# Patient Record
Sex: MALE | Race: WHITE | NOT HISPANIC OR LATINO | Employment: OTHER | URBAN - NONMETROPOLITAN AREA
[De-identification: names, ages, dates, MRNs, and addresses within clinical notes are randomized per-mention and may not be internally consistent; named-entity substitution may affect disease eponyms.]

---

## 2021-07-18 ENCOUNTER — HOSPITAL ENCOUNTER (INPATIENT)
Facility: HOSPITAL | Age: 76
LOS: 16 days | Discharge: NURSING, REHAB OR LONG-TERM CARE | DRG: 291 | End: 2021-08-03
Attending: EMERGENCY MEDICINE | Admitting: FAMILY MEDICINE
Payer: COMMERCIAL

## 2021-07-18 ENCOUNTER — APPOINTMENT (EMERGENCY)
Dept: RADIOLOGY | Facility: HOSPITAL | Age: 76
DRG: 291 | End: 2021-07-18
Payer: COMMERCIAL

## 2021-07-18 DIAGNOSIS — E11.22 TYPE 2 DIABETES MELLITUS WITH STAGE 3B CHRONIC KIDNEY DISEASE, WITHOUT LONG-TERM CURRENT USE OF INSULIN (HCC): ICD-10-CM

## 2021-07-18 DIAGNOSIS — K92.2 GI BLEED: ICD-10-CM
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
## 2021-07-18 DIAGNOSIS — N17.9 AKI (ACUTE KIDNEY INJURY) (HCC): ICD-10-CM

## 2021-07-18 DIAGNOSIS — I50.33 ACUTE ON CHRONIC DIASTOLIC CHF (CONGESTIVE HEART FAILURE) (HCC): ICD-10-CM

## 2021-07-18 DIAGNOSIS — D64.9 ANEMIA: ICD-10-CM

## 2021-07-18 DIAGNOSIS — R60.1 ANASARCA: ICD-10-CM

## 2021-07-18 DIAGNOSIS — F03.90 DEMENTIA WITHOUT BEHAVIORAL DISTURBANCE (HCC): ICD-10-CM

## 2021-07-18 DIAGNOSIS — G93.89 ENCEPHALOMALACIA: ICD-10-CM

## 2021-07-18 DIAGNOSIS — N18.32 TYPE 2 DIABETES MELLITUS WITH STAGE 3B CHRONIC KIDNEY DISEASE, WITHOUT LONG-TERM CURRENT USE OF INSULIN (HCC): ICD-10-CM

## 2021-07-18 DIAGNOSIS — I35.0 MODERATE AORTIC STENOSIS BY PRIOR ECHOCARDIOGRAM: ICD-10-CM

## 2021-07-18 DIAGNOSIS — N18.9 ACUTE KIDNEY INJURY SUPERIMPOSED ON CHRONIC KIDNEY DISEASE (HCC): ICD-10-CM

## 2021-07-18 DIAGNOSIS — K92.2 GASTROINTESTINAL HEMORRHAGE, UNSPECIFIED GASTROINTESTINAL HEMORRHAGE TYPE: ICD-10-CM

## 2021-07-18 DIAGNOSIS — E87.6 HYPOKALEMIA: ICD-10-CM

## 2021-07-18 DIAGNOSIS — E87.79 OTHER HYPERVOLEMIA: ICD-10-CM

## 2021-07-18 DIAGNOSIS — R06.89 HYPERCAPNIA: ICD-10-CM

## 2021-07-18 DIAGNOSIS — E03.9 HYPOTHYROIDISM: ICD-10-CM

## 2021-07-18 DIAGNOSIS — N17.9 ACUTE KIDNEY INJURY SUPERIMPOSED ON CHRONIC KIDNEY DISEASE (HCC): ICD-10-CM

## 2021-07-18 DIAGNOSIS — R60.9 PERIPHERAL EDEMA: Primary | ICD-10-CM

## 2021-07-18 DIAGNOSIS — G93.40 ACUTE ENCEPHALOPATHY: ICD-10-CM

## 2021-07-18 PROBLEM — Z86.73 HISTORY OF TIA (TRANSIENT ISCHEMIC ATTACK): Status: ACTIVE | Noted: 2021-07-18

## 2021-07-18 PROBLEM — R29.6 FREQUENT FALLS: Status: ACTIVE | Noted: 2021-07-18

## 2021-07-18 PROBLEM — I48.0 PAF (PAROXYSMAL ATRIAL FIBRILLATION) (HCC): Status: ACTIVE | Noted: 2021-07-18

## 2021-07-18 PROBLEM — E87.70 VOLUME OVERLOAD: Status: ACTIVE | Noted: 2021-07-18

## 2021-07-18 PROBLEM — G30.9 ALZHEIMER'S DEMENTIA (HCC): Status: ACTIVE | Noted: 2021-07-18

## 2021-07-18 PROBLEM — F02.80 ALZHEIMER'S DEMENTIA (HCC): Status: ACTIVE | Noted: 2021-07-18

## 2021-07-18 PROBLEM — R26.2 AMBULATORY DYSFUNCTION: Status: ACTIVE | Noted: 2021-07-18

## 2021-07-18 PROBLEM — I10 ESSENTIAL HYPERTENSION: Status: ACTIVE | Noted: 2021-07-18

## 2021-07-18 PROBLEM — E11.29 TYPE 2 DIABETES MELLITUS WITH KIDNEY COMPLICATION, WITHOUT LONG-TERM CURRENT USE OF INSULIN (HCC): Status: ACTIVE | Noted: 2021-07-18

## 2021-07-18 PROBLEM — Z91.89 AT RISK FOR FLUID VOLUME OVERLOAD: Status: ACTIVE | Noted: 2021-07-18

## 2021-07-18 LAB
ALBUMIN SERPL BCP-MCNC: 3 G/DL (ref 3.5–5)
ALP SERPL-CCNC: 120 U/L (ref 46–116)
ALT SERPL W P-5'-P-CCNC: 25 U/L (ref 12–78)
ANION GAP SERPL CALCULATED.3IONS-SCNC: 10 MMOL/L (ref 4–13)
APTT PPP: 35 SECONDS (ref 23–37)
AST SERPL W P-5'-P-CCNC: 27 U/L (ref 5–45)
BASOPHILS # BLD AUTO: 0.06 THOUSANDS/ΜL (ref 0–0.1)
BASOPHILS NFR BLD AUTO: 1 % (ref 0–1)
BILIRUB SERPL-MCNC: 0.58 MG/DL (ref 0.2–1)
BUN SERPL-MCNC: 25 MG/DL (ref 5–25)
CALCIUM ALBUM COR SERPL-MCNC: 9.2 MG/DL (ref 8.3–10.1)
CALCIUM SERPL-MCNC: 8.4 MG/DL (ref 8.3–10.1)
CHLORIDE SERPL-SCNC: 105 MMOL/L (ref 100–108)
CK MB SERPL-MCNC: 1.4 NG/ML (ref 0–5)
CK MB SERPL-MCNC: <1 % (ref 0–2.5)
CK SERPL-CCNC: 286 U/L (ref 39–308)
CO2 SERPL-SCNC: 30 MMOL/L (ref 21–32)
CREAT SERPL-MCNC: 2.21 MG/DL (ref 0.6–1.3)
EOSINOPHIL # BLD AUTO: 0.16 THOUSAND/ΜL (ref 0–0.61)
EOSINOPHIL NFR BLD AUTO: 3 % (ref 0–6)
ERYTHROCYTE [DISTWIDTH] IN BLOOD BY AUTOMATED COUNT: 15.9 % (ref 11.6–15.1)
GFR SERPL CREATININE-BSD FRML MDRD: 28 ML/MIN/1.73SQ M
GLUCOSE SERPL-MCNC: 133 MG/DL (ref 65–140)
HCT VFR BLD AUTO: 29.2 % (ref 36.5–49.3)
HGB BLD-MCNC: 8.5 G/DL (ref 12–17)
IMM GRANULOCYTES # BLD AUTO: 0.02 THOUSAND/UL (ref 0–0.2)
IMM GRANULOCYTES NFR BLD AUTO: 0 % (ref 0–2)
INR PPP: 1.22 (ref 0.84–1.19)
LACTATE SERPL-SCNC: 1.1 MMOL/L (ref 0.5–2)
LYMPHOCYTES # BLD AUTO: 0.65 THOUSANDS/ΜL (ref 0.6–4.47)
LYMPHOCYTES NFR BLD AUTO: 11 % (ref 14–44)
MAGNESIUM SERPL-MCNC: 2.3 MG/DL (ref 1.6–2.6)
MCH RBC QN AUTO: 26.6 PG (ref 26.8–34.3)
MCHC RBC AUTO-ENTMCNC: 29.1 G/DL (ref 31.4–37.4)
MCV RBC AUTO: 91 FL (ref 82–98)
MONOCYTES # BLD AUTO: 0.78 THOUSAND/ΜL (ref 0.17–1.22)
MONOCYTES NFR BLD AUTO: 14 % (ref 4–12)
NEUTROPHILS # BLD AUTO: 4.05 THOUSANDS/ΜL (ref 1.85–7.62)
NEUTS SEG NFR BLD AUTO: 71 % (ref 43–75)
NRBC BLD AUTO-RTO: 0 /100 WBCS
NT-PROBNP SERPL-MCNC: 4495 PG/ML
PLATELET # BLD AUTO: 357 THOUSANDS/UL (ref 149–390)
PMV BLD AUTO: 9.8 FL (ref 8.9–12.7)
POTASSIUM SERPL-SCNC: 2.9 MMOL/L (ref 3.5–5.3)
PROT SERPL-MCNC: 7.5 G/DL (ref 6.4–8.2)
PROTHROMBIN TIME: 15.2 SECONDS (ref 11.6–14.5)
RBC # BLD AUTO: 3.2 MILLION/UL (ref 3.88–5.62)
SODIUM SERPL-SCNC: 145 MMOL/L (ref 136–145)
TROPONIN I SERPL-MCNC: 0.04 NG/ML
WBC # BLD AUTO: 5.72 THOUSAND/UL (ref 4.31–10.16)

## 2021-07-18 PROCEDURE — 71045 X-RAY EXAM CHEST 1 VIEW: CPT

## 2021-07-18 PROCEDURE — 85610 PROTHROMBIN TIME: CPT | Performed by: EMERGENCY MEDICINE

## 2021-07-18 PROCEDURE — 36415 COLL VENOUS BLD VENIPUNCTURE: CPT | Performed by: EMERGENCY MEDICINE

## 2021-07-18 PROCEDURE — 93005 ELECTROCARDIOGRAM TRACING: CPT

## 2021-07-18 PROCEDURE — 99285 EMERGENCY DEPT VISIT HI MDM: CPT | Performed by: EMERGENCY MEDICINE

## 2021-07-18 PROCEDURE — 85730 THROMBOPLASTIN TIME PARTIAL: CPT | Performed by: EMERGENCY MEDICINE

## 2021-07-18 PROCEDURE — 96374 THER/PROPH/DIAG INJ IV PUSH: CPT

## 2021-07-18 PROCEDURE — 80053 COMPREHEN METABOLIC PANEL: CPT | Performed by: EMERGENCY MEDICINE

## 2021-07-18 PROCEDURE — 82550 ASSAY OF CK (CPK): CPT | Performed by: EMERGENCY MEDICINE

## 2021-07-18 PROCEDURE — 84484 ASSAY OF TROPONIN QUANT: CPT | Performed by: EMERGENCY MEDICINE

## 2021-07-18 PROCEDURE — 99285 EMERGENCY DEPT VISIT HI MDM: CPT

## 2021-07-18 PROCEDURE — 82553 CREATINE MB FRACTION: CPT | Performed by: EMERGENCY MEDICINE

## 2021-07-18 PROCEDURE — 85025 COMPLETE CBC W/AUTO DIFF WBC: CPT | Performed by: EMERGENCY MEDICINE

## 2021-07-18 PROCEDURE — 83880 ASSAY OF NATRIURETIC PEPTIDE: CPT | Performed by: EMERGENCY MEDICINE

## 2021-07-18 PROCEDURE — 83735 ASSAY OF MAGNESIUM: CPT | Performed by: EMERGENCY MEDICINE

## 2021-07-18 PROCEDURE — 99223 1ST HOSP IP/OBS HIGH 75: CPT | Performed by: NURSE PRACTITIONER

## 2021-07-18 PROCEDURE — 83605 ASSAY OF LACTIC ACID: CPT | Performed by: EMERGENCY MEDICINE

## 2021-07-18 RX ORDER — GLIMEPIRIDE 4 MG/1
4 TABLET ORAL 2 TIMES DAILY
COMMUNITY
Start: 2021-03-23 | End: 2021-08-03 | Stop reason: HOSPADM

## 2021-07-18 RX ORDER — DONEPEZIL HYDROCHLORIDE 5 MG/1
5 TABLET, FILM COATED ORAL
Status: DISCONTINUED | OUTPATIENT
Start: 2021-07-18 | End: 2021-08-03 | Stop reason: HOSPADM

## 2021-07-18 RX ORDER — POTASSIUM CHLORIDE 29.8 MG/ML
40 INJECTION INTRAVENOUS ONCE
Status: DISCONTINUED | OUTPATIENT
Start: 2021-07-18 | End: 2021-07-18 | Stop reason: RX

## 2021-07-18 RX ORDER — DONEPEZIL HYDROCHLORIDE 5 MG/1
5 TABLET, FILM COATED ORAL
COMMUNITY
Start: 2021-03-23 | End: 2022-03-23

## 2021-07-18 RX ORDER — AMLODIPINE BESYLATE 2.5 MG/1
2.5 TABLET ORAL DAILY
Status: DISCONTINUED | OUTPATIENT
Start: 2021-07-19 | End: 2021-07-19

## 2021-07-18 RX ORDER — DOXYCYCLINE HYCLATE 50 MG/1
324 CAPSULE, GELATIN COATED ORAL DAILY
Status: DISCONTINUED | OUTPATIENT
Start: 2021-07-19 | End: 2021-07-23

## 2021-07-18 RX ORDER — PANTOPRAZOLE SODIUM 40 MG/1
40 TABLET, DELAYED RELEASE ORAL
Status: DISCONTINUED | OUTPATIENT
Start: 2021-07-19 | End: 2021-08-03 | Stop reason: HOSPADM

## 2021-07-18 RX ORDER — FUROSEMIDE 40 MG/1
40 TABLET ORAL 2 TIMES DAILY
Status: DISCONTINUED | OUTPATIENT
Start: 2021-07-18 | End: 2021-07-18

## 2021-07-18 RX ORDER — LEVOTHYROXINE SODIUM 0.1 MG/1
100 TABLET ORAL
COMMUNITY
Start: 2021-03-23 | End: 2021-08-03 | Stop reason: HOSPADM

## 2021-07-18 RX ORDER — POTASSIUM CHLORIDE 20 MEQ/1
40 TABLET, EXTENDED RELEASE ORAL ONCE
Status: COMPLETED | OUTPATIENT
Start: 2021-07-18 | End: 2021-07-18

## 2021-07-18 RX ORDER — PANTOPRAZOLE SODIUM 40 MG/1
40 TABLET, DELAYED RELEASE ORAL DAILY
COMMUNITY
Start: 2021-03-23

## 2021-07-18 RX ORDER — FUROSEMIDE 10 MG/ML
40 INJECTION INTRAMUSCULAR; INTRAVENOUS
Status: DISCONTINUED | OUTPATIENT
Start: 2021-07-18 | End: 2021-07-19

## 2021-07-18 RX ORDER — FUROSEMIDE 40 MG/1
40 TABLET ORAL DAILY
COMMUNITY
Start: 2021-03-23 | End: 2021-08-03 | Stop reason: HOSPADM

## 2021-07-18 RX ORDER — ACETAMINOPHEN 325 MG/1
650 TABLET ORAL EVERY 4 HOURS PRN
Status: DISCONTINUED | OUTPATIENT
Start: 2021-07-18 | End: 2021-08-03 | Stop reason: HOSPADM

## 2021-07-18 RX ORDER — LOSARTAN POTASSIUM 100 MG/1
100 TABLET ORAL DAILY
COMMUNITY
Start: 2021-04-05 | End: 2021-08-03 | Stop reason: HOSPADM

## 2021-07-18 RX ORDER — METOPROLOL SUCCINATE 50 MG/1
50 TABLET, EXTENDED RELEASE ORAL DAILY
COMMUNITY
Start: 2021-06-15 | End: 2022-06-15

## 2021-07-18 RX ORDER — POTASSIUM CHLORIDE 14.9 MG/ML
20 INJECTION INTRAVENOUS
Status: COMPLETED | OUTPATIENT
Start: 2021-07-18 | End: 2021-07-19

## 2021-07-18 RX ORDER — METOPROLOL SUCCINATE 50 MG/1
50 TABLET, EXTENDED RELEASE ORAL DAILY
Status: DISCONTINUED | OUTPATIENT
Start: 2021-07-19 | End: 2021-08-03 | Stop reason: HOSPADM

## 2021-07-18 RX ORDER — LEVOTHYROXINE SODIUM 0.1 MG/1
100 TABLET ORAL
Status: DISCONTINUED | OUTPATIENT
Start: 2021-07-19 | End: 2021-07-21

## 2021-07-18 RX ORDER — PIOGLITAZONEHYDROCHLORIDE 30 MG/1
TABLET ORAL
COMMUNITY
Start: 2021-03-23 | End: 2021-08-03 | Stop reason: HOSPADM

## 2021-07-18 RX ORDER — ATORVASTATIN CALCIUM 40 MG/1
80 TABLET, FILM COATED ORAL
Status: DISCONTINUED | OUTPATIENT
Start: 2021-07-19 | End: 2021-08-03 | Stop reason: HOSPADM

## 2021-07-18 RX ORDER — ATORVASTATIN CALCIUM 80 MG/1
80 TABLET, FILM COATED ORAL
COMMUNITY
Start: 2021-03-23

## 2021-07-18 RX ORDER — AMLODIPINE BESYLATE 5 MG/1
5 TABLET ORAL DAILY
COMMUNITY
Start: 2021-03-23

## 2021-07-18 RX ORDER — CLOPIDOGREL BISULFATE 75 MG/1
75 TABLET ORAL DAILY
COMMUNITY
Start: 2021-03-23

## 2021-07-18 RX ADMIN — POTASSIUM CHLORIDE 20 MEQ: 14.9 INJECTION, SOLUTION INTRAVENOUS at 22:02

## 2021-07-18 RX ADMIN — POTASSIUM CHLORIDE 20 MEQ: 14.9 INJECTION, SOLUTION INTRAVENOUS at 23:58

## 2021-07-18 RX ADMIN — POTASSIUM CHLORIDE 40 MEQ: 1500 TABLET, EXTENDED RELEASE ORAL at 21:49

## 2021-07-18 RX ADMIN — DONEPEZIL HYDROCHLORIDE 5 MG: 5 TABLET ORAL at 23:59

## 2021-07-19 ENCOUNTER — ANESTHESIA EVENT (INPATIENT)
Dept: ANESTHESIOLOGY | Facility: HOSPITAL | Age: 76
DRG: 291 | End: 2021-07-19
Payer: COMMERCIAL

## 2021-07-19 ENCOUNTER — ANESTHESIA (INPATIENT)
Dept: ANESTHESIOLOGY | Facility: HOSPITAL | Age: 76
DRG: 291 | End: 2021-07-19
Payer: COMMERCIAL

## 2021-07-19 ENCOUNTER — APPOINTMENT (INPATIENT)
Dept: NON INVASIVE DIAGNOSTICS | Facility: HOSPITAL | Age: 76
DRG: 291 | End: 2021-07-19
Payer: COMMERCIAL

## 2021-07-19 PROBLEM — I50.33 ACUTE ON CHRONIC DIASTOLIC CHF (CONGESTIVE HEART FAILURE) (HCC): Status: ACTIVE | Noted: 2021-07-18

## 2021-07-19 PROBLEM — E66.01 MORBID OBESITY WITH BMI OF 40.0-44.9, ADULT (HCC): Status: ACTIVE | Noted: 2021-07-19

## 2021-07-19 LAB
ANION GAP SERPL CALCULATED.3IONS-SCNC: 9 MMOL/L (ref 4–13)
BILIRUB UR QL STRIP: NEGATIVE
BUN SERPL-MCNC: 23 MG/DL (ref 5–25)
CALCIUM SERPL-MCNC: 8.4 MG/DL (ref 8.3–10.1)
CHLORIDE SERPL-SCNC: 107 MMOL/L (ref 100–108)
CHOLEST SERPL-MCNC: 86 MG/DL (ref 50–200)
CLARITY UR: CLEAR
CO2 SERPL-SCNC: 28 MMOL/L (ref 21–32)
COLOR UR: YELLOW
CREAT SERPL-MCNC: 2.06 MG/DL (ref 0.6–1.3)
ERYTHROCYTE [DISTWIDTH] IN BLOOD BY AUTOMATED COUNT: 15.9 % (ref 11.6–15.1)
EST. AVERAGE GLUCOSE BLD GHB EST-MCNC: 143 MG/DL
GFR SERPL CREATININE-BSD FRML MDRD: 30 ML/MIN/1.73SQ M
GLUCOSE SERPL-MCNC: 102 MG/DL (ref 65–140)
GLUCOSE SERPL-MCNC: 121 MG/DL (ref 65–140)
GLUCOSE SERPL-MCNC: 133 MG/DL (ref 65–140)
GLUCOSE SERPL-MCNC: 61 MG/DL (ref 65–140)
GLUCOSE SERPL-MCNC: 63 MG/DL (ref 65–140)
GLUCOSE SERPL-MCNC: 67 MG/DL (ref 65–140)
GLUCOSE SERPL-MCNC: 79 MG/DL (ref 65–140)
GLUCOSE SERPL-MCNC: 85 MG/DL (ref 65–140)
GLUCOSE SERPL-MCNC: 93 MG/DL (ref 65–140)
GLUCOSE UR STRIP-MCNC: NEGATIVE MG/DL
HBA1C MFR BLD: 6.6 %
HCT VFR BLD AUTO: 27.2 % (ref 36.5–49.3)
HDLC SERPL-MCNC: 41 MG/DL
HGB BLD-MCNC: 7.8 G/DL (ref 12–17)
HGB UR QL STRIP.AUTO: NEGATIVE
KETONES UR STRIP-MCNC: NEGATIVE MG/DL
LDLC SERPL CALC-MCNC: 33 MG/DL (ref 0–100)
LEUKOCYTE ESTERASE UR QL STRIP: NEGATIVE
MCH RBC QN AUTO: 26.4 PG (ref 26.8–34.3)
MCHC RBC AUTO-ENTMCNC: 28.7 G/DL (ref 31.4–37.4)
MCV RBC AUTO: 92 FL (ref 82–98)
NITRITE UR QL STRIP: NEGATIVE
NONHDLC SERPL-MCNC: 45 MG/DL
PH UR STRIP.AUTO: 5.5 [PH]
PLATELET # BLD AUTO: 328 THOUSANDS/UL (ref 149–390)
PMV BLD AUTO: 10.2 FL (ref 8.9–12.7)
POTASSIUM SERPL-SCNC: 3.2 MMOL/L (ref 3.5–5.3)
PROT UR STRIP-MCNC: NEGATIVE MG/DL
RBC # BLD AUTO: 2.95 MILLION/UL (ref 3.88–5.62)
SODIUM SERPL-SCNC: 144 MMOL/L (ref 136–145)
SP GR UR STRIP.AUTO: 1.01 (ref 1–1.03)
T4 FREE SERPL-MCNC: 1.2 NG/DL (ref 0.76–1.46)
TRIGL SERPL-MCNC: 58 MG/DL
TSH SERPL DL<=0.05 MIU/L-ACNC: 14.38 UIU/ML (ref 0.36–3.74)
UROBILINOGEN UR QL STRIP.AUTO: 0.2 E.U./DL
WBC # BLD AUTO: 4.62 THOUSAND/UL (ref 4.31–10.16)

## 2021-07-19 PROCEDURE — 97163 PT EVAL HIGH COMPLEX 45 MIN: CPT

## 2021-07-19 PROCEDURE — 80048 BASIC METABOLIC PNL TOTAL CA: CPT | Performed by: NURSE PRACTITIONER

## 2021-07-19 PROCEDURE — 93308 TTE F-UP OR LMTD: CPT

## 2021-07-19 PROCEDURE — 93325 DOPPLER ECHO COLOR FLOW MAPG: CPT | Performed by: INTERNAL MEDICINE

## 2021-07-19 PROCEDURE — 93321 DOPPLER ECHO F-UP/LMTD STD: CPT | Performed by: INTERNAL MEDICINE

## 2021-07-19 PROCEDURE — 84443 ASSAY THYROID STIM HORMONE: CPT | Performed by: NURSE PRACTITIONER

## 2021-07-19 PROCEDURE — 97167 OT EVAL HIGH COMPLEX 60 MIN: CPT

## 2021-07-19 PROCEDURE — 94760 N-INVAS EAR/PLS OXIMETRY 1: CPT

## 2021-07-19 PROCEDURE — 82948 REAGENT STRIP/BLOOD GLUCOSE: CPT

## 2021-07-19 PROCEDURE — 80061 LIPID PANEL: CPT | Performed by: NURSE PRACTITIONER

## 2021-07-19 PROCEDURE — 93308 TTE F-UP OR LMTD: CPT | Performed by: INTERNAL MEDICINE

## 2021-07-19 PROCEDURE — 85027 COMPLETE CBC AUTOMATED: CPT | Performed by: NURSE PRACTITIONER

## 2021-07-19 PROCEDURE — 83036 HEMOGLOBIN GLYCOSYLATED A1C: CPT | Performed by: NURSE PRACTITIONER

## 2021-07-19 PROCEDURE — 84439 ASSAY OF FREE THYROXINE: CPT | Performed by: NURSE PRACTITIONER

## 2021-07-19 PROCEDURE — 81003 URINALYSIS AUTO W/O SCOPE: CPT | Performed by: EMERGENCY MEDICINE

## 2021-07-19 PROCEDURE — 99233 SBSQ HOSP IP/OBS HIGH 50: CPT | Performed by: FAMILY MEDICINE

## 2021-07-19 RX ORDER — POTASSIUM CHLORIDE 20 MEQ/1
40 TABLET, EXTENDED RELEASE ORAL ONCE
Status: COMPLETED | OUTPATIENT
Start: 2021-07-19 | End: 2021-07-19

## 2021-07-19 RX ORDER — FUROSEMIDE 10 MG/ML
40 INJECTION INTRAMUSCULAR; INTRAVENOUS ONCE
Status: COMPLETED | OUTPATIENT
Start: 2021-07-19 | End: 2021-07-19

## 2021-07-19 RX ORDER — FUROSEMIDE 10 MG/ML
20 INJECTION INTRAMUSCULAR; INTRAVENOUS ONCE
Status: DISCONTINUED | OUTPATIENT
Start: 2021-07-19 | End: 2021-07-19

## 2021-07-19 RX ORDER — POLYETHYLENE GLYCOL 3350 17 G/17G
238 POWDER, FOR SOLUTION ORAL ONCE
Status: COMPLETED | OUTPATIENT
Start: 2021-07-19 | End: 2021-07-19

## 2021-07-19 RX ORDER — FUROSEMIDE 10 MG/ML
60 INJECTION INTRAMUSCULAR; INTRAVENOUS
Status: DISCONTINUED | OUTPATIENT
Start: 2021-07-19 | End: 2021-07-21

## 2021-07-19 RX ORDER — DEXTROSE MONOHYDRATE 50 MG/ML
50 INJECTION, SOLUTION INTRAVENOUS CONTINUOUS
Status: DISCONTINUED | OUTPATIENT
Start: 2021-07-19 | End: 2021-07-21

## 2021-07-19 RX ADMIN — POLYETHYLENE GLYCOL 3350 238 G: 17 POWDER, FOR SOLUTION ORAL at 12:24

## 2021-07-19 RX ADMIN — LEVOTHYROXINE SODIUM 100 MCG: 100 TABLET ORAL at 05:03

## 2021-07-19 RX ADMIN — PANTOPRAZOLE SODIUM 40 MG: 40 TABLET, DELAYED RELEASE ORAL at 05:03

## 2021-07-19 RX ADMIN — FUROSEMIDE 40 MG: 10 INJECTION, SOLUTION INTRAMUSCULAR; INTRAVENOUS at 12:24

## 2021-07-19 RX ADMIN — DEXTROSE 50 ML/HR: 5 SOLUTION INTRAVENOUS at 17:44

## 2021-07-19 RX ADMIN — ATORVASTATIN CALCIUM 80 MG: 40 TABLET, FILM COATED ORAL at 18:37

## 2021-07-19 RX ADMIN — BISACODYL 20 MG: 5 TABLET, COATED ORAL at 15:00

## 2021-07-19 RX ADMIN — FUROSEMIDE 40 MG: 10 INJECTION, SOLUTION INTRAMUSCULAR; INTRAVENOUS at 08:51

## 2021-07-19 RX ADMIN — DONEPEZIL HYDROCHLORIDE 5 MG: 5 TABLET ORAL at 21:33

## 2021-07-19 RX ADMIN — METOPROLOL SUCCINATE 50 MG: 50 TABLET, EXTENDED RELEASE ORAL at 08:51

## 2021-07-19 RX ADMIN — AMLODIPINE BESYLATE 2.5 MG: 2.5 TABLET ORAL at 08:51

## 2021-07-19 RX ADMIN — FERROUS GLUCONATE 324 MG: 324 TABLET ORAL at 08:51

## 2021-07-19 RX ADMIN — FUROSEMIDE 60 MG: 10 INJECTION, SOLUTION INTRAMUSCULAR; INTRAVENOUS at 18:36

## 2021-07-19 RX ADMIN — POTASSIUM CHLORIDE 40 MEQ: 1500 TABLET, EXTENDED RELEASE ORAL at 08:57

## 2021-07-19 RX ADMIN — POTASSIUM CHLORIDE 40 MEQ: 1500 TABLET, EXTENDED RELEASE ORAL at 12:24

## 2021-07-19 RX ADMIN — PERFLUTREN 0.4 ML/MIN: 6.52 INJECTION, SUSPENSION INTRAVENOUS at 15:29

## 2021-07-19 NOTE — CONSULTS
Consult received for CHF ed  Pt with +4 BLE and nonpitting perineal edema per nursing flowsheets  Currently on CCD clear liquids diet and NPO after midnight for colonoscopy  Pt has decreased appetite for about a week, smaller portions at meals  Normally eats 2 meals per day though does not follow special diet at home  Wife reports pt uses some salt at table, does report consumption of high sodium foods like processed meats  Discussed high sodium foods and alternatives  Advised avoidance of salt shaker  CHF Nutrition Therapy handout provided with RD contact information  Advance diet as medically appropriate to CCD 3, cardiac diet  Fluid restriction per MD     Thank you for the consult, will follow up

## 2021-07-19 NOTE — PLAN OF CARE
Problem: Potential for Falls  Goal: Patient will remain free of falls  Description: INTERVENTIONS:  - Educate patient/family on patient safety including physical limitations  - Instruct patient to call for assistance with activity   - Consult OT/PT to assist with strengthening/mobility   - Keep Call bell within reach  - Keep bed low and locked with side rails adjusted as appropriate  - Keep care items and personal belongings within reach  - Initiate and maintain comfort rounds  - Make Fall Risk Sign visible to staff  -  - Apply yellow socks and bracelet for high fall risk patients  - Consider moving patient to room near nurses station  Outcome: Progressing     Problem: MOBILITY - ADULT  Goal: Maintain or return to baseline ADL function  Description: INTERVENTIONS:  -  Assess patient's ability to carry out ADLs; assess patient's baseline for ADL function and identify physical deficits which impact ability to perform ADLs (bathing, care of mouth/teeth, toileting, grooming, dressing, etc )  - Assess/evaluate cause of self-care deficits   - Assess range of motion  - Assess patient's mobility; develop plan if impaired  - Assess patient's need for assistive devices and provide as appropriate  - Encourage maximum independence but intervene and supervise when necessary  - Involve family in performance of ADLs  - Assess for home care needs following discharge   - Consider OT consult to assist with ADL evaluation and planning for discharge  - Provide patient education as appropriate  Outcome: Progressing  Goal: Maintains/Returns to pre admission functional level  Description: INTERVENTIONS:  - Perform BMAT or MOVE assessment daily    - Set and communicate daily mobility goal to care team and patient/family/caregiver     - Collaborate with rehabilitation services on mobility goals if consulted  -  - Out of bed for toileting  - Record patient progress and toleration of activity level   Outcome: Progressing     Problem: Prexisting or High Potential for Compromised Skin Integrity  Goal: Skin integrity is maintained or improved  Description: INTERVENTIONS:  - Identify patients at risk for skin breakdown  - Assess and monitor skin integrity  - Assess and monitor nutrition and hydration status  - Monitor labs   - Assess for incontinence   - Turn and reposition patient  - Assist with mobility/ambulation  - Relieve pressure over bony prominences  - Avoid friction and shearing  - Provide appropriate hygiene as needed including keeping skin clean and dry  - Evaluate need for skin moisturizer/barrier cream  - Collaborate with interdisciplinary team   - Patient/family teaching  - Consider wound care consult   Outcome: Progressing     Problem: NEUROSENSORY - ADULT  Goal: Achieves stable or improved neurological status  Description: INTERVENTIONS  - Monitor and report changes in neurological status  - Monitor vital signs such as temperature, blood pressure, glucose, and any other labs ordered   - Initiate measures to prevent increased intracranial pressure  - Monitor for seizure activity and implement precautions if appropriate      Outcome: Progressing  Goal: Remains free of injury related to seizures activity  Description: INTERVENTIONS  - Maintain airway, patient safety  and administer oxygen as ordered  - Monitor patient for seizure activity, document and report duration and description of seizure to physician/advanced practitioner  - If seizure occurs,  ensure patient safety during seizure  - Reorient patient post seizure  - Seizure pads on all 4 side rails  - Instruct patient/family to notify RN of any seizure activity including if an aura is experienced  - Instruct patient/family to call for assistance with activity based on nursing assessment  - Administer anti-seizure medications if ordered    Outcome: Progressing  Goal: Achieves maximal functionality and self care  Description: INTERVENTIONS  - Monitor swallowing and airway patency with patient fatigue and changes in neurological status  - Encourage and assist patient to increase activity and self care     - Encourage visually impaired, hearing impaired and aphasic patients to use assistive/communication devices  Outcome: Progressing     Problem: CARDIOVASCULAR - ADULT  Goal: Maintains optimal cardiac output and hemodynamic stability  Description: INTERVENTIONS:  - Monitor I/O, vital signs and rhythm  - Monitor for S/S and trends of decreased cardiac output  - Administer and titrate ordered vasoactive medications to optimize hemodynamic stability  - Assess quality of pulses, skin color and temperature  - Assess for signs of decreased coronary artery perfusion  - Instruct patient to report change in severity of symptoms  Outcome: Progressing  Goal: Absence of cardiac dysrhythmias or at baseline rhythm  Description: INTERVENTIONS:  - Continuous cardiac monitoring, vital signs, obtain 12 lead EKG if ordered  - Administer antiarrhythmic and heart rate control medications as ordered  - Monitor electrolytes and administer replacement therapy as ordered  Outcome: Progressing     Problem: RESPIRATORY - ADULT  Goal: Achieves optimal ventilation and oxygenation  Description: INTERVENTIONS:  - Assess for changes in respiratory status  - Assess for changes in mentation and behavior  - Position to facilitate oxygenation and minimize respiratory effort  - Oxygen administered by appropriate delivery if ordered  - Initiate smoking cessation education as indicated  - Encourage broncho-pulmonary hygiene including cough, deep breathe, Incentive Spirometry  - Assess the need for suctioning and aspirate as needed  - Assess and instruct to report SOB or any respiratory difficulty  - Respiratory Therapy support as indicated  Outcome: Progressing     Problem: GASTROINTESTINAL - ADULT  Goal: Minimal or absence of nausea and/or vomiting  Description: INTERVENTIONS:  - Administer IV fluids if ordered to ensure adequate hydration  - Maintain NPO status until nausea and vomiting are resolved  - Nasogastric tube if ordered  - Administer ordered antiemetic medications as needed  - Provide nonpharmacologic comfort measures as appropriate  - Advance diet as tolerated, if ordered  - Consider nutrition services referral to assist patient with adequate nutrition and appropriate food choices  Outcome: Progressing  Goal: Maintains or returns to baseline bowel function  Description: INTERVENTIONS:  - Assess bowel function  - Encourage oral fluids to ensure adequate hydration  - Administer IV fluids if ordered to ensure adequate hydration  - Administer ordered medications as needed  - Encourage mobilization and activity  - Consider nutritional services referral to assist patient with adequate nutrition and appropriate food choices  Outcome: Progressing  Goal: Maintains adequate nutritional intake  Description: INTERVENTIONS:  - Monitor percentage of each meal consumed  - Identify factors contributing to decreased intake, treat as appropriate  - Assist with meals as needed  - Monitor I&O, weight, and lab values if indicated  - Obtain nutrition services referral as needed  Outcome: Progressing  Goal: Establish and maintain optimal ostomy function  Description: INTERVENTIONS:  - Assess bowel function  - Encourage oral fluids to ensure adequate hydration  - Administer IV fluids if ordered to ensure adequate hydration   - Administer ordered medications as needed  - Encourage mobilization and activity  - Nutrition services referral to assist patient with appropriate food choices  - Assess stoma site  - Consider wound care consult   Outcome: Progressing  Goal: Oral mucous membranes remain intact  Description: INTERVENTIONS  - Assess oral mucosa and hygiene practices  - Implement preventative oral hygiene regimen  - Implement oral medicated treatments as ordered  - Initiate Nutrition services referral as needed  Outcome: Progressing     Problem: GENITOURINARY - ADULT  Goal: Maintains or returns to baseline urinary function  Description: INTERVENTIONS:  - Assess urinary function  - Encourage oral fluids to ensure adequate hydration if ordered  - Administer IV fluids as ordered to ensure adequate hydration  - Administer ordered medications as needed  - Offer frequent toileting  - Follow urinary retention protocol if ordered  Outcome: Progressing  Goal: Absence of urinary retention  Description: INTERVENTIONS:  - Assess patients ability to void and empty bladder  - Monitor I/O  - Bladder scan as needed  - Discuss with physician/AP medications to alleviate retention as needed  - Discuss catheterization for long term situations as appropriate  Outcome: Progressing  Goal: Urinary catheter remains patent  Description: INTERVENTIONS:  - Assess patency of urinary catheter  - If patient has a chronic quintero, consider changing catheter if non-functioning  - Follow guidelines for intermittent irrigation of non-functioning urinary catheter  Outcome: Progressing     Problem: METABOLIC, FLUID AND ELECTROLYTES - ADULT  Goal: Electrolytes maintained within normal limits  Description: INTERVENTIONS:  - Monitor labs and assess patient for signs and symptoms of electrolyte imbalances  - Administer electrolyte replacement as ordered  - Monitor response to electrolyte replacements, including repeat lab results as appropriate  - Instruct patient on fluid and nutrition as appropriate  Outcome: Progressing  Goal: Fluid balance maintained  Description: INTERVENTIONS:  - Monitor labs   - Monitor I/O and WT  - Instruct patient on fluid and nutrition as appropriate  - Assess for signs & symptoms of volume excess or deficit  Outcome: Progressing  Goal: Glucose maintained within target range  Description: INTERVENTIONS:  - Monitor Blood Glucose as ordered  - Assess for signs and symptoms of hyperglycemia and hypoglycemia  - Administer ordered medications to maintain glucose within target range  - Assess nutritional intake and initiate nutrition service referral as needed  Outcome: Progressing     Problem: SKIN/TISSUE INTEGRITY - ADULT  Goal: Skin Integrity remains intact(Skin Breakdown Prevention)  Description: Assess:    -Assess extremities for adequate circulation and sensation     Bed Management:  -Have minimal linens on bed & keep smooth, unwrinkled  -Change linens as needed when moist or perspiring  -      Skin Care:  -Avoid use of baby powder, tape, friction and shearing, hot water or constrictive clothing    Outcome: Progressing  Goal: Incision(s), wounds(s) or drain site(s) healing without S/S of infection  Description: INTERVENTIONS  - Assess and document dressing, incision, wound bed, drain sites and surrounding tissue    Outcome: Progressing  Goal: Pressure injury heals and does not worsen  Description: Interventions:  - Implement low air loss mattress or specialty surface (Criteria met)  - Apply silicone foam dressing   hours   - Utilize friction reducing device or surface for transfers     - Consider nutrition services referral as needed  Outcome: Progressing     Problem: HEMATOLOGIC - ADULT  Goal: Maintains hematologic stability  Description: INTERVENTIONS  - Assess for signs and symptoms of bleeding or hemorrhage  - Monitor labs  - Administer supportive blood products/factors as ordered and appropriate  Outcome: Progressing     Problem: MUSCULOSKELETAL - ADULT  Goal: Maintain or return mobility to safest level of function  Description: INTERVENTIONS:  - Assess patient's ability to carry out ADLs; assess patient's baseline for ADL function and identify physical deficits which impact ability to perform ADLs (bathing, care of mouth/teeth, toileting, grooming, dressing, etc )  - Assess/evaluate cause of self-care deficits   - Assess range of motion  - Assess patient's mobility  - Assess patient's need for assistive devices and provide as appropriate  - Encourage maximum independence but intervene and supervise when necessary  - Involve family in performance of ADLs  - Assess for home care needs following discharge   - Consider OT consult to assist with ADL evaluation and planning for discharge  - Provide patient education as appropriate  Outcome: Progressing  Goal: Maintain proper alignment of affected body part  Description: INTERVENTIONS:  - Support, maintain and protect limb and body alignment  - Provide patient/ family with appropriate education  Outcome: Progressing

## 2021-07-19 NOTE — ASSESSMENT & PLAN NOTE
· Lower extremity 3+ pitting edema to bilateral lower extremities extending up to scrotum, bibasilar rales  · Chest x-ray:  Cardiomegaly  Suspect pulmonary vasculature congestion    Radiology read is pending  · BNP:  4500  · Daily weights  · I&Os  · Lasix IV twice daily  · Cardiology consult  · Recheck echocardiogram  · Low-salt diet, fluid restriction

## 2021-07-19 NOTE — H&P
[address] 1945, 64 y o  male MRN: [id number]  Unit/Bed#: -01 Encounter: [id number]  Primary Care Provider: No primary care provider on file  Date and time admitted to hospital: 7/18/2021  9:06 PM    Ambulatory dysfunction  Assessment & Plan  · Patient has been having frequent falls, and today he could not get up from the floor  EMS was concerned that patient may be in an unsafe situation as patient's wife is having difficulty caring for him  · Fall precautions  · Case management consult  · PT OT consult    Volume overload  Assessment & Plan  · Lower extremity 3+ pitting edema to bilateral lower extremities extending up to scrotum, bibasilar rales  · Chest x-ray:  Cardiomegaly  Suspect pulmonary vasculature congestion  Radiology read is pending  · BNP:  4500  · Daily weights  · I&Os  · Lasix IV twice daily  · Cardiology consult  · Recheck echocardiogram  · Low-salt diet, fluid restriction    Acute kidney injury superimposed on chronic kidney disease [facility])  Assessment & Plan  Lab Results   Component Value Date    EGFR 28 07/18/2021    CREATININE 2 21 (H) 07/18/2021     · Baseline creatinine around 1 4  Suspect partially due to losartan-will hold  · Hold off on fluids due to anasarca  · Daily metabolic panel and trend creatinine during diuresis  · Caution with nephrotoxins and avoid hypotension    Moderate aortic stenosis by prior echocardiogram  Assessment & Plan  · Echocardiogram May 2021 at [facility]:  EF 55-60%  Normal diastolic function  Consistent with moderate AS, moderate tricuspid regurgitation  Moderately elevated estimated PA systolic pressure    · He takes Lasix 40 mg orally daily-will change to Lasix 40 mg IV per volume overload plan  · Cardiology consult    Type 2 diabetes mellitus with kidney complication, without long-term current use of insulin ([facility])  Assessment & Plan  Lab Results   Component Value Date    HGBA1C 7 2 (H) 04/20/2018       No results for input(s): POCGLU in the last 72 hours  Blood Sugar Average: Last 72 hrs:     · Diabetic diet  · Fingerstick blood sugar sliding scale coverage  · Hold home oral agents  · Check hemoglobin A1c    History of TIA (transient ischemic attack)  Assessment & Plan  · Plavix on hold due to GI bleeding  · Continue statin    GI bleeding  Assessment & Plan  · Stool Hemoccult positive  · Hemoglobin 8 5  · He has a history of GI bleeding  · GI consult  · Hold Plavix    Essential hypertension  Assessment & Plan  · BP reviewed and acceptable  · Losartan on hold due to NORAH  · Continue metoprolol  · Monitor blood pressure    PAF (paroxysmal atrial fibrillation) ([facility])  Assessment & Plan  · EKG: normal sinus rhythm  · He is not on anticoagulation  · Continue metoprolol    Anemia  Assessment & Plan  · Hemoglobin is 8 5 with baseline around 13  · Stool Hemoccult is positive  · Daily CBC and trend hemoglobin  · Monitor for bleeding  · Continue iron    Alzheimer's dementia ([facility] [address] )  Assessment & Plan  · At baseline  · Continue Aricept  · Supportive care    Hypokalemia  Assessment & Plan  · Potassium 2 9  · Repleted with 40 mEq orally and 20 mEq IV in the ER  · Recheck metabolic panel and trend potassium    VTE Prophylaxis: Pharmacologic VTE Prophylaxis contraindicated due to Hemoccult-positive stool  / sequential compression device   Code Status:  Full  POLST: POLST form is not discussed and not completed at this time  Discussion with family:  Patient    Anticipated Length of Stay:  Patient will be admitted on an Inpatient basis with an anticipated length of stay of  greater than 2 midnights  Justification for Hospital Stay:  Per plan above    Total Time for Visit, including Counseling / Coordination of Care: 45 minutes  Greater than 50% of this total time spent on direct patient counseling and coordination of care      Chief Complaint:   Ambulatory dysfunction, edema    History of Present Illness:    Arash Roach is a 64 y o  male with history of TIA, non-insulin-dependent type 2 diabetes, aortic stenosis, GI bleed, Alzheimer's dementia, essential hypertension, PAF, anemia, and CKD stage IIIb, who presents with ambulatory dysfunction and edema  The patient had slipped off the couch, and EMS was called for lift assistance  EMS noted significant lower extremity edema and were also concerned that patient's wife is having difficulty taking care of him  The patient is alert, but not oriented to time or situation  He offers no complaints, is joking around with staff  When asked questions, he is sometimes vague to cover deficits  He denies fever, congestion, visual changes, shortness of breath, chest pain, nausea, abdominal pain, dysuria, and dizziness, or suicidal ideation  Review of Systems:    Review of Systems   Constitutional: Negative for chills and fever  HENT: Negative for congestion  Eyes: Negative for visual disturbance  Respiratory: Negative for cough and shortness of breath  Cardiovascular: Positive for leg swelling  Negative for chest pain and palpitations  Gastrointestinal: Negative for abdominal pain, constipation, diarrhea, nausea and vomiting  Genitourinary: Negative for dysuria and flank pain  Musculoskeletal: Positive for gait problem  Negative for arthralgias and myalgias  Skin: Negative for pallor and rash  Neurological: Positive for weakness  Negative for dizziness, syncope, numbness and headaches  Psychiatric/Behavioral: Negative for suicidal ideas  All other systems reviewed and are negative        Past Medical and Surgical History:     Past Medical History:   Diagnosis Date    [facility]     Diabetes mellitus ([facility] )     Hypertension        Past Surgical History:   Procedure Laterality Date    APPENDECTOMY      KNEE ARTHROPLASTY Bilateral        Meds/Allergies:    Prior to Admission medications    Medication Sig Start Date End Date Taking? Authorizing Provider   amLODIPine (NORVASC) 2 5 mg tablet Take 2 5 mg by mouth 3/23/21  Yes Historical Provider, MD   atorvastatin (LIPITOR) 80 mg tablet Take 80 mg by mouth 3/23/21  Yes Historical Provider, MD   clopidogrel (PLAVIX) 75 mg tablet Take 75 mg by mouth 3/23/21  Yes Historical Provider, MD   donepezil (ARICEPT) 5 mg tablet Take 5 mg by mouth 3/23/21 3/23/22 Yes Historical Provider, MD   furosemide (LASIX) 40 mg tablet Take 40 mg by mouth daily 3/23/21  Yes Historical Provider, MD   glimepiride (AMARYL) 4 mg tablet Take 4 mg by mouth 3/23/21  Yes Historical Provider, MD   levothyroxine 100 mcg tablet Take 100 mcg by mouth 3/23/21 3/23/22 Yes Historical Provider, MD   losartan (COZAAR) 100 MG tablet Take 100 mg by mouth daily 4/5/21 4/5/22 Yes Historical Provider, MD   metoprolol succinate (TOPROL-XL) 50 mg 24 hr tablet Take 50 mg by mouth daily 6/15/21 6/15/22 Yes Historical Provider, MD   pantoprazole (PROTONIX) 40 mg tablet Take 40 mg by mouth 3/23/21  Yes Historical Provider, MD   pioglitazone (ACTOS) 30 mg tablet Take one tablet by mouth daily 3/23/21  Yes Historical Provider, MD   Ferrous Gluconate 256 (28 Fe) MG TABS Take 246 mg by mouth    Historical Provider, MD     I have reviewed home medications using allscripts  Allergies: No Known Allergies    Social History:     Marital Status:     Occupation:  Retired  Patient Pre-hospital Living Situation:  Home  Patient Pre-hospital Level of Mobility:  Requires assist  Patient Pre-hospital Diet Restrictions:  Cardiac diabetic  Substance Use History:   Social History     Substance and Sexual Activity   Alcohol Use Never     Social History     Tobacco Use   Smoking Status Never Smoker   Smokeless Tobacco Never Used     Social History     Substance and Sexual Activity   Drug Use Never       Family History:    Family History   Problem Relation Age of Onset    Heart disease Mother     Stroke Mother        Physical Exam:     Vitals:   Blood Pressure: 126/54 (07/18/21 2248)  Pulse: 72 (07/18/21 2248)  Temperature: 98 6 °F (37 °C) (07/18/21 2248)  Temp Source: Temporal (07/18/21 2109)  Respirations: 18 (07/18/21 2248)  Height: 5' 7" (170 2 cm) (07/18/21 2109)  Weight - Scale: 126 kg (276 lb 10 8 oz) (07/18/21 2109)  SpO2: 94 % (07/18/21 2248)    Physical Exam  Vitals and nursing note reviewed  HENT:      Head: Normocephalic and atraumatic  Mouth/Throat:      Mouth: Mucous membranes are moist       Pharynx: Oropharynx is clear  Eyes:      Extraocular Movements: Extraocular movements intact  Pupils: Pupils are equal, round, and reactive to light  Cardiovascular:      Rate and Rhythm: Normal rate and regular rhythm  Pulses:           Dorsalis pedis pulses are 1+ on the right side and 1+ on the left side  Heart sounds: Murmur heard  Pulmonary:      Effort: Pulmonary effort is normal       Breath sounds: Rales present  Abdominal:      General: Bowel sounds are normal       Palpations: Abdomen is soft  Tenderness: There is no abdominal tenderness  Genitourinary:     Comments: Scrotal edema  Musculoskeletal:         General: Swelling and tenderness present  Normal range of motion  Cervical back: Normal range of motion and neck supple  Skin:     General: Skin is warm and dry  Capillary Refill: Capillary refill takes less than 2 seconds  Neurological:      General: No focal deficit present  Mental Status: He is alert  He is disoriented  Comments: Confabulates to cover deficits           Additional Data:     Lab Results: I have personally reviewed pertinent reports        Results from last 7 days   Lab Units 07/18/21 2114   WBC Thousand/uL 5 72   HEMOGLOBIN g/dL 8 5*   HEMATOCRIT % 29 2*   PLATELETS Thousands/uL 357   NEUTROS PCT % 71   LYMPHS PCT % 11*   MONOS PCT % 14*   EOS PCT % 3     Results from last 7 days   Lab Units 07/18/21 2114   SODIUM mmol/L 145   POTASSIUM mmol/L 2 9*   CHLORIDE mmol/L 105   CO2 mmol/L 30   BUN mg/dL 25   CREATININE mg/dL 2 21*   ANION GAP mmol/L 10   CALCIUM mg/dL 8 4   ALBUMIN g/dL 3 0*   TOTAL BILIRUBIN mg/dL 0 58   ALK PHOS U/L 120*   ALT U/L 25   AST U/L 27   GLUCOSE RANDOM mg/dL 133     Results from last 7 days   Lab Units 07/18/21  2114   INR  1 22*             Results from last 7 days   Lab Units 07/18/21  2114   LACTIC ACID mmol/L 1 1       Imaging: I have personally reviewed pertinent reports  and I have personally reviewed pertinent films in PACS    XR chest 1 view portable   ED Interpretation by Luzmaria Centeno DO (07/18 2142)   Cardiomegaly, no acute findings            Allscripts / Epic Records Reviewed: Yes     ** Please Note: This note has been constructed using a voice recognition system   **

## 2021-07-19 NOTE — ASSESSMENT & PLAN NOTE
· BP reviewed and acceptable  · Losartan on hold due to NORAH  · Continue metoprolol  · Monitor blood pressure

## 2021-07-19 NOTE — PROGRESS NOTES
[address]yllis Harley  Progress Note Alonso Smith 1945, 64 y o  male MRN: [id number]  Unit/Bed#: -01 Encounter: [id number]  Primary Care Provider: No primary care provider on file  Date and time admitted to hospital: 7/18/2021  9:06 PM    * Acute on chronic diastolic CHF (congestive heart failure) ([facility])  Assessment & Plan  · Lower extremity 3+ pitting edema to bilateral lower extremities extending up to scrotum, bibasilar rales  · Chest x-ray:  Atelectasis  · BNP:  4500  · Daily weights  · I&Os  · Lasix IV twice daily for acute diuresis for now  Not much urine output with 40 IV b i d  Of Lasix  Will increase to 60 mg IV b i d  Closely monitor urine output  Hold Norvasc  · check echocardiogram to evaluate LV function  · Low-salt diet, fluid restriction    Acute on chronic anemia  Assessment & Plan  · Hemoglobin is 8 5 with baseline around 13 admission now trended down to 7 8 this morning  · Stool Hemoccult is positive  · No evidence of gross bleeding noted  Keep NPO for now to be seen by GI for possible EGD today  · Continue iron    Acute kidney injury superimposed on chronic kidney disease [facility])  Assessment & Plan  Lab Results   Component Value Date    EGFR 30 07/19/2021    EGFR 28 07/18/2021    CREATININE 2 06 (H) 07/19/2021    CREATININE 2 21 (H) 07/18/2021     · Baseline creatinine around 1 4  Suspect partially due to losartan-will hold  · Hold off on fluids due to anasarca  · Daily metabolic panel and trend creatinine during diuresis  · Caution with nephrotoxins and avoid hypotension    Hypokalemia  Assessment & Plan  · Potassium 2 9  · Repleted with 40 mEq orally and 20 mEq IV in the ER  · Recheck potassium today is 3 2    Will repeat 40meq oral potassium and recheck BMP again tomorrow    Morbid obesity with BMI of 40 0-44 9, adult [facility])  Assessment & Plan  Consult placed to dietitian    History of TIA (transient ischemic attack)  Assessment & Plan  · Plavix on hold due to GI bleeding  · Continue statin    Essential hypertension  Assessment & Plan  · BP reviewed and acceptable  · Losartan on hold due to NORAH  · Continue metoprolol  · Monitor blood pressure    Ambulatory dysfunction  Assessment & Plan  · Patient has been having frequent falls, and today he could not get up from the floor  EMS was concerned that patient may be in an unsafe situation as patient's wife is having difficulty caring for him  · Fall precautions  · Case management consult  · PT OT consult    Moderate aortic stenosis by prior echocardiogram  Assessment & Plan  · Echocardiogram May 2021 at [facility]:  EF 55-60%  Normal diastolic function  Consistent with moderate AS, moderate tricuspid regurgitation  Moderately elevated estimated PA systolic pressure  · He takes Lasix 40 mg orally daily at home    Type 2 diabetes mellitus with kidney complication, without long-term current use of insulin [facility])  Assessment & Plan  Lab Results   Component Value Date    HGBA1C 6 6 (H) 07/19/2021       Recent Labs     07/19/21  0000 07/19/21  0750   POCGLU 121 85       Blood Sugar Average: Last 72 hrs:  (P) 103   · Diabetic diet  · Fingerstick blood sugar sliding scale coverage  · Hold home oral agents  ·  hemoglobin A1c 6 6  Blood sugars are currently well controlled due to being NPO    PAF (paroxysmal atrial fibrillation) ([facility])  Assessment & Plan  · EKG: normal sinus rhythm  · He is not on anticoagulation  · Continue metoprolol    Alzheimer's dementia ([facility] [address] )  Assessment & Plan  · At baseline  · Continue Aricept  · Supportive care      VTE Pharmacologic Prophylaxis:   Pharmacologic: Pharmacologic VTE Prophylaxis contraindicated due to Acute on chronic anemia  Mechanical VTE Prophylaxis in Place: Yes    Patient Centered Rounds: I have performed bedside rounds with nursing staff today      Discussions with Specialists or Other Care Team Provider:  Discussed with GI    Education and Discussions with Family / Patient:  Discussed with patient at bedside will update family    Time Spent for Care: 45 minutes  More than 50% of total time spent on counseling and coordination of care as described above  Current Length of Stay: 1 day(s)    Current Patient Status: Inpatient   Certification Statement: The patient will continue to require additional inpatient hospital stay due to Acute on chronic diastolic CHF exacerbation    Discharge Plan:  Pending progress    Code Status: Level 1 - Full Code      Subjective:   Patient denies any chest pain or shortness of breath or abdominal pain today  He is a poor historian  Denies any active bleeding  States he has been falling at home and his ex-wife is having a hard time taking care of him    Objective:     Vitals:   Temp (24hrs), Av 7 °F (36 5 °C), Min:96 3 °F (35 7 °C), Max:98 6 °F (37 °C)    Temp:  [96 3 °F (35 7 °C)-98 6 °F (37 °C)] 98 2 °F (36 8 °C)  HR:  [67-73] 68  Resp:  [16-23] 18  BP: (121-150)/(54-72) 121/62  SpO2:  [90 %-95 %] 93 %  Body mass index is 43 3 kg/m²  Input and Output Summary (last 24 hours): Intake/Output Summary (Last 24 hours) at 2021 1101  Last data filed at 2021 0859  Gross per 24 hour   Intake --   Output 200 ml   Net -200 ml       Physical Exam:     Physical Exam  Vitals and nursing note reviewed  Constitutional:       Appearance: He is ill-appearing  Comments: Morbidly obese   HENT:      Head: Normocephalic and atraumatic  Right Ear: External ear normal       Left Ear: External ear normal       Nose: Nose normal       Mouth/Throat:      Pharynx: Oropharynx is clear  Eyes:      Pupils: Pupils are equal, round, and reactive to light  Cardiovascular:      Rate and Rhythm: Normal rate and regular rhythm  Heart sounds: Normal heart sounds     Pulmonary:      Effort: Pulmonary effort is normal       Comments: Moderate air entry bilaterally diminished breath sounds bilateral bases  Abdominal:      General: Bowel sounds are normal  Palpations: Abdomen is soft  Tenderness: There is no abdominal tenderness  Musculoskeletal:         General: Normal range of motion  Cervical back: Normal range of motion and neck supple  Right lower leg: Edema present  Left lower leg: Edema present  Skin:     General: Skin is warm and dry  Capillary Refill: Capillary refill takes less than 2 seconds  Neurological:      General: No focal deficit present  Mental Status: He is alert  Comments: Oriented to person and place   Psychiatric:         Mood and Affect: Mood normal            Additional Data:     Labs:    Results from last 7 days   Lab Units 07/19/21  0449 07/18/21 2114   WBC Thousand/uL 4 62 5 72   HEMOGLOBIN g/dL 7 8* 8 5*   HEMATOCRIT % 27 2* 29 2*   PLATELETS Thousands/uL 328 357   NEUTROS PCT %  --  71   LYMPHS PCT %  --  11*   MONOS PCT %  --  14*   EOS PCT %  --  3     Results from last 7 days   Lab Units 07/19/21  0449 07/18/21  2114   SODIUM mmol/L 144 145   POTASSIUM mmol/L 3 2* 2 9*   CHLORIDE mmol/L 107 105   CO2 mmol/L 28 30   BUN mg/dL 23 25   CREATININE mg/dL 2 06* 2 21*   ANION GAP mmol/L 9 10   CALCIUM mg/dL 8 4 8 4   ALBUMIN g/dL  --  3 0*   TOTAL BILIRUBIN mg/dL  --  0 58   ALK PHOS U/L  --  120*   ALT U/L  --  25   AST U/L  --  27   GLUCOSE RANDOM mg/dL 93 133     Results from last 7 days   Lab Units 07/18/21  2114   INR  1 22*     Results from last 7 days   Lab Units 07/19/21  0750 07/19/21  0000   POC GLUCOSE mg/dl 85 121     Results from last 7 days   Lab Units 07/19/21  0449   HEMOGLOBIN A1C % 6 6*     Results from last 7 days   Lab Units 07/18/21  2114   LACTIC ACID mmol/L 1 1           * I Have Reviewed All Lab Data Listed Above  * Additional Pertinent Lab Tests Reviewed:  Elsie 64 Admission Reviewed    Imaging:    Imaging Reports Reviewed Today Include:  Chest x-ray  Imaging Personally Reviewed by Myself Includes:  Chest x-ray    Recent Cultures (last 7 days): Last 24 Hours Medication List:   Current Facility-Administered Medications   Medication Dose Route Frequency Provider Last Rate    acetaminophen  650 mg Oral Q4H PRN SEYMOUR Rothman      atorvastatin  80 mg Oral Daily With [address] [address] SEYMOUR Miller      donepezil  5 mg Oral HS SEYMOUR Rothman      ferrous gluconate  324 mg Oral Daily SEYMOUR Rothman      furosemide  40 mg Intravenous Once Xenia Chin MD      furosemide  60 mg Intravenous BID (diuretic) eXnia Chin MD      insulin lispro  2-12 Units Subcutaneous TID AC SEYMOUR Rothman      insulin lispro  2-12 Units Subcutaneous HS SEYMOUR Rothman      levothyroxine  100 mcg Oral Early Morning SEYMOUR Rothman      metoprolol succinate  50 mg Oral Daily SEYMOUR Rothman      pantoprazole  40 mg Oral Early Morning SEYMOUR Rothman      potassium chloride  40 mEq Oral Once Xenia Chin MD          Today, Patient Was Seen By: Xenia Chin MD    ** Please Note: Dictation voice to text software may have been used in the creation of this document   **

## 2021-07-19 NOTE — ASSESSMENT & PLAN NOTE
· Echocardiogram May 2021 at [facility]:  EF 55-60%  Normal diastolic function  Consistent with moderate AS, moderate tricuspid regurgitation  Moderately elevated estimated PA systolic pressure    · He takes Lasix 40 mg orally daily at home

## 2021-07-19 NOTE — ASSESSMENT & PLAN NOTE
· Potassium 2 9  · Repleted with 40 mEq orally and 20 mEq IV in the ER  · Recheck potassium today is 3 2    Will repeat 40meq oral potassium and recheck BMP again tomorrow

## 2021-07-19 NOTE — ASSESSMENT & PLAN NOTE
Lab Results   Component Value Date    HGBA1C 6 6 (H) 07/19/2021       Recent Labs     07/19/21  0000 07/19/21  0750   POCGLU 121 85       Blood Sugar Average: Last 72 hrs:  (P) 103   · Diabetic diet  · Fingerstick blood sugar sliding scale coverage  · Hold home oral agents  ·  hemoglobin A1c 6 6    Blood sugars are currently well controlled due to being NPO

## 2021-07-19 NOTE — ASSESSMENT & PLAN NOTE
Lab Results   Component Value Date    EGFR 30 07/19/2021    EGFR 28 07/18/2021    CREATININE 2 06 (H) 07/19/2021    CREATININE 2 21 (H) 07/18/2021     · Baseline creatinine around 1 4    Suspect partially due to losartan-will hold  · Hold off on fluids due to anasarca  · Daily metabolic panel and trend creatinine during diuresis  · Caution with nephrotoxins and avoid hypotension

## 2021-07-19 NOTE — UTILIZATION REVIEW
consult  Daily electrolyte & creatinine monitoring on Diuresis  Consult GI  Hld Plavix  Date: 7/19  Day 2:   PT EVAL  Post acute rehabilitation services  7/19 Attending  Increase IV Lasix 60 mg BID- not much urine output  Obtain ECHO to assess LV function; NPO poss EGD today; hold Losartan due to NORAH, hold fluids due to anasarca  Avoid hypotension & nephrotoxins  Hypokalemia on admit: 40 mEq orally and 20 mEq IV in the ER & repeat oral replacement & repeat BMP in am  Cont statin, monitor BP  GI  On dual antiplt therapy, aortic stenosis & CKD places him at risk for bleeding w AVMs; proceed w EGD / colonoscopy tomorrow; clear liq & NPO after MN  Prep ordered & transfuse for HGB< 7-8  Optimize volume for procedure tomorrow     ED Triage Vitals [07/18/21 2109]   Temperature Pulse Respirations Blood Pressure SpO2   (!) 96 3 °F (35 7 °C) 73 16 145/64 95 %      Temp Source Heart Rate Source Patient Position - Orthostatic VS BP Location FiO2 (%)   Temporal Monitor Lying Right arm --      Pain Score       No Pain          Wt Readings from Last 1 Encounters:   07/19/21 125 kg (276 lb 7 3 oz)     Additional Vital Signs:   Date/Time  Temp  Pulse  Resp  BP  MAP (mmHg)  SpO2  O2 Device  Patient Position - Orthostatic VS   07/19/21 15:14:22  98 6 °F (37 °C)  --  18  139/63  88  --  --  --   07/19/21 1145  --  --  18  --  --  93 %  None (Room air)  --   07/19/21 09:49:15  --  68  --  121/62   82  93 %  None (Room air)  --   BP: after ambulation, c/o lightheadedness, resolved w/in 2' at 07/19/21 0949   07/19/21 07:47:02  98 2 °F (36 8 °C)  67  --  150/72  98  91 %  --  --   07/19/21 0036  --  70  18  --  --  90 %  --  --   07/18/21 2250  --  --  --  --  --  --  None (Room air)  --   07/18/21 22:48:29  98 6 °F (37 °C)  72  18  126/54  78  94 %  --  --   07/18/21 2200  --  69  23Abnormal   137/62  89  95 %  --  --   07/18/21 2145  --  68  22  137/63  91  92 %  --  --   07/18/21 2130  --  69  23Abnormal   145/60  87  95 %  --  -- 07/18/21 2109  96 3 °F (35 7 °C)Abnormal   73  16  145/64  --  95 %  None (Room air)  Lying      Weights (last 14 days)    Date/Time  Weight  Weight Method  Height   07/19/21 1256  --  --  5' 7" (1 702 m)   07/19/21 0454  125 kg (276 lb 7 3 oz)  Bed scale  --   07/18/21 2109  126 kg (276 lb 10 8 oz)  Bed scale  5' 7" (1 702 m)       Pertinent Labs/Diagnostic Test Results:       Results from last 7 days   Lab Units 07/19/21 0449 07/18/21 2114   WBC Thousand/uL 4 62 5 72   HEMOGLOBIN g/dL 7 8* 8 5*   HEMATOCRIT % 27 2* 29 2*   PLATELETS Thousands/uL 328 357   NEUTROS ABS Thousands/µL  --  4 05         Results from last 7 days   Lab Units 07/19/21 0449 07/18/21 2114   SODIUM mmol/L 144 145   POTASSIUM mmol/L 3 2* 2 9*   CHLORIDE mmol/L 107 105   CO2 mmol/L 28 30   ANION GAP mmol/L 9 10   BUN mg/dL 23 25   CREATININE mg/dL 2 06* 2 21*   EGFR ml/min/1 73sq m 30 28   CALCIUM mg/dL 8 4 8 4   MAGNESIUM mg/dL  --  2 3     Results from last 7 days   Lab Units 07/18/21 2114   AST U/L 27   ALT U/L 25   ALK PHOS U/L 120*   TOTAL PROTEIN g/dL 7 5   ALBUMIN g/dL 3 0*   TOTAL BILIRUBIN mg/dL 0 58     Results from last 7 days   Lab Units 07/19/21  1201 07/19/21  1141 07/19/21  0750 07/19/21  0000   POC GLUCOSE mg/dl 79 63* 85 121     Results from last 7 days   Lab Units 07/19/21  0449 07/18/21  2114   GLUCOSE RANDOM mg/dL 93 133         Results from last 7 days   Lab Units 07/19/21 0449   HEMOGLOBIN A1C % 6 6*   EAG mg/dl 143     No results found for: BETA-HYDROXYBUTYRATE               Results from last 7 days   Lab Units 07/18/21 2114   CK TOTAL U/L 286   CK MB INDEX % <1 0   CK MB ng/mL 1 4     Results from last 7 days   Lab Units 07/18/21  2114   TROPONIN I ng/mL 0 04         Results from last 7 days   Lab Units 07/18/21  2114   PROTIME seconds 15 2*   INR  1 22*   PTT seconds 35     Results from last 7 days   Lab Units 07/19/21  0449   TSH 3RD GENERATON uIU/mL 14 382*         Results from last 7 days   Lab Units 07/18/21 2114   LACTIC ACID mmol/L 1 1             Results from last 7 days   Lab Units 07/18/21 2114   NT-PRO BNP pg/mL 4,495*     7/19 ECHO=PENDING    7/18    XR chest 1 view portable   Final Result by Franca Khoury MD (07/19 1010)      Right basilar subsegmental atelectasis  7/18 ekg nsr Ectopy: PVCs       PVCs:  Infrequent   QRS:     QRS intervals:  Wide   Conduction:     Conduction: abnormal       Abnormal conduction: complete RBBB      ED Treatment:   Medication Administration from 07/18/2021 2105 to 07/18/2021 2236       Date/Time Order Dose Route Action     07/18/2021 2149 potassium chloride (K-DUR,KLOR-CON) CR tablet 40 mEq 40 mEq Oral Given     07/18/2021 2202 potassium chloride 20 mEq IVPB (premix) 20 mEq Intravenous New Bag        Past Medical History:   Diagnosis Date    Alzheimers disease ([facility] )     Diabetes mellitus ([facility] )     Hypertension      Admitting Diagnosis: Edema [R60 9]  Hypokalemia [E87 6]  Anasarca [R60 1]  Peripheral edema [R60 9]  Anemia [D64 9]  GI bleed [K92 2]  NORAH (acute kidney injury) ([facility] [N17 9]  Fall, initial encounter [W19  XXXA]  Age/Sex: 64 y o  male  Admission Orders:  Npo  egd  Colonoscopy  Up w assist  scd  Scheduled Medications:  atorvastatin, 80 mg, Oral, Daily With Dinner  donepezil, 5 mg, Oral, HS  ferrous gluconate, 324 mg, Oral, Daily  furosemide, 60 mg, Intravenous, BID (diuretic)  insulin lispro, 1-6 Units, Subcutaneous, TID AC  levothyroxine, 100 mcg, Oral, Early Morning  metoprolol succinate, 50 mg, Oral, Daily  pantoprazole, 40 mg, Oral, Early Morning  Continuous IV Infusions:     PRN Meds:  acetaminophen, 650 mg, Oral, Q4H PRN  IP CONSULT TO GASTROENTEROLOGY  IP CONSULT TO NUTRITION SERVICES  IP CONSULT TO CASE MANAGEMENT    Network Utilization Review Department  ATTENTION: Please call with any questions or concerns to [phone] and carefully listen to the prompts so that you are directed to the right person   All voicemails are confidential   Jayme Garza all requests for admission clinical reviews, approved or denied determinations and any other requests to dedicated fax number below belonging to the campus where the patient is receiving treatment   List of dedicated fax numbers for the Facilities:  [address] DENIALS (Administrative/Medical Necessity) [phone]   [address] (Maternity/NICU/Pediatrics) [phone]   [address] Cecille Burrellel Claudia [phone] [address] [facility] [address] [facility] [facility] [phone]

## 2021-07-19 NOTE — PLAN OF CARE
Problem: OCCUPATIONAL THERAPY ADULT  Goal: Performs self-care activities at highest level of function for planned discharge setting  See evaluation for individualized goals  Description: Treatment Interventions: ADL retraining, Functional transfer training, UE strengthening/ROM, Endurance training, Cognitive reorientation, Patient/family training, Equipment evaluation/education, Neuromuscular reeducation, Compensatory technique education, Fine motor coordination activities, Continued evaluation, Energy conservation, Activityengagement          See flowsheet documentation for full assessment, interventions and recommendations  Note: Limitation: Decreased ADL status, Decreased UE strength, Decreased Safe judgement during ADL, Decreased cognition, Decreased endurance, Decreased fine motor control, Decreased high-level ADLs, Decreased self-care trans  Prognosis: Good  Assessment: Pt is a 63 y/o F admitted to [address] [place] [address] 7/18/2021 d/t experiencing a fall at home and inability to get up  Dx: ambulatory dysfunction  Pt with PMHx impacting performance during functional tasks including: alzheimers disease, DM, HTN, hx of TIA, NORAH  Pt with no acute fracture or injury noted at this time from fall  Pt unable to report PLOF or home set-up at this time  Information obtained from EMS report, stating that Pt lives at home with his wife who is his primary caretaker  Pt appears to be needing more assistance at home than what the wife can provide  Will consult with CM as able to obtain information regarding Pt's PLOF and home set-up  On evaluation, Pt A&Ox1  Pt completing supine>sit @ Mod A  UB Dressing @ Min A  LB Dressing @ Max A  Pt completing STS and SPT @ Mod A with use of RW for UB support  Pt's BUE ROM and MS WFL   Pt's barriers to d/c include: decrease activity tolerance, decrease standing balance, decrease sitting balance, decrease performance during ADL tasks, decrease cognition, decrease safety awareness , decrease UB MS, decrease generalized strength, decrease activity engagement, decrease performance during functional transfers and frequent falls  Pt would benefit from continued acute OT services to address deficits as well as post acute rehab upon d/c from [facility]       OT Discharge Recommendation: Post acute rehabilitation services

## 2021-07-19 NOTE — OCCUPATIONAL THERAPY NOTE
Occupational Therapy Evaluation     Patient Name: Dany Ballesteros  EHFRQ'Z Date: 7/19/2021  Problem List  Principal Problem:    Ambulatory dysfunction  Active Problems:    Hypokalemia    Alzheimer's dementia ([place] [address] )    Anemia    Acute kidney injury superimposed on chronic kidney disease (HCC)    PAF (paroxysmal atrial fibrillation) ([place] [address] )    Type 2 diabetes mellitus with kidney complication, without long-term current use of insulin ([facility])    Moderate aortic stenosis by prior echocardiogram    Essential hypertension    Volume overload    GI bleeding    History of TIA (transient ischemic attack)    Past Medical History  Past Medical History:   Diagnosis Date    Alzheimers disease ([place] [address] )     Diabetes mellitus ([place] [address] )     Hypertension      Past Surgical History  Past Surgical History:   Procedure Laterality Date    APPENDECTOMY      KNEE ARTHROPLASTY Bilateral            07/19/21 0939   Note Type   Note type Evaluation   Restrictions/Precautions   Other Precautions Cognitive; Chair Alarm; Bed Alarm; Fall Risk   Pain Assessment   Pain Assessment Tool 0-10   Pain Score No Pain   Home Living   Type of Home House  (0 LIGIA)   Home Layout One level;Performs ADLs on one level; Able to live on main level with bedroom/bathroom   Additional Comments Pt poor historian, uncertain of accuracy of information  Will consult with CM as as able   Prior Function   Level of [place] Independent with ADLs and functional mobility   Lives With Spouse  ("My fiance")   ADL Assistance Needs assistance   IADLs Needs assistance   Falls in the last 6 months 1 to 4  (3 falls)   Comments Pt poor historian  reprots being independent with ADLs, IADLs, adn functional ambulation + driving although per EMS report, Pt has assistance for all functional tasks from his wife     ADL   Where Assessed Edge of bed   Grooming Assistance 5  Supervision/Setup   Grooming Deficit Setup   UB Dressing Assistance 4  Minimal Assistance   UB Dressing Deficit Verbal cueing;Supervision/safety; Increased time to complete; Thread RUE; Thread LUE;Pull over head;Pull around back  (vc'ing for sequencing through task)   LB Dressing Assistance 2  Maximal Assistance   LB Dressing Deficit Steadying;Verbal cueing; Requires assistive device for steadying;Supervision/safety; Increased time to complete; Don/doff R sock; Don/doff L sock; Thread RLE into pants; Thread LLE into pants;Pull up over hips   Additional Comments Pt completing ADL tasks while seated at EOB  UB Dressing @ Min A for vc'ing for sequencing and threading BUE and pulling over head  Pt requiring S for grooming tasks with increased time and vc'ing for initiation  Pt requirign Max A for LB Dressing with minimal attempts to complete independently from Pt  Max A for donning socks and pants around feet  Mod A for CM around waist while standing at RW  Bed Mobility   Supine to Sit 3  Moderate assistance   Additional items Assist x 1; Increased time required;Verbal cues;LE management  (trunk management)   Additional Comments HOB flat, no bedrails   Transfers   Sit to Stand 3  Moderate assistance   Additional items Assist x 1; Increased time required;Verbal cues   Stand to Sit 3  Moderate assistance   Additional items Assist x 1; Increased time required;Verbal cues   Stand pivot 3  Moderate assistance   Additional items Assist x 1; Increased time required;Verbal cues   Additional Comments Pt completing functional transfers with use of RW for UB support  Mod A for STS and SPT with increased time, RW management, weight shifting, and sequencing through task      Balance   Static Sitting Fair +   Dynamic Sitting Fair   Static Standing Fair -   Dynamic Standing Poor +   Activity Tolerance   Activity Tolerance Patient limited by fatigue   Medical Staff Made Aware Spoke with PT, Luiza Paris   Nurse Made Aware Spoke with RN, Segundo Billy Assessment   RUE Assessment WFL   LUE Assessment   LUE Assessment WFL   Hand Function   Gross Motor Coordination Functional   Fine Motor Coordination Functional   Sensation   Light Touch No apparent deficits   Additional Comments Pt's BUE ROM and MS as well as FM control, appear WFL although Pt with increased difficulty following commands to fully assess MS adn FM coordination  Cognition   Overall Cognitive Status Impaired   Arousal/Participation Alert; Responsive; Cooperative   Attention Difficulty attending to directions   Orientation Level Oriented to person;Disoriented to place; Disoriented to time;Disoriented to situation   Memory Decreased long term memory   Following Commands Follows one step commands inconsistently   Comments Pt inconsistantly following commands, frequent HOHA required to sequencing through or initiate task  Assessment   Limitation Decreased ADL status; Decreased UE strength;Decreased Safe judgement during ADL;Decreased cognition;Decreased endurance;Decreased fine motor control;Decreased high-level ADLs; Decreased self-care trans   Prognosis Good   Assessment Pt is a 63 y/o F admitted to [address] [place] [address] 7/18/2021 d/t experiencing a fall at home and inability to get up  Dx: ambulatory dysfunction  Pt with PMHx impacting performance during functional tasks including: alzheimers disease, DM, HTN, hx of TIA, NORAH  Pt with no acute fracture or injury noted at this time from fall  Pt unable to report PLOF or home set-up at this time  Information obtained from EMS report, stating that Pt lives at home with his wife who is his primary caretaker  Pt appears to be needing more assistance at home than what the wife can provide  Will consult with CM as able to obtain information regarding Pt's PLOF and home set-up  On evaluation, Pt A&Ox1  Pt completing supine>sit @ Mod A  UB Dressing @ Min A  LB Dressing @ Max A  Pt completing STS and SPT @ Mod A with use of RW for UB support  Pt's BUE ROM and MS WFL   Pt's barriers to d/c include: decrease activity tolerance, decrease standing balance, decrease sitting balance, decrease performance during ADL tasks, decrease cognition, decrease safety awareness , decrease UB MS, decrease generalized strength, decrease activity engagement, decrease performance during functional transfers and frequent falls  Pt would benefit from continued acute OT services to address deficits as well as post acute rehab upon d/c from [facility]  Plan   Treatment Interventions ADL retraining;Functional transfer training;UE strengthening/ROM; Endurance training;Cognitive reorientation;Patient/family training;Equipment evaluation/education; Neuromuscular reeducation; Compensatory technique education; Fine motor coordination activities;Continued evaluation; Energy conservation; Activityengagement   Goal Expiration Date 07/29/21   OT Frequency 3-5x/wk   Recommendation   OT Discharge Recommendation Post acute rehabilitation services   AM-[facility] Daily Activity Inpatient   Lower Body Dressing 2   Bathing 2   Toileting 2   Upper Body Dressing 3   Grooming 3   Eating 3   Daily Activity Raw Score 15   Daily Activity Standardized Score (Calc for Raw Score >=11) 34 69   [facility] Applied Cognition Inpatient   Following a Speech/Presentation 3   Understanding Ordinary Conversation 3   Taking Medications 1   Remembering Where Things Are Placed or Put Away 2   Remembering List of 4-5 Errands 1   Taking Care of Complicated Tasks 1   Applied Cognition Raw Score 11   Applied Cognition Standardized Score 27 03     The patient's raw score on the -PAC Daily Activity inpatient short form is 15, standardized score is 34 69, less than 39 4  Patients at this level are likely to benefit from DC to post-acute rehabilitation services  Please refer to the recommendation of the Occupational Therapist for safe DC planning  Pt goals to be met by 7/29/2021    1  Pt will demonstrate ability to complete grooming/hygiene tasks @ Mod I after set-up    2  Pt will demonstrate ability to complete supine<>sit @ Mod I in order to increase safety and independence during ADL tasks  3  Pt will demonstrate ability to complete UB ADLs including washing/dressing @ Mod I in order to increase performance and participation during meaningful tasks  4  Pt will demonstrate ability to complete LB dressing @ Min A in order to increase safety and independence during meaningful tasks  5  Pt will demonstrate ability to complete toileting tasks including CM and pericare @ Min A in order to increase safety and independence during meaningful tasks  6  Pt will demonstrate ability to complete EOB, chair, toilet/commode transfers @ S in order to increase performance and participation during functional tasks  7  Pt will demonstrate ability to stand for 2-3 minutes while maintaining F+ balance with use of RW for UB support PRN  8  Pt will demonstrate ability to tolerate 30-35 minute OT session with no vc'ing for deep breathing or use of energy conservation techniques in order to increase activity tolerance during functional tasks  9  Pt will demonstrate Good carryover of use of energy conservation/compensatory strategies during ADLs and functional tasks in order to increase safety and reduce risk for falls  10  Pt will demonstrate Good attention and participation in continued evaluation of functional ambulation house hold distances in order to assist with safe d/c planning  11  Pt will attend to continued cognitive assessments 100% of the time in order to provide most appropriate d/c recommendations  12  Pt will follow 100% simple 1-step commands and be A&O x2 consistently with environmental cues to increase participation in functional activities  13  Pt will identify 3 areas of interest/hobbies and 1 intervention on how to incorporate into daily life in order to increase interaction with environment and peers as well as increase participation in meaningful tasks     14  Pt will demonstrate 100% carryover of BUE HEP in order to increase BUE MS and increase performance during functional tasks upon d/c home      Randy BOWENS/GEETHA

## 2021-07-19 NOTE — ED PROVIDER NOTES
History  Chief Complaint   Patient presents with   Dorcus Erwin     pt fell off of couch today denies injury    Edema     pt has anasarca     Patient is a 64-year-old male brought to the emergency department by EMS secondary to slip off couch and diffuse edema, initial EMS call was for assistance to get off floor as patient had slid off the couch onto his, however EMS was concerned patient required evaluation in the emergency department secondary to bilateral lower extremity edema, patient's wife currently cares for him at and EMS was also concerned that patient unlikely in unsafe situation as wife has difficult time caring for him and patient was too weak to get off the floor by himself after slipping, patient is pleasantly confused but denies any complaints          Prior to Admission Medications   Prescriptions Last Dose Informant Patient Reported? Taking?    Ferrous Gluconate 256 (28 Fe) MG TABS   Yes No   Sig: Take 246 mg by mouth   amLODIPine (NORVASC) 2 5 mg tablet   Yes Yes   Sig: Take 2 5 mg by mouth   atorvastatin (LIPITOR) 80 mg tablet   Yes Yes   Sig: Take 80 mg by mouth   clopidogrel (PLAVIX) 75 mg tablet   Yes Yes   Sig: Take 75 mg by mouth   donepezil (ARICEPT) 5 mg tablet   Yes Yes   Sig: Take 5 mg by mouth   furosemide (LASIX) 40 mg tablet   Yes Yes   Sig: Take 40 mg by mouth daily   glimepiride (AMARYL) 4 mg tablet   Yes Yes   Sig: Take 4 mg by mouth   levothyroxine 100 mcg tablet   Yes Yes   Sig: Take 100 mcg by mouth   losartan (COZAAR) 100 MG tablet   Yes Yes   Sig: Take 100 mg by mouth daily   metoprolol succinate (TOPROL-XL) 50 mg 24 hr tablet   Yes Yes   Sig: Take 50 mg by mouth daily   pantoprazole (PROTONIX) 40 mg tablet   Yes Yes   Sig: Take 40 mg by mouth   pioglitazone (ACTOS) 30 mg tablet   Yes Yes   Sig: Take one tablet by mouth daily      Facility-Administered Medications: None       Past Medical History:   Diagnosis Date    [facility] )     Diabetes mellitus ([facility] )     Hypertension        Past Surgical History:   Procedure Laterality Date    APPENDECTOMY      KNEE ARTHROPLASTY Bilateral        Family History   Problem Relation Age of Onset    Heart disease Mother     Stroke Mother      I have reviewed and agree with the history as documented  E-Cigarette/Vaping    E-Cigarette Use Never User      E-Cigarette/Vaping Substances     Social History     Tobacco Use    Smoking status: Never Smoker    Smokeless tobacco: Never Used   Vaping Use    Vaping Use: Never used   Substance Use Topics    Alcohol use: Never    Drug use: Never       Review of Systems   Constitutional: Negative  HENT: Negative  Eyes: Negative  Respiratory: Negative  Cardiovascular: Positive for leg swelling  Gastrointestinal: Negative  Endocrine: Negative  Genitourinary: Negative  Musculoskeletal: Negative  Skin: Negative  Allergic/Immunologic: Negative  Neurological: Positive for weakness  Hematological: Negative  Psychiatric/Behavioral: Positive for confusion  Physical Exam  Physical Exam  Constitutional:       Appearance: He is obese  HENT:      Head: Normocephalic  Nose: Nose normal       Mouth/Throat:      Mouth: Mucous membranes are moist    Eyes:      Extraocular Movements: Extraocular movements intact  Pupils: Pupils are equal, round, and reactive to light  Cardiovascular:      Rate and Rhythm: Normal rate  Pulses: Normal pulses  Heart sounds: Normal heart sounds  Pulmonary:      Effort: Pulmonary effort is normal    Abdominal:      Palpations: Abdomen is soft  Musculoskeletal:         General: Normal range of motion  Cervical back: Normal range of motion  Right lower leg: Edema present  Left lower leg: Edema present  Skin:     General: Skin is warm  Coloration: Skin is pale  Neurological:      Mental Status: He is alert  Mental status is at baseline  He is disoriented     Psychiatric:         Mood and Affect: Mood normal          Vital Signs  ED Triage Vitals [07/18/21 2109]   Temperature Pulse Respirations Blood Pressure SpO2   (!) 96 3 °F (35 7 °C) 73 16 145/64 95 %      Temp Source Heart Rate Source Patient Position - Orthostatic VS BP Location FiO2 (%)   Temporal Monitor Lying Right arm --      Pain Score       No Pain           Vitals:    07/18/21 2130 07/18/21 2145 07/18/21 2200 07/18/21 2248   BP: 145/60 137/63 137/62 126/54   Pulse: 69 68 69 72   Patient Position - Orthostatic VS:               Visual Acuity      Most Recent Value   L Pupil Size (mm)  3   R Pupil Size (mm)  3          ED Medications  Medications   potassium chloride 20 mEq IVPB (premix) (20 mEq Intravenous New Bag 7/18/21 2202)   amLODIPine (NORVASC) tablet 2 5 mg (has no administration in time range)   atorvastatin (LIPITOR) tablet 80 mg (has no administration in time range)   donepezil (ARICEPT) tablet 5 mg (has no administration in time range)   Ferrous Gluconate TABS 246 mg (has no administration in time range)   levothyroxine tablet 100 mcg (has no administration in time range)   metoprolol succinate (TOPROL-XL) 24 hr tablet 50 mg (has no administration in time range)   pantoprazole (PROTONIX) EC tablet 40 mg (has no administration in time range)   furosemide (LASIX) tablet 40 mg (has no administration in time range)   insulin lispro (HumaLOG) 100 units/mL subcutaneous injection 2-12 Units (has no administration in time range)   insulin lispro (HumaLOG) 100 units/mL subcutaneous injection 2-12 Units (has no administration in time range)   acetaminophen (TYLENOL) tablet 650 mg (has no administration in time range)   potassium chloride (K-DUR,KLOR-CON) CR tablet 40 mEq (40 mEq Oral Given 7/18/21 2149)       Diagnostic Studies  Results Reviewed     Procedure Component Value Units Date/Time    CKMB [[id number]]  (Normal) Collected: 07/18/21 2114    Lab Status: Final result Specimen: Blood from Arm, Left Updated: 07/18/21 2206     CK-MB Index <1 0 %      CK-MB 1 4 ng/mL     Magnesium [[id number]]  (Normal) Collected: 07/18/21 2114    Lab Status: Final result Specimen: Blood from Arm, Left Updated: 07/18/21 2145     Magnesium 2 3 mg/dL     NT-BNP PRO [[id number]]  (Abnormal) Collected: 07/18/21 2114    Lab Status: Final result Specimen: Blood from Arm, Left Updated: 07/18/21 2145     NT-proBNP 4,495 pg/mL     Troponin I [[id number]]  (Normal) Collected: 07/18/21 2114    Lab Status: Final result Specimen: Blood from Arm, Left Updated: 07/18/21 2145     Troponin I 0 04 ng/mL     Lactic acid [[id number]]  (Normal) Collected: 07/18/21 2114    Lab Status: Final result Specimen: Blood from Arm, Left Updated: 07/18/21 2144     LACTIC ACID 1 1 mmol/L     Narrative:      Result may be elevated if tourniquet was used during collection      CK Total with Reflex CKMB [[id number]]  (Normal) Collected: 07/18/21 2114    Lab Status: Final result Specimen: Blood from Arm, Left Updated: 07/18/21 2144     Total  U/L     Comprehensive metabolic panel [[id number]]  (Abnormal) Collected: 07/18/21 2114    Lab Status: Final result Specimen: Blood from Arm, Left Updated: 07/18/21 2139     Sodium 145 mmol/L      Potassium 2 9 mmol/L      Chloride 105 mmol/L      CO2 30 mmol/L      ANION GAP 10 mmol/L      BUN 25 mg/dL      Creatinine 2 21 mg/dL      Glucose 133 mg/dL      Calcium 8 4 mg/dL      Corrected Calcium 9 2 mg/dL      AST 27 U/L      ALT 25 U/L      Alkaline Phosphatase 120 U/L      Total Protein 7 5 g/dL      Albumin 3 0 g/dL      Total Bilirubin 0 58 mg/dL      eGFR 28 ml/min/1 73sq m     Narrative:      Laura guidelines for Chronic Kidney Disease (CKD):     Stage 1 with normal or high GFR (GFR > 90 mL/min/1 73 square meters)    Stage 2 Mild CKD (GFR = 60-89 mL/min/1 73 square meters)    Stage 3A Moderate CKD (GFR = 45-59 mL/min/1 73 square meters)    Stage 3B Moderate CKD (GFR = 30-44 mL/min/1 73 square meters)    Stage 4 Severe CKD (GFR = 15-29 mL/min/1 73 square meters)    Stage 5 End Stage CKD (GFR <15 mL/min/1 73 square meters)  Note: GFR calculation is accurate only with a steady state creatinine    Protime-INR [[id number]]  (Abnormal) Collected: 07/18/21 2114    Lab Status: Final result Specimen: Blood from Arm, Left Updated: 07/18/21 2136     Protime 15 2 seconds      INR 1 22    APTT [[id number]]  (Normal) Collected: 07/18/21 2114    Lab Status: Final result Specimen: Blood from Arm, Left Updated: 07/18/21 2136     PTT 35 seconds     CBC and differential [[id number]]  (Abnormal) Collected: 07/18/21 2114    Lab Status: Final result Specimen: Blood from Arm, Left Updated: 07/18/21 2120     WBC 5 72 Thousand/uL      RBC 3 20 Million/uL      Hemoglobin 8 5 g/dL      Hematocrit 29 2 %      MCV 91 fL      MCH 26 6 pg      MCHC 29 1 g/dL      RDW 15 9 %      MPV 9 8 fL      Platelets 085 Thousands/uL      nRBC 0 /100 WBCs      Neutrophils Relative 71 %      Immat GRANS % 0 %      Lymphocytes Relative 11 %      Monocytes Relative 14 %      Eosinophils Relative 3 %      Basophils Relative 1 %      Neutrophils Absolute 4 05 Thousands/µL      Immature Grans Absolute 0 02 Thousand/uL      Lymphocytes Absolute 0 65 Thousands/µL      Monocytes Absolute 0 78 Thousand/µL      Eosinophils Absolute 0 16 Thousand/µL      Basophils Absolute 0 06 Thousands/µL     UA w Reflex to Microscopic w Reflex to Culture [[id number]]     Lab Status: No result Specimen: Urine                  XR chest 1 view portable   ED Interpretation by Calin Leyva DO (07/18 2142)   Cardiomegaly, no acute findings                 Procedures  ECG 12 Lead Documentation Only    Date/Time: 7/18/2021 9:13 PM  Performed by: Calin Leyva DO  Authorized by: Calin Leyva DO     Indications / Diagnosis:  Edema  ECG reviewed by me, the ED Provider: yes    Previous ECG:     Previous ECG:  Unavailable    Comparison to cardiac monitor: Yes    Interpretation:     Interpretation: normal Rate:     ECG rate:  74    ECG rate assessment: normal    Rhythm:     Rhythm: sinus rhythm    Ectopy:     Ectopy: PVCs      PVCs:  Infrequent  QRS:     QRS intervals: Wide  Conduction:     Conduction: abnormal      Abnormal conduction: complete RBBB    ST segments:     ST segments:  Normal  T waves:     T waves: normal               ED Course  ED Course as of Jul 18 2338   Sun Jul 18, 2021   2210 Rectal exam with soft dark brown stool, Hemoccult positive      2337 Patient with multiple lab abnormalities including anemia, Hemoccult-positive stools, but NORAH with creatinine at 2 2 and potassium of 2 9, diffuse anasarca, significant other reports she is having increasingly difficult time caring for patient at home, patient likely will require PT OT evaluation, possible placement in short-term rehab versus skilled nursing facility or home health services depending on needs evaluation, discussed with hospitalist service who will admit for further evaluation and treatment                                                Disposition  Final diagnoses:   Peripheral edema   Fall, initial encounter   Anasarca   NORAH (acute kidney injury) ([facility] )   Anemia   GI bleed   Hypokalemia   Other hypervolemia     Time reflects when diagnosis was documented in both MDM as applicable and the Disposition within this note     Time User Action Codes Description Comment    7/18/2021 10:09 PM Leverette Holstein Add [R60 9] Peripheral edema     7/18/2021 10:09 PM Leverette Holstein Add Kristel Slovak  ZQDO] Fall, initial encounter     7/18/2021 10:09 PM Carlin Gloria Add [R60 1] Anasarca     7/18/2021 10:09 PM Carlin Gloria Add [N17 9] NORAH (acute kidney injury) ([facility] )     7/18/2021 10:09 PM Carlin Gloria Add [D64 9] Anemia     7/18/2021 10:09 PM Carlin Gloria Add [K92 2] GI bleed     7/18/2021 10:10 PM Carlin Gloria [E87 6] Hypokalemia     7/18/2021 11:33 PM Jocelyn Riddle Add [E87 79] Other hypervolemia       ED Disposition     ED Disposition Condition Date/Time Comment    Admit Stable Sun Jul 18, 2021 10:09 PM Case was discussed with KATIA Lezama and the patient's admission status was agreed to be Admission Status: inpatient status to the service of Dr Awa Faulkner   Follow-up Information    None         Current Discharge Medication List      CONTINUE these medications which have NOT CHANGED    Details   amLODIPine (NORVASC) 2 5 mg tablet Take 2 5 mg by mouth      atorvastatin (LIPITOR) 80 mg tablet Take 80 mg by mouth      clopidogrel (PLAVIX) 75 mg tablet Take 75 mg by mouth      donepezil (ARICEPT) 5 mg tablet Take 5 mg by mouth      furosemide (LASIX) 40 mg tablet Take 40 mg by mouth daily      glimepiride (AMARYL) 4 mg tablet Take 4 mg by mouth      levothyroxine 100 mcg tablet Take 100 mcg by mouth      losartan (COZAAR) 100 MG tablet Take 100 mg by mouth daily      metoprolol succinate (TOPROL-XL) 50 mg 24 hr tablet Take 50 mg by mouth daily      pantoprazole (PROTONIX) 40 mg tablet Take 40 mg by mouth      pioglitazone (ACTOS) 30 mg tablet Take one tablet by mouth daily      Ferrous Gluconate 256 (28 Fe) MG TABS Take 246 mg by mouth           No discharge procedures on file      PDMP Review     None          ED Provider  Electronically Signed by           Rhoda Bustamante DO  07/18/21 3086

## 2021-07-19 NOTE — PLAN OF CARE
Problem: PHYSICAL THERAPY ADULT  Goal: Performs mobility at highest level of function for planned discharge setting  See evaluation for individualized goals  Description: Treatment/Interventions: Functional transfer training, LE strengthening/ROM, Elevations, Therapeutic exercise, Endurance training, Cognitive reorientation, Patient/family training, Equipment eval/education, Bed mobility, Gait training, Compensatory technique education, Spoke to nursing, Spoke to case management, OT  Equipment Recommended:  (TBD by rehab)       See flowsheet documentation for full assessment, interventions and recommendations  Note: Prognosis: Good  Problem List: Decreased strength, Decreased endurance, Impaired balance, Decreased mobility, Decreased cognition, Impaired judgement, Decreased safety awareness, Impaired hearing, Obesity, Decreased skin integrity  Assessment:  Pt is a 64 y o  male seen for PT evaluation s/p admission to [address] [place] [address] on 7/18/2021 with Ambulatory dysfunction, GI bleeding  Order placed for PT services    Upon evaluation: Pt is presenting with impaired functional mobility due to decreased strength, decreased endurance, impaired balance, gait deviations, impaired cognition, decreased safety awareness, impaired judgment, impaired hearing, fall risk, LE edema and impaired skin integrity requiring moderate assistance for bed mobility, moderate assistance for transfers and minimal assistance for ambulation with RW  Pt's clinical presentation is currently unpredictable given the functional mobility deficits above, especially weakness, edema of extremities, decreased skin integrity, decreased endurance, gait deviations, decreased activity tolerance, decreased functional mobility tolerance, decreased safety awareness, impaired judgement, decreased cognition and SOB upon exertion, coupled with fall risks as indicated by AM-PAC 6-Clicks: 44/81 as well as hx of falls, impaired balance, polypharmacy, impaired judgement, decreased safety awareness and decreased cognition and combined with medical complications of abnormal renal lab values, abnormal H&H, abnormal potassium values, need for input for mobility technique/safety and abnormal BNP, HgbA1c, NORAH  Pt's PMHx and comorbidities that may affect physical performance and progress include: CKD, A fib, DM, HTN, Dementia and h/o TIA, moderate aortic stenosis  Personal factors affecting pt at time of IE include: inaccessible home environment, limited home support, inability to perform IADLs, inability to perform ADLs, inability to navigate level surfaces without external assistance, inability to ambulate household distances, limited insight into impairments and recent fall(s)/fall history  Pt will benefit from continued skilled PT services to address deficits as defined above and to maximize level of functional mobility to facilitate return toward PLOF and improved QOL  From PT/mobility standpoint, recommendation at time of d/c would be Short term rehab pending progress in order to reduce fall risk and maximize pt's functional independence and consistency with mobility in order to facilitate return to PLOF  Recommend ther ex next 1-2 sessions  Barriers to Discharge: Decreased caregiver support, Inaccessible home environment  Barriers to Discharge Comments: requires increased assistance to complete all mobility     PT Discharge Recommendation: Post acute rehabilitation services     PT - OK to Discharge:  (when medically cleared to rehab)    See flowsheet documentation for full assessment

## 2021-07-19 NOTE — CASE MANAGEMENT
Case Management Progress Note    Patient name Oren Frausto  Location [address] [phone] MRN [id number]  : 1945 Date 2021       LOS (days): 1  Geometric Mean LOS (GMLOS) (days):   Days to GMLOS:        PROGRESS NOTE:      STR has been recommended by PT/OT  Cm discussing options with pt and SO, who is at bedside  As per request, the following referrals have been placed in [facility]:  [id number] of note, pts SO sts he had his first Covid vaccine "three weeks ago" and was due for second shot on 21, pt fell ill and did not receive second dose

## 2021-07-19 NOTE — ANESTHESIA PREPROCEDURE EVALUATION
Procedure:  PRE-OP ONLY  Takes metoprolol  TTE: Pending  Hbg 7 8 baseline 13  Acute on chronic CHF  Hx mod AS  Relevant Problems   CARDIO   (+) Essential hypertension   (+) Moderate aortic stenosis by prior echocardiogram   (+) PAF (paroxysmal atrial fibrillation) (HCC)      ENDO   (+) Type 2 diabetes mellitus with kidney complication, without long-term current use of insulin (HCC)      GI/HEPATIC   (+) GI bleeding      /RENAL   (+) Acute kidney injury superimposed on chronic kidney disease (HCC)      HEMATOLOGY   (+) Acute on chronic anemia      NEURO/PSYCH   (+) Alzheimer's dementia (HCC)   (+) History of TIA (transient ischemic attack)      Other   (+) Morbid obesity with BMI of 40 0-44 9, adult ([facility] [address] )             Anesthesia Plan  ASA Score- 4     Anesthesia Type- IV sedation with anesthesia with ASA Monitors  Additional Monitors:   Airway Plan:           Plan Factors-    Chart reviewed  EKG reviewed  Existing labs reviewed  Patient summary reviewed              Induction-     Postoperative Plan-     Informed Consent-

## 2021-07-19 NOTE — ASSESSMENT & PLAN NOTE
· Potassium 2 9  · Repleted with 40 mEq orally and 20 mEq IV in the ER  · Recheck metabolic panel and trend potassium

## 2021-07-19 NOTE — PLAN OF CARE
Problem: Potential for Falls  Goal: Patient will remain free of falls  Description: INTERVENTIONS:  - Educate patient/family on patient safety including physical limitations  - Instruct patient to call for assistance with activity   - Consult OT/PT to assist with strengthening/mobility   - Keep Call bell within reach  - Keep bed low and locked with side rails adjusted as appropriate  - Keep care items and personal belongings within reach  - Initiate and maintain comfort rounds  - Make Fall Risk Sign visible to staff  - Offer Toileting every 2 Hours, in advance of need  - Initiate/Maintain bed alarm  - Obtain necessary fall risk management equipment:   - Apply yellow socks and bracelet for high fall risk patients  - Consider moving patient to room near nurses station  Outcome: Progressing     Problem: MOBILITY - ADULT  Goal: Maintain or return to baseline ADL function  Description: INTERVENTIONS:  -  Assess patient's ability to carry out ADLs; assess patient's baseline for ADL function and identify physical deficits which impact ability to perform ADLs (bathing, care of mouth/teeth, toileting, grooming, dressing, etc )  - Assess/evaluate cause of self-care deficits   - Assess range of motion  - Assess patient's mobility; develop plan if impaired  - Assess patient's need for assistive devices and provide as appropriate  - Encourage maximum independence but intervene and supervise when necessary  - Involve family in performance of ADLs  - Assess for home care needs following discharge   - Consider OT consult to assist with ADL evaluation and planning for discharge  - Provide patient education as appropriate  Outcome: Progressing  Goal: Maintains/Returns to pre admission functional level  Description: INTERVENTIONS:  - Perform BMAT or MOVE assessment daily    - Set and communicate daily mobility goal to care team and patient/family/caregiver     - Collaborate with rehabilitation services on mobility goals if consulted  - Perform Range of Motion 3 times a day  - Reposition patient every 2 hours    - Dangle patient 3 times a day  - Stand patient 3 times a day  - Ambulate patient 3 times a day  - Out of bed to chair 3 times a day   - Out of bed for meals 3 times a day  - Out of bed for toileting  - Record patient progress and toleration of activity level   Outcome: Progressing     Problem: Prexisting or High Potential for Compromised Skin Integrity  Goal: Skin integrity is maintained or improved  Description: INTERVENTIONS:  - Identify patients at risk for skin breakdown  - Assess and monitor skin integrity  - Assess and monitor nutrition and hydration status  - Monitor labs   - Assess for incontinence   - Turn and reposition patient  - Assist with mobility/ambulation  - Relieve pressure over bony prominences  - Avoid friction and shearing  - Provide appropriate hygiene as needed including keeping skin clean and dry  - Evaluate need for skin moisturizer/barrier cream  - Collaborate with interdisciplinary team   - Patient/family teaching  - Consider wound care consult   Outcome: Progressing     Problem: NEUROSENSORY - ADULT  Goal: Achieves stable or improved neurological status  Description: INTERVENTIONS  - Monitor and report changes in neurological status  - Monitor vital signs such as temperature, blood pressure, glucose, and any other labs ordered   - Initiate measures to prevent increased intracranial pressure  - Monitor for seizure activity and implement precautions if appropriate      Outcome: Progressing  Goal: Remains free of injury related to seizures activity  Description: INTERVENTIONS  - Maintain airway, patient safety  and administer oxygen as ordered  - Monitor patient for seizure activity, document and report duration and description of seizure to physician/advanced practitioner  - If seizure occurs,  ensure patient safety during seizure  - Reorient patient post seizure  - Seizure pads on all 4 side rails  - Instruct patient/family to notify RN of any seizure activity including if an aura is experienced  - Instruct patient/family to call for assistance with activity based on nursing assessment  - Administer anti-seizure medications if ordered    Outcome: Progressing  Goal: Achieves maximal functionality and self care  Description: INTERVENTIONS  - Monitor swallowing and airway patency with patient fatigue and changes in neurological status  - Encourage and assist patient to increase activity and self care     - Encourage visually impaired, hearing impaired and aphasic patients to use assistive/communication devices  Outcome: Progressing     Problem: CARDIOVASCULAR - ADULT  Goal: Maintains optimal cardiac output and hemodynamic stability  Description: INTERVENTIONS:  - Monitor I/O, vital signs and rhythm  - Monitor for S/S and trends of decreased cardiac output  - Administer and titrate ordered vasoactive medications to optimize hemodynamic stability  - Assess quality of pulses, skin color and temperature  - Assess for signs of decreased coronary artery perfusion  - Instruct patient to report change in severity of symptoms  Outcome: Progressing  Goal: Absence of cardiac dysrhythmias or at baseline rhythm  Description: INTERVENTIONS:  - Continuous cardiac monitoring, vital signs, obtain 12 lead EKG if ordered  - Administer antiarrhythmic and heart rate control medications as ordered  - Monitor electrolytes and administer replacement therapy as ordered  Outcome: Progressing     Problem: RESPIRATORY - ADULT  Goal: Achieves optimal ventilation and oxygenation  Description: INTERVENTIONS:  - Assess for changes in respiratory status  - Assess for changes in mentation and behavior  - Position to facilitate oxygenation and minimize respiratory effort  - Oxygen administered by appropriate delivery if ordered  - Initiate smoking cessation education as indicated  - Encourage broncho-pulmonary hygiene including cough, deep breathe, Incentive Spirometry  - Assess the need for suctioning and aspirate as needed  - Assess and instruct to report SOB or any respiratory difficulty  - Respiratory Therapy support as indicated  Outcome: Progressing     Problem: GASTROINTESTINAL - ADULT  Goal: Minimal or absence of nausea and/or vomiting  Description: INTERVENTIONS:  - Administer IV fluids if ordered to ensure adequate hydration  - Maintain NPO status until nausea and vomiting are resolved  - Nasogastric tube if ordered  - Administer ordered antiemetic medications as needed  - Provide nonpharmacologic comfort measures as appropriate  - Advance diet as tolerated, if ordered  - Consider nutrition services referral to assist patient with adequate nutrition and appropriate food choices  Outcome: Progressing  Goal: Maintains or returns to baseline bowel function  Description: INTERVENTIONS:  - Assess bowel function  - Encourage oral fluids to ensure adequate hydration  - Administer IV fluids if ordered to ensure adequate hydration  - Administer ordered medications as needed  - Encourage mobilization and activity  - Consider nutritional services referral to assist patient with adequate nutrition and appropriate food choices  Outcome: Progressing  Goal: Maintains adequate nutritional intake  Description: INTERVENTIONS:  - Monitor percentage of each meal consumed  - Identify factors contributing to decreased intake, treat as appropriate  - Assist with meals as needed  - Monitor I&O, weight, and lab values if indicated  - Obtain nutrition services referral as needed  Outcome: Progressing  Goal: Establish and maintain optimal ostomy function  Description: INTERVENTIONS:  - Assess bowel function  - Encourage oral fluids to ensure adequate hydration  - Administer IV fluids if ordered to ensure adequate hydration   - Administer ordered medications as needed  - Encourage mobilization and activity  - Nutrition services referral to assist patient with appropriate food choices  - Assess stoma site  - Consider wound care consult   Outcome: Progressing  Goal: Oral mucous membranes remain intact  Description: INTERVENTIONS  - Assess oral mucosa and hygiene practices  - Implement preventative oral hygiene regimen  - Implement oral medicated treatments as ordered  - Initiate Nutrition services referral as needed  Outcome: Progressing     Problem: GENITOURINARY - ADULT  Goal: Maintains or returns to baseline urinary function  Description: INTERVENTIONS:  - Assess urinary function  - Encourage oral fluids to ensure adequate hydration if ordered  - Administer IV fluids as ordered to ensure adequate hydration  - Administer ordered medications as needed  - Offer frequent toileting  - Follow urinary retention protocol if ordered  Outcome: Progressing  Goal: Absence of urinary retention  Description: INTERVENTIONS:  - Assess patients ability to void and empty bladder  - Monitor I/O  - Bladder scan as needed  - Discuss with physician/AP medications to alleviate retention as needed  - Discuss catheterization for long term situations as appropriate  Outcome: Progressing  Goal: Urinary catheter remains patent  Description: INTERVENTIONS:  - Assess patency of urinary catheter  - If patient has a chronic quintero, consider changing catheter if non-functioning  - Follow guidelines for intermittent irrigation of non-functioning urinary catheter  Outcome: Progressing     Problem: METABOLIC, FLUID AND ELECTROLYTES - ADULT  Goal: Electrolytes maintained within normal limits  Description: INTERVENTIONS:  - Monitor labs and assess patient for signs and symptoms of electrolyte imbalances  - Administer electrolyte replacement as ordered  - Monitor response to electrolyte replacements, including repeat lab results as appropriate  - Instruct patient on fluid and nutrition as appropriate  Outcome: Progressing  Goal: Fluid balance maintained  Description: INTERVENTIONS:  - Monitor labs   - Monitor I/O and WT  - Instruct patient on fluid and nutrition as appropriate  - Assess for signs & symptoms of volume excess or deficit  Outcome: Progressing  Goal: Glucose maintained within target range  Description: INTERVENTIONS:  - Monitor Blood Glucose as ordered  - Assess for signs and symptoms of hyperglycemia and hypoglycemia  - Administer ordered medications to maintain glucose within target range  - Assess nutritional intake and initiate nutrition service referral as needed  Outcome: Progressing     Problem: SKIN/TISSUE INTEGRITY - ADULT  Goal: Skin Integrity remains intact(Skin Breakdown Prevention)  Description: Assess:  -Perform Wale assessment every shift  -Clean and moisturize skin every time when soiled  -Inspect skin when repositioning, toileting, and assisting with ADLS  -Assess under medical devices such as oxygen tubing every 2 hours   -Assess extremities for adequate circulation and sensation     Bed Management:  -Have minimal linens on bed & keep smooth, unwrinkled  -Change linens as needed when moist or perspiring  -Avoid sitting or lying in one position for more than 2 hours while in bed  -Keep HOB at 30-45 degrees     Toileting:  -Offer bedside commode  -Assess for incontinence every two hours   -Use incontinent care products after each incontinent episode such as     Activity:  -Mobilize patient 3 times a day  -Encourage activity and walks on unit  -Encourage or provide ROM exercises   -Turn and reposition patient every 2 Hours  -Use appropriate equipment to lift or move patient in bed  -Instruct/ Assist with weight shifting every hour when out of bed in chair  -Consider limitation of chair time 2 hour intervals    Skin Care:  -Avoid use of baby powder, tape, friction and shearing, hot water or constrictive clothing  -Relieve pressure over bony prominences using allevyns  -Do not massage red bony areas    Next Steps:  -Teach patient strategies to minimize risks such as    -Consider consults to interdisciplinary teams such as   Outcome: Progressing  Goal: Incision(s), wounds(s) or drain site(s) healing without S/S of infection  Description: INTERVENTIONS  - Assess and document dressing, incision, wound bed, drain sites and surrounding tissue  - Provide patient and family education  - Perform skin care/dressing changes every day  Outcome: Progressing  Goal: Pressure injury heals and does not worsen  Description: Interventions:  - Implement low air loss mattress or specialty surface (Criteria met)  - Apply silicone foam dressing  - Instruct/assist with weight shifting every 30 minutes when in chair   - Limit chair time to 2 hour intervals  - Use special pressure reducing interventions such as ehob cushion when in chair   - Apply fecal or urinary incontinence containment device   - Perform passive or active ROM every   - Turn and reposition patient & offload bony prominences every  hours   - Utilize friction reducing device or surface for transfers   - Consider consults to  interdisciplinary teams such as   - Use incontinent care products after each incontinent episode such as   - Consider nutrition services referral as needed  Outcome: Progressing     Problem: HEMATOLOGIC - ADULT  Goal: Maintains hematologic stability  Description: INTERVENTIONS  - Assess for signs and symptoms of bleeding or hemorrhage  - Monitor labs  - Administer supportive blood products/factors as ordered and appropriate  Outcome: Progressing     Problem: MUSCULOSKELETAL - ADULT  Goal: Maintain or return mobility to safest level of function  Description: INTERVENTIONS:  - Assess patient's ability to carry out ADLs; assess patient's baseline for ADL function and identify physical deficits which impact ability to perform ADLs (bathing, care of mouth/teeth, toileting, grooming, dressing, etc )  - Assess/evaluate cause of self-care deficits   - Assess range of motion  - Assess patient's mobility  - Assess patient's need for assistive devices and provide as appropriate  - Encourage maximum independence but intervene and supervise when necessary  - Involve family in performance of ADLs  - Assess for home care needs following discharge   - Consider OT consult to assist with ADL evaluation and planning for discharge  - Provide patient education as appropriate  Outcome: Progressing  Goal: Maintain proper alignment of affected body part  Description: INTERVENTIONS:  - Support, maintain and protect limb and body alignment  - Provide patient/ family with appropriate education  Outcome: Progressing

## 2021-07-19 NOTE — ASSESSMENT & PLAN NOTE
Lab Results   Component Value Date    EGFR 28 07/18/2021    CREATININE 2 21 (H) 07/18/2021     · Baseline creatinine around 1 4    Suspect partially due to losartan-will hold  · Hold off on fluids due to anasarca  · Daily metabolic panel and trend creatinine during diuresis  · Caution with nephrotoxins and avoid hypotension

## 2021-07-19 NOTE — CASE MANAGEMENT
Case Management Assessment & Discharge Planning Note    Patient name Sharlee Krabbe  Location /-68 MRN [id number]  : 1945 Date 2021       Current Admission Date: 2021  Current Admission Diagnosis:  Acute on chronic diastolic CHF (congestive heart failure) ([place] [address] )  Patient Active Problem List   Diagnosis    Hypokalemia    Alzheimer's dementia ([place] [address] )    Acute on chronic anemia    Acute kidney injury superimposed on chronic kidney disease ([place] [address] )    Frequent falls    PAF (paroxysmal atrial fibrillation) ([facility])    Type 2 diabetes mellitus with kidney complication, without long-term current use of insulin ([facility])    Moderate aortic stenosis by prior echocardiogram    Ambulatory dysfunction    Essential hypertension    Acute on chronic diastolic CHF (congestive heart failure) ([facility])    GI bleeding    History of TIA (transient ischemic attack)    Morbid obesity with BMI of 40 0-44 9, adult ([place] [address] )    Previous Admission - Discharge Date:    LOS (days): 1  Geometric Mean LOS (GMLOS) (days):   Days to GMLOS: Previous Discharge Diagnosis:  No discharge information exists for this patient       Risk of Unplanned Readmission Score  Predictive Model Details          16 (Low)  Factor Value    Calculated 2021 12:06 20% Number of active Rx orders 25    Risk of Unplanned Readmission Model 11% ECG/EKG order present in last 6 months     9% Diagnosis of electrolyte disorder present     8% Latest INR high (1 22)     8% Imaging order present in last 6 months     7% Age 64     7% Latest hemoglobin low (7 8 g/dL)     7% Charlson Comorbidity Index 5     6% Number of ED visits in last six months 1     5% Diagnosis of deficiency anemia present     5% Latest creatinine high (2 06 mg/dL)     4% Diagnosis of renal failure present     2% Future appointment scheduled     1% Active ulcer medication Rx order present     1% Current length of stay 0 581 days         BUNDLE:      OBJECTIVE:  Pt is a 64 y o  year old , white or  [1], male with Nondenominational preference of None admitted on 7/18/2021  9:06 PM  Pt is admitted to [facility] [address] at [address] with complaints of Acute on chronic diastolic CHF (congestive heart failure) ([facility] [address] )  Current admission status: Inpatient  Preferred Pharmacy:   [facility], [place] [address], [address]  [address] [place] [address]  Phone: [phone] Fax: [phone]    Primary Care Provider: Dr De Choi:    Healthcare Proxy  Health Care Agent:   Name: Hollie Garner  Relationship: Significant Other [13]  Home Phone: [phone]  Mobile Phone:   If more than one Healthcare Proxy exists, details will need to be added manually in this note  Advance Directives  Does patient have a [address]?: Yes (Zion Morley is pts POA)  Does patient have Advance Directives?: Yes  Advance Directives: Power of  for health care, Power of  for finance                   Patient Information  Mental Status: Confused  During Assessment patient was accompanied by: Spouse  Assessment information provided by[de-identified] Spouse  Primary Caregiver: Spouse  Caregiver's Name[de-identified] Donn Bender Relationship to Patient[de-identified] Significant Other  Support Systems: Spouse/significant other  Type of Current Residence: [facility] entry access options   Select all that apply : Stairs  Number of steps to enter home : 1  Living Arrangements: Lives w/ Spouse/significant other    Activities of Daily Living Prior to Admission  Functional Status: Assistance  Completes ADLs independently?: No  Level of ADL dependence: Assistance  Ambulates independently?: No  Level of ambulatory dependence: Assistance  Does patient use assisted devices?: Yes  Assisted Devices (DME) used: Nicol Yancy  Does patient currently own DME?: Yes  What DME does the patient currently own?: Nicol Yancy  Does patient have a history of Outpatient Therapy (PT/OT)?: No  Does the patient have a history of Short-Term Rehab?: No  Does patient currently have [address]?: No         Patient Information Continued  Income Source: Pension/group home  Does patient receive dialysis treatments?: No  Does patient have a history of substance abuse?: No  Does patient have a history of Mental Health Diagnosis?: No         Means of Transportation  Means of Transport to Appts[de-identified] Family transport    DISCHARGE DETAILS:    Discharge planning discussed with[de-identified] pt and pts SO  Freedom of Choice: Yes    Contacts  Patient Contacts: Caroline Rasheed to Patient[de-identified] Friend  Contact Method: In Person  Reason/Outcome: Discharge [address]         Is the patient interested in [address] at discharge?: No    DME Referral Provided  Referral made for DME?: No         We would like to be able to fill any required prescriptions on discharge at our [facility] and have them delivered to you at discharge in your room    Would you like to participate in this program? : No - Declined    Discharge Destination Plan[de-identified] SNF

## 2021-07-19 NOTE — PHYSICAL THERAPY NOTE
PHYSICAL THERAPY EVALUATION  NAME:  Fadumo Rhodes  DATE: 07/19/21    AGE:   64 y o  Mrn:   [id number]  ADMIT DX:  Edema [R60 9]  Hypokalemia [E87 6]  Anasarca [R60 1]  Peripheral edema [R60 9]  Anemia [D64 9]  GI bleed [K92 2]  NORAH (acute kidney injury) ([facility] [address] ) [N17 9]  Fall, initial encounter [W19  XXXA]    Past Medical History:   Diagnosis Date    Alzheimers disease ([facility] )     Diabetes mellitus ([facility] )     Hypertension      Length Of Stay: 1  Performed at least 2 patient identifiers during session: Name and Birthday  PHYSICAL THERAPY EVALUATION :      07/19/21 0938   PT Last Visit   PT Visit Date 07/19/21   Note Type   Note type Evaluation   Pain Assessment   Pain Assessment Tool 0-10   Pain Score No Pain   Home Living   Type of Home House  (no LIGIA)   Home Layout One level;Performs ADLs on one level; Able to live on main level with bedroom/bathroom   [address] [facility]   Additional Comments Poot historian  Will need to clarify PLOF and home set up  Prior Function   Level of [place] Independent with ADLs and functional mobility   Lives With Spouse  ("My fiance")   ADL Assistance Needs assistance   IADLs Needs assistance   Falls in the last 6 months 1 to 4  (3 falls)   Comments pt poor historian  Reports being indepednent PTA and + driving, but per chart review, pt has assistance with ADLs and IADLs  Pt reports using RW PTA  Restrictions/Precautions   Other Precautions Cognitive; Chair Alarm; Bed Alarm; Fall Risk;Hard of hearing   Cognition   Orientation Level Oriented to person;Disoriented to place; Disoriented to time;Disoriented to situation   Following Commands Follows one step commands inconsistently   Comments requires repeated cues and cues for inc attentiont to task   RLE Assessment   RLE Assessment WFL  (3+/5, ankle DF to neutral; inc LE edema)   LLE Assessment   LLE Assessment WFL  (3+/5, ankle DF to neutral  inc LE edema)   Coordination   Movements are Fluid and Coordinated 1   Light Touch   RLE Light Touch Grossly intact   LLE Light Touch Grossly intact   Bed Mobility   Supine to Sit 3  Moderate assistance   Additional items Assist x 1; Increased time required;Verbal cues  (trunk management)   Additional Comments HOB flat without bedrail  required increased verbal cues and manual assistance at modAx1 to complete bed mobility  Transfers   Sit to Stand 3  Moderate assistance   Additional items Assist x 1; Increased time required;Verbal cues   Stand to Sit 3  Moderate assistance   Additional items Assist x 1; Increased time required;Verbal cues   Stand pivot 3  Moderate assistance   Additional items Assist x 1; Increased time required;Verbal cues   Additional Comments use of RW  min verbal cues for hand placement for safety with use of RW  required modAx1 to achieve standing and modAx1 to initiate sitting  spt with RW with modAx1 with manual cues for wt shifting and management of RW as well as cues for turning completely prior to sitting  Ambulation/Elevation   Gait pattern Wide MIRLANDE; Excessively slow; Short stride;Decreased foot clearance   Gait Assistance 4  Minimal assist   Additional items Assist x 1;Verbal cues   Assistive Device Rolling walker   Distance 16'x1 with RW and minAx1 with manual cues for wt shifting, verbal cues for increased step length and foot clearance  chair follow  at end of ambulation pt with decreasing verbalizations with increased manaul cues to initiate sitting  pt then with c/o feeling lightheaded  Balance   Static Sitting Fair +   Dynamic Sitting Fair   Static Standing Fair -   Dynamic Standing Poor +   Ambulatory Poor +   Endurance Deficit   Endurance Deficit Yes   Endurance Deficit Description min MARTE  SpO2 stable  fatigue and c/o lightheadedness   Activity Tolerance   Activity Tolerance Patient limited by fatigue   Medical Staff Made Aware Erin CALLE   Nurse Made Aware Anastasia SAINZ   Assessment   Prognosis Good   Problem List Decreased strength;Decreased endurance; Impaired balance;Decreased mobility; Decreased cognition; Impaired judgement;Decreased safety awareness; Impaired hearing;Obesity; Decreased skin integrity   Barriers to Discharge Decreased caregiver support; Inaccessible home environment   Barriers to Discharge Comments requires increased assistance to complete all mobility   Goals   Patient Goals "Get rehab at that new place "   [facility] Expiration Date 07/29/21   PT Treatment Day 0   Plan   Treatment/Interventions Functional transfer training;LE strengthening/ROM; Elevations; Therapeutic exercise; Endurance training;Cognitive reorientation;Patient/family training;Equipment eval/education; Bed mobility;Gait training; Compensatory technique education;Spoke to nursing;Spoke to case management;OT   PT Frequency Other (Comment)  (3-5x/week)   Recommendation   PT Discharge Recommendation Post acute rehabilitation services   Equipment Recommended   (TBD by rehab)   PT - OK to Discharge   (when medically cleared to rehab)   Additional Comments pt sitting in recliner chair at end of session with all needs wihtin reach and posey alarm on   AM-PAC Basic Mobility Inpatient   Turning in Bed Without Bedrails 2   Lying on Back to Sitting on Edge of Flat Bed 2   Moving Bed to Chair 2   Standing Up From Chair 2   Walk in Room 3   Climb 3-5 Stairs 2   Basic Mobility Inpatient Raw Score 13   Basic Mobility Standardized Score 33 99        07/19/21 0949   Vitals   Pulse 68   Blood Pressure 121/62  (after ambulation, c/o lightheadedness, resolved w/in 2')   MAP (mmHg) 82   Oxygen Therapy   SpO2 93 %   SpO2 Activity At Rest   O2 Device None (Room air)   Pain Assessment   Pain Assessment Tool Pain Assessment not indicated - pt denies pain   Pain Score No Pain       (Please find full objective findings from PT assessment regarding body systems outlined above)       Assessment: Pt is a 64 y o  male seen for PT evaluation s/p admission to [address] [place] [address] on 7/18/2021 with Ambulatory dysfunction, GI bleeding  Order placed for PT services  Upon evaluation: Pt is presenting with impaired functional mobility due to decreased strength, decreased endurance, impaired balance, gait deviations, impaired cognition, decreased safety awareness, impaired judgment, impaired hearing, fall risk, LE edema and impaired skin integrity requiring moderate assistance for bed mobility, moderate assistance for transfers and minimal assistance for ambulation with RW  Pt's clinical presentation is currently unpredictable given the functional mobility deficits above, especially weakness, edema of extremities, decreased skin integrity, decreased endurance, gait deviations, decreased activity tolerance, decreased functional mobility tolerance, decreased safety awareness, impaired judgement, decreased cognition and SOB upon exertion, coupled with fall risks as indicated by AM-PAC 6-Clicks: 55/05 as well as hx of falls, impaired balance, polypharmacy, impaired judgement, decreased safety awareness and decreased cognition and combined with medical complications of abnormal renal lab values, abnormal H&H, abnormal potassium values, need for input for mobility technique/safety and abnormal BNP, HgbA1c, NORAH  Pt's PMHx and comorbidities that may affect physical performance and progress include: CKD, A fib, DM, HTN, Dementia and h/o TIA, moderate aortic stenosis  Personal factors affecting pt at time of IE include: inaccessible home environment, limited home support, inability to perform IADLs, inability to perform ADLs, inability to navigate level surfaces without external assistance, inability to ambulate household distances, limited insight into impairments and recent fall(s)/fall history  Pt will benefit from continued skilled PT services to address deficits as defined above and to maximize level of functional mobility to facilitate return toward PLOF and improved QOL   From PT/mobility standpoint, recommendation at time of d/c would be Short term rehab pending progress in order to reduce fall risk and maximize pt's functional independence and consistency with mobility in order to facilitate return to PLOF  Recommend ther ex next 1-2 sessions  The patient's AM-PAC Basic Mobility Inpatient Short Form Raw Score is 13, Standardized Score is 33 99  A standardized score less than 42 9 suggests the patient may benefit from discharge to post-acute rehabilitation services  Please also refer to the recommendation of the Physical Therapist for safe discharge planning  Goals: Pt will: Perform bed mobility tasks with Supervision to reposition in bed and prepare for transfers  Pt will perform transfers with Supervision to decrease burden of care, decrease risk for falls and improve activity tolerance and prepare for ambulation  Pt will ambulate with RW for >/= 76' with  Supervision  to decrease burden of care, decrease risk for falls, improve activity tolerance and improve gait quality and to access home environment  Pt will complete >/= 4 steps with with bilateral handrails with steadying assistance to decrease burden of care, decrease risk for falls and improve activity tolerance  Pt will participate in objective balance assessment to determine baseline fall risk  Pt will increase B LE strength >/= 1/2 MMT grade to facilitate functional mobility        Alpesh Louei, PT,DPT

## 2021-07-19 NOTE — ASSESSMENT & PLAN NOTE
· Echocardiogram May 2021 at [facility]:  EF 55-60%  Normal diastolic function  Consistent with moderate AS, moderate tricuspid regurgitation  Moderately elevated estimated PA systolic pressure    · He takes Lasix 40 mg orally daily-will change to Lasix 40 mg IV per volume overload plan  · Cardiology consult

## 2021-07-19 NOTE — NURSING NOTE
FSBS was 63  Patient was given 15 grams of carbohydrate juice  Repeat FSBS was 79  Dr Nilesh Barnes made aware

## 2021-07-19 NOTE — ASSESSMENT & PLAN NOTE
Lab Results   Component Value Date    HGBA1C 7 2 (H) 04/20/2018       No results for input(s): POCGLU in the last 72 hours      Blood Sugar Average: Last 72 hrs:     · Diabetic diet  · Fingerstick blood sugar sliding scale coverage  · Hold home oral agents  · Check hemoglobin A1c

## 2021-07-19 NOTE — CONSULTS
[facility] - Outpatient Consultation  Tangela Paris 64 y o  male MRN: [id number]  Encounter: [id number]          ASSESSMENT AND PLAN:      1  Acute on chronic anemia  2  Hemoccult-positive stool  3  History of iron deficiency anemia  -patient fortunately is hemodynamically stable without signs of overt GI bleeding  -certainly has risk factors for GI bleeding given his dual antiplatelet therapy as well as his aortic stenosis and CKD which puts him at risk for bleeding particularly with AVMs, other differential diagnosis includes PUD, diverticular disease, hemorrhoidal bleeding  -given his likely need for continued dual antiplatelet therapy will proceed with EGD/colonoscopy tomorrow  -continue to hold Plavix  -clear liquid diet today, NPO after midnight  -MiraLax/Dulcolax split prep dose ordered  -transfuse for hemoglobin less than 7-8  -optimize volume status for procedure tomorrow     ______________________________________________________________________    HPI:  57-year-old male seen in consultation for acute on chronic anemia, Hemoccult-positive stool with history of GI bleeding on dual antiplatelet agents and history of iron deficiency anemia  His past medical history significant for diabetes, Alzheimer's, hypertension, history of TIA, moderate aortic stenosis, paroxysmal atrial fibrillation, CKD 3 who presented with ambulatory dysfunction edema with subsequent fall therefore EMS was called  He presents with acute on chronic kidney injury and acute on chronic anemia with Hemoccult-positive blood  Baseline hemoglobin 13-14, presenting hemoglobin 8 5 and trended down further to 7 8 this morning  As per nursing staff no overt signs of GI bleeding  Patient is a poor historian and is only oriented to himself however he denies chest pain, shortness of breath, palpitations, abdominal pain, nausea, vomiting        REVIEW OF SYSTEMS:  Limited due to patient's mental status    CONSTITUTIONAL: Denies any fever, chills, rigors, and weight loss  HEENT: No earache or tinnitus  Denies hearing loss or visual disturbances  CARDIOVASCULAR: No chest pain or palpitations  RESPIRATORY: Denies any cough, hemoptysis, shortness of breath or dyspnea on exertion  GASTROINTESTINAL: As noted in the History of Present Illness  GENITOURINARY: No problems with urination  Denies any hematuria or dysuria  NEUROLOGIC: No dizziness or vertigo, denies headaches  MUSCULOSKELETAL: Denies any muscle or joint pain  SKIN: Denies skin rashes or itching  ENDOCRINE: Denies excessive thirst  Denies intolerance to heat or cold  PSYCHOSOCIAL: Denies depression or anxiety  Denies any recent memory loss         Historical Information   Past Medical History:   Diagnosis Date    [facility]     Diabetes mellitus ([facility] )     Hypertension      Past Surgical History:   Procedure Laterality Date    APPENDECTOMY      KNEE ARTHROPLASTY Bilateral      Social History   Social History     Substance and Sexual Activity   Alcohol Use Never     Social History     Substance and Sexual Activity   Drug Use Never     Social History     Tobacco Use   Smoking Status Never Smoker   Smokeless Tobacco Never Used     Family History   Problem Relation Age of Onset    Heart disease Mother     Stroke Mother        Meds/Allergies       Current Facility-Administered Medications:     acetaminophen (TYLENOL) tablet 650 mg, 650 mg, Oral, Q4H PRN    atorvastatin (LIPITOR) tablet 80 mg, 80 mg, Oral, Daily With Dinner    bisacodyl (DULCOLAX) EC tablet 20 mg, 20 mg, Oral, Once    donepezil (ARICEPT) tablet 5 mg, 5 mg, Oral, HS, 5 mg at 07/18/21 2889    ferrous gluconate (FERGON) tablet 324 mg, 324 mg, Oral, Daily, 324 mg at 07/19/21 0851    furosemide (LASIX) injection 40 mg, 40 mg, Intravenous, Once    furosemide (LASIX) injection 60 mg, 60 mg, Intravenous, BID (diuretic)    insulin lispro (HumaLOG) 100 units/mL subcutaneous injection 2-12 Units, 2-12 Units, Subcutaneous, TID AC **AND** Fingerstick Glucose (POCT), , , TID AC    insulin lispro (HumaLOG) 100 units/mL subcutaneous injection 2-12 Units, 2-12 Units, Subcutaneous, HS    levothyroxine tablet 100 mcg, 100 mcg, Oral, Early Morning, 100 mcg at 07/19/21 0503    metoprolol succinate (TOPROL-XL) 24 hr tablet 50 mg, 50 mg, Oral, Daily, 50 mg at 07/19/21 0851    pantoprazole (PROTONIX) EC tablet 40 mg, 40 mg, Oral, Early Morning, 40 mg at 07/19/21 0503    polyethylene glycol (GLYCOLAX) bowel prep 238 g, 238 g, Oral, Once    potassium chloride (K-DUR,KLOR-CON) CR tablet 40 mEq, 40 mEq, Oral, Once    No Known Allergies        Objective     Blood pressure 121/62, pulse 68, temperature 98 2 °F (36 8 °C), resp  rate 18, height 5' 7" (1 702 m), weight 125 kg (276 lb 7 3 oz), SpO2 93 %  Body mass index is 43 3 kg/m²  PHYSICAL EXAM:      General Appearance:   Alert, cooperative, no distress, AAOX1   HEENT:   Normocephalic, atraumatic, anicteric, +conjunctival pallor      Neck:  Supple, symmetrical, trachea midline   Lungs:   Clear to auscultation bilaterally; no rales, rhonchi or wheezing; respirations unlabored    Heart[de-identified]   Regular rate and rhythm; no murmur, rub, or gallop     Abdomen:   Soft, non-tender, non-distended; normal bowel sounds; no masses, no organomegaly    Genitalia:   Deferred    Rectal:   External hemorrhoids, no melena or blood appreciated   Extremities:  + edema bilaterally        Skin:   RLE erythema     Lymph nodes:  No palpable cervical lymphadenopathy        Lab Results:   Admission on 07/18/2021   Component Date Value    WBC 07/18/2021 5 72     RBC 07/18/2021 3 20*    Hemoglobin 07/18/2021 8 5*    Hematocrit 07/18/2021 29 2*    MCV 07/18/2021 91     MCH 07/18/2021 26 6*    MCHC 07/18/2021 29 1*    RDW 07/18/2021 15 9*    MPV 07/18/2021 9 8     Platelets 26/33/6170 357     nRBC 07/18/2021 0     Neutrophils Relative 07/18/2021 71  Immat GRANS % 07/18/2021 0     Lymphocytes Relative 07/18/2021 11*    Monocytes Relative 07/18/2021 14*    Eosinophils Relative 07/18/2021 3     Basophils Relative 07/18/2021 1     Neutrophils Absolute 07/18/2021 4 05     Immature Grans Absolute 07/18/2021 0 02     Lymphocytes Absolute 07/18/2021 0 65     Monocytes Absolute 07/18/2021 0 78     Eosinophils Absolute 07/18/2021 0 16     Basophils Absolute 07/18/2021 0 06     Protime 07/18/2021 15 2*    INR 07/18/2021 1 22*    PTT 07/18/2021 35     Sodium 07/18/2021 145     Potassium 07/18/2021 2 9*    Chloride 07/18/2021 105     CO2 07/18/2021 30     ANION GAP 07/18/2021 10     BUN 07/18/2021 25     Creatinine 07/18/2021 2 21*    Glucose 07/18/2021 133     Calcium 07/18/2021 8 4     Corrected Calcium 07/18/2021 9 2     AST 07/18/2021 27     ALT 07/18/2021 25     Alkaline Phosphatase 07/18/2021 120*    Total Protein 07/18/2021 7 5     Albumin 07/18/2021 3 0*    Total Bilirubin 07/18/2021 0 58     eGFR 07/18/2021 28     Magnesium 07/18/2021 2 3     LACTIC ACID 07/18/2021 1 1     Total CK 07/18/2021 286     Troponin I 07/18/2021 0 04     NT-proBNP 07/18/2021 4,495*    CK-MB Index 07/18/2021 <1 0     CK-MB 07/18/2021 1 4     POC Glucose 07/19/2021 121     TSH 3RD GENERATON 07/19/2021 14 382*    Hemoglobin A1C 07/19/2021 6 6*    EAG 07/19/2021 143     Sodium 07/19/2021 144     Potassium 07/19/2021 3 2*    Chloride 07/19/2021 107     CO2 07/19/2021 28     ANION GAP 07/19/2021 9     BUN 07/19/2021 23     Creatinine 07/19/2021 2 06*    Glucose 07/19/2021 93     Calcium 07/19/2021 8 4     eGFR 07/19/2021 30     WBC 07/19/2021 4 62     RBC 07/19/2021 2 95*    Hemoglobin 07/19/2021 7 8*    Hematocrit 07/19/2021 27 2*    MCV 07/19/2021 92     MCH 07/19/2021 26 4*    MCHC 07/19/2021 28 7*    RDW 07/19/2021 15 9*    Platelets 07/03/1965 328     MPV 07/19/2021 10 2     Cholesterol 07/19/2021 86     Triglycerides 07/19/2021 58     HDL, Direct 07/19/2021 41     LDL Calculated 07/19/2021 33     Non-HDL-Chol (CHOL-HDL) 07/19/2021 45     Free T4 07/19/2021 1 20     POC Glucose 07/19/2021 85          Radiology Results:   XR chest 1 view portable    Result Date: 7/19/2021  Narrative: CHEST INDICATION:   edema  COMPARISON:  None EXAM PERFORMED/VIEWS:  XR CHEST PORTABLE FINDINGS: Heart top normal in size  Pulmonary vessels unremarkable  Suboptimal depth of inspiration  Elevation of the right hemidiaphragm  Mild subsegmental atelectasis in the mid right lung  Lungs otherwise clear  No evidence of pleural effusion or pneumothorax  Osseous structures appear within normal limits for patient age  Impression: Right basilar subsegmental atelectasis   Workstation performed: [id number]

## 2021-07-19 NOTE — ASSESSMENT & PLAN NOTE
· Patient has been having frequent falls, and today he could not get up from the floor    EMS was concerned that patient may be in an unsafe situation as patient's wife is having difficulty caring for him  · Fall precautions  · Case management consult  · PT OT consult

## 2021-07-19 NOTE — ASSESSMENT & PLAN NOTE
· Hemoglobin is 8 5 with baseline around 13  · Stool Hemoccult is positive  · Daily CBC and trend hemoglobin  · Monitor for bleeding  · Continue iron

## 2021-07-19 NOTE — ASSESSMENT & PLAN NOTE
· Stool Hemoccult positive  · Hemoglobin 8 5  · He has a history of GI bleeding  · GI consult  · Hold Plavix

## 2021-07-19 NOTE — ASSESSMENT & PLAN NOTE
· Hemoglobin is 8 5 with baseline around 13 admission now trended down to 7 8 this morning  · Stool Hemoccult is positive  · No evidence of gross bleeding noted    Keep NPO for now to be seen by GI for possible EGD today  · Continue iron

## 2021-07-19 NOTE — ASSESSMENT & PLAN NOTE
· Lower extremity 3+ pitting edema to bilateral lower extremities extending up to scrotum, bibasilar rales  · Chest x-ray:  Atelectasis  · BNP:  4500  · Daily weights  · I&Os  · Lasix IV twice daily for acute diuresis for now  Not much urine output with 40 IV b i d  Of Lasix  Will increase to 60 mg IV b i d  Closely monitor urine output  Hold Norvasc    · check echocardiogram to evaluate LV function  · Low-salt diet, fluid restriction

## 2021-07-19 NOTE — RESPIRATORY THERAPY NOTE
RT Protocol Note  Claudette Siu 64 y o  male MRN: [id number]  Unit/Bed#: -01 Encounter: [id number]    Assessment    Principal Problem:    Acute on chronic diastolic CHF (congestive heart failure) ([facility])  Active Problems:    Hypokalemia    Alzheimer's dementia ([facility] )    Acute on chronic anemia    Acute kidney injury superimposed on chronic kidney disease (HCC)    PAF (paroxysmal atrial fibrillation) ([facility])    Type 2 diabetes mellitus with kidney complication, without long-term current use of insulin ([facility])    Moderate aortic stenosis by prior echocardiogram    Ambulatory dysfunction    Essential hypertension    GI bleeding    History of TIA (transient ischemic attack)    Morbid obesity with BMI of 40 0-44 9, adult [facility])      Home Pulmonary Medications:         Past Medical History:   Diagnosis Date    Alzheimers disease ([place] [address] )     Diabetes mellitus ([place] [address] )     Hypertension      Social History     Socioeconomic History    Marital status:      Spouse name: None    Number of children: None    Years of education: None    Highest education level: None   Occupational History    None   Tobacco Use    Smoking status: Never Smoker    Smokeless tobacco: Never Used   Vaping Use    Vaping Use: Never used   Substance and Sexual Activity    Alcohol use: Never    Drug use: Never    Sexual activity: None   Other Topics Concern    None   Social History Narrative    None     Social Determinants of Health     Financial Resource Strain:     Difficulty of Paying Living Expenses:    Food Insecurity:     Worried About Running Out of Food in the Last Year:     Ran Out of Food in the Last Year:    Transportation Needs:     Lack of Transportation (Medical):      Lack of Transportation (Non-Medical):    Physical Activity:     Days of Exercise per Week:     Minutes of Exercise per Session:    Stress:     Feeling of Stress :    Social Connections:     Frequency of Communication with Friends and Family:     Frequency of Social Gatherings with Friends and Family:     Attends Rastafari Services:     Active Member of Clubs or Organizations:     Attends Club or Organization Meetings:     Marital Status:    Intimate Partner Violence:     Fear of Current or Ex-Partner:     Emotionally Abused:     Physically Abused:     Sexually Abused:        Subjective         Objective    Physical Exam:   Assessment Type: Assess only  General Appearance: Alert, Awake  Respiratory Pattern: Normal  Chest Assessment: Chest expansion symmetrical  Bilateral Breath Sounds: Diminished, Clear  Cough: None  O2 Device: RA    Vitals:  Blood pressure 121/62, pulse 68, temperature 98 2 °F (36 8 °C), resp  rate 18, height 5' 7" (1 702 m), weight 125 kg (276 lb 7 3 oz), SpO2 93 %  Imaging and other studies: I have personally reviewed pertinent reports  O2 Device: RA     Plan    Respiratory Plan: No distress/Pulmonary history        Resp Comments: Pt is a former smoker, wife states quit a long time ago  Pt does not have pulmonary hx or home respiratory medications or devices  Pt is not currently wheezing or in respiratory distress  Pt here for CHF  Respiratory protocol  D/C

## 2021-07-20 ENCOUNTER — APPOINTMENT (OUTPATIENT)
Dept: GASTROENTEROLOGY | Facility: HOSPITAL | Age: 76
DRG: 291 | End: 2021-07-20
Attending: INTERNAL MEDICINE
Payer: COMMERCIAL

## 2021-07-20 ENCOUNTER — TELEPHONE (OUTPATIENT)
Dept: SURGERY | Facility: HOSPITAL | Age: 76
End: 2021-07-20

## 2021-07-20 ENCOUNTER — ANESTHESIA EVENT (INPATIENT)
Dept: GASTROENTEROLOGY | Facility: HOSPITAL | Age: 76
DRG: 291 | End: 2021-07-20
Payer: COMMERCIAL

## 2021-07-20 ENCOUNTER — ANESTHESIA (INPATIENT)
Dept: GASTROENTEROLOGY | Facility: HOSPITAL | Age: 76
DRG: 291 | End: 2021-07-20
Payer: COMMERCIAL

## 2021-07-20 LAB
ANION GAP SERPL CALCULATED.3IONS-SCNC: 6 MMOL/L (ref 4–13)
BUN SERPL-MCNC: 19 MG/DL (ref 5–25)
CALCIUM SERPL-MCNC: 8.1 MG/DL (ref 8.3–10.1)
CHLORIDE SERPL-SCNC: 107 MMOL/L (ref 100–108)
CO2 SERPL-SCNC: 30 MMOL/L (ref 21–32)
CREAT SERPL-MCNC: 1.91 MG/DL (ref 0.6–1.3)
ERYTHROCYTE [DISTWIDTH] IN BLOOD BY AUTOMATED COUNT: 15.9 % (ref 11.6–15.1)
ERYTHROCYTE [DISTWIDTH] IN BLOOD BY AUTOMATED COUNT: 16.1 % (ref 11.6–15.1)
GFR SERPL CREATININE-BSD FRML MDRD: 33 ML/MIN/1.73SQ M
GLUCOSE SERPL-MCNC: 123 MG/DL (ref 65–140)
GLUCOSE SERPL-MCNC: 179 MG/DL (ref 65–140)
GLUCOSE SERPL-MCNC: 64 MG/DL (ref 65–140)
GLUCOSE SERPL-MCNC: 72 MG/DL (ref 65–140)
GLUCOSE SERPL-MCNC: 76 MG/DL (ref 65–140)
GLUCOSE SERPL-MCNC: 85 MG/DL (ref 65–140)
HCT VFR BLD AUTO: 26.9 % (ref 36.5–49.3)
HCT VFR BLD AUTO: 27.8 % (ref 36.5–49.3)
HGB BLD-MCNC: 7.9 G/DL (ref 12–17)
HGB BLD-MCNC: 8.1 G/DL (ref 12–17)
MCH RBC QN AUTO: 26.6 PG (ref 26.8–34.3)
MCH RBC QN AUTO: 26.7 PG (ref 26.8–34.3)
MCHC RBC AUTO-ENTMCNC: 29.1 G/DL (ref 31.4–37.4)
MCHC RBC AUTO-ENTMCNC: 29.4 G/DL (ref 31.4–37.4)
MCV RBC AUTO: 91 FL (ref 82–98)
MCV RBC AUTO: 91 FL (ref 82–98)
PLATELET # BLD AUTO: 315 THOUSANDS/UL (ref 149–390)
PLATELET # BLD AUTO: 338 THOUSANDS/UL (ref 149–390)
PMV BLD AUTO: 10 FL (ref 8.9–12.7)
PMV BLD AUTO: 9.7 FL (ref 8.9–12.7)
POTASSIUM SERPL-SCNC: 3.4 MMOL/L (ref 3.5–5.3)
RBC # BLD AUTO: 2.96 MILLION/UL (ref 3.88–5.62)
RBC # BLD AUTO: 3.05 MILLION/UL (ref 3.88–5.62)
SODIUM SERPL-SCNC: 143 MMOL/L (ref 136–145)
T3 SERPL-MCNC: 0.8 NG/ML (ref 0.6–1.8)
WBC # BLD AUTO: 4.61 THOUSAND/UL (ref 4.31–10.16)
WBC # BLD AUTO: 5.99 THOUSAND/UL (ref 4.31–10.16)

## 2021-07-20 PROCEDURE — 85027 COMPLETE CBC AUTOMATED: CPT | Performed by: FAMILY MEDICINE

## 2021-07-20 PROCEDURE — 99232 SBSQ HOSP IP/OBS MODERATE 35: CPT | Performed by: STUDENT IN AN ORGANIZED HEALTH CARE EDUCATION/TRAINING PROGRAM

## 2021-07-20 PROCEDURE — 88305 TISSUE EXAM BY PATHOLOGIST: CPT | Performed by: PATHOLOGY

## 2021-07-20 PROCEDURE — 82948 REAGENT STRIP/BLOOD GLUCOSE: CPT

## 2021-07-20 PROCEDURE — 0DB58ZX EXCISION OF ESOPHAGUS, VIA NATURAL OR ARTIFICIAL OPENING ENDOSCOPIC, DIAGNOSTIC: ICD-10-PCS | Performed by: INTERNAL MEDICINE

## 2021-07-20 PROCEDURE — 80048 BASIC METABOLIC PNL TOTAL CA: CPT | Performed by: FAMILY MEDICINE

## 2021-07-20 PROCEDURE — 84480 ASSAY TRIIODOTHYRONINE (T3): CPT | Performed by: NURSE PRACTITIONER

## 2021-07-20 PROCEDURE — 0DJD8ZZ INSPECTION OF LOWER INTESTINAL TRACT, VIA NATURAL OR ARTIFICIAL OPENING ENDOSCOPIC: ICD-10-PCS | Performed by: INTERNAL MEDICINE

## 2021-07-20 PROCEDURE — 85027 COMPLETE CBC AUTOMATED: CPT | Performed by: STUDENT IN AN ORGANIZED HEALTH CARE EDUCATION/TRAINING PROGRAM

## 2021-07-20 PROCEDURE — 0DB98ZX EXCISION OF DUODENUM, VIA NATURAL OR ARTIFICIAL OPENING ENDOSCOPIC, DIAGNOSTIC: ICD-10-PCS | Performed by: INTERNAL MEDICINE

## 2021-07-20 PROCEDURE — 0DB68ZX EXCISION OF STOMACH, VIA NATURAL OR ARTIFICIAL OPENING ENDOSCOPIC, DIAGNOSTIC: ICD-10-PCS | Performed by: INTERNAL MEDICINE

## 2021-07-20 RX ORDER — SODIUM CHLORIDE, SODIUM LACTATE, POTASSIUM CHLORIDE, CALCIUM CHLORIDE 600; 310; 30; 20 MG/100ML; MG/100ML; MG/100ML; MG/100ML
INJECTION, SOLUTION INTRAVENOUS CONTINUOUS PRN
Status: DISCONTINUED | OUTPATIENT
Start: 2021-07-20 | End: 2021-07-20

## 2021-07-20 RX ORDER — ONDANSETRON 2 MG/ML
4 INJECTION INTRAMUSCULAR; INTRAVENOUS ONCE AS NEEDED
Status: DISCONTINUED | OUTPATIENT
Start: 2021-07-20 | End: 2021-07-20 | Stop reason: HOSPADM

## 2021-07-20 RX ORDER — DEXTROSE MONOHYDRATE 25 G/50ML
25 INJECTION, SOLUTION INTRAVENOUS ONCE
Status: COMPLETED | OUTPATIENT
Start: 2021-07-20 | End: 2021-07-20

## 2021-07-20 RX ORDER — DEXTROSE MONOHYDRATE 25 G/50ML
10 INJECTION, SOLUTION INTRAVENOUS ONCE
Status: COMPLETED | OUTPATIENT
Start: 2021-07-20 | End: 2021-07-20

## 2021-07-20 RX ORDER — LIDOCAINE HYDROCHLORIDE 10 MG/ML
INJECTION, SOLUTION EPIDURAL; INFILTRATION; INTRACAUDAL; PERINEURAL AS NEEDED
Status: DISCONTINUED | OUTPATIENT
Start: 2021-07-20 | End: 2021-07-20

## 2021-07-20 RX ORDER — CLOPIDOGREL BISULFATE 75 MG/1
75 TABLET ORAL DAILY
Status: DISCONTINUED | OUTPATIENT
Start: 2021-07-21 | End: 2021-08-03 | Stop reason: HOSPADM

## 2021-07-20 RX ORDER — FENTANYL CITRATE/PF 50 MCG/ML
12.5 SYRINGE (ML) INJECTION
Status: DISCONTINUED | OUTPATIENT
Start: 2021-07-20 | End: 2021-07-20 | Stop reason: HOSPADM

## 2021-07-20 RX ORDER — PROPOFOL 10 MG/ML
INJECTION, EMULSION INTRAVENOUS AS NEEDED
Status: DISCONTINUED | OUTPATIENT
Start: 2021-07-20 | End: 2021-07-20

## 2021-07-20 RX ORDER — POTASSIUM CHLORIDE 20MEQ/15ML
40 LIQUID (ML) ORAL ONCE
Status: COMPLETED | OUTPATIENT
Start: 2021-07-20 | End: 2021-07-20

## 2021-07-20 RX ADMIN — Medication 40 MG: at 14:00

## 2021-07-20 RX ADMIN — PROPOFOL 10 MG: 10 INJECTION, EMULSION INTRAVENOUS at 13:49

## 2021-07-20 RX ADMIN — PROPOFOL 50 MG: 10 INJECTION, EMULSION INTRAVENOUS at 13:33

## 2021-07-20 RX ADMIN — ATORVASTATIN CALCIUM 80 MG: 40 TABLET, FILM COATED ORAL at 17:44

## 2021-07-20 RX ADMIN — FUROSEMIDE 60 MG: 10 INJECTION, SOLUTION INTRAMUSCULAR; INTRAVENOUS at 17:46

## 2021-07-20 RX ADMIN — PROPOFOL 20 MG: 10 INJECTION, EMULSION INTRAVENOUS at 13:37

## 2021-07-20 RX ADMIN — PROPOFOL 10 MG: 10 INJECTION, EMULSION INTRAVENOUS at 13:42

## 2021-07-20 RX ADMIN — FERROUS GLUCONATE 324 MG: 324 TABLET ORAL at 08:27

## 2021-07-20 RX ADMIN — DEXTROSE 50 ML/HR: 5 SOLUTION INTRAVENOUS at 14:53

## 2021-07-20 RX ADMIN — PROPOFOL 50 MG: 10 INJECTION, EMULSION INTRAVENOUS at 13:35

## 2021-07-20 RX ADMIN — POTASSIUM CHLORIDE 40 MEQ: 20 SOLUTION ORAL at 18:30

## 2021-07-20 RX ADMIN — DEXTROSE MONOHYDRATE 10 ML: 500 INJECTION PARENTERAL at 13:01

## 2021-07-20 RX ADMIN — LIDOCAINE HYDROCHLORIDE 50 MG: 10 INJECTION, SOLUTION EPIDURAL; INFILTRATION; INTRACAUDAL; PERINEURAL at 13:33

## 2021-07-20 RX ADMIN — DONEPEZIL HYDROCHLORIDE 5 MG: 5 TABLET ORAL at 21:22

## 2021-07-20 RX ADMIN — BISACODYL 20 MG: 5 TABLET, COATED ORAL at 09:22

## 2021-07-20 RX ADMIN — PROPOFOL 20 MG: 10 INJECTION, EMULSION INTRAVENOUS at 14:12

## 2021-07-20 RX ADMIN — PROPOFOL 10 MG: 10 INJECTION, EMULSION INTRAVENOUS at 14:02

## 2021-07-20 RX ADMIN — PROPOFOL 20 MG: 10 INJECTION, EMULSION INTRAVENOUS at 14:04

## 2021-07-20 RX ADMIN — PROPOFOL 10 MG: 10 INJECTION, EMULSION INTRAVENOUS at 13:45

## 2021-07-20 RX ADMIN — PROPOFOL 10 MG: 10 INJECTION, EMULSION INTRAVENOUS at 13:55

## 2021-07-20 RX ADMIN — LEVOTHYROXINE SODIUM 100 MCG: 100 TABLET ORAL at 05:43

## 2021-07-20 RX ADMIN — FUROSEMIDE 60 MG: 10 INJECTION, SOLUTION INTRAMUSCULAR; INTRAVENOUS at 08:27

## 2021-07-20 RX ADMIN — DEXTROSE MONOHYDRATE 25 ML: 500 INJECTION PARENTERAL at 13:34

## 2021-07-20 RX ADMIN — PROPOFOL 10 MG: 10 INJECTION, EMULSION INTRAVENOUS at 13:54

## 2021-07-20 RX ADMIN — METOPROLOL SUCCINATE 50 MG: 50 TABLET, EXTENDED RELEASE ORAL at 08:27

## 2021-07-20 RX ADMIN — PROPOFOL 10 MG: 10 INJECTION, EMULSION INTRAVENOUS at 13:57

## 2021-07-20 RX ADMIN — PROPOFOL 10 MG: 10 INJECTION, EMULSION INTRAVENOUS at 13:40

## 2021-07-20 RX ADMIN — SODIUM CHLORIDE, SODIUM LACTATE, POTASSIUM CHLORIDE, AND CALCIUM CHLORIDE: .6; .31; .03; .02 INJECTION, SOLUTION INTRAVENOUS at 13:29

## 2021-07-20 RX ADMIN — PANTOPRAZOLE SODIUM 40 MG: 40 TABLET, DELAYED RELEASE ORAL at 05:43

## 2021-07-20 RX ADMIN — PROPOFOL 20 MG: 10 INJECTION, EMULSION INTRAVENOUS at 14:08

## 2021-07-20 NOTE — ASSESSMENT & PLAN NOTE
· Echocardiogram May 2021 at [facility]:  EF 55-60%  Normal diastolic function  Consistent with moderate AS, moderate tricuspid regurgitation  Moderately elevated estimated PA systolic pressure    · He takes Lasix 40 mg orally daily at home

## 2021-07-20 NOTE — PLAN OF CARE
Problem: Prexisting or High Potential for Compromised Skin Integrity  Goal: Skin integrity is maintained or improved  Description: INTERVENTIONS:  - Identify patients at risk for skin breakdown  - Assess and monitor skin integrity  - Assess and monitor nutrition and hydration status  - Monitor labs   - Assess for incontinence   - Turn and reposition patient  - Assist with mobility/ambulation  - Relieve pressure over bony prominences  - Avoid friction and shearing  - Provide appropriate hygiene as needed including keeping skin clean and dry  - Evaluate need for skin moisturizer/barrier cream  - Collaborate with interdisciplinary team   - Patient/family teaching  - Consider wound care consult   Outcome: Progressing     Problem: NEUROSENSORY - ADULT  Goal: Achieves stable or improved neurological status  Description: INTERVENTIONS  - Monitor and report changes in neurological status  - Monitor vital signs such as temperature, blood pressure, glucose, and any other labs ordered   - Initiate measures to prevent increased intracranial pressure  - Monitor for seizure activity and implement precautions if appropriate      Outcome: Progressing     Problem: NEUROSENSORY - ADULT  Goal: Remains free of injury related to seizures activity  Description: INTERVENTIONS  - Maintain airway, patient safety  and administer oxygen as ordered  - Monitor patient for seizure activity, document and report duration and description of seizure to physician/advanced practitioner  - If seizure occurs,  ensure patient safety during seizure  - Reorient patient post seizure  - Seizure pads on all 4 side rails  - Instruct patient/family to notify RN of any seizure activity including if an aura is experienced  - Instruct patient/family to call for assistance with activity based on nursing assessment  - Administer anti-seizure medications if ordered    Outcome: Progressing     Problem: NEUROSENSORY - ADULT  Goal: Achieves maximal functionality and self care  Description: INTERVENTIONS  - Monitor swallowing and airway patency with patient fatigue and changes in neurological status  - Encourage and assist patient to increase activity and self care     - Encourage visually impaired, hearing impaired and aphasic patients to use assistive/communication devices  Outcome: Progressing     Problem: CARDIOVASCULAR - ADULT  Goal: Maintains optimal cardiac output and hemodynamic stability  Description: INTERVENTIONS:  - Monitor I/O, vital signs and rhythm  - Monitor for S/S and trends of decreased cardiac output  - Administer and titrate ordered vasoactive medications to optimize hemodynamic stability  - Assess quality of pulses, skin color and temperature  - Assess for signs of decreased coronary artery perfusion  - Instruct patient to report change in severity of symptoms  Outcome: Progressing     Problem: CARDIOVASCULAR - ADULT  Goal: Absence of cardiac dysrhythmias or at baseline rhythm  Description: INTERVENTIONS:  - Continuous cardiac monitoring, vital signs, obtain 12 lead EKG if ordered  - Administer antiarrhythmic and heart rate control medications as ordered  - Monitor electrolytes and administer replacement therapy as ordered  Outcome: Progressing     Problem: RESPIRATORY - ADULT  Goal: Achieves optimal ventilation and oxygenation  Description: INTERVENTIONS:  - Assess for changes in respiratory status  - Assess for changes in mentation and behavior  - Position to facilitate oxygenation and minimize respiratory effort  - Oxygen administered by appropriate delivery if ordered  - Initiate smoking cessation education as indicated  - Encourage broncho-pulmonary hygiene including cough, deep breathe, Incentive Spirometry  - Assess the need for suctioning and aspirate as needed  - Assess and instruct to report SOB or any respiratory difficulty  - Respiratory Therapy support as indicated  Outcome: Progressing     Problem: GASTROINTESTINAL - ADULT  Goal: Minimal or absence of nausea and/or vomiting  Description: INTERVENTIONS:  - Administer IV fluids if ordered to ensure adequate hydration  - Maintain NPO status until nausea and vomiting are resolved  - Nasogastric tube if ordered  - Administer ordered antiemetic medications as needed  - Provide nonpharmacologic comfort measures as appropriate  - Advance diet as tolerated, if ordered  - Consider nutrition services referral to assist patient with adequate nutrition and appropriate food choices  Outcome: Progressing     Problem: GASTROINTESTINAL - ADULT  Goal: Maintains or returns to baseline bowel function  Description: INTERVENTIONS:  - Assess bowel function  - Encourage oral fluids to ensure adequate hydration  - Administer IV fluids if ordered to ensure adequate hydration  - Administer ordered medications as needed  - Encourage mobilization and activity  - Consider nutritional services referral to assist patient with adequate nutrition and appropriate food choices  Outcome: Progressing     Problem: GASTROINTESTINAL - ADULT  Goal: Maintains adequate nutritional intake  Description: INTERVENTIONS:  - Monitor percentage of each meal consumed  - Identify factors contributing to decreased intake, treat as appropriate  - Assist with meals as needed  - Monitor I&O, weight, and lab values if indicated  - Obtain nutrition services referral as needed  Outcome: Progressing     Problem: GASTROINTESTINAL - ADULT  Goal: Oral mucous membranes remain intact  Description: INTERVENTIONS  - Assess oral mucosa and hygiene practices  - Implement preventative oral hygiene regimen  - Implement oral medicated treatments as ordered  - Initiate Nutrition services referral as needed  Outcome: Progressing     Problem: GENITOURINARY - ADULT  Goal: Maintains or returns to baseline urinary function  Description: INTERVENTIONS:  - Assess urinary function  - Encourage oral fluids to ensure adequate hydration if ordered  - Administer IV fluids as ordered to ensure adequate hydration  - Administer ordered medications as needed  - Offer frequent toileting  - Follow urinary retention protocol if ordered  Outcome: Progressing     Problem: GENITOURINARY - ADULT  Goal: Absence of urinary retention  Description: INTERVENTIONS:  - Assess patients ability to void and empty bladder  - Monitor I/O  - Bladder scan as needed  - Discuss with physician/AP medications to alleviate retention as needed  - Discuss catheterization for long term situations as appropriate  Outcome: Progressing     Problem: METABOLIC, FLUID AND ELECTROLYTES - ADULT  Goal: Electrolytes maintained within normal limits  Description: INTERVENTIONS:  - Monitor labs and assess patient for signs and symptoms of electrolyte imbalances  - Administer electrolyte replacement as ordered  - Monitor response to electrolyte replacements, including repeat lab results as appropriate  - Instruct patient on fluid and nutrition as appropriate  Outcome: Progressing     Problem: METABOLIC, FLUID AND ELECTROLYTES - ADULT  Goal: Fluid balance maintained  Description: INTERVENTIONS:  - Monitor labs   - Monitor I/O and WT  - Instruct patient on fluid and nutrition as appropriate  - Assess for signs & symptoms of volume excess or deficit  Outcome: Progressing     Problem: METABOLIC, FLUID AND ELECTROLYTES - ADULT  Goal: Glucose maintained within target range  Description: INTERVENTIONS:  - Monitor Blood Glucose as ordered  - Assess for signs and symptoms of hyperglycemia and hypoglycemia  - Administer ordered medications to maintain glucose within target range  - Assess nutritional intake and initiate nutrition service referral as needed  Outcome: Progressing     Problem: HEMATOLOGIC - ADULT  Goal: Maintains hematologic stability  Description: INTERVENTIONS  - Assess for signs and symptoms of bleeding or hemorrhage  - Monitor labs  - Administer supportive blood products/factors as ordered and appropriate  Outcome: Progressing Problem: MUSCULOSKELETAL - ADULT  Goal: Maintain or return mobility to safest level of function  Description: INTERVENTIONS:  - Assess patient's ability to carry out ADLs; assess patient's baseline for ADL function and identify physical deficits which impact ability to perform ADLs (bathing, care of mouth/teeth, toileting, grooming, dressing, etc )  - Assess/evaluate cause of self-care deficits   - Assess range of motion  - Assess patient's mobility  - Assess patient's need for assistive devices and provide as appropriate  - Encourage maximum independence but intervene and supervise when necessary  - Involve family in performance of ADLs  - Assess for home care needs following discharge   - Consider OT consult to assist with ADL evaluation and planning for discharge  - Provide patient education as appropriate  Outcome: Progressing     Problem: MUSCULOSKELETAL - ADULT  Goal: Maintain proper alignment of affected body part  Description: INTERVENTIONS:  - Support, maintain and protect limb and body alignment  - Provide patient/ family with appropriate education  Outcome: Progressing     Problem: Nutrition/Hydration-ADULT  Goal: Nutrient/Hydration intake appropriate for improving, restoring or maintaining nutritional needs  Description: Monitor and assess patient's nutrition/hydration status for malnutrition  Collaborate with interdisciplinary team and initiate plan and interventions as ordered  Monitor patient's weight and dietary intake as ordered or per policy  Utilize nutrition screening tool and intervene as necessary  Determine patient's food preferences and provide high-protein, high-caloric foods as appropriate       INTERVENTIONS:  - Monitor oral intake, urinary output, labs, and treatment plans  - Assess nutrition and hydration status and recommend course of action  - Evaluate amount of meals eaten  - Assist patient with eating if necessary   - Allow adequate time for meals  - Recommend/ encourage appropriate diets, oral nutritional supplements, and vitamin/mineral supplements  - Order, calculate, and assess calorie counts as needed  - Recommend, monitor, and adjust tube feedings and TPN/PPN based on assessed needs  - Assess need for intravenous fluids  - Provide specific nutrition/hydration education as appropriate  - Include patient/family/caregiver in decisions related to nutrition  Outcome: Progressing

## 2021-07-20 NOTE — ASSESSMENT & PLAN NOTE
· Potassium 2 9  · Repleted with 40 mEq orally and 20 mEq IV in the ER  · Monitor and replete accordingly

## 2021-07-20 NOTE — ASSESSMENT & PLAN NOTE
Lab Results   Component Value Date    HGBA1C 6 6 (H) 07/19/2021       Recent Labs     07/20/21  1247 07/20/21  1321 07/20/21  1427 07/20/21  1621   POCGLU 64* 76 85 76       Blood Sugar Average: Last 72 hrs:  (P) 86 [id number]   · Diabetic diet  · Fingerstick blood sugar sliding scale coverage  · Hold home oral agents  ·  hemoglobin A1c 6 6    Blood sugars are currently well controlled

## 2021-07-20 NOTE — ASSESSMENT & PLAN NOTE
Lab Results   Component Value Date    EGFR 33 07/20/2021    EGFR 30 07/19/2021    EGFR 28 07/18/2021    CREATININE 1 91 (H) 07/20/2021    CREATININE 2 06 (H) 07/19/2021    CREATININE 2 21 (H) 07/18/2021     · Baseline creatinine around 1 4    Suspect partially due to losartan-will hold  · Hold off on fluids due to anasarca  · Daily metabolic panel and trend creatinine during diuresis  · Caution with nephrotoxins and avoid hypotension  · Improving, continue to monitor

## 2021-07-20 NOTE — ASSESSMENT & PLAN NOTE
· Hemoglobin is 8 5 with baseline around 13 admission now trended down to 7 8 this morning  · Stool Hemoccult is positive  · No evidence of gross bleeding noted     · Continue iron  · S/p EGD and colonoscopy on 7/20  "C1 M3 Og's esophagus-biopsies taken to rule out dysplasia  Small hiatal hernia otherwise normal stomach on direct and retroflexed views  Random gastric biopsies taken to rule out H pylori  Normal visualized portion of duodenum from duodenal bulb to D3  Random biopsies taken to rule out celiac disease "    "No evidence of active bleeding  Terminal ileum normal  Scattered diverticula throughout the colon left greater than right without evidence of bleeding  Area of erythema/ulceration/hemorrhage likely from trauma from enema versus old diverticular bleed  Large internal/external hemorrhoids"    Okay to resume plavix from GI standpoint  Patient okay to eat from GI standpoint

## 2021-07-20 NOTE — ASSESSMENT & PLAN NOTE
· Lower extremity 3+ pitting edema to bilateral lower extremities extending up to scrotum, bibasilar rales  · Chest x-ray:  Atelectasis  · BNP:  4500  · Daily weights  · I&Os  · Lasix IV twice daily for acute diuresis for now  Not much urine output with 40 IV b i d  Of Lasix  Will increase to 60 mg IV b i d  Closely monitor urine output  Hold Norvasc    · TTE: LV function showing 55% EF  · Low-salt diet, fluid restriction

## 2021-07-20 NOTE — ASSESSMENT & PLAN NOTE
· Stool Hemoccult positive  · Hemoglobin 8 5  · EGD and Colonoscopy results noted  · Okay to start Plavix from GI standpoint  · Okay to resume diet

## 2021-07-20 NOTE — PROGRESS NOTES
[address]  Progress Note Miquel Summers 1945, 64 y o  male MRN: [id number]  Unit/Bed#: -01 Encounter: [id number]  Primary Care Provider: Verna Quinn MD   Date and time admitted to hospital: 7/18/2021  9:06 PM    Morbid obesity with BMI of 40 0-44 9, adult [facility])  Assessment & Plan  Consult placed to dietitian    History of TIA (transient ischemic attack)  Assessment & Plan  · Resumed plavix, no active bleeding on endoscopy  · Continue statin    GI bleeding  Assessment & Plan  · Stool Hemoccult positive  · Hemoglobin 8 5  · EGD and Colonoscopy results noted  · Okay to start Plavix from GI standpoint  · Okay to resume diet    Essential hypertension  Assessment & Plan  · BP reviewed and acceptable  · Losartan on hold due to NORAH  · Continue metoprolol  · Monitor blood pressure    Ambulatory dysfunction  Assessment & Plan  · Patient has been having frequent falls, and today he could not get up from the floor  EMS was concerned that patient may be in an unsafe situation as patient's wife is having difficulty caring for him  · Fall precautions  · Case management consult  · PT OT consult    Moderate aortic stenosis by prior echocardiogram  Assessment & Plan  · Echocardiogram May 2021 at [facility]:  EF 55-60%  Normal diastolic function  Consistent with moderate AS, moderate tricuspid regurgitation  Moderately elevated estimated PA systolic pressure  · He takes Lasix 40 mg orally daily at home    Type 2 diabetes mellitus with kidney complication, without long-term current use of insulin [facility])  Assessment & Plan  Lab Results   Component Value Date    HGBA1C 6 6 (H) 07/19/2021       Recent Labs     07/20/21  1247 07/20/21  1321 07/20/21  1427 07/20/21  1621   POCGLU 64* 76 85 76       Blood Sugar Average: Last 72 hrs:  (P) 86 [id number]   · Diabetic diet  · Fingerstick blood sugar sliding scale coverage  · Hold home oral agents  ·  hemoglobin A1c 6 6    Blood sugars are currently well controlled    PAF (paroxysmal atrial fibrillation) ([facility])  Assessment & Plan  · EKG: normal sinus rhythm  · He is not on anticoagulation  · Continue metoprolol    Acute kidney injury superimposed on chronic kidney disease [facility])  Assessment & Plan  Lab Results   Component Value Date    EGFR 33 07/20/2021    EGFR 30 07/19/2021    EGFR 28 07/18/2021    CREATININE 1 91 (H) 07/20/2021    CREATININE 2 06 (H) 07/19/2021    CREATININE 2 21 (H) 07/18/2021     · Baseline creatinine around 1 4  Suspect partially due to losartan-will hold  · Hold off on fluids due to anasarca  · Daily metabolic panel and trend creatinine during diuresis  · Caution with nephrotoxins and avoid hypotension  · Improving, continue to monitor    Acute on chronic anemia  Assessment & Plan  · Hemoglobin is 8 5 with baseline around 13 admission now trended down to 7 8 this morning  · Stool Hemoccult is positive  · No evidence of gross bleeding noted     · Continue iron  · S/p EGD and colonoscopy on 7/20  "C1 M3 Og's esophagus-biopsies taken to rule out dysplasia  Small hiatal hernia otherwise normal stomach on direct and retroflexed views  Random gastric biopsies taken to rule out H pylori  Normal visualized portion of duodenum from duodenal bulb to D3  Random biopsies taken to rule out celiac disease "    "No evidence of active bleeding  Terminal ileum normal  Scattered diverticula throughout the colon left greater than right without evidence of bleeding  Area of erythema/ulceration/hemorrhage likely from trauma from enema versus old diverticular bleed  Large internal/external hemorrhoids"    Okay to resume plavix from GI standpoint  Patient okay to eat from GI standpoint        Alzheimer's dementia ([facility] [address] )  Assessment & Plan  · At baseline  · Continue Aricept  · Supportive care    Hypokalemia  Assessment & Plan  · Potassium 2 9  · Repleted with 40 mEq orally and 20 mEq IV in the ER  · Monitor and replete accordingly    * Acute on chronic diastolic CHF (congestive heart failure) ([facility])  Assessment & Plan  · Lower extremity 3+ pitting edema to bilateral lower extremities extending up to scrotum, bibasilar rales  · Chest x-ray:  Atelectasis  · BNP:  4500  · Daily weights  · I&Os  · Lasix IV twice daily for acute diuresis for now  Not much urine output with 40 IV b i d  Of Lasix  Will increase to 60 mg IV b i d  Closely monitor urine output  Hold Norvasc  · TTE: LV function showing 55% EF  · Low-salt diet, fluid restriction        VTE Pharmacologic Prophylaxis:   Pharmacologic: Pharmacologic VTE Prophylaxis contraindicated due to GI bleed  Mechanical VTE Prophylaxis in Place: Yes    Patient Centered Rounds: I have performed bedside rounds with nursing staff today  Discussions with Specialists or Other Care Team Provider: GI    Education and Discussions with Family / Patient: patient at bedside    Time Spent for Care: 30 minutes  More than 50% of total time spent on counseling and coordination of care as described above  Current Length of Stay: 2 day(s)    Current Patient Status: Inpatient   Certification Statement: The patient will continue to require additional inpatient hospital stay due to IV diuresis    Discharge Plan: Rehab    Code Status: Level 1 - Full Code      Subjective:   Patient seen examined at bedside  Endorses feeling better than when he 1st came in  Denies any complaints at this time  Endorses improvement in lower extremity edema  Review of Systems   Constitutional: Negative for chills and fever  HENT: Negative for ear pain and sore throat  Eyes: Negative for pain and visual disturbance  Respiratory: Negative for cough and shortness of breath  Cardiovascular: Negative for chest pain and palpitations  Gastrointestinal: Negative for abdominal pain and vomiting  Genitourinary: Negative for dysuria and hematuria  Musculoskeletal: Negative for arthralgias and back pain          Bilateral lower extremity edema   Skin: Negative for color change and rash  Neurological: Negative for seizures and syncope  All other systems reviewed and are negative  Objective:     Vitals:   Temp (24hrs), Av 2 °F (36 8 °C), Min:97 9 °F (36 6 °C), Max:98 5 °F (36 9 °C)    Temp:  [97 9 °F (36 6 °C)-98 5 °F (36 9 °C)] 98 5 °F (36 9 °C)  HR:  [61-69] 63  Resp:  [16-18] 18  BP: (113-165)/(55-75) 140/56  SpO2:  [90 %-100 %] 93 %  Body mass index is 41 63 kg/m²  Input and Output Summary (last 24 hours): Intake/Output Summary (Last 24 hours) at 2021 1750  Last data filed at 2021 1735  Gross per 24 hour   Intake 1500 83 ml   Output 1200 ml   Net 300 83 ml       Physical Exam:     Physical Exam  Vitals and nursing note reviewed  Constitutional:       Appearance: He is well-developed  HENT:      Head: Normocephalic and atraumatic  Eyes:      Conjunctiva/sclera: Conjunctivae normal    Cardiovascular:      Rate and Rhythm: Normal rate and regular rhythm  Heart sounds: No murmur heard  Comments: 3+ pitting edema bilateral lower extremities  Pulmonary:      Effort: Pulmonary effort is normal  No respiratory distress  Breath sounds: Normal breath sounds  Abdominal:      Palpations: Abdomen is soft  Tenderness: There is no abdominal tenderness  Musculoskeletal:      Cervical back: Neck supple  Skin:     General: Skin is warm and dry  Neurological:      Mental Status: He is alert             Additional Data:     Labs:    Results from last 7 days   Lab Units 21  1311 21  2114   WBC Thousand/uL 5 99 5 72   HEMOGLOBIN g/dL 8 1* 8 5*   HEMATOCRIT % 27 8* 29 2*   PLATELETS Thousands/uL 338 357   NEUTROS PCT %  --  71   LYMPHS PCT %  --  11*   MONOS PCT %  --  14*   EOS PCT %  --  3     Results from last 7 days   Lab Units 21  0444 21  2114   SODIUM mmol/L 143 145   POTASSIUM mmol/L 3 4* 2 9*   CHLORIDE mmol/L 107 105   CO2 mmol/L 30 30   BUN mg/dL 19 25   CREATININE mg/dL 1 91* 2 21*   ANION GAP mmol/L 6 10   CALCIUM mg/dL 8 1* 8 4   ALBUMIN g/dL  --  3 0*   TOTAL BILIRUBIN mg/dL  --  0 58   ALK PHOS U/L  --  120*   ALT U/L  --  25   AST U/L  --  27   GLUCOSE RANDOM mg/dL 76 133     Results from last 7 days   Lab Units 07/18/21  2114   INR  1 22*     Results from last 7 days   Lab Units 07/20/21  1621 07/20/21  1427 07/20/21  1321 07/20/21  1247 07/20/21  1102 07/20/21  0716 07/19/21  2132 07/19/21  1849 07/19/21  1714 07/19/21  1657 07/19/21  1201 07/19/21  1141   POC GLUCOSE mg/dl 76 85 76 64* 72 123 102 133 67 61* 79 63*     Results from last 7 days   Lab Units 07/19/21  0449   HEMOGLOBIN A1C % 6 6*     Results from last 7 days   Lab Units 07/18/21  2114   LACTIC ACID mmol/L 1 1           * I Have Reviewed All Lab Data Listed Above  * Additional Pertinent Lab Tests Reviewed:  Elsie 64 Admission Reviewed    Imaging:    Reviewed    Recent Cultures (last 7 days):           Last 24 Hours Medication List:   Current Facility-Administered Medications   Medication Dose Route Frequency Provider Last Rate    acetaminophen  650 mg Oral Q4H PRN SEYMOUR Thacker      atorvastatin  80 mg Oral Daily With Abbott LaboratoriesSEYMOUR      [START ON 7/21/2021] clopidogrel  75 mg Oral Daily Janice Baires MD      dextrose  50 mL/hr Intravenous Continuous Jannette Jha MD 50 mL/hr (07/20/21 1453)    donepezil  5 mg Oral HS SEYMOUR Thacker      ferrous gluconate  324 mg Oral Daily SEYMOUR Thacker      furosemide  60 mg Intravenous BID (diuretic) Jannette Jha MD      insulin lispro  1-6 Units Subcutaneous TID AC Jannette Jha MD      levothyroxine  100 mcg Oral Early Morning SEYMOUR Thacker      metoprolol succinate  50 mg Oral Daily SEYMOUR Thacker      pantoprazole  40 mg Oral Early Morning SEYMOUR Thacker      potassium chloride  40 mEq Oral Once Janice Garcia MD          Today, Patient Was Seen By: Cristóbal Montoya MD    ** Please Note: Dictation voice to text software may have been used in the creation of this document   **

## 2021-07-20 NOTE — H&P
History and Physical - [address] [facility]  Kaylah Powers 64 y o  male MRN: [id number]                  HPI: Kaylah Powers is a 64y o  year old male who presents for EGD/colonoscopy for evaluation of acute on chronic anemia with Hemoccult-positive stool in setting of iron deficiency anemia  Last EGD/colonoscopy performed 2018 which revealed probable Og's esophagus and erosive duodenitis and diminutive polyp, mild diverticulosis and small internal hemorrhoids on the colonoscopy  REVIEW OF SYSTEMS: Per the HPI, and otherwise unremarkable  Historical Information   Past Medical History:   Diagnosis Date    Alzheimers disease ([facility] )     Diabetes mellitus ([facility] )     Hypertension      Past Surgical History:   Procedure Laterality Date    APPENDECTOMY      KNEE ARTHROPLASTY Bilateral      Social History   Social History     Substance and Sexual Activity   Alcohol Use Never     Social History     Substance and Sexual Activity   Drug Use Never     Social History     Tobacco Use   Smoking Status Never Smoker   Smokeless Tobacco Never Used     Family History   Problem Relation Age of Onset    Heart disease Mother     Stroke Mother        Meds/Allergies     Medications Prior to Admission   Medication    amLODIPine (NORVASC) 2 5 mg tablet    atorvastatin (LIPITOR) 80 mg tablet    donepezil (ARICEPT) 5 mg tablet    furosemide (LASIX) 40 mg tablet    glimepiride (AMARYL) 4 mg tablet    levothyroxine 100 mcg tablet    losartan (COZAAR) 100 MG tablet    metoprolol succinate (TOPROL-XL) 50 mg 24 hr tablet    pantoprazole (PROTONIX) 40 mg tablet    pioglitazone (ACTOS) 30 mg tablet    clopidogrel (PLAVIX) 75 mg tablet    Ferrous Gluconate 256 (28 Fe) MG TABS       No Known Allergies    Objective     Blood pressure 165/75, pulse 61, temperature 97 9 °F (36 6 °C), resp  rate 18, height 5' 7" (1 702 m), weight 121 kg (265 lb 12 8 oz), SpO2 96 %        PHYSICAL EXAM    Gen: NAD  CV: RRR  CHEST: Clear  ABD: soft, NT/ND  EXT: no edema      ASSESSMENT/PLAN:  This is a 64y o  year old male here for EGD/colonoscopy for evaluation of acute on chronic anemia  Patient is stable and optimized for procedure      PLAN:   Procedure:  EGD/colonoscopy

## 2021-07-20 NOTE — ANESTHESIA PREPROCEDURE EVALUATION
Procedure:  COLONOSCOPY  EGD  Takes metoprolol  TTE: LVEF 55%, Mild AS mG 15  Hbg 7 9 baseline 13  Acute on chronic CHF  Hx mild AS  Denies SOB  Relevant Problems   CARDIO   (+) Essential hypertension   (+) Moderate aortic stenosis by prior echocardiogram   (+) PAF (paroxysmal atrial fibrillation) ([facility])      ENDO   (+) Type 2 diabetes mellitus with kidney complication, without long-term current use of insulin (HCC)      GI/HEPATIC   (+) GI bleeding      /RENAL   (+) Acute kidney injury superimposed on chronic kidney disease (HCC)      HEMATOLOGY   (+) Acute on chronic anemia      NEURO/PSYCH   (+) Alzheimer's dementia ([facility])   (+) History of TIA (transient ischemic attack)      Other   (+) Acute on chronic diastolic CHF (congestive heart failure) ([facility])   (+) Ambulatory dysfunction        Physical Exam    Airway    Mallampati score: II  TM Distance: >3 FB  Neck ROM: full     Dental   lower dentures and upper dentures,     Cardiovascular      Pulmonary      Other Findings        Anesthesia Plan  ASA Score- 3     Anesthesia Type- IV sedation with anesthesia with ASA Monitors  Additional Monitors:   Airway Plan:           Plan Factors-Exercise comment: Unable to assess  Chart reviewed  EKG reviewed  Existing labs reviewed  Patient summary reviewed  Patient is not a current smoker  Obstructive sleep apnea risk education given perioperatively  Induction-     Postoperative Plan-     Informed Consent- Anesthetic plan and risks discussed with patient  I personally reviewed this patient with the CRNA  Discussed and agreed on the Anesthesia Plan with the CRNA  Luz Delaney

## 2021-07-21 PROBLEM — E03.9 HYPOTHYROIDISM: Status: ACTIVE | Noted: 2021-07-21

## 2021-07-21 LAB
ANION GAP SERPL CALCULATED.3IONS-SCNC: 7 MMOL/L (ref 4–13)
BASOPHILS # BLD AUTO: 0.05 THOUSANDS/ΜL (ref 0–0.1)
BASOPHILS NFR BLD AUTO: 1 % (ref 0–1)
BUN SERPL-MCNC: 19 MG/DL (ref 5–25)
CALCIUM SERPL-MCNC: 7.9 MG/DL (ref 8.3–10.1)
CHLORIDE SERPL-SCNC: 103 MMOL/L (ref 100–108)
CO2 SERPL-SCNC: 29 MMOL/L (ref 21–32)
CREAT SERPL-MCNC: 1.88 MG/DL (ref 0.6–1.3)
EOSINOPHIL # BLD AUTO: 0.61 THOUSAND/ΜL (ref 0–0.61)
EOSINOPHIL NFR BLD AUTO: 10 % (ref 0–6)
ERYTHROCYTE [DISTWIDTH] IN BLOOD BY AUTOMATED COUNT: 16 % (ref 11.6–15.1)
GFR SERPL CREATININE-BSD FRML MDRD: 33 ML/MIN/1.73SQ M
GLUCOSE SERPL-MCNC: 109 MG/DL (ref 65–140)
GLUCOSE SERPL-MCNC: 115 MG/DL (ref 65–140)
GLUCOSE SERPL-MCNC: 123 MG/DL (ref 65–140)
GLUCOSE SERPL-MCNC: 125 MG/DL (ref 65–140)
GLUCOSE SERPL-MCNC: 216 MG/DL (ref 65–140)
HCT VFR BLD AUTO: 29.4 % (ref 36.5–49.3)
HGB BLD-MCNC: 8.5 G/DL (ref 12–17)
IMM GRANULOCYTES # BLD AUTO: 0.03 THOUSAND/UL (ref 0–0.2)
IMM GRANULOCYTES NFR BLD AUTO: 1 % (ref 0–2)
LYMPHOCYTES # BLD AUTO: 0.63 THOUSANDS/ΜL (ref 0.6–4.47)
LYMPHOCYTES NFR BLD AUTO: 10 % (ref 14–44)
MCH RBC QN AUTO: 26.6 PG (ref 26.8–34.3)
MCHC RBC AUTO-ENTMCNC: 28.9 G/DL (ref 31.4–37.4)
MCV RBC AUTO: 92 FL (ref 82–98)
MONOCYTES # BLD AUTO: 0.83 THOUSAND/ΜL (ref 0.17–1.22)
MONOCYTES NFR BLD AUTO: 13 % (ref 4–12)
NEUTROPHILS # BLD AUTO: 4.26 THOUSANDS/ΜL (ref 1.85–7.62)
NEUTS SEG NFR BLD AUTO: 65 % (ref 43–75)
NRBC BLD AUTO-RTO: 0 /100 WBCS
PLATELET # BLD AUTO: 313 THOUSANDS/UL (ref 149–390)
PMV BLD AUTO: 10.2 FL (ref 8.9–12.7)
POTASSIUM SERPL-SCNC: 3.8 MMOL/L (ref 3.5–5.3)
RBC # BLD AUTO: 3.2 MILLION/UL (ref 3.88–5.62)
SODIUM SERPL-SCNC: 139 MMOL/L (ref 136–145)
WBC # BLD AUTO: 6.41 THOUSAND/UL (ref 4.31–10.16)

## 2021-07-21 PROCEDURE — 80048 BASIC METABOLIC PNL TOTAL CA: CPT | Performed by: STUDENT IN AN ORGANIZED HEALTH CARE EDUCATION/TRAINING PROGRAM

## 2021-07-21 PROCEDURE — 99233 SBSQ HOSP IP/OBS HIGH 50: CPT | Performed by: STUDENT IN AN ORGANIZED HEALTH CARE EDUCATION/TRAINING PROGRAM

## 2021-07-21 PROCEDURE — 85025 COMPLETE CBC W/AUTO DIFF WBC: CPT | Performed by: STUDENT IN AN ORGANIZED HEALTH CARE EDUCATION/TRAINING PROGRAM

## 2021-07-21 PROCEDURE — 82948 REAGENT STRIP/BLOOD GLUCOSE: CPT

## 2021-07-21 RX ORDER — LEVOTHYROXINE SODIUM 112 UG/1
112 TABLET ORAL
Status: DISCONTINUED | OUTPATIENT
Start: 2021-07-22 | End: 2021-08-03 | Stop reason: HOSPADM

## 2021-07-21 RX ORDER — TORSEMIDE 20 MG/1
40 TABLET ORAL DAILY
Status: DISCONTINUED | OUTPATIENT
Start: 2021-07-22 | End: 2021-07-21

## 2021-07-21 RX ORDER — FUROSEMIDE 10 MG/ML
80 INJECTION INTRAMUSCULAR; INTRAVENOUS
Status: DISCONTINUED | OUTPATIENT
Start: 2021-07-22 | End: 2021-07-22

## 2021-07-21 RX ORDER — TORSEMIDE 20 MG/1
40 TABLET ORAL DAILY
Status: DISCONTINUED | OUTPATIENT
Start: 2021-07-21 | End: 2021-07-21

## 2021-07-21 RX ORDER — QUETIAPINE FUMARATE 25 MG/1
25 TABLET, FILM COATED ORAL
Status: DISCONTINUED | OUTPATIENT
Start: 2021-07-21 | End: 2021-08-03 | Stop reason: HOSPADM

## 2021-07-21 RX ADMIN — FUROSEMIDE 60 MG: 10 INJECTION, SOLUTION INTRAMUSCULAR; INTRAVENOUS at 16:39

## 2021-07-21 RX ADMIN — DONEPEZIL HYDROCHLORIDE 5 MG: 5 TABLET ORAL at 21:46

## 2021-07-21 RX ADMIN — FUROSEMIDE 60 MG: 10 INJECTION, SOLUTION INTRAMUSCULAR; INTRAVENOUS at 08:06

## 2021-07-21 RX ADMIN — LEVOTHYROXINE SODIUM 100 MCG: 100 TABLET ORAL at 05:19

## 2021-07-21 RX ADMIN — ATORVASTATIN CALCIUM 80 MG: 40 TABLET, FILM COATED ORAL at 16:38

## 2021-07-21 RX ADMIN — QUETIAPINE FUMARATE 25 MG: 25 TABLET ORAL at 22:55

## 2021-07-21 RX ADMIN — METOPROLOL SUCCINATE 50 MG: 50 TABLET, EXTENDED RELEASE ORAL at 08:07

## 2021-07-21 RX ADMIN — TORSEMIDE 40 MG: 20 TABLET ORAL at 18:18

## 2021-07-21 RX ADMIN — PANTOPRAZOLE SODIUM 40 MG: 40 TABLET, DELAYED RELEASE ORAL at 05:19

## 2021-07-21 RX ADMIN — FERROUS GLUCONATE 324 MG: 324 TABLET ORAL at 08:07

## 2021-07-21 RX ADMIN — CLOPIDOGREL BISULFATE 75 MG: 75 TABLET ORAL at 08:07

## 2021-07-21 RX ADMIN — INSULIN LISPRO 2 UNITS: 100 INJECTION, SOLUTION INTRAVENOUS; SUBCUTANEOUS at 08:07

## 2021-07-21 NOTE — PLAN OF CARE
Problem: Potential for Falls  Goal: Patient will remain free of falls  Description: INTERVENTIONS:  - Educate patient/family on patient safety including physical limitations  - Instruct patient to call for assistance with activity   - Consult OT/PT to assist with strengthening/mobility   - Keep Call bell within reach  - Keep bed low and locked with side rails adjusted as appropriate  - Keep care items and personal belongings within reach  - Initiate and maintain comfort rounds  - Make Fall Risk Sign visible to staff  - Offer Toileting every 2 Hours, in advance of need  - Initiate/Maintain bed alarm  - Obtain necessary fall risk management equipment: yellow socks and bracelet  - Apply yellow socks and bracelet for high fall risk patients  - Consider moving patient to room near nurses station  Outcome: Progressing     Problem: MOBILITY - ADULT  Goal: Maintain or return to baseline ADL function  Description: INTERVENTIONS:  -  Assess patient's ability to carry out ADLs; assess patient's baseline for ADL function and identify physical deficits which impact ability to perform ADLs (bathing, care of mouth/teeth, toileting, grooming, dressing, etc )  - Assess/evaluate cause of self-care deficits   - Assess range of motion  - Assess patient's mobility; develop plan if impaired  - Assess patient's need for assistive devices and provide as appropriate  - Encourage maximum independence but intervene and supervise when necessary  - Involve family in performance of ADLs  - Assess for home care needs following discharge   - Consider OT consult to assist with ADL evaluation and planning for discharge  - Provide patient education as appropriate  Outcome: Progressing  Goal: Maintains/Returns to pre admission functional level  Description: INTERVENTIONS:  - Perform BMAT or MOVE assessment daily    - Set and communicate daily mobility goal to care team and patient/family/caregiver     - Collaborate with rehabilitation services on mobility goals if consulted  - Perform Range of Motion 4 times a day  - Reposition patient every 2 hours  - Dangle patient 4 times a day  - Stand patient 4 times a day  - Ambulate patient 4 times a day  - Out of bed to chair 4 times a day   - Out of bed for meals 3 times a day  - Out of bed for toileting  - Record patient progress and toleration of activity level   Outcome: Progressing     Problem: Prexisting or High Potential for Compromised Skin Integrity  Goal: Skin integrity is maintained or improved  Description: INTERVENTIONS:  - Identify patients at risk for skin breakdown  - Assess and monitor skin integrity  - Assess and monitor nutrition and hydration status  - Monitor labs   - Assess for incontinence   - Turn and reposition patient  - Assist with mobility/ambulation  - Relieve pressure over bony prominences  - Avoid friction and shearing  - Provide appropriate hygiene as needed including keeping skin clean and dry  - Evaluate need for skin moisturizer/barrier cream  - Collaborate with interdisciplinary team   - Patient/family teaching  - Consider wound care consult   Outcome: Progressing     Problem: NEUROSENSORY - ADULT  Goal: Achieves stable or improved neurological status  Description: INTERVENTIONS  - Monitor and report changes in neurological status  - Monitor vital signs such as temperature, blood pressure, glucose, and any other labs ordered   - Initiate measures to prevent increased intracranial pressure  - Monitor for seizure activity and implement precautions if appropriate      Outcome: Progressing  Goal: Achieves maximal functionality and self care  Description: INTERVENTIONS  - Monitor swallowing and airway patency with patient fatigue and changes in neurological status  - Encourage and assist patient to increase activity and self care     - Encourage visually impaired, hearing impaired and aphasic patients to use assistive/communication devices  Outcome: Progressing     Problem: CARDIOVASCULAR - ADULT  Goal: Maintains optimal cardiac output and hemodynamic stability  Description: INTERVENTIONS:  - Monitor I/O, vital signs and rhythm  - Monitor for S/S and trends of decreased cardiac output  - Administer and titrate ordered vasoactive medications to optimize hemodynamic stability  - Assess quality of pulses, skin color and temperature  - Assess for signs of decreased coronary artery perfusion  - Instruct patient to report change in severity of symptoms  Outcome: Progressing  Goal: Absence of cardiac dysrhythmias or at baseline rhythm  Description: INTERVENTIONS:  - Continuous cardiac monitoring, vital signs, obtain 12 lead EKG if ordered  - Administer antiarrhythmic and heart rate control medications as ordered  - Monitor electrolytes and administer replacement therapy as ordered  Outcome: Progressing     Problem: RESPIRATORY - ADULT  Goal: Achieves optimal ventilation and oxygenation  Description: INTERVENTIONS:  - Assess for changes in respiratory status  - Assess for changes in mentation and behavior  - Position to facilitate oxygenation and minimize respiratory effort  - Oxygen administered by appropriate delivery if ordered  - Initiate smoking cessation education as indicated  - Encourage broncho-pulmonary hygiene including cough, deep breathe, Incentive Spirometry  - Assess the need for suctioning and aspirate as needed  - Assess and instruct to report SOB or any respiratory difficulty  - Respiratory Therapy support as indicated  Outcome: Progressing     Problem: GASTROINTESTINAL - ADULT  Goal: Minimal or absence of nausea and/or vomiting  Description: INTERVENTIONS:  - Administer IV fluids if ordered to ensure adequate hydration  - Maintain NPO status until nausea and vomiting are resolved  - Nasogastric tube if ordered  - Administer ordered antiemetic medications as needed  - Provide nonpharmacologic comfort measures as appropriate  - Advance diet as tolerated, if ordered  - Consider nutrition services referral to assist patient with adequate nutrition and appropriate food choices  Outcome: Progressing  Goal: Maintains or returns to baseline bowel function  Description: INTERVENTIONS:  - Assess bowel function  - Encourage oral fluids to ensure adequate hydration  - Administer IV fluids if ordered to ensure adequate hydration  - Administer ordered medications as needed  - Encourage mobilization and activity  - Consider nutritional services referral to assist patient with adequate nutrition and appropriate food choices  Outcome: Progressing  Goal: Maintains adequate nutritional intake  Description: INTERVENTIONS:  - Monitor percentage of each meal consumed  - Identify factors contributing to decreased intake, treat as appropriate  - Assist with meals as needed  - Monitor I&O, weight, and lab values if indicated  - Obtain nutrition services referral as needed  Outcome: Progressing  Goal: Oral mucous membranes remain intact  Description: INTERVENTIONS  - Assess oral mucosa and hygiene practices  - Implement preventative oral hygiene regimen  - Implement oral medicated treatments as ordered  - Initiate Nutrition services referral as needed  Outcome: Progressing     Problem: GENITOURINARY - ADULT  Goal: Maintains or returns to baseline urinary function  Description: INTERVENTIONS:  - Assess urinary function  - Encourage oral fluids to ensure adequate hydration if ordered  - Administer IV fluids as ordered to ensure adequate hydration  - Administer ordered medications as needed  - Offer frequent toileting  - Follow urinary retention protocol if ordered  Outcome: Progressing  Goal: Absence of urinary retention  Description: INTERVENTIONS:  - Assess patients ability to void and empty bladder  - Monitor I/O  - Bladder scan as needed  - Discuss with physician/AP medications to alleviate retention as needed  - Discuss catheterization for long term situations as appropriate  Outcome: Progressing Problem: METABOLIC, FLUID AND ELECTROLYTES - ADULT  Goal: Electrolytes maintained within normal limits  Description: INTERVENTIONS:  - Monitor labs and assess patient for signs and symptoms of electrolyte imbalances  - Administer electrolyte replacement as ordered  - Monitor response to electrolyte replacements, including repeat lab results as appropriate  - Instruct patient on fluid and nutrition as appropriate  Outcome: Progressing  Goal: Fluid balance maintained  Description: INTERVENTIONS:  - Monitor labs   - Monitor I/O and WT  - Instruct patient on fluid and nutrition as appropriate  - Assess for signs & symptoms of volume excess or deficit  Outcome: Progressing  Goal: Glucose maintained within target range  Description: INTERVENTIONS:  - Monitor Blood Glucose as ordered  - Assess for signs and symptoms of hyperglycemia and hypoglycemia  - Administer ordered medications to maintain glucose within target range  - Assess nutritional intake and initiate nutrition service referral as needed  Outcome: Progressing     Problem: SKIN/TISSUE INTEGRITY - ADULT  Goal: Skin Integrity remains intact(Skin Breakdown Prevention)  Description: Assess:  -Perform Wale assessment every 8  -Clean and moisturize skin every 8  -Inspect skin when repositioning, toileting, and assisting with ADLS  -Assess under medical devices such as masimo band every 2 hours  -Assess extremities for adequate circulation and sensation     Bed Management:  -Have minimal linens on bed & keep smooth, unwrinkled  -Change linens as needed when moist or perspiring  -Avoid sitting or lying in one position for more than 2 hours while in bed  -Keep HOB at 60 degrees     Toileting:  -Offer bedside commode  -Assess for incontinence every 2  -Use incontinent care products after each incontinent episode such as pads    Activity:  -Mobilize patient 4 times a day  -Encourage activity and walks on unit  -Encourage or provide ROM exercises   -Turn and reposition patient every 2 Hours  -Use appropriate equipment to lift or move patient in bed  -Instruct/ Assist with weight shifting every 2 when out of bed in chair  -Consider limitation of chair time 2 hour intervals    Skin Care:  -Avoid use of baby powder, tape, friction and shearing, hot water or constrictive clothing  -Relieve pressure over bony prominences using pillows  -Do not massage red bony areas    Next Steps:  -Teach patient strategies to minimize risks such as weight shifting  -Consider consults to  interdisciplinary teams such as PT  Outcome: Progressing  Goal: Incision(s), wounds(s) or drain site(s) healing without S/S of infection  Description: INTERVENTIONS  - Assess and document dressing, incision, wound bed, drain sites and surrounding tissue  - Provide patient and family education  - Perform skin care/dressing changes every 2  Outcome: Progressing  Goal: Pressure injury heals and does not worsen  Description: Interventions:  - Implement low air loss mattress or specialty surface (Criteria met)  - Apply silicone foam dressing  - Instruct/assist with weight shifting every 30 minutes when in chair   - Limit chair time to 2 hour intervals  - Use special pressure reducing interventions such as weight shifting when in chair   - Apply fecal or urinary incontinence containment device   - Perform passive or active ROM every 2  - Turn and reposition patient & offload bony prominences every 2 hours   - Utilize friction reducing device or surface for transfers   - Consider consults to  interdisciplinary teams such as PT  - Use incontinent care products after each incontinent episode such as pads  - Consider nutrition services referral as needed  Outcome: Progressing     Problem: HEMATOLOGIC - ADULT  Goal: Maintains hematologic stability  Description: INTERVENTIONS  - Assess for signs and symptoms of bleeding or hemorrhage  - Monitor labs  - Administer supportive blood products/factors as ordered and appropriate  Outcome: Progressing     Problem: MUSCULOSKELETAL - ADULT  Goal: Maintain or return mobility to safest level of function  Description: INTERVENTIONS:  - Assess patient's ability to carry out ADLs; assess patient's baseline for ADL function and identify physical deficits which impact ability to perform ADLs (bathing, care of mouth/teeth, toileting, grooming, dressing, etc )  - Assess/evaluate cause of self-care deficits   - Assess range of motion  - Assess patient's mobility  - Assess patient's need for assistive devices and provide as appropriate  - Encourage maximum independence but intervene and supervise when necessary  - Involve family in performance of ADLs  - Assess for home care needs following discharge   - Consider OT consult to assist with ADL evaluation and planning for discharge  - Provide patient education as appropriate  Outcome: Progressing  Goal: Maintain proper alignment of affected body part  Description: INTERVENTIONS:  - Support, maintain and protect limb and body alignment  - Provide patient/ family with appropriate education  Outcome: Progressing     Problem: Nutrition/Hydration-ADULT  Goal: Nutrient/Hydration intake appropriate for improving, restoring or maintaining nutritional needs  Description: Monitor and assess patient's nutrition/hydration status for malnutrition  Collaborate with interdisciplinary team and initiate plan and interventions as ordered  Monitor patient's weight and dietary intake as ordered or per policy  Utilize nutrition screening tool and intervene as necessary  Determine patient's food preferences and provide high-protein, high-caloric foods as appropriate       INTERVENTIONS:  - Monitor oral intake, urinary output, labs, and treatment plans  - Assess nutrition and hydration status and recommend course of action  - Evaluate amount of meals eaten  - Assist patient with eating if necessary   - Allow adequate time for meals  - Recommend/ encourage appropriate diets, oral nutritional supplements, and vitamin/mineral supplements  - Order, calculate, and assess calorie counts as needed  - Recommend, monitor, and adjust tube feedings and TPN/PPN based on assessed needs  - Assess need for intravenous fluids  - Provide specific nutrition/hydration education as appropriate  - Include patient/family/caregiver in decisions related to nutrition  Outcome: Progressing

## 2021-07-21 NOTE — PROGRESS NOTES
Progress Note - Sharlee Krabbe 64 y o  male MRN: [id number]    Unit/Bed#: -[facility] Encounter: [id number]      Assessment:  Acute on chronic anemia, JJ  Heme occult positive stool  Barretts esophagus  Hiatal hernia  Diverticulosis  Hemorrhoids  Erythematous rectosigmoid/rectal mucosa    Plan:  No signs of active unstable GI bleeding  F/u EGD and colon path results  Follow stool, vitals, cbc, lytes, coags  Diet as tolerated  Monitor carefully back on Plavix  PPI daily for Barretts  GI will follow as needed, please call with questions    Subjective:   Resting comfortably  Denies abd pain, no N/V  Hgb improved  S/p EGD/Colon 7/20/21 see below    Objective:     Vitals: Blood pressure 167/71, pulse 68, temperature 97 8 °F (36 6 °C), resp  rate 19, height 5' 7" (1 702 m), weight 121 kg (267 lb 3 2 oz), SpO2 96 %  ,Body mass index is 41 85 kg/m²  Intake/Output Summary (Last 24 hours) at 7/21/2021 1318  Last data filed at 7/21/2021 1016  Gross per 24 hour   Intake 1805 83 ml   Output 700 ml   Net 1105 83 ml       Physical Exam:   Gen:  NAD  CVS:  RRR  Lungs:  CTA b/l  Abd:  Soft, NT/ND, +BS    Invasive Devices     Peripheral Intravenous Line            Peripheral IV 07/21/21 Dorsal (posterior); Right Hand <1 day                Lab, Imaging and other studies: I have personally reviewed pertinent reports  Results for Jyoti Rahman (MRN [id number]) as of 7/21/2021 13:22   Ref   Range 7/21/2021 06:35   Sodium Latest Ref Range: 136 - 145 mmol/L 139   Potassium Latest Ref Range: 3 5 - 5 3 mmol/L 3 8   Chloride Latest Ref Range: 100 - 108 mmol/L 103   CO2 Latest Ref Range: 21 - 32 mmol/L 29   Anion Gap Latest Ref Range: 4 - 13 mmol/L 7   BUN Latest Ref Range: 5 - 25 mg/dL 19   Creatinine Latest Ref Range: 0 60 - 1 30 mg/dL 1 88 (H)   Glucose, Random Latest Ref Range: 65 - 140 mg/dL 115   Calcium Latest Ref Range: 8 3 - 10 1 mg/dL 7 9 (L)   eGFR Latest Units: ml/min/1 73sq m 33   WBC Latest Ref Range: 4 31 - 10 16 Thousand/uL 6 41   Red Blood Cell Count Latest Ref Range: 3 88 - 5 62 Million/uL 3 20 (L)   Hemoglobin Latest Ref Range: 12 0 - 17 0 g/dL 8 5 (L)   HCT Latest Ref Range: 36 5 - 49 3 % 29 4 (L)   MCV Latest Ref Range: 82 - 98 fL 92   MCH Latest Ref Range: 26 8 - 34 3 pg 26 6 (L)   MCHC Latest Ref Range: 31 4 - 37 4 g/dL 28 9 (L)   RDW Latest Ref Range: 11 6 - 15 1 % 16 0 (H)   Platelet Count Latest Ref Range: 149 - 390 Thousands/uL 313   MPV Latest Ref Range: 8 9 - 12 7 fL 10 2   nRBC Latest Units: /100 WBCs 0   Neutrophils % Latest Ref Range: 43 - 75 % 65   Immat GRANS % Latest Ref Range: 0 - 2 % 1   Lymphocytes Relative Latest Ref Range: 14 - 44 % 10 (L)   Monocytes Relative Latest Ref Range: 4 - 12 % 13 (H)   Eosinophils Latest Ref Range: 0 - 6 % 10 (H)   Basophils Relative Latest Ref Range: 0 - 1 % 1   Immature Grans Absolute Latest Ref Range: 0 00 - 0 20 Thousand/uL 0 03   Absolute Neutrophils Latest Ref Range: 1 85 - 7 62 Thousands/µL 4 26   Lymphocytes Absolute Latest Ref Range: 0 60 - 4 47 Thousands/µL 0 63   Absolute Monocytes Latest Ref Range: 0 17 - 1 22 Thousand/µL 0 83   Absolute Eosinophils Latest Ref Range: 0 00 - 0 61 Thousand/µL 0 61   Basophils Absolute Latest Ref Range: 0 00 - 0 10 Thousands/µL 0 05     EGD/Colon 7/20/21     FINDINGS:  · C1M3 Og's esophagus observed where the top of gastric folds are 38 cm from the incisors with no associated nodule; performed cold forceps biopsy  · Small sliding hiatal hernia (type I hiatal hernia) without Prachi Ravalli lesions present - GE junction 38 cm from the incisors, diaphragmatic impression 40 cm from the incisors   Otherwise normal stomach on direct and retroflexed views  · Performed biopsies to rule out celiac disease and H  pylori in the body of the stomach, incisura, antrum, duodenal bulb and 2nd part of the duodenum  · The duodenal bulb, 1st part of the duodenum, 2nd part of the duodenum and 3rd part of the duodenum appeared normal   FINDINGS:  · The terminal ileum appeared normal   · The ileocecal valve, cecum, ascending colon, hepatic flexure, transverse colon, splenic flexure, descending colon and sigmoid colon appeared normal   · Mild erythematous and hemorrhagic mucosa with erosion in the rectosigmoid and rectum   Areas of erosive hemorrhagic lesions that were not actively bleeding possibly secondary to trauma from enemas; no identified diverticula in the area however this could also be secondary to diverticula that may have bled  · Moderate scattered diverticula  · Protruding, external, internal hemorrhoids

## 2021-07-21 NOTE — CASE MANAGEMENT
Case Management Progress Note    Patient name Yazmin Coon  Location [address] 334/-30 MRN [id number]  : 1945 Date 2021       LOS (days): 3  Geometric Mean LOS (GMLOS) (days): 4 00  Days to GMLOS:1 5          PROGRESS NOTE:    [facility] will accept pt at time of discharge

## 2021-07-21 NOTE — PLAN OF CARE
Problem: Prexisting or High Potential for Compromised Skin Integrity  Goal: Skin integrity is maintained or improved  Description: INTERVENTIONS:  - Identify patients at risk for skin breakdown  - Assess and monitor skin integrity  - Assess and monitor nutrition and hydration status  - Monitor labs   - Assess for incontinence   - Turn and reposition patient  - Assist with mobility/ambulation  - Relieve pressure over bony prominences  - Avoid friction and shearing  - Provide appropriate hygiene as needed including keeping skin clean and dry  - Evaluate need for skin moisturizer/barrier cream  - Collaborate with interdisciplinary team   - Patient/family teaching  - Consider wound care consult   Outcome: Progressing     Problem: NEUROSENSORY - ADULT  Goal: Achieves stable or improved neurological status  Description: INTERVENTIONS  - Monitor and report changes in neurological status  - Monitor vital signs such as temperature, blood pressure, glucose, and any other labs ordered   - Initiate measures to prevent increased intracranial pressure  - Monitor for seizure activity and implement precautions if appropriate      Outcome: Progressing     Problem: NEUROSENSORY - ADULT  Goal: Achieves maximal functionality and self care  Description: INTERVENTIONS  - Monitor swallowing and airway patency with patient fatigue and changes in neurological status  - Encourage and assist patient to increase activity and self care     - Encourage visually impaired, hearing impaired and aphasic patients to use assistive/communication devices  Outcome: Progressing     Problem: CARDIOVASCULAR - ADULT  Goal: Maintains optimal cardiac output and hemodynamic stability  Description: INTERVENTIONS:  - Monitor I/O, vital signs and rhythm  - Monitor for S/S and trends of decreased cardiac output  - Administer and titrate ordered vasoactive medications to optimize hemodynamic stability  - Assess quality of pulses, skin color and temperature  - Assess for signs of decreased coronary artery perfusion  - Instruct patient to report change in severity of symptoms  Outcome: Progressing     Problem: CARDIOVASCULAR - ADULT  Goal: Absence of cardiac dysrhythmias or at baseline rhythm  Description: INTERVENTIONS:  - Continuous cardiac monitoring, vital signs, obtain 12 lead EKG if ordered  - Administer antiarrhythmic and heart rate control medications as ordered  - Monitor electrolytes and administer replacement therapy as ordered  Outcome: Progressing     Problem: RESPIRATORY - ADULT  Goal: Achieves optimal ventilation and oxygenation  Description: INTERVENTIONS:  - Assess for changes in respiratory status  - Assess for changes in mentation and behavior  - Position to facilitate oxygenation and minimize respiratory effort  - Oxygen administered by appropriate delivery if ordered  - Initiate smoking cessation education as indicated  - Encourage broncho-pulmonary hygiene including cough, deep breathe, Incentive Spirometry  - Assess the need for suctioning and aspirate as needed  - Assess and instruct to report SOB or any respiratory difficulty  - Respiratory Therapy support as indicated  Outcome: Progressing     Problem: GASTROINTESTINAL - ADULT  Goal: Minimal or absence of nausea and/or vomiting  Description: INTERVENTIONS:  - Administer IV fluids if ordered to ensure adequate hydration  - Maintain NPO status until nausea and vomiting are resolved  - Nasogastric tube if ordered  - Administer ordered antiemetic medications as needed  - Provide nonpharmacologic comfort measures as appropriate  - Advance diet as tolerated, if ordered  - Consider nutrition services referral to assist patient with adequate nutrition and appropriate food choices  Outcome: Progressing     Problem: GASTROINTESTINAL - ADULT  Goal: Maintains or returns to baseline bowel function  Description: INTERVENTIONS:  - Assess bowel function  - Encourage oral fluids to ensure adequate hydration  - Administer IV fluids if ordered to ensure adequate hydration  - Administer ordered medications as needed  - Encourage mobilization and activity  - Consider nutritional services referral to assist patient with adequate nutrition and appropriate food choices  Outcome: Progressing     Problem: GASTROINTESTINAL - ADULT  Goal: Maintains adequate nutritional intake  Description: INTERVENTIONS:  - Monitor percentage of each meal consumed  - Identify factors contributing to decreased intake, treat as appropriate  - Assist with meals as needed  - Monitor I&O, weight, and lab values if indicated  - Obtain nutrition services referral as needed  Outcome: Progressing     Problem: GASTROINTESTINAL - ADULT  Goal: Oral mucous membranes remain intact  Description: INTERVENTIONS  - Assess oral mucosa and hygiene practices  - Implement preventative oral hygiene regimen  - Implement oral medicated treatments as ordered  - Initiate Nutrition services referral as needed  Outcome: Progressing     Problem: GENITOURINARY - ADULT  Goal: Maintains or returns to baseline urinary function  Description: INTERVENTIONS:  - Assess urinary function  - Encourage oral fluids to ensure adequate hydration if ordered  - Administer IV fluids as ordered to ensure adequate hydration  - Administer ordered medications as needed  - Offer frequent toileting  - Follow urinary retention protocol if ordered  Outcome: Progressing     Problem: GENITOURINARY - ADULT  Goal: Absence of urinary retention  Description: INTERVENTIONS:  - Assess patients ability to void and empty bladder  - Monitor I/O  - Bladder scan as needed  - Discuss with physician/AP medications to alleviate retention as needed  - Discuss catheterization for long term situations as appropriate  Outcome: Progressing     Problem: METABOLIC, FLUID AND ELECTROLYTES - ADULT  Goal: Electrolytes maintained within normal limits  Description: INTERVENTIONS:  - Monitor labs and assess patient for signs and symptoms of electrolyte imbalances  - Administer electrolyte replacement as ordered  - Monitor response to electrolyte replacements, including repeat lab results as appropriate  - Instruct patient on fluid and nutrition as appropriate  Outcome: Progressing     Problem: METABOLIC, FLUID AND ELECTROLYTES - ADULT  Goal: Fluid balance maintained  Description: INTERVENTIONS:  - Monitor labs   - Monitor I/O and WT  - Instruct patient on fluid and nutrition as appropriate  - Assess for signs & symptoms of volume excess or deficit  Outcome: Progressing     Problem: METABOLIC, FLUID AND ELECTROLYTES - ADULT  Goal: Glucose maintained within target range  Description: INTERVENTIONS:  - Monitor Blood Glucose as ordered  - Assess for signs and symptoms of hyperglycemia and hypoglycemia  - Administer ordered medications to maintain glucose within target range  - Assess nutritional intake and initiate nutrition service referral as needed  Outcome: Progressing

## 2021-07-21 NOTE — CASE MANAGEMENT
Case Management Progress Note    Patient name Hiral Marin  Location [address] 334/-[id number] MRN [id number]  : 1945 Date 2021       LOS (days): 3  Geometric Mean LOS (GMLOS) (days): 4 00  Days to GMLOS:1 3        PROGRESS NOTE:    CM initiated prior auth for STR stay through [facility], [email or web address] ([phone])  Pending auth # [id number]  CM awaiting call from nurse at [facility] [email or web address] to receive fax number to send clinical information       Cyril 109 received call from [facility] from [facility] who requesting clinical information to be faxed to  phone number [phone] option#2, ext [phone]    As per request, CM faxed clinical

## 2021-07-21 NOTE — ANESTHESIA POSTPROCEDURE EVALUATION
Post-Op Assessment Note    CV Status:  Stable  Pain Score: 0    Pain management: adequate     Mental Status:  Alert and awake   Hydration Status:  Euvolemic   PONV Controlled:  Controlled   Airway Patency:  Patent      Post Op Vitals Reviewed:  Yes

## 2021-07-22 ENCOUNTER — APPOINTMENT (INPATIENT)
Dept: CT IMAGING | Facility: HOSPITAL | Age: 76
DRG: 291 | End: 2021-07-22
Payer: COMMERCIAL

## 2021-07-22 ENCOUNTER — APPOINTMENT (INPATIENT)
Dept: NON INVASIVE DIAGNOSTICS | Facility: HOSPITAL | Age: 76
DRG: 291 | End: 2021-07-22
Payer: COMMERCIAL

## 2021-07-22 ENCOUNTER — APPOINTMENT (INPATIENT)
Dept: MRI IMAGING | Facility: HOSPITAL | Age: 76
DRG: 291 | End: 2021-07-22
Payer: COMMERCIAL

## 2021-07-22 ENCOUNTER — APPOINTMENT (INPATIENT)
Dept: ULTRASOUND IMAGING | Facility: HOSPITAL | Age: 76
DRG: 291 | End: 2021-07-22
Payer: COMMERCIAL

## 2021-07-22 LAB
ALBUMIN SERPL BCP-MCNC: 2.7 G/DL (ref 3.5–5)
ALP SERPL-CCNC: 100 U/L (ref 46–116)
ALT SERPL W P-5'-P-CCNC: 18 U/L (ref 12–78)
ANION GAP SERPL CALCULATED.3IONS-SCNC: 8 MMOL/L (ref 4–13)
AST SERPL W P-5'-P-CCNC: 17 U/L (ref 5–45)
BACTERIA UR QL AUTO: ABNORMAL /HPF
BASOPHILS # BLD AUTO: 0.04 THOUSANDS/ΜL (ref 0–0.1)
BASOPHILS NFR BLD AUTO: 1 % (ref 0–1)
BILIRUB SERPL-MCNC: 0.67 MG/DL (ref 0.2–1)
BILIRUB UR QL STRIP: NEGATIVE
BUN SERPL-MCNC: 22 MG/DL (ref 5–25)
CALCIUM ALBUM COR SERPL-MCNC: 8.6 MG/DL (ref 8.3–10.1)
CALCIUM SERPL-MCNC: 7.6 MG/DL (ref 8.3–10.1)
CHLORIDE SERPL-SCNC: 104 MMOL/L (ref 100–108)
CLARITY UR: CLEAR
CO2 SERPL-SCNC: 28 MMOL/L (ref 21–32)
COLOR UR: YELLOW
CREAT SERPL-MCNC: 2.06 MG/DL (ref 0.6–1.3)
CREAT UR-MCNC: 46.4 MG/DL
EOSINOPHIL # BLD AUTO: 0.11 THOUSAND/ΜL (ref 0–0.61)
EOSINOPHIL NFR BLD AUTO: 2 % (ref 0–6)
ERYTHROCYTE [DISTWIDTH] IN BLOOD BY AUTOMATED COUNT: 16.2 % (ref 11.6–15.1)
GFR SERPL CREATININE-BSD FRML MDRD: 30 ML/MIN/1.73SQ M
GLUCOSE SERPL-MCNC: 100 MG/DL (ref 65–140)
GLUCOSE SERPL-MCNC: 112 MG/DL (ref 65–140)
GLUCOSE SERPL-MCNC: 124 MG/DL (ref 65–140)
GLUCOSE SERPL-MCNC: 150 MG/DL (ref 65–140)
GLUCOSE SERPL-MCNC: 90 MG/DL (ref 65–140)
GLUCOSE UR STRIP-MCNC: NEGATIVE MG/DL
HCT VFR BLD AUTO: 27.2 % (ref 36.5–49.3)
HGB BLD-MCNC: 8 G/DL (ref 12–17)
HGB UR QL STRIP.AUTO: NEGATIVE
HYALINE CASTS #/AREA URNS LPF: ABNORMAL /LPF
IMM GRANULOCYTES # BLD AUTO: 0.03 THOUSAND/UL (ref 0–0.2)
IMM GRANULOCYTES NFR BLD AUTO: 1 % (ref 0–2)
IRON SATN MFR SERPL: 7 %
IRON SERPL-MCNC: 26 UG/DL (ref 65–175)
KETONES UR STRIP-MCNC: NEGATIVE MG/DL
LEUKOCYTE ESTERASE UR QL STRIP: NEGATIVE
LYMPHOCYTES # BLD AUTO: 0.71 THOUSANDS/ΜL (ref 0.6–4.47)
LYMPHOCYTES NFR BLD AUTO: 13 % (ref 14–44)
MAGNESIUM SERPL-MCNC: 2.1 MG/DL (ref 1.6–2.6)
MCH RBC QN AUTO: 26.8 PG (ref 26.8–34.3)
MCHC RBC AUTO-ENTMCNC: 29.4 G/DL (ref 31.4–37.4)
MCV RBC AUTO: 91 FL (ref 82–98)
MICROALBUMIN UR-MCNC: 36.7 MG/L (ref 0–20)
MICROALBUMIN/CREAT 24H UR: 79 MG/G CREATININE (ref 0–30)
MONOCYTES # BLD AUTO: 0.82 THOUSAND/ΜL (ref 0.17–1.22)
MONOCYTES NFR BLD AUTO: 15 % (ref 4–12)
NEUTROPHILS # BLD AUTO: 3.95 THOUSANDS/ΜL (ref 1.85–7.62)
NEUTS SEG NFR BLD AUTO: 68 % (ref 43–75)
NITRITE UR QL STRIP: NEGATIVE
NON-SQ EPI CELLS URNS QL MICRO: ABNORMAL /HPF
NRBC BLD AUTO-RTO: 0 /100 WBCS
PH UR STRIP.AUTO: 5.5 [PH]
PHOSPHATE SERPL-MCNC: 3.6 MG/DL (ref 2.3–4.1)
PLATELET # BLD AUTO: 297 THOUSANDS/UL (ref 149–390)
PMV BLD AUTO: 10.6 FL (ref 8.9–12.7)
POTASSIUM SERPL-SCNC: 3.6 MMOL/L (ref 3.5–5.3)
PROT SERPL-MCNC: 6.9 G/DL (ref 6.4–8.2)
PROT UR STRIP-MCNC: NEGATIVE MG/DL
PROT UR-MCNC: 24 MG/DL
PROT/CREAT UR: 0.52 MG/G{CREAT} (ref 0–0.1)
RBC # BLD AUTO: 2.99 MILLION/UL (ref 3.88–5.62)
RBC #/AREA URNS AUTO: ABNORMAL /HPF
SODIUM 24H UR-SCNC: 85 MOL/L
SODIUM SERPL-SCNC: 140 MMOL/L (ref 136–145)
SP GR UR STRIP.AUTO: 1.01 (ref 1–1.03)
TIBC SERPL-MCNC: 352 UG/DL (ref 250–450)
UROBILINOGEN UR QL STRIP.AUTO: 0.2 E.U./DL
UUN 24H UR-MCNC: 230 MG/DL
WBC # BLD AUTO: 5.66 THOUSAND/UL (ref 4.31–10.16)
WBC #/AREA URNS AUTO: ABNORMAL /HPF

## 2021-07-22 PROCEDURE — 82043 UR ALBUMIN QUANTITATIVE: CPT | Performed by: INTERNAL MEDICINE

## 2021-07-22 PROCEDURE — 84100 ASSAY OF PHOSPHORUS: CPT | Performed by: STUDENT IN AN ORGANIZED HEALTH CARE EDUCATION/TRAINING PROGRAM

## 2021-07-22 PROCEDURE — 82570 ASSAY OF URINE CREATININE: CPT | Performed by: INTERNAL MEDICINE

## 2021-07-22 PROCEDURE — 82948 REAGENT STRIP/BLOOD GLUCOSE: CPT

## 2021-07-22 PROCEDURE — 76770 US EXAM ABDO BACK WALL COMP: CPT

## 2021-07-22 PROCEDURE — 83550 IRON BINDING TEST: CPT | Performed by: INTERNAL MEDICINE

## 2021-07-22 PROCEDURE — 84300 ASSAY OF URINE SODIUM: CPT | Performed by: INTERNAL MEDICINE

## 2021-07-22 PROCEDURE — 70450 CT HEAD/BRAIN W/O DYE: CPT

## 2021-07-22 PROCEDURE — G1004 CDSM NDSC: HCPCS

## 2021-07-22 PROCEDURE — 83540 ASSAY OF IRON: CPT | Performed by: INTERNAL MEDICINE

## 2021-07-22 PROCEDURE — 81001 URINALYSIS AUTO W/SCOPE: CPT | Performed by: INTERNAL MEDICINE

## 2021-07-22 PROCEDURE — 84540 ASSAY OF URINE/UREA-N: CPT | Performed by: INTERNAL MEDICINE

## 2021-07-22 PROCEDURE — 99233 SBSQ HOSP IP/OBS HIGH 50: CPT | Performed by: STUDENT IN AN ORGANIZED HEALTH CARE EDUCATION/TRAINING PROGRAM

## 2021-07-22 PROCEDURE — 80053 COMPREHEN METABOLIC PANEL: CPT | Performed by: STUDENT IN AN ORGANIZED HEALTH CARE EDUCATION/TRAINING PROGRAM

## 2021-07-22 PROCEDURE — 84156 ASSAY OF PROTEIN URINE: CPT | Performed by: INTERNAL MEDICINE

## 2021-07-22 PROCEDURE — 83735 ASSAY OF MAGNESIUM: CPT | Performed by: STUDENT IN AN ORGANIZED HEALTH CARE EDUCATION/TRAINING PROGRAM

## 2021-07-22 PROCEDURE — 99223 1ST HOSP IP/OBS HIGH 75: CPT | Performed by: INTERNAL MEDICINE

## 2021-07-22 PROCEDURE — 70551 MRI BRAIN STEM W/O DYE: CPT

## 2021-07-22 PROCEDURE — 85025 COMPLETE CBC W/AUTO DIFF WBC: CPT | Performed by: STUDENT IN AN ORGANIZED HEALTH CARE EDUCATION/TRAINING PROGRAM

## 2021-07-22 PROCEDURE — 99222 1ST HOSP IP/OBS MODERATE 55: CPT | Performed by: INTERNAL MEDICINE

## 2021-07-22 RX ORDER — ALBUMIN (HUMAN) 12.5 G/50ML
25 SOLUTION INTRAVENOUS 3 TIMES DAILY
Status: DISCONTINUED | OUTPATIENT
Start: 2021-07-22 | End: 2021-07-25

## 2021-07-22 RX ORDER — BUMETANIDE 0.25 MG/ML
1 INJECTION INTRAMUSCULAR; INTRAVENOUS CONTINUOUS
Status: DISCONTINUED | OUTPATIENT
Start: 2021-07-22 | End: 2021-07-25

## 2021-07-22 RX ADMIN — CLOPIDOGREL BISULFATE 75 MG: 75 TABLET ORAL at 08:48

## 2021-07-22 RX ADMIN — ALBUMIN (HUMAN) 25 G: 0.25 INJECTION, SOLUTION INTRAVENOUS at 21:50

## 2021-07-22 RX ADMIN — ATORVASTATIN CALCIUM 80 MG: 40 TABLET, FILM COATED ORAL at 16:01

## 2021-07-22 RX ADMIN — Medication 0.5 MG/HR: at 20:17

## 2021-07-22 RX ADMIN — Medication 0.5 MG/HR: at 13:51

## 2021-07-22 RX ADMIN — DONEPEZIL HYDROCHLORIDE 5 MG: 5 TABLET ORAL at 21:47

## 2021-07-22 RX ADMIN — INSULIN LISPRO 1 UNITS: 100 INJECTION, SOLUTION INTRAVENOUS; SUBCUTANEOUS at 11:53

## 2021-07-22 RX ADMIN — FUROSEMIDE 80 MG: 10 INJECTION, SOLUTION INTRAMUSCULAR; INTRAVENOUS at 08:48

## 2021-07-22 RX ADMIN — METOPROLOL SUCCINATE 50 MG: 50 TABLET, EXTENDED RELEASE ORAL at 08:48

## 2021-07-22 RX ADMIN — FERROUS GLUCONATE 324 MG: 324 TABLET ORAL at 08:48

## 2021-07-22 RX ADMIN — QUETIAPINE FUMARATE 25 MG: 25 TABLET ORAL at 21:47

## 2021-07-22 RX ADMIN — ALBUMIN (HUMAN) 25 G: 0.25 INJECTION, SOLUTION INTRAVENOUS at 16:09

## 2021-07-22 NOTE — ASSESSMENT & PLAN NOTE
· Lower extremity 3+ pitting edema to bilateral lower extremities extending up to scrotum, bibasilar rales  · Chest x-ray:  Atelectasis  · BNP:  4500  · Daily weights  · I&Os  · Lasix IV twice daily for acute diuresis for now  Not much urine output with 40 IV b i d  Of Lasix  Decreased lasix dose to 40 from 80 IV bid however patient seems to have decreased urinary output so increased lasix back to 80 BID  Patient also received 1 dose of torsemide this afternoon  Not having a lot of urinary output on Closely monitor urine output  Hold Norvasc    · TTE: LV function showing 55% EF  · Low-salt diet, fluid restriction    Will consult cardiology

## 2021-07-22 NOTE — PLAN OF CARE
Problem: Potential for Falls  Goal: Patient will remain free of falls  Description: INTERVENTIONS:  - Educate patient/family on patient safety including physical limitations  - Instruct patient to call for assistance with activity   - Consult OT/PT to assist with strengthening/mobility   - Keep Call bell within reach  - Keep bed low and locked with side rails adjusted as appropriate  - Keep care items and personal belongings within reach  - Initiate and maintain comfort rounds  - Make Fall Risk Sign visible to staff  - Offer Toileting every 2 Hours, in advance of need  - Initiate/Maintain bed alarm  - Obtain necessary fall risk management equipment: yellow socks and bracelet  - Apply yellow socks and bracelet for high fall risk patients  - Consider moving patient to room near nurses station  Outcome: Progressing     Problem: MOBILITY - ADULT  Goal: Maintain or return to baseline ADL function  Description: INTERVENTIONS:  -  Assess patient's ability to carry out ADLs; assess patient's baseline for ADL function and identify physical deficits which impact ability to perform ADLs (bathing, care of mouth/teeth, toileting, grooming, dressing, etc )  - Assess/evaluate cause of self-care deficits   - Assess range of motion  - Assess patient's mobility; develop plan if impaired  - Assess patient's need for assistive devices and provide as appropriate  - Encourage maximum independence but intervene and supervise when necessary  - Involve family in performance of ADLs  - Assess for home care needs following discharge   - Consider OT consult to assist with ADL evaluation and planning for discharge  - Provide patient education as appropriate  Outcome: Progressing  Goal: Maintains/Returns to pre admission functional level  Description: INTERVENTIONS:  - Perform BMAT or MOVE assessment daily    - Set and communicate daily mobility goal to care team and patient/family/caregiver     - Collaborate with rehabilitation services on mobility goals if consulted  - Perform Range of Motion 4 times a day  - Reposition patient every 2 hours  - Dangle patient 4 times a day  - Stand patient 4 times a day  - Ambulate patient 4 times a day  - Out of bed to chair 3 times a day   - Out of bed for meals 3 times a day  - Out of bed for toileting  - Record patient progress and toleration of activity level   Outcome: Progressing     Problem: Prexisting or High Potential for Compromised Skin Integrity  Goal: Skin integrity is maintained or improved  Description: INTERVENTIONS:  - Identify patients at risk for skin breakdown  - Assess and monitor skin integrity  - Assess and monitor nutrition and hydration status  - Monitor labs   - Assess for incontinence   - Turn and reposition patient  - Assist with mobility/ambulation  - Relieve pressure over bony prominences  - Avoid friction and shearing  - Provide appropriate hygiene as needed including keeping skin clean and dry  - Evaluate need for skin moisturizer/barrier cream  - Collaborate with interdisciplinary team   - Patient/family teaching  - Consider wound care consult   Outcome: Progressing     Problem: NEUROSENSORY - ADULT  Goal: Achieves stable or improved neurological status  Description: INTERVENTIONS  - Monitor and report changes in neurological status  - Monitor vital signs such as temperature, blood pressure, glucose, and any other labs ordered   - Initiate measures to prevent increased intracranial pressure  - Monitor for seizure activity and implement precautions if appropriate      Outcome: Progressing  Goal: Achieves maximal functionality and self care  Description: INTERVENTIONS  - Monitor swallowing and airway patency with patient fatigue and changes in neurological status  - Encourage and assist patient to increase activity and self care     - Encourage visually impaired, hearing impaired and aphasic patients to use assistive/communication devices  Outcome: Progressing     Problem: CARDIOVASCULAR - ADULT  Goal: Maintains optimal cardiac output and hemodynamic stability  Description: INTERVENTIONS:  - Monitor I/O, vital signs and rhythm  - Monitor for S/S and trends of decreased cardiac output  - Administer and titrate ordered vasoactive medications to optimize hemodynamic stability  - Assess quality of pulses, skin color and temperature  - Assess for signs of decreased coronary artery perfusion  - Instruct patient to report change in severity of symptoms  Outcome: Progressing  Goal: Absence of cardiac dysrhythmias or at baseline rhythm  Description: INTERVENTIONS:  - Continuous cardiac monitoring, vital signs, obtain 12 lead EKG if ordered  - Administer antiarrhythmic and heart rate control medications as ordered  - Monitor electrolytes and administer replacement therapy as ordered  Outcome: Progressing     Problem: RESPIRATORY - ADULT  Goal: Achieves optimal ventilation and oxygenation  Description: INTERVENTIONS:  - Assess for changes in respiratory status  - Assess for changes in mentation and behavior  - Position to facilitate oxygenation and minimize respiratory effort  - Oxygen administered by appropriate delivery if ordered  - Initiate smoking cessation education as indicated  - Encourage broncho-pulmonary hygiene including cough, deep breathe, Incentive Spirometry  - Assess the need for suctioning and aspirate as needed  - Assess and instruct to report SOB or any respiratory difficulty  - Respiratory Therapy support as indicated  Outcome: Progressing     Problem: GASTROINTESTINAL - ADULT  Goal: Minimal or absence of nausea and/or vomiting  Description: INTERVENTIONS:  - Administer IV fluids if ordered to ensure adequate hydration  - Maintain NPO status until nausea and vomiting are resolved  - Nasogastric tube if ordered  - Administer ordered antiemetic medications as needed  - Provide nonpharmacologic comfort measures as appropriate  - Advance diet as tolerated, if ordered  - Consider nutrition services referral to assist patient with adequate nutrition and appropriate food choices  Outcome: Progressing  Goal: Maintains or returns to baseline bowel function  Description: INTERVENTIONS:  - Assess bowel function  - Encourage oral fluids to ensure adequate hydration  - Administer IV fluids if ordered to ensure adequate hydration  - Administer ordered medications as needed  - Encourage mobilization and activity  - Consider nutritional services referral to assist patient with adequate nutrition and appropriate food choices  Outcome: Progressing  Goal: Maintains adequate nutritional intake  Description: INTERVENTIONS:  - Monitor percentage of each meal consumed  - Identify factors contributing to decreased intake, treat as appropriate  - Assist with meals as needed  - Monitor I&O, weight, and lab values if indicated  - Obtain nutrition services referral as needed  Outcome: Progressing  Goal: Oral mucous membranes remain intact  Description: INTERVENTIONS  - Assess oral mucosa and hygiene practices  - Implement preventative oral hygiene regimen  - Implement oral medicated treatments as ordered  - Initiate Nutrition services referral as needed  Outcome: Progressing     Problem: GENITOURINARY - ADULT  Goal: Maintains or returns to baseline urinary function  Description: INTERVENTIONS:  - Assess urinary function  - Encourage oral fluids to ensure adequate hydration if ordered  - Administer IV fluids as ordered to ensure adequate hydration  - Administer ordered medications as needed  - Offer frequent toileting  - Follow urinary retention protocol if ordered  Outcome: Progressing  Goal: Absence of urinary retention  Description: INTERVENTIONS:  - Assess patients ability to void and empty bladder  - Monitor I/O  - Bladder scan as needed  - Discuss with physician/AP medications to alleviate retention as needed  - Discuss catheterization for long term situations as appropriate  Outcome: Progressing Problem: METABOLIC, FLUID AND ELECTROLYTES - ADULT  Goal: Electrolytes maintained within normal limits  Description: INTERVENTIONS:  - Monitor labs and assess patient for signs and symptoms of electrolyte imbalances  - Administer electrolyte replacement as ordered  - Monitor response to electrolyte replacements, including repeat lab results as appropriate  - Instruct patient on fluid and nutrition as appropriate  Outcome: Progressing  Goal: Fluid balance maintained  Description: INTERVENTIONS:  - Monitor labs   - Monitor I/O and WT  - Instruct patient on fluid and nutrition as appropriate  - Assess for signs & symptoms of volume excess or deficit  Outcome: Progressing  Goal: Glucose maintained within target range  Description: INTERVENTIONS:  - Monitor Blood Glucose as ordered  - Assess for signs and symptoms of hyperglycemia and hypoglycemia  - Administer ordered medications to maintain glucose within target range  - Assess nutritional intake and initiate nutrition service referral as needed  Outcome: Progressing     Problem: SKIN/TISSUE INTEGRITY - ADULT  Goal: Skin Integrity remains intact(Skin Breakdown Prevention)  Description: Assess:  -Perform Wale assessment every 8  -Inspect skin when repositioning, toileting, and assisting with ADLS  -Assess under medical devices such as Masimo band   -Assess extremities for adequate circulation and sensation     Bed Management:  -Have minimal linens on bed & keep smooth, unwrinkled  -Change linens as needed when moist or perspiring  -Avoid sitting or lying in one position for more than 2 hours while in bed  -Keep HOB at 60 degrees     Toileting:  -Offer bedside commode  -Assess for incontinence every 2  -Use incontinent care products after each incontinent episode such as pads    Activity:  -Mobilize patient 4 times a day  -Encourage activity and walks on unit  -Encourage or provide ROM exercises   -Turn and reposition patient every 2 Hours  -Use appropriate equipment to lift or move patient in bed  -Instruct/ Assist with weight shifting every 30 min when out of bed in chair  -Consider limitation of chair time 2 hour intervals    Skin Care:  -Avoid use of baby powder, tape, friction and shearing, hot water or constrictive clothing  -Relieve pressure over bony prominences using pillows  -Do not massage red bony areas    Next Steps:  -Teach patient strategies to minimize risks such as weight shifting  -Consider consults to  interdisciplinary teams such as PT  Outcome: Progressing  Goal: Incision(s), wounds(s) or drain site(s) healing without S/S of infection  Description: INTERVENTIONS  - Assess and document dressing, incision, wound bed, drain sites and surrounding tissue  - Provide patient and family education  - Perform skin care/dressing changes every 8  Outcome: Progressing  Goal: Pressure injury heals and does not worsen  Description: Interventions:  - Implement low air loss mattress or specialty surface (Criteria met)  - Apply silicone foam dressing  - Instruct/assist with weight shifting every 30 minutes when in chair   - Limit chair time to 2 hour intervals  - Use special pressure reducing interventions such as pillows when in chair   - Apply fecal or urinary incontinence containment device   - Perform passive or active ROM every 2  - Turn and reposition patient & offload bony prominences every 2 hours   - Utilize friction reducing device or surface for transfers   - Consider consults to  interdisciplinary teams such as PT  - Use incontinent care products after each incontinent episode such as pads  - Consider nutrition services referral as needed  Outcome: Progressing     Problem: HEMATOLOGIC - ADULT  Goal: Maintains hematologic stability  Description: INTERVENTIONS  - Assess for signs and symptoms of bleeding or hemorrhage  - Monitor labs  - Administer supportive blood products/factors as ordered and appropriate  Outcome: Progressing     Problem: MUSCULOSKELETAL - ADULT  Goal: Maintain or return mobility to safest level of function  Description: INTERVENTIONS:  - Assess patient's ability to carry out ADLs; assess patient's baseline for ADL function and identify physical deficits which impact ability to perform ADLs (bathing, care of mouth/teeth, toileting, grooming, dressing, etc )  - Assess/evaluate cause of self-care deficits   - Assess range of motion  - Assess patient's mobility  - Assess patient's need for assistive devices and provide as appropriate  - Encourage maximum independence but intervene and supervise when necessary  - Involve family in performance of ADLs  - Assess for home care needs following discharge   - Consider OT consult to assist with ADL evaluation and planning for discharge  - Provide patient education as appropriate  Outcome: Progressing  Goal: Maintain proper alignment of affected body part  Description: INTERVENTIONS:  - Support, maintain and protect limb and body alignment  - Provide patient/ family with appropriate education  Outcome: Progressing     Problem: Nutrition/Hydration-ADULT  Goal: Nutrient/Hydration intake appropriate for improving, restoring or maintaining nutritional needs  Description: Monitor and assess patient's nutrition/hydration status for malnutrition  Collaborate with interdisciplinary team and initiate plan and interventions as ordered  Monitor patient's weight and dietary intake as ordered or per policy  Utilize nutrition screening tool and intervene as necessary  Determine patient's food preferences and provide high-protein, high-caloric foods as appropriate       INTERVENTIONS:  - Monitor oral intake, urinary output, labs, and treatment plans  - Assess nutrition and hydration status and recommend course of action  - Evaluate amount of meals eaten  - Assist patient with eating if necessary   - Allow adequate time for meals  - Recommend/ encourage appropriate diets, oral nutritional supplements, and vitamin/mineral supplements  - Order, calculate, and assess calorie counts as needed  - Recommend, monitor, and adjust tube feedings and TPN/PPN based on assessed needs  - Assess need for intravenous fluids  - Provide specific nutrition/hydration education as appropriate  - Include patient/family/caregiver in decisions related to nutrition  Outcome: Progressing

## 2021-07-22 NOTE — PROGRESS NOTES
[address]  Progress Note Gretchen Rangel 1945, 64 y o  male MRN: [id number]  Unit/Bed#: -01 Encounter: [id number]  Primary Care Provider: Pia Mckinley MD   Date and time admitted to hospital: 7/18/2021  9:06 PM    Hypothyroidism  Assessment & Plan  Patient with a history of hypothyroidism and on levothyroxine 100 mcg at home  TSH done on admission elevated to 14 with ft4 at 1 2  Will increase levothyroxine dose to 112 mcg   Patient will need to obtain repeat TSH levels in 4-6 weeks    Morbid obesity with BMI of 40 0-44 9, adult [facility])  Assessment & Plan  Consult placed to dietitian    History of TIA (transient ischemic attack)  Assessment & Plan  · Resumed plavix, no active bleeding on endoscopy  · Continue statin    GI bleeding  Assessment & Plan  · Stool Hemoccult positive  · Hemoglobin 8 5  · EGD and Colonoscopy results noted  · Okay to start Plavix from GI standpoint  · Okay to resume diet    Essential hypertension  Assessment & Plan  · BP reviewed and acceptable  · Losartan on hold due to NORAH  · Continue metoprolol  · Monitor blood pressure    Ambulatory dysfunction  Assessment & Plan  · Patient has been having frequent falls, and today he could not get up from the floor  EMS was concerned that patient may be in an unsafe situation as patient's wife is having difficulty caring for him  · Fall precautions  · Case management consult  · PT OT consult - discharge to rehab    Moderate aortic stenosis by prior echocardiogram  Assessment & Plan  · Echocardiogram May 2021 at [facility]:  EF 55-60%  Normal diastolic function  Consistent with moderate AS, moderate tricuspid regurgitation  Moderately elevated estimated PA systolic pressure    · He takes Lasix 40 mg orally daily at home however was started on IV diuresis during this admission given fluid overload    Type 2 diabetes mellitus with kidney complication, without long-term current use of insulin (HCC)  Assessment & Plan  Lab Results   Component Value Date    HGBA1C 6 6 (H) 07/19/2021       Recent Labs     07/21/21  1601 07/21/21  2103 07/22/21  0702 07/22/21  1104   POCGLU 123 109 100 150*       Blood Sugar Average: Last 72 hrs:  (P) 617 [id number]   · Diabetic diet  · Fingerstick blood sugar sliding scale coverage  · Hold home oral agents  ·  hemoglobin A1c 6 6  Blood sugars are currently well controlled    PAF (paroxysmal atrial fibrillation) ([facility])  Assessment & Plan  · EKG: normal sinus rhythm  · He is not on anticoagulation  · Continue metoprolol    Acute kidney injury superimposed on chronic kidney disease [facility])  Assessment & Plan  Lab Results   Component Value Date    EGFR 30 07/22/2021    EGFR 33 07/21/2021    EGFR 33 07/20/2021    CREATININE 2 06 (H) 07/22/2021    CREATININE 1 88 (H) 07/21/2021    CREATININE 1 91 (H) 07/20/2021     · Baseline creatinine around 1 4  Suspect partially due to losartan-will hold  · Hold off on fluids due to anasarca  · Daily metabolic panel and trend creatinine during diuresis  · Caution with nephrotoxins and avoid hypotension  · Improving, continue to monitor    Nephrology consulted and recommendations appreciated    Acute on chronic anemia  Assessment & Plan  · Hemoglobin is 8 5 with baseline around 13 admission now trended down to 7 8 this morning  · Stool Hemoccult is positive  · No evidence of gross bleeding noted     · Continue iron  · S/p EGD and colonoscopy on 7/20  "C1 M3 Og's esophagus-biopsies taken to rule out dysplasia  Small hiatal hernia otherwise normal stomach on direct and retroflexed views  Random gastric biopsies taken to rule out H pylori  Normal visualized portion of duodenum from duodenal bulb to D3  Random biopsies taken to rule out celiac disease "    "No evidence of active bleeding  Terminal ileum normal  Scattered diverticula throughout the colon left greater than right without evidence of bleeding  Area of erythema/ulceration/hemorrhage likely from trauma from enema versus old diverticular bleed  Large internal/external hemorrhoids"    Okay to resume plavix from GI standpoint  Patient okay to eat from GI standpoint        Alzheimer's dementia ([facility] [address] )  Assessment & Plan  · At baseline  · Continue Aricept  · Supportive care  · CT head showing encephalomalacia  · MRI showing the same    Hypokalemia  Assessment & Plan  · Potassium 2 9  · Repleted with 40 mEq orally and 20 mEq IV in the ER  · Monitor and replete accordingly    IMPROVED    * Acute on chronic diastolic CHF (congestive heart failure) ([facility])  Assessment & Plan  · Lower extremity 3+ pitting edema to bilateral lower extremities extending up to scrotum, bibasilar rales  · Chest x-ray:  Atelectasis  · BNP:  4500  · Daily weights  · I&Os  · Lasix IV twice daily for acute diuresis for now  Not much urine output with 40 IV b i d  Of Lasix  Decreased lasix dose to 40 from 80 IV bid however patient seems to have decreased urinary output so increased lasix back to 80 BID  Patient also received 1 dose of torsemide this afternoon  Not having a lot of urinary output on Closely monitor urine output  Hold Norvasc  · TTE: LV function showing 55% EF  · Low-salt diet, fluid restriction    Patient started on Bumex drip on 07/22 given poor response to Lasix IV as per cardiology recommendations  Will continue to monitor I&Os        VTE Pharmacologic Prophylaxis:   Pharmacologic: Pharmacologic VTE Prophylaxis contraindicated due to GI bleed  Mechanical VTE Prophylaxis in Place: Yes    Patient Centered Rounds: I have performed bedside rounds with nursing staff today  Discussions with Specialists or Other Care Team Provider:  Cardiology, Nephrology, GI    Education and Discussions with Family / Patient:  Patient and his wife    Time Spent for Care: 30 minutes  More than 50% of total time spent on counseling and coordination of care as described above      Current Length of Stay: 4 day(s)    Current Patient Status: Inpatient Certification Statement: The patient will continue to require additional inpatient hospital stay due to IV diuresis    Discharge Plan:  Rehab    Code Status: Level 1 - Full Code      Subjective:   Patient seen and examined at bedside today patient not really responding well to any questions  Spoke with patient's wife who had stated that this was patient's baseline for the past year  Noticed that the patient has good days and has bad days and sometimes answers appropriately however today was not  Review of Systems   Unable to perform ROS: Dementia          Objective:     Vitals:   Temp (24hrs), Av 8 °F (37 1 °C), Min:98 3 °F (36 8 °C), Max:99 5 °F (37 5 °C)    Temp:  [98 3 °F (36 8 °C)-99 5 °F (37 5 °C)] 98 3 °F (36 8 °C)  HR:  [60-67] 60  Resp:  [18-22] 22  BP: (136-155)/() 142/103  SpO2:  [91 %-100 %] 93 %  Body mass index is 41 57 kg/m²  Input and Output Summary (last 24 hours): Intake/Output Summary (Last 24 hours) at 2021  Last data filed at 2021 1703  Gross per 24 hour   Intake --   Output 1256 ml   Net -1256 ml       Physical Exam:     Physical Exam  Vitals and nursing note reviewed  Constitutional:       General: He is not in acute distress  Appearance: He is well-developed  He is ill-appearing  HENT:      Head: Normocephalic and atraumatic  Eyes:      Conjunctiva/sclera: Conjunctivae normal    Cardiovascular:      Rate and Rhythm: Normal rate  Rhythm irregular  Heart sounds: No murmur heard  Comments: 3+ pitting edema on dependent areas as well as scrotum and bilateral lower extremities  Pulmonary:      Effort: Pulmonary effort is normal  No respiratory distress  Breath sounds: Rales present  Comments: Bilateral of rales over all lung field  Abdominal:      Palpations: Abdomen is soft  Tenderness: There is no abdominal tenderness  Musculoskeletal:      Cervical back: Neck supple  Skin:     General: Skin is warm and dry  Comments: Appearing pale and ashen   Neurological:      Mental Status: He is alert  He is disoriented  Additional Data:     Labs:    Results from last 7 days   Lab Units 07/22/21  0623   WBC Thousand/uL 5 66   HEMOGLOBIN g/dL 8 0*   HEMATOCRIT % 27 2*   PLATELETS Thousands/uL 297   NEUTROS PCT % 68   LYMPHS PCT % 13*   MONOS PCT % 15*   EOS PCT % 2     Results from last 7 days   Lab Units 07/22/21  0623   SODIUM mmol/L 140   POTASSIUM mmol/L 3 6   CHLORIDE mmol/L 104   CO2 mmol/L 28   BUN mg/dL 22   CREATININE mg/dL 2 06*   ANION GAP mmol/L 8   CALCIUM mg/dL 7 6*   ALBUMIN g/dL 2 7*   TOTAL BILIRUBIN mg/dL 0 67   ALK PHOS U/L 100   ALT U/L 18   AST U/L 17   GLUCOSE RANDOM mg/dL 90     Results from last 7 days   Lab Units 07/18/21  2114   INR  1 22*     Results from last 7 days   Lab Units 07/22/21  1104 07/22/21  0702 07/21/21  2103 07/21/21  1601 07/21/21  1107 07/21/21  0735 07/20/21  2118 07/20/21  1621 07/20/21  1427 07/20/21  1321 07/20/21  1247 07/20/21  1102   POC GLUCOSE mg/dl 150* 100 109 123 125 216* 179* 76 85 76 64* 72     Results from last 7 days   Lab Units 07/19/21  0449   HEMOGLOBIN A1C % 6 6*     Results from last 7 days   Lab Units 07/18/21  2114   LACTIC ACID mmol/L 1 1           * I Have Reviewed All Lab Data Listed Above  * Additional Pertinent Lab Tests Reviewed:  [facility] Admission Reviewed    Imaging:  Reviewed    Recent Cultures (last 7 days):           Last 24 Hours Medication List:   Current Facility-Administered Medications   Medication Dose Route Frequency Provider Last Rate    acetaminophen  650 mg Oral Q4H PRN Arno Sprain, CRNP      albumin human  25 g Intravenous TID SEYMOUR Asencio      atorvastatin  80 mg Oral Daily With Dinner Arno Sprain, CRNP      bumetanide  0 5 mg/hr Intravenous Continuous SEYMOUR Asencio 0 5 mg/hr (07/22/21 1351)    clopidogrel  75 mg Oral Daily Wen Bennett MD      donepezil  5 mg Oral HS SEYMOUR Murray      ferrous gluconate  324 mg Oral Daily SEYMOUR Murray      insulin lispro  1-6 Units Subcutaneous TID AC Álvaro Carrillo MD      levothyroxine  112 mcg Oral Early Morning Italia Cope MD      metoprolol succinate  50 mg Oral Daily SEYMOUR Murray      pantoprazole  40 mg Oral Early Morning SEYMOUR Murray      QUEtiapine  25 mg Oral HS SEYMOUR Montez          Today, Patient Was Seen By: Gamal Goyal MD    ** Please Note: Dictation voice to text software may have been used in the creation of this document   **

## 2021-07-22 NOTE — CASE MANAGEMENT
Case Management Progress Note    Patient name Hiral Marin  Location /-79 MRN [id number]  : 1945 Date 2021       LOS (days): 4  Geometric Mean LOS (GMLOS) (days): 4 00  Days to GMLOS:0 3        BUNDLE:      OBJECTIVE:  Pt is a 64y o  year old , white or  [1], male with Sabianism preference of None admitted on 2021  9:06 PM  Pt is admitted to [facility] [id number] [phone] at [address] [place] [address] with complaints of Acute on chronic diastolic CHF (congestive heart failure) ([facility] [address] )  Current admission status: Inpatient  Preferred Pharmacy:   [facility], [address] [place] [address]  Phone: [phone] Fax: [phone]    Primary Care Provider: Verna Quinn MD    PROGRESS NOTE:  Pt discussed in MDT rounds, not stable for d/c  CM received auth from [facility] [employer] (#[id number]) w/ next review on  to Kay Cohen at [phone]; fax [phone])  CM will update Chelo Franklin [phone]) to update on dcp

## 2021-07-22 NOTE — ASSESSMENT & PLAN NOTE
· Echocardiogram May 2021 at [facility]:  EF 55-60%  Normal diastolic function  Consistent with moderate AS, moderate tricuspid regurgitation  Moderately elevated estimated PA systolic pressure    · He takes Lasix 40 mg orally daily at home however was started on IV diuresis during this admission given fluid overload

## 2021-07-22 NOTE — ASSESSMENT & PLAN NOTE
· Potassium 2 9  · Repleted with 40 mEq orally and 20 mEq IV in the ER  · Monitor and replete accordingly    IMPROVED

## 2021-07-22 NOTE — ASSESSMENT & PLAN NOTE
Lab Results   Component Value Date    EGFR 30 07/22/2021    EGFR 33 07/21/2021    EGFR 33 07/20/2021    CREATININE 2 06 (H) 07/22/2021    CREATININE 1 88 (H) 07/21/2021    CREATININE 1 91 (H) 07/20/2021     · Baseline creatinine around 1 4    Suspect partially due to losartan-will hold  · Hold off on fluids due to anasarca  · Daily metabolic panel and trend creatinine during diuresis  · Caution with nephrotoxins and avoid hypotension  · Improving, continue to monitor    Nephrology consulted and recommendations appreciated

## 2021-07-22 NOTE — QUICK NOTE
Final Diagnosis   A  Duodenum, biopsy:  -  Benign duodenal mucosa with partially eroded surface   -  No villous atrophy, intraepithelial lymphocytosis or crypt hyperplasia; negative for features of malabsorptive enteropathy   -  Negative for chronic or active duodenitis, dysplasia or malignancy  B  Stomach, biopsy:  -  Chronic inactive oxyntic gastritis  -  Negative for Helicobacter pylori, by H&E stain   -  Negative for atrophy, intestinal metaplasia, dysplasia or carcinoma  C  Esophagus, biopsy:  -  Gastroesophageal junction mucosa with intestinal metaplasia, compatible with Og's esophagus in the correct clinical setting   -  Negative for dysplasia or carcinoma  Pathology results from EGD on 7/20/21 as above  Negative for H pylori, 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

## 2021-07-22 NOTE — PHYSICAL THERAPY NOTE
PHYSICAL THERAPY NOTE          Patient Name: Isiah [id number] Date: 7/22/2021 07/22/21 1437   Note Type   Note Type Treatment   Cancel Reasons Patient off floor/test       Chart reviewed  Patient with increasing confusion  CT head showing: Encephalomalacia involving left gyrus rectus and inferior left frontal lobe as well as right frontal cortical/subcortical white matter  MRI of brain ordered  Pt off unit at this time, unavailable  Will continue to follow and see patient as medically appropriate at a later time      Kiana General, PT,DPT

## 2021-07-22 NOTE — ASSESSMENT & PLAN NOTE
· Patient has been having frequent falls, and today he could not get up from the floor    EMS was concerned that patient may be in an unsafe situation as patient's wife is having difficulty caring for him  · Fall precautions  · Case management consult  · PT OT consult - discharge to rehab

## 2021-07-22 NOTE — ASSESSMENT & PLAN NOTE
· Echocardiogram May 2021 at [facility]:  EF 55-60%  Normal diastolic function  Consistent with moderate AS, moderate tricuspid regurgitation  Moderately elevated estimated PA systolic pressure    · He takes Lasix 40 mg orally daily at home however was started on IV diuresis during this admission given fluid overload

## 2021-07-22 NOTE — ASSESSMENT & PLAN NOTE
Lab Results   Component Value Date    HGBA1C 6 6 (H) 07/19/2021       Recent Labs     07/21/21  1601 07/21/21  2103 07/22/21  0702 07/22/21  1104   POCGLU 123 109 100 150*       Blood Sugar Average: Last 72 hrs:  (P) 920 [id number]   · Diabetic diet  · Fingerstick blood sugar sliding scale coverage  · Hold home oral agents  ·  hemoglobin A1c 6 6    Blood sugars are currently well controlled

## 2021-07-22 NOTE — ASSESSMENT & PLAN NOTE
Patient with a history of hypothyroidism and on levothyroxine 100 mcg at home  TSH done on admission elevated to 14 with ft4 at 1 2  Will increase levothyroxine dose to 112 mcg   Patient will need to obtain repeat TSH levels in 4-6 weeks

## 2021-07-22 NOTE — ASSESSMENT & PLAN NOTE
Lab Results   Component Value Date    EGFR 33 07/21/2021    EGFR 33 07/20/2021    EGFR 30 07/19/2021    CREATININE 1 88 (H) 07/21/2021    CREATININE 1 91 (H) 07/20/2021    CREATININE 2 06 (H) 07/19/2021     · Baseline creatinine around 1 4  Suspect partially due to losartan-will hold  · Hold off on fluids due to anasarca  · Daily metabolic panel and trend creatinine during diuresis  · Caution with nephrotoxins and avoid hypotension  · Improving, continue to monitor    Will consult nephrology

## 2021-07-22 NOTE — PROGRESS NOTES
[address]  Progress Note Miquel Summers 1945, 64 y o  male MRN: [id number]  Unit/Bed#: -01 Encounter: [id number]  Primary Care Provider: Verna Quinn MD   Date and time admitted to hospital: 7/18/2021  9:06 PM    Hypothyroidism  Assessment & Plan  Patient with a history of hypothyroidism and on levothyroxine 100 mcg at home  TSH done on admission elevated to 14 with ft4 at 1 2  Will increase levothyroxine dose to 112 mcg   Patient will need to obtain repeat TSH levels in 4-6 weeks    Morbid obesity with BMI of 40 0-44 9, adult [facility])  Assessment & Plan  Consult placed to dietitian    History of TIA (transient ischemic attack)  Assessment & Plan  · Resumed plavix, no active bleeding on endoscopy  · Continue statin    GI bleeding  Assessment & Plan  · Stool Hemoccult positive  · Hemoglobin 8 5  · EGD and Colonoscopy results noted  · Okay to start Plavix from GI standpoint  · Okay to resume diet    Essential hypertension  Assessment & Plan  · BP reviewed and acceptable  · Losartan on hold due to NORAH  · Continue metoprolol  · Monitor blood pressure    Ambulatory dysfunction  Assessment & Plan  · Patient has been having frequent falls, and today he could not get up from the floor  EMS was concerned that patient may be in an unsafe situation as patient's wife is having difficulty caring for him  · Fall precautions  · Case management consult  · PT OT consult - discharge to rehab    Moderate aortic stenosis by prior echocardiogram  Assessment & Plan  · Echocardiogram May 2021 at [facility]:  EF 55-60%  Normal diastolic function  Consistent with moderate AS, moderate tricuspid regurgitation  Moderately elevated estimated PA systolic pressure    · He takes Lasix 40 mg orally daily at home however was started on IV diuresis during this admission given fluid overload    Type 2 diabetes mellitus with kidney complication, without long-term current use of insulin (HCC)  Assessment & Plan  Lab Results   Component Value Date    HGBA1C 6 6 (H) 07/19/2021       Recent Labs     07/20/21  2118 07/21/21  0735 07/21/21  1107 07/21/21  1601   POCGLU 179* 216* 125 123       Blood Sugar Average: Last 72 hrs:  (P) 102 [id number]   · Diabetic diet  · Fingerstick blood sugar sliding scale coverage  · Hold home oral agents  ·  hemoglobin A1c 6 6  Blood sugars are currently well controlled    PAF (paroxysmal atrial fibrillation) ([facility])  Assessment & Plan  · EKG: normal sinus rhythm  · He is not on anticoagulation  · Continue metoprolol    Acute kidney injury superimposed on chronic kidney disease [facility])  Assessment & Plan  Lab Results   Component Value Date    EGFR 33 07/21/2021    EGFR 33 07/20/2021    EGFR 30 07/19/2021    CREATININE 1 88 (H) 07/21/2021    CREATININE 1 91 (H) 07/20/2021    CREATININE 2 06 (H) 07/19/2021     · Baseline creatinine around 1 4  Suspect partially due to losartan-will hold  · Hold off on fluids due to anasarca  · Daily metabolic panel and trend creatinine during diuresis  · Caution with nephrotoxins and avoid hypotension  · Improving, continue to monitor    Will consult nephrology  Acute on chronic anemia  Assessment & Plan  · Hemoglobin is 8 5 with baseline around 13 admission now trended down to 7 8 this morning  · Stool Hemoccult is positive  · No evidence of gross bleeding noted     · Continue iron  · S/p EGD and colonoscopy on 7/20  "C1 M3 Og's esophagus-biopsies taken to rule out dysplasia  Small hiatal hernia otherwise normal stomach on direct and retroflexed views  Random gastric biopsies taken to rule out H pylori  Normal visualized portion of duodenum from duodenal bulb to D3  Random biopsies taken to rule out celiac disease "    "No evidence of active bleeding  Terminal ileum normal  Scattered diverticula throughout the colon left greater than right without evidence of bleeding  Area of erythema/ulceration/hemorrhage likely from trauma from enema versus old diverticular bleed  Large internal/external hemorrhoids"    Okay to resume plavix from GI standpoint  Patient okay to eat from GI standpoint        Alzheimer's dementia ([facility] [address] )  Assessment & Plan  · At baseline  · Continue Aricept  · Supportive care    Hypokalemia  Assessment & Plan  · Potassium 2 9  · Repleted with 40 mEq orally and 20 mEq IV in the ER  · Monitor and replete accordingly    IMPROVED    * Acute on chronic diastolic CHF (congestive heart failure) ([facility])  Assessment & Plan  · Lower extremity 3+ pitting edema to bilateral lower extremities extending up to scrotum, bibasilar rales  · Chest x-ray:  Atelectasis  · BNP:  4500  · Daily weights  · I&Os  · Lasix IV twice daily for acute diuresis for now  Not much urine output with 40 IV b i d  Of Lasix  Decreased lasix dose to 40 from 80 IV bid however patient seems to have decreased urinary output so increased lasix back to 80 BID  Patient also received 1 dose of torsemide this afternoon  Not having a lot of urinary output on Closely monitor urine output  Hold Norvasc  · TTE: LV function showing 55% EF  · Low-salt diet, fluid restriction    Will consult cardiology      VTE Pharmacologic Prophylaxis:   Pharmacologic: Pharmacologic VTE Prophylaxis contraindicated due to suspected GIB and acute on chronic anemia  Mechanical VTE Prophylaxis in Place: Yes    Patient Centered Rounds: I have performed bedside rounds with nursing staff today  Discussions with Specialists or Other Care Team Provider: GI  Consult placed to Cardiology and Nephrology    Education and Discussions with Family / Patient: patient and wife at bedside    Time Spent for Care: 30 minutes  More than 50% of total time spent on counseling and coordination of care as described above      Current Length of Stay: 3 day(s)    Current Patient Status: Inpatient   Certification Statement: The patient will continue to require additional inpatient hospital stay due to diuresis    Discharge Plan: rehab    Code Status: Level 1 - Full Code      Subjective:   Patient was seen and examined at bedside  He appeared very weak during the encounter however denied any complaints  Denied any shortness of breath, worsening edema however wife stated that his legs are always paining him and have been very heavy  Patient and wife endorsing poor urine output despite IV lasix  Appetite remains good  Denies any abdominal pain or bloody bowel movements  Review of Systems   Constitutional: Positive for fatigue  Negative for chills and fever  HENT: Negative for ear pain and sore throat  Eyes: Negative for pain and visual disturbance  Respiratory: Negative for cough and shortness of breath  Cardiovascular: Positive for leg swelling  Negative for chest pain and palpitations  Gastrointestinal: Negative for abdominal pain and vomiting  Genitourinary: Negative for dysuria and hematuria  Musculoskeletal: Negative for arthralgias and back pain  Skin: Negative for color change and rash  Neurological: Negative for seizures and syncope  All other systems reviewed and are negative  Objective:     Vitals:   Temp (24hrs), Av 7 °F (36 5 °C), Min:97 6 °F (36 4 °C), Max:97 8 °F (36 6 °C)    Temp:  [97 6 °F (36 4 °C)-97 8 °F (36 6 °C)] 97 6 °F (36 4 °C)  HR:  [63-68] 63  Resp:  [19-22] 22  BP: (133-167)/(59-71) 133/59  SpO2:  [92 %-98 %] 92 %  Body mass index is 41 85 kg/m²  Input and Output Summary (last 24 hours): Intake/Output Summary (Last 24 hours) at 2021  Last data filed at 2021 1016  Gross per 24 hour   Intake 425 ml   Output 600 ml   Net -175 ml       Physical Exam:     Physical Exam  Vitals and nursing note reviewed  Constitutional:       Appearance: He is well-developed  He is obese  He is ill-appearing  HENT:      Head: Normocephalic and atraumatic  Eyes:      Conjunctiva/sclera: Conjunctivae normal    Cardiovascular:      Rate and Rhythm: Normal rate   Rhythm irregular  Heart sounds: No murmur heard  Comments: 3+ edema over bilateral lower extremities, increased from yesterday  Pulmonary:      Effort: Pulmonary effort is normal  No respiratory distress  Breath sounds: Rales present  Comments: Bilateral rales mid to base  Abdominal:      General: Bowel sounds are normal  There is no distension  Palpations: Abdomen is soft  Tenderness: There is no abdominal tenderness  Musculoskeletal:      Cervical back: Neck supple  Skin:     General: Skin is warm and dry  Neurological:      Mental Status: He is alert  Additional Data:     Labs:    Results from last 7 days   Lab Units 07/21/21  0635   WBC Thousand/uL 6 41   HEMOGLOBIN g/dL 8 5*   HEMATOCRIT % 29 4*   PLATELETS Thousands/uL 313   NEUTROS PCT % 65   LYMPHS PCT % 10*   MONOS PCT % 13*   EOS PCT % 10*     Results from last 7 days   Lab Units 07/21/21  0635 07/18/21  2114   SODIUM mmol/L 139 145   POTASSIUM mmol/L 3 8 2 9*   CHLORIDE mmol/L 103 105   CO2 mmol/L 29 30   BUN mg/dL 19 25   CREATININE mg/dL 1 88* 2 21*   ANION GAP mmol/L 7 10   CALCIUM mg/dL 7 9* 8 4   ALBUMIN g/dL  --  3 0*   TOTAL BILIRUBIN mg/dL  --  0 58   ALK PHOS U/L  --  120*   ALT U/L  --  25   AST U/L  --  27   GLUCOSE RANDOM mg/dL 115 133     Results from last 7 days   Lab Units 07/18/21  2114   INR  1 22*     Results from last 7 days   Lab Units 07/21/21  1601 07/21/21  1107 07/21/21  0735 07/20/21  2118 07/20/21  1621 07/20/21  1427 07/20/21  1321 07/20/21  1247 07/20/21  1102 07/20/21  0716 07/19/21  2132 07/19/21  1849   POC GLUCOSE mg/dl 123 125 216* 179* 76 85 76 64* 72 123 102 133     Results from last 7 days   Lab Units 07/19/21  0449   HEMOGLOBIN A1C % 6 6*     Results from last 7 days   Lab Units 07/18/21  2114   LACTIC ACID mmol/L 1 1           * I Have Reviewed All Lab Data Listed Above  * Additional Pertinent Lab Tests Reviewed:  Elsie 64 Admission Reviewed    Imaging:    reviewed    Recent Cultures (last 7 days):           Last 24 Hours Medication List:   Current Facility-Administered Medications   Medication Dose Route Frequency Provider Last Rate    acetaminophen  650 mg Oral Q4H PRN SEYMOUR Crouch      atorvastatin  80 mg Oral Daily With Dinner SEYMOUR Crouch      clopidogrel  75 mg Oral Daily Yadira Baires MD      donepezil  5 mg Oral HS SEYMOUR Crouch      ferrous gluconate  324 mg Oral Daily Quynh Crouch [address]      [START ON 7/22/2021] furosemide  80 mg Intravenous BID (diuretic) Yadira Baires MD      insulin lispro  1-6 Units Subcutaneous TID [facility] Sandeep Olivares MD      levothyroxine  100 mcg Oral Early Morning SEYMOUR Crouch      metoprolol succinate  50 mg Oral Daily SEYMOUR Crouch      pantoprazole  40 mg Oral Early Morning SEYMOUR Crouch          Today, Patient Was Seen By: Venu Morris MD    ** Please Note: Dictation voice to text software may have been used in the creation of this document   **

## 2021-07-22 NOTE — ASSESSMENT & PLAN NOTE
Wt Readings from Last 3 Encounters:   07/22/21 120 kg (265 lb 6 9 oz)     Echo 5/2021 with EF 55-60% and moderate aortic stenosis  Echo 7/19/2021 EF 55% with mild aortic stenosis  Chest xray at admission reveals no pulmonary congestion  NT proBNP at admission 4500  Continue Albumin 25g TID   Continue Bumex drip at 0 5mg/hr  , would consider increasing dose but will discuss up-titration with nephrology  Strict I's/O's and daily weights (standing only if deemed safe)  Ordered telemetry due to diuresis  Optimize electrolytes for K+ > 4, Mag > 2  Ordered KCL 40mEq PO now

## 2021-07-22 NOTE — ASSESSMENT & PLAN NOTE
· Lower extremity 3+ pitting edema to bilateral lower extremities extending up to scrotum, bibasilar rales  · Chest x-ray:  Atelectasis  · BNP:  4500  · Daily weights  · I&Os  · Lasix IV twice daily for acute diuresis for now  Not much urine output with 40 IV b i d  Of Lasix  Decreased lasix dose to 40 from 80 IV bid however patient seems to have decreased urinary output so increased lasix back to 80 BID  Patient also received 1 dose of torsemide this afternoon  Not having a lot of urinary output on Closely monitor urine output  Hold Norvasc    · TTE: LV function showing 55% EF  · Low-salt diet, fluid restriction    Patient started on Bumex drip on 07/22 given poor response to Lasix IV as per cardiology recommendations  Will continue to monitor I&Os

## 2021-07-22 NOTE — ASSESSMENT & PLAN NOTE
· At baseline  · Continue Aricept  · Supportive care  · CT head showing encephalomalacia  · MRI showing the same

## 2021-07-22 NOTE — CONSULTS
Consultation - Nephrology   Kelly Singh 64 y o  male MRN: [id number]  Unit/Bed#: -01 Encounter: [id number]      A/P:  1  Acute kidney injury present on admission  · Admitted with creatinine of 2 21 mg/dL and anemia  · Losartan 100 mg was held with very mild improvement is stabilized at a creatinine of 2 06 mg/dL  · Etiology may be ATN due to blood loss and a patient with underlying vascular disease and long-term diabetes  · Check a kidney ultrasound and urine electrolytes  · Check urinary microalbumin to creatinine ratio  He does have a longstanding diabetic issue history, however, he may be better served by a lower dose of an ACE I/ARB when stable  · Avoid nephrotoxins and maintain mean arterial pressure greater than 65 without wide excursions    2  Kidney disease stage IIIB  · He appears to have had progression of his chronic kidney disease over the past year and a half on records review  His creatinine has trended up from 1 4 to as high as 1 78  Most recent creatinine was 1 6 at his PCPs office earlier this year  · Etiology is longstanding diabetes, underlying vascular disease and hypertension  · Workup as above    3  Acute on chronic anemia  · Check an iron saturation  · He is receiving oral iron but it does iron saturation is low he may benefit from Venofer infusions    4  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 use of insulin  · Last A1c was well controlled at 6 6%     5  Volume overload  · Case discussed with cardiology  · He will receive a Bumex infusion and albumin 25 g tid  to centralize flow             Thank you for allowing us to participate in the care of your patient  Please feel free to contact us regarding the care of this patient, or any other questions/concerns that may be applicable      Patient Active Problem List   Diagnosis    Hypokalemia    Alzheimer's dementia ([facility] )    Acute on chronic anemia    Acute kidney injury superimposed on chronic kidney disease ([facility] )    Frequent falls    [facility]oxysmal [facility] fibrillation) [facility]    Type 2 diabetes mellitus with kidney complication, without long-term current use of insulin ([facility])    Moderate aortic stenosis by prior echocardiogram    Ambulatory dysfunction    Essential hypertension    Acute on chronic diastolic CHF (congestive heart failure) ([facility])    GI bleeding    History of TIA (transient ischemic attack)    Morbid obesity with BMI of 40 0-44 9, adult ([facility] )    Hypothyroidism       History of Present Illness   Physician Requesting Consult: Ezio Velazquez *  Reason for Consult / Principal Problem: Chronic kidney disease stage 3B/NORAH  Hx and PE limited by:   HPI: Niya Fischer is a 64y o  year old male who presents with edema and frequent falls  He was found to have volume overload and an increase in creatinine to 2 21 mg/dl  His historic baseline was 1 4 mg/dl  Losartan 100mg was placed on hold  He underwent EGD and colonoscopy due to a hemoglobin of 7 8 g/dl  which did not show active bleeding  His creatinine improved to 2 06 mg/dl  He has a steadily rising creatinine over the past year  In July of 2020 his creatinine was 1 52 -> 1 78 mg/dl in Nov 2020  He has a history of hypertension, His echo demonstrated mild AS but normal systolic and diastolic function  He is unsure if he took NSAIDs but does take Tylenol  He has a history of dementia and had a cT of the brain with marked microangiopathic changes  He has a history of DM-2 without long term use of insulin and last A1c ws 6 6%    History obtained from chart review and the patient    Review of Systems - Negative except as mentioned above in HPI, more specifics as mentioned below  Review of Systems - Negative for chest pain, shortness of breath, abdominal pain, nausea, vomiting, dysuria  Denies any history of kidney disease or problems voiding      Historical Information   Past Medical History:   Diagnosis Date    Alzheimers disease ([facility] )     Diabetes mellitus ([facility] )     Hypertension      Past Surgical History:   Procedure Laterality Date    APPENDECTOMY      KNEE ARTHROPLASTY Bilateral      Social History   Social History     Substance and Sexual Activity   Alcohol Use Never     Social History     Substance and Sexual Activity   Drug Use Never     Social History     Tobacco Use   Smoking Status Never Smoker   Smokeless Tobacco Never Used     Family History   Problem Relation Age of Onset    Heart disease Mother     Stroke Mother        Meds/Allergies   all current active meds have been reviewed, current meds:   Current Facility-Administered Medications   Medication Dose Route Frequency    acetaminophen (TYLENOL) tablet 650 mg  650 mg Oral Q4H PRN    atorvastatin (LIPITOR) tablet 80 mg  80 mg Oral Daily With Dinner    clopidogrel (PLAVIX) tablet 75 mg  75 mg Oral Daily    donepezil (ARICEPT) tablet 5 mg  5 mg Oral HS    ferrous gluconate (FERGON) tablet 324 mg  324 mg Oral Daily    furosemide (LASIX) injection 80 mg  80 mg Intravenous BID (diuretic)    insulin lispro (HumaLOG) 100 units/mL subcutaneous injection 1-6 Units  1-6 Units Subcutaneous TID AC    levothyroxine tablet 112 mcg  112 mcg Oral Early Morning    metoprolol succinate (TOPROL-XL) 24 hr tablet 50 mg  50 mg Oral Daily    pantoprazole (PROTONIX) EC tablet 40 mg  40 mg Oral Early Morning    QUEtiapine (SEROquel) tablet 25 mg  25 mg Oral HS    and PTA meds:    Medications Prior to Admission   Medication    amLODIPine (NORVASC) 2 5 mg tablet    atorvastatin (LIPITOR) 80 mg tablet    donepezil (ARICEPT) 5 mg tablet    furosemide (LASIX) 40 mg tablet    glimepiride (AMARYL) 4 mg tablet    levothyroxine 100 mcg tablet    losartan (COZAAR) 100 MG tablet    metoprolol succinate (TOPROL-XL) 50 mg 24 hr tablet    pantoprazole (PROTONIX) 40 mg tablet    pioglitazone (ACTOS) 30 mg tablet    clopidogrel (PLAVIX) 75 mg tablet    Ferrous Gluconate 256 (28 Fe) MG TABS         No Known Allergies    Objective     Intake/Output Summary (Last 24 hours) at 7/22/2021 1113  Last data filed at 7/22/2021 0758  Gross per 24 hour   Intake --   Output 806 ml   Net -806 ml       Invasive Devices:        Physical Exam      I/O last 3 completed shifts: In: 425 [I V :425]  Out: 887 [Urine:887]    Vitals:    07/22/21 0700   BP: 155/68   Pulse: 62   Resp: 20   Temp: 98 9 °F (37 2 °C)   SpO2: 91%       Gen: in NAD,pale  Alert/Awake  HEENT: no sclerous icterus, MMM, neck supple  CV: +S1/S2, RRR  Lungs: CTA bilaterally  Abd: +BS, soft NT/ND  Ext: all four extremities are warm  Skin: no rashes noted  Neuro: CN II-XII moving arms and legs and answers questions but has a delayed response time    Current Weight: Weight - Scale: 120 kg (265 lb 6 9 oz)  First Weight: Weight - Scale: 126 kg (276 lb 10 8 oz)    Lab Results:  I have personally reviewed pertinent labs      CBC:   Lab Results   Component Value Date    WBC 5 66 07/22/2021    HGB 8 0 (L) 07/22/2021    HCT 27 2 (L) 07/22/2021    MCV 91 07/22/2021     07/22/2021    MCH 26 8 07/22/2021    MCHC 29 4 (L) 07/22/2021    RDW 16 2 (H) 07/22/2021    MPV 10 6 07/22/2021    NRBC 0 07/22/2021     CMP:   Lab Results   Component Value Date    K 3 6 07/22/2021     07/22/2021    CO2 28 07/22/2021    BUN 22 07/22/2021    CREATININE 2 06 (H) 07/22/2021    CALCIUM 7 6 (L) 07/22/2021    AST 17 07/22/2021    ALT 18 07/22/2021    ALKPHOS 100 07/22/2021    EGFR 30 07/22/2021     Phosphorus:   Lab Results   Component Value Date    PHOS 3 6 07/22/2021     Magnesium:   Lab Results   Component Value Date    MG 2 1 07/22/2021     Urinalysis: No results found for: COLORU, CLARITYU, SPECGRAV, PHUR, LEUKOCYTESUR, NITRITE, PROTEINUA, GLUCOSEU, KETONESU, BILIRUBINUR, BLOODU  Ionized Calcium: No results found for: CAION  Coagulation: No results found for: PT, INR, APTT  Troponin: No results found for: TROPONINI  ABG: No results found for: PHART, ZHO0DUJ, PO2ART, YWB0EFW, H3XEIQNT, BEART, SOURCE    Results from last 7 days   Lab Units 07/22/21  0623 07/21/21  0635 07/20/21  0444 07/18/21  2114   POTASSIUM mmol/L 3 6 3 8 3 4* 2 9*   CHLORIDE mmol/L 104 103 107 105   CO2 mmol/L 28 29 30 30   BUN mg/dL 22 19 19 25   CREATININE mg/dL 2 06* 1 88* 1 91* 2 21*   CALCIUM mg/dL 7 6* 7 9* 8 1* 8 4   ALK PHOS U/L 100  --   --  120*   ALT U/L 18  --   --  25   AST U/L 17  --   --  27       Radiology review:  Procedure: EGD    Addendum Date: 7/20/2021 Addendum:   [facility]  [address] [facility] [id number] [phone] DATE OF SERVICE: 7/20/21 PHYSICIAN(S): Shayna Dean MD - Attending Physician INDICATION: Anemia, Gastrointestinal hemorrhage, unspecified gastrointestinal hemorrhage type POST-OP DIAGNOSIS: See the impression below  PREPROCEDURE: Informed consent was obtained for the procedure, including sedation  Risks of perforation, hemorrhage, adverse drug reaction and aspiration were discussed  The patient was placed in the left lateral decubitus position  Patient was explained about the risks and benefits of the procedure  Risks including but not limited to bleeding, infection, and perforation were explained in detail  Also explained about less than 100% sensitivity with the exam and other alternatives  DETAILS OF PROCEDURE: Patient was taken to the procedure room where a time out was performed to confirm correct patient and correct procedure  The patient underwent monitored anesthesia care, which was administered by an anesthesia professional  The patient's blood pressure, heart rate, level of consciousness, respirations and oxygen were monitored throughout the procedure  The scope was introduced through the mouth and advanced to the third part of the duodenum  Retroflexion was performed in the incisura  The patient experienced no blood loss  The procedure was not difficult  The patient tolerated the procedure well   There were no apparent complications  ANESTHESIA INFORMATION: ASA: III Anesthesia Type: IV Sedation with Anesthesia MEDICATIONS: dextrose 50 % IV solution 25 mL 25 mL (Totals for administrations occurring from 1304 to 1348 on 07/20/21) FINDINGS: C1M3 Og's esophagus observed where the top of gastric folds are 38 cm from the incisors with no associated nodule; performed cold forceps biopsy Small sliding hiatal hernia (type I hiatal hernia) without [place] lesions present - GE junction 38 cm from the incisors, diaphragmatic impression 40 cm from the incisors   Otherwise normal stomach on direct and retroflexed views Performed biopsies to rule out celiac disease and H  pylori in the body of the stomach, incisura, antrum, duodenal bulb and 2nd part of the duodenum The duodenal bulb, 1st part of the duodenum, 2nd part of the duodenum and 3rd part of the duodenum appeared normal  SPECIMENS: ID Type Source Tests Collected by Time Destination 1 : biopsy retrieved by cold biopsy forceps, R/O celiac disease Tissue Duodenum TISSUE EXAM Urban West MD 7/20/2021  1:39 PM  2 : gastric biopsy retrieved by cold biopsy forceps, R/O H  pylori Tissue Stomach TISSUE EXAM Urban West MD 7/20/2021  1:39 PM  3 : biopsy retrieved by cold biopsy forceps, H/O Og's esophagus, R/O dysplasia Tissue Esophagus TISSUE EXAM Urban West MD 7/20/2021  1:44 PM  IMPRESSION: C1 M3 Og's esophagus-biopsies taken to rule out dysplasia Small hiatal hernia otherwise normal stomach on direct and retroflexed views Random gastric biopsies taken to rule out H pylori Normal visualized portion of duodenum from duodenal bulb to D3 Random biopsies taken to rule out celiac disease RECOMMENDATION: Await pathology results Continue PPI for Og's esophagus; given his advanced age and comorbid conditions and after discussion with POA unlikely he would benefit from continued surveillance Proceed with previously scheduled colonoscopy  Urban West MD    Result Date: 7/20/2021  Narrative: [facility]  [address] [facility] [address] [phone] DATE OF SERVICE: 7/20/21 PHYSICIAN(S): Cruzito Evans MD - Attending Physician INDICATION: Anemia, Gastrointestinal hemorrhage, unspecified gastrointestinal hemorrhage type POST-OP DIAGNOSIS: See the impression below  PREPROCEDURE: Informed consent was obtained for the procedure, including sedation  Risks of perforation, hemorrhage, adverse drug reaction and aspiration were discussed  The patient was placed in the left lateral decubitus position  Patient was explained about the risks and benefits of the procedure  Risks including but not limited to bleeding, infection, and perforation were explained in detail  Also explained about less than 100% sensitivity with the exam and other alternatives  DETAILS OF PROCEDURE: Patient was taken to the procedure room where a time out was performed to confirm correct patient and correct procedure  The patient underwent monitored anesthesia care, which was administered by an anesthesia professional  The patient's blood pressure, heart rate, level of consciousness, respirations and oxygen were monitored throughout the procedure  The scope was introduced through the mouth and advanced to the third part of the duodenum  Retroflexion was performed in the incisura  The patient experienced no blood loss  The procedure was not difficult  The patient tolerated the procedure well  There were no apparent complications   ANESTHESIA INFORMATION: ASA: III Anesthesia Type: IV Sedation with Anesthesia MEDICATIONS: dextrose 50 % IV solution 25 mL 25 mL (Totals for administrations occurring from 1304 to 1348 on 07/20/21) FINDINGS: C1M3 Og's esophagus observed where the top of gastric folds are 38 cm from the incisors with no associated nodule; performed cold forceps biopsy Small sliding hiatal hernia (type I hiatal hernia) without [place] lesions present - GE junction 38 cm from the incisors, diaphragmatic impression 40 cm from the incisors  Otherwise normal stomach on direct and retroflexed views Performed biopsies to rule out celiac disease and H  pylori in the body of the stomach, incisura, antrum, duodenal bulb and 2nd part of the duodenum The duodenal bulb, 1st part of the duodenum, 2nd part of the duodenum and 3rd part of the duodenum appeared normal  SPECIMENS: ID Type Source Tests Collected by Time Destination 1 : biopsy retrieved by cold biopsy forceps, R/O celiac disease Tissue Duodenum TISSUE EXAM Derrek Cunningham MD 7/20/2021  1:39 PM  2 : gastric biopsy retrieved by cold biopsy forceps, R/O H  pylori Tissue Stomach TISSUE EXAM Derrek Cunningham MD 7/20/2021  1:39 PM  3 : biopsy retrieved by cold biopsy forceps, H/O Og's esophagus, R/O dysplasia Tissue Esophagus TISSUE EXAM Derrek Cunningham MD 7/20/2021  1:44 PM      Impression: C1 M3 Og's esophagus-biopsies taken to rule out dysplasia Small hiatal hernia otherwise normal stomach on direct and retroflexed views Random gastric biopsies taken to rule out H pylori Normal visualized portion of duodenum from duodenal bulb to D3 Random biopsies taken to rule out celiac disease RECOMMENDATION: Await pathology results Proceed with previously scheduled colonoscopy  Derrek Cunningham MD     Procedure: Colonoscopy    Addendum Date: 7/20/2021 Addendum:   [facility] [address] [place] [id number] [phone] DATE OF SERVICE: 7/20/21 PHYSICIAN(S): Derrek Cunningham MD - Attending Physician INDICATION: Anemia, Gastrointestinal hemorrhage, unspecified gastrointestinal hemorrhage type Colonoscopy performed for a diagnostic indication  POST-OP DIAGNOSIS: See the impression below  HISTORY: Prior colonoscopy: 3 years ago  BOWEL PREPARATION: Miralax/Dulcolax PREPROCEDURE: Informed consent was obtained for the procedure, including sedation   Risks including but not limited to bleeding, infection, perforation, adverse drug reaction and aspiration were explained in detail  Also explained about less than 100% sensitivity with the exam and other alternatives  The patient was placed in the left lateral decubitus position  DETAILS OF PROCEDURE: Patient was taken to the procedure room where a time out was performed to confirm correct patient and correct procedure  The patient underwent monitored anesthesia care, which was administered by an anesthesia professional  The patient's blood pressure, heart rate, level of consciousness, oxygen and respirations were monitored throughout the procedure  A digital rectal exam was performed  The scope was introduced through the anus and advanced to the terminal ileum  The quality of bowel preparation was evaluated using the [facility] Bowel Preparation Scale with scores of: right colon = 2, transverse colon = 2, left colon = 2  The total BBPS score was 6  Bowel prep was adequate  The patient experienced no blood loss  The procedure was not difficult  The patient tolerated the procedure well  There were no apparent complications  ANESTHESIA INFORMATION: ASA: III Anesthesia Type: IV Sedation with Anesthesia MEDICATIONS: dextrose 50 % IV solution 25 mL 25 mL simethicone (MYLICON) 40 mg in sterile water 60 mL 40 mg (Totals for administrations occurring from 1304 to 1420 on 07/20/21) FINDINGS: The terminal ileum appeared normal  The ileocecal valve, cecum, ascending colon, hepatic flexure, transverse colon, splenic flexure, descending colon and sigmoid colon appeared normal  Mild erythematous and hemorrhagic mucosa with erosion in the rectosigmoid and rectum   Areas of erosive hemorrhagic lesions that were not actively bleeding possibly secondary to trauma from enemas; no identified diverticula in the area however this could also be secondary to diverticula that may have bled Moderate scattered diverticula Protruding, external, internal hemorrhoids EVENTS: Procedure Events Event Event Time ENDO CECUM REACHED 7/20/2021  1:59 PM ENDO SCOPE OUT TIME 7/20/2021  2:15 PM SPECIMENS: ID Type Source Tests Collected by Time Destination 1 : biopsy retrieved by cold biopsy forceps, R/O celiac disease Tissue Duodenum TISSUE EXAM Flynn Mena MD 7/20/2021  1:39 PM  2 : gastric biopsy retrieved by cold biopsy forceps, R/O H  pylori Tissue Stomach TISSUE EXAM Flynn Mena MD 7/20/2021  1:39 PM  3 : biopsy retrieved by cold biopsy forceps, H/O Og's esophagus, R/O dysplasia Tissue Esophagus TISSUE EXAM Flynn Mena MD 7/20/2021  1:44 PM  EQUIPMENT: [id number] IMPRESSION: No evidence of active bleeding Terminal ileum normal Scattered diverticula throughout the colon left greater than right without evidence of bleeding Area of erythema/ulceration/hemorrhage likely from trauma from enema versus old diverticular bleed Large internal/external hemorrhoids RECOMMENDATION: Repeat colonoscopy not recommended due to age (age = 75 or greater)  Return to floor for ongoing medical care as per primary team May resume regular diet as tolerated Okay to resume Plavix, however risks versus benefits should be assessed for dual anti-platelet therapy Continue to monitor hemoglobin daily and signs of active GI bleeding Defer management of Hemoglobin and iron levels 2 PCP as an outpatient GI follow-up on as-needed basis Flynn Mena MD     Result Date: 7/20/2021  Narrative: [facility]  [address] [facility] [id number] [address] [place] [address] Street: 7/20/21 PHYSICIAN(S): Flynn Mena MD - Attending Physician INDICATION: Anemia, Gastrointestinal hemorrhage, unspecified gastrointestinal hemorrhage type Colonoscopy performed for a diagnostic indication  POST-OP DIAGNOSIS: See the impression below  HISTORY: Prior colonoscopy: 3 years ago  BOWEL PREPARATION: Miralax/Dulcolax PREPROCEDURE: Informed consent was obtained for the procedure, including sedation   Risks including but not limited to bleeding, infection, perforation, adverse drug reaction and aspiration were explained in detail  Also explained about less than 100% sensitivity with the exam and other alternatives  The patient was placed in the left lateral decubitus position  DETAILS OF PROCEDURE: Patient was taken to the procedure room where a time out was performed to confirm correct patient and correct procedure  The patient underwent monitored anesthesia care, which was administered by an anesthesia professional  The patient's blood pressure, heart rate, level of consciousness, oxygen and respirations were monitored throughout the procedure  A digital rectal exam was performed  The scope was introduced through the anus and advanced to the terminal ileum  The quality of bowel preparation was evaluated using the [facility] Bowel Preparation Scale with scores of: right colon = 2, transverse colon = 2, left colon = 2  The total BBPS score was 6  Bowel prep was adequate  The patient experienced no blood loss  The procedure was not difficult  The patient tolerated the procedure well  There were no apparent complications  ANESTHESIA INFORMATION: ASA: III Anesthesia Type: IV Sedation with Anesthesia MEDICATIONS: dextrose 50 % IV solution 25 mL 25 mL simethicone (MYLICON) 40 mg in sterile water 60 mL 40 mg (Totals for administrations occurring from 1304 to 1420 on 07/20/21) FINDINGS: The terminal ileum appeared normal  The ileocecal valve, cecum, ascending colon, hepatic flexure, transverse colon, splenic flexure, descending colon and sigmoid colon appeared normal  Mild erythematous and hemorrhagic mucosa with erosion in the rectosigmoid and rectum   Areas of erosive hemorrhagic lesions that were not actively bleeding possibly secondary to trauma from enemas; no identified diverticula in the area however this could also be secondary to diverticula that may have bled Moderate scattered diverticula Protruding, external, internal hemorrhoids EVENTS: Procedure Events Event Event Time ENDO CECUM REACHED 7/20/2021  1:59 PM ENDO SCOPE OUT TIME 7/20/2021  2:15 PM SPECIMENS: ID Type Source Tests Collected by Time Destination 1 : biopsy retrieved by cold biopsy forceps, R/O celiac disease Tissue Duodenum TISSUE EXAM Alia Chilel MD 7/20/2021  1:39 PM  2 : gastric biopsy retrieved by cold biopsy forceps, R/O H  pylori Tissue Stomach TISSUE EXAM Alia Chilel MD 7/20/2021  1:39 PM  3 : biopsy retrieved by cold biopsy forceps, H/O Og's esophagus, R/O dysplasia Tissue Esophagus TISSUE EXAM Alia Chilel MD 7/20/2021  1:44 PM  EQUIPMENT: [id number]     Impression: No evidence of active bleeding Terminal ileum Scattered diverticula throughout the colon left greater than right Area of erythema/ulceration/hemorrhage likely from trauma from enema versus old diverticular bleed Large internal/external hemorrhoids RECOMMENDATION: Repeat colonoscopy not recommended due to age (age = 75 or greater)  Return to floor for ongoing medical care as per primary team May resume regular diet as tolerated Okay to resume Plavix tomorrow Continue to monitor hemoglobin and signs of active GI bleeding Alia Chilel MD     Procedure: CT head wo contrast    Result Date: 7/22/2021  Narrative: CT BRAIN - WITHOUT CONTRAST INDICATION:   Altered mental status COMPARISON:  None  TECHNIQUE:  CT examination of the brain was performed  In addition to axial images, sagittal and coronal 2D reformatted images were created and submitted for interpretation  Radiation dose length product (DLP) for this visit:  951 78 mGy-cm   This examination, like all CT scans performed in the [facility], was performed utilizing techniques to minimize radiation dose exposure, including the use of iterative  reconstruction and automated exposure control  IMAGE QUALITY:  Diagnostic   FINDINGS: PARENCHYMA: Encephalomalacia and volume loss involving left gyrus rectus and inferior left frontal lobe as well as right frontal cortical/subcortical white matter  Patchy and confluent hypoattenuation involving periventricular and subcortical white matter  Cerebral volume loss  No intracranial masslike lesion, mass effect or midline shift  No CT signs of acute infarction  No acute parenchymal hemorrhage  VENTRICLES AND EXTRA-AXIAL SPACES:  Normal for the patient's age  VISUALIZED ORBITS AND PARANASAL SINUSES:  Unremarkable  CALVARIUM AND EXTRACRANIAL SOFT TISSUES:  Normal      Impression: 1  New acute intracranial CT abnormality  2   Encephalomalacia involving left gyrus rectus and inferior left frontal lobe as well as right frontal cortical/subcortical white matter  3   Nonspecific extensive white matter change suggesting advanced microangiopathy  Workstation performed: [id number]     Procedure: Echo follow up/limited with contrast if indicated    Result Date: 2021  Narrative: 300 [facility], [address] [place] [address] Transthoracic Echocardiogram Limited 2D, M-mode, Doppler, and Color Doppler Study date:  2021 Patient: Jesse Cordova MR number: [id number] Account number: [de-identified] : [id number] Age: 64 years Gender: Male Status: Inpatient Location: [address] [place] [address] Height: 67 in Weight: 275 4 lb BP: 150/ 72 mmHg Indications: Heart Failure Primary Physician:  Bijal Mckenna Interpreting Physician:  [facility],  SUMMARY LEFT VENTRICLE: Systolic function was normal  Ejection fraction was estimated to be 55 %  There were no regional wall motion abnormalities  LEFT ATRIUM: The atrium was dilated  MITRAL VALVE: There was trace regurgitation  AORTIC VALVE: Heavily calcified aortic valve  Transaortic velocity was increased due to valvular stenosis  There was mild stenosis  Peak transaortic velocity of 2 7 m/s with a mean gradient of 15 mmHg  DI 0 42  CHIRAG 1 3 cm2  TRICUSPID VALVE: There was mild regurgitation   COMPARISONS: Compared to prior study 2021, there is no significant change  Prior peak transaortic velocity 2 4 m/s with a mean gradient of 10 mmHg  HISTORY: PRIOR HISTORY: Aortic Stenosis PROCEDURE: The study was performed in the [facility]  This was a routine study  The transthoracic approach was used  The study included limited 2D imaging, M-mode, limited spectral Doppler, and color Doppler  The heart rate was 60 bpm, at the start of the study  Intravenous contrast ( 0 4 ml Definity) was administered  Image quality was adequate  LEFT VENTRICLE: Size was normal  Systolic function was normal  Ejection fraction was estimated to be 55 %  There were no regional wall motion abnormalities  No evidence of apical thrombus  DOPPLER: Left ventricular diastolic function parameters were normal  RIGHT VENTRICLE: The size was normal  Systolic function was normal  Wall thickness was normal  LEFT ATRIUM: The atrium was dilated  RIGHT ATRIUM: Size was normal  MITRAL VALVE: Valve structure was normal  There was normal leaflet separation  DOPPLER: The transmitral velocity was within the normal range  There was no evidence for stenosis  There was trace regurgitation  AORTIC VALVE: Heavily calcified aortic valve  DOPPLER: Transaortic velocity was increased due to valvular stenosis  There was mild stenosis  Peak transaortic velocity of 2 7 m/s with a mean gradient of 15 mmHg  DI 0 42  CHIRAG 1 3 cm2  There was no significant regurgitation  TRICUSPID VALVE: The valve structure was normal  There was normal leaflet separation  DOPPLER: The transtricuspid velocity was within the normal range  There was no evidence for stenosis  There was mild regurgitation  Estimated peak PA pressure was 35 mmHg (assuming an estimated right atrial pressure of 5 mmHg)  PULMONIC VALVE: Not well visualized  PERICARDIUM: There was no pericardial effusion  SYSTEMIC VEINS: IVC: Not assessed   SYSTEM MEASUREMENT TABLES 2D %FS: 25 51 % AV Diam: 2 98 cm EDV(Teich): 120 96 ml EF(Teich): 50 02 % ESV(Teich): 60 45 ml IVSd: 0 89 cm LA Diam: 5 07 cm LVEDV MOD A4C: 123 1 ml LVEF MOD A4C: 55 16 % LVESV MOD A4C: 55 19 ml LVIDd: 5 05 cm LVIDs: 3 76 cm LVLd A4C: 9 53 cm LVLs A4C: 8 29 cm LVOT Diam: 2 19 cm LVPWd: 1 11 cm RWT: 0 44 SV MOD A4C: 67 91 ml SV(Teich): 60 51 ml CW AV CI: 32 76 l/minm2 AV CO: 76 01 L/min AV Env  Ti: 334 92 ms AV SI: 182 87 ml/m2 AV SV: 424 27 ml AV VTI: 60 94 cm AV Vmax: 2 69 m/s AV Vmean: 1 82 m/s AV maxP 05 mmHg AV meanPG: 15 43 mmHg HR: 179 15 BPM TR Vmax: 2 77 m/s TR maxP 68 mmHg PW CHIRAG (VTI): 1 58 cm2 CHIRAG Vmax: 1 35 cm2 AVAI (VTI): 0 cm2/m2 AVAI Vmax: 0 cm2/m2 LVCI Dopp: 6 9 l/minm2 LVCO Dopp: 16 L/min LVOT Env  Ti: 361 56 ms LVOT VTI: 25 58 cm LVOT Vmax: 0 96 m/s LVOT Vmean: 0 71 m/s LVOT maxPG: 3 72 mmHg LVOT meanP 21 mmHg LVSI Dopp: 41 57 ml/m2 LVSV Dopp: 96 44 ml MV A Osmar: 0 82 m/s MV Dec [place]: 6 28 m/s2 MV DecT: 161 13 ms MV E Osmar: 1 01 m/s MV E/A Ratio: 1 [facility] Prepared and electronically signed by Peter Simpson DO Signed 2021 17:15:15         EKG, Pathology, and Other Studies:  I have reviewed prior labs and current labs  I check  We where reviewed his PCP note dated 2021  At that time his creatinine was 1 6 mg/dL      Counseling / Coordination of Care  Total ADDITIONAL floor / unit time spent today 35 minutes  Greater than 50% of total time was spent with the patient and / or family counseling and / or coordination of care  A description of the counseling / coordination of care: Saint Loupe, MD      This consultation note was produced in part using a dictation device which may document imprecise wording from author's original intent

## 2021-07-22 NOTE — ASSESSMENT & PLAN NOTE
Lab Results   Component Value Date    HGBA1C 6 6 (H) 07/19/2021       Recent Labs     07/20/21  2118 07/21/21  0735 07/21/21  1107 07/21/21  1601   POCGLU 179* 216* 125 123       Blood Sugar Average: Last 72 hrs:  (P) 102 [id number]   · Diabetic diet  · Fingerstick blood sugar sliding scale coverage  · Hold home oral agents  ·  hemoglobin A1c 6 6    Blood sugars are currently well controlled

## 2021-07-22 NOTE — ASSESSMENT & PLAN NOTE
Pt with history of atrial fibrillation  In sinus rhythm with occasional PVC's on admission    Continue telemetry  Continue Metoprolol succinate 50mg daily  Anticoagulation on hold secondary to GI bleed  Optimize electrolytes for K+ > 4, Mag > 2

## 2021-07-22 NOTE — PLAN OF CARE
Problem: Potential for Falls  Goal: Patient will remain free of falls  Description: INTERVENTIONS:  - Educate patient/family on patient safety including physical limitations  - Instruct patient to call for assistance with activity   - Consult OT/PT to assist with strengthening/mobility   - Keep Call bell within reach  - Keep bed low and locked with side rails adjusted as appropriate  - Keep care items and personal belongings within reach  - Initiate and maintain comfort rounds  - Make Fall Risk Sign visible to staff  - Offer Toileting every   Hours, in advance of need  - Initiate/Maintain   alarm  - Obtain necessary fall risk management equipment:   - Apply yellow socks and bracelet for high fall risk patients  - Consider moving patient to room near nurses station  Outcome: Progressing     Problem: MOBILITY - ADULT  Goal: Maintain or return to baseline ADL function  Description: INTERVENTIONS:  -  Assess patient's ability to carry out ADLs; assess patient's baseline for ADL function and identify physical deficits which impact ability to perform ADLs (bathing, care of mouth/teeth, toileting, grooming, dressing, etc )  - Assess/evaluate cause of self-care deficits   - Assess range of motion  - Assess patient's mobility; develop plan if impaired  - Assess patient's need for assistive devices and provide as appropriate  - Encourage maximum independence but intervene and supervise when necessary  - Involve family in performance of ADLs  - Assess for home care needs following discharge   - Consider OT consult to assist with ADL evaluation and planning for discharge  - Provide patient education as appropriate  Outcome: Progressing  Goal: Maintains/Returns to pre admission functional level  Description: INTERVENTIONS:  - Perform BMAT or MOVE assessment daily    - Set and communicate daily mobility goal to care team and patient/family/caregiver     - Collaborate with rehabilitation services on mobility goals if consulted  - Perform Range of Motion   times a day  - Reposition patient every   hours  - Dangle patient   times a day  - Stand patient   times a day  - Ambulate patient   times a day  - Out of bed to chair   times a day   - Out of bed for meals   times a day  - Out of bed for toileting  - Record patient progress and toleration of activity level   Outcome: Progressing     Problem: Prexisting or High Potential for Compromised Skin Integrity  Goal: Skin integrity is maintained or improved  Description: INTERVENTIONS:  - Identify patients at risk for skin breakdown  - Assess and monitor skin integrity  - Assess and monitor nutrition and hydration status  - Monitor labs   - Assess for incontinence   - Turn and reposition patient  - Assist with mobility/ambulation  - Relieve pressure over bony prominences  - Avoid friction and shearing  - Provide appropriate hygiene as needed including keeping skin clean and dry  - Evaluate need for skin moisturizer/barrier cream  - Collaborate with interdisciplinary team   - Patient/family teaching  - Consider wound care consult   Outcome: Progressing     Problem: NEUROSENSORY - ADULT  Goal: Achieves stable or improved neurological status  Description: INTERVENTIONS  - Monitor and report changes in neurological status  - Monitor vital signs such as temperature, blood pressure, glucose, and any other labs ordered   - Initiate measures to prevent increased intracranial pressure  - Monitor for seizure activity and implement precautions if appropriate      Outcome: Progressing  Goal: Achieves maximal functionality and self care  Description: INTERVENTIONS  - Monitor swallowing and airway patency with patient fatigue and changes in neurological status  - Encourage and assist patient to increase activity and self care     - Encourage visually impaired, hearing impaired and aphasic patients to use assistive/communication devices  Outcome: Progressing     Problem: CARDIOVASCULAR - ADULT  Goal: Maintains optimal cardiac output and hemodynamic stability  Description: INTERVENTIONS:  - Monitor I/O, vital signs and rhythm  - Monitor for S/S and trends of decreased cardiac output  - Administer and titrate ordered vasoactive medications to optimize hemodynamic stability  - Assess quality of pulses, skin color and temperature  - Assess for signs of decreased coronary artery perfusion  - Instruct patient to report change in severity of symptoms  Outcome: Progressing  Goal: Absence of cardiac dysrhythmias or at baseline rhythm  Description: INTERVENTIONS:  - Continuous cardiac monitoring, vital signs, obtain 12 lead EKG if ordered  - Administer antiarrhythmic and heart rate control medications as ordered  - Monitor electrolytes and administer replacement therapy as ordered  Outcome: Progressing     Problem: RESPIRATORY - ADULT  Goal: Achieves optimal ventilation and oxygenation  Description: INTERVENTIONS:  - Assess for changes in respiratory status  - Assess for changes in mentation and behavior  - Position to facilitate oxygenation and minimize respiratory effort  - Oxygen administered by appropriate delivery if ordered  - Initiate smoking cessation education as indicated  - Encourage broncho-pulmonary hygiene including cough, deep breathe, Incentive Spirometry  - Assess the need for suctioning and aspirate as needed  - Assess and instruct to report SOB or any respiratory difficulty  - Respiratory Therapy support as indicated  Outcome: Progressing     Problem: GASTROINTESTINAL - ADULT  Goal: Minimal or absence of nausea and/or vomiting  Description: INTERVENTIONS:  - Administer IV fluids if ordered to ensure adequate hydration  - Maintain NPO status until nausea and vomiting are resolved  - Nasogastric tube if ordered  - Administer ordered antiemetic medications as needed  - Provide nonpharmacologic comfort measures as appropriate  - Advance diet as tolerated, if ordered  - Consider nutrition services referral to assist patient with adequate nutrition and appropriate food choices  Outcome: Progressing  Goal: Maintains or returns to baseline bowel function  Description: INTERVENTIONS:  - Assess bowel function  - Encourage oral fluids to ensure adequate hydration  - Administer IV fluids if ordered to ensure adequate hydration  - Administer ordered medications as needed  - Encourage mobilization and activity  - Consider nutritional services referral to assist patient with adequate nutrition and appropriate food choices  Outcome: Progressing  Goal: Maintains adequate nutritional intake  Description: INTERVENTIONS:  - Monitor percentage of each meal consumed  - Identify factors contributing to decreased intake, treat as appropriate  - Assist with meals as needed  - Monitor I&O, weight, and lab values if indicated  - Obtain nutrition services referral as needed  Outcome: Progressing  Goal: Oral mucous membranes remain intact  Description: INTERVENTIONS  - Assess oral mucosa and hygiene practices  - Implement preventative oral hygiene regimen  - Implement oral medicated treatments as ordered  - Initiate Nutrition services referral as needed  Outcome: Progressing     Problem: GENITOURINARY - ADULT  Goal: Maintains or returns to baseline urinary function  Description: INTERVENTIONS:  - Assess urinary function  - Encourage oral fluids to ensure adequate hydration if ordered  - Administer IV fluids as ordered to ensure adequate hydration  - Administer ordered medications as needed  - Offer frequent toileting  - Follow urinary retention protocol if ordered  Outcome: Progressing  Goal: Absence of urinary retention  Description: INTERVENTIONS:  - Assess patients ability to void and empty bladder  - Monitor I/O  - Bladder scan as needed  - Discuss with physician/AP medications to alleviate retention as needed  - Discuss catheterization for long term situations as appropriate  Outcome: Progressing     Problem: METABOLIC, FLUID AND ELECTROLYTES - ADULT  Goal: Electrolytes maintained within normal limits  Description: INTERVENTIONS:  - Monitor labs and assess patient for signs and symptoms of electrolyte imbalances  - Administer electrolyte replacement as ordered  - Monitor response to electrolyte replacements, including repeat lab results as appropriate  - Instruct patient on fluid and nutrition as appropriate  Outcome: Progressing  Goal: Fluid balance maintained  Description: INTERVENTIONS:  - Monitor labs   - Monitor I/O and WT  - Instruct patient on fluid and nutrition as appropriate  - Assess for signs & symptoms of volume excess or deficit  Outcome: Progressing  Goal: Glucose maintained within target range  Description: INTERVENTIONS:  - Monitor Blood Glucose as ordered  - Assess for signs and symptoms of hyperglycemia and hypoglycemia  - Administer ordered medications to maintain glucose within target range  - Assess nutritional intake and initiate nutrition service referral as needed  Outcome: Progressing     Problem: SKIN/TISSUE INTEGRITY - ADULT  Goal: Skin Integrity remains intact(Skin Breakdown Prevention)  Description: Assess:  -Perform Wale assessment every    -Clean and moisturize skin every    -Inspect skin when repositioning, toileting, and assisting with ADLS  -Assess under medical devices such as   every    -Assess extremities for adequate circulation and sensation     Bed Management:  -Have minimal linens on bed & keep smooth, unwrinkled  -Change linens as needed when moist or perspiring  -Avoid sitting or lying in one position for more than   hours while in bed  -Keep HOB at  degrees     Toileting:  -Offer bedside commode  -Assess for incontinence every   -Use incontinent care products after each incontinent episode such as   Activity:  -Mobilize patient   times a day  -Encourage activity and walks on unit  -Encourage or provide ROM exercises   -Turn and reposition patient every    Hours  -Use appropriate equipment to lift or move patient in bed  -Instruct/ Assist with weight shifting every   when out of bed in chair  -Consider limitation of chair time   hour intervals    Skin Care:  -Avoid use of baby powder, tape, friction and shearing, hot water or constrictive clothing  -Relieve pressure over bony prominences using    -Do not massage red bony areas    Next Steps:  -Teach patient strategies to minimize risks such as     -Consider consults to  interdisciplinary teams such as   Outcome: Progressing  Goal: Incision(s), wounds(s) or drain site(s) healing without S/S of infection  Description: INTERVENTIONS  - Assess and document dressing, incision, wound bed, drain sites and surrounding tissue  - Provide patient and family education  - Perform skin care/dressing changes every   Outcome: Progressing  Goal: Pressure injury heals and does not worsen  Description: Interventions:  - Implement low air loss mattress or specialty surface (Criteria met)  - Apply silicone foam dressing  - Instruct/assist with weight shifting every   minutes when in chair   - Limit chair time to   hour intervals  - Use special pressure reducing interventions such as  3  when in chair   - Apply fecal or urinary incontinence containment device   - Perform passive or active ROM every   - Turn and reposition patient & offload bony prominences every    hours   - Utilize friction reducing device or surface for transfers   - Consider consults to  interdisciplinary teams such as    - Use incontinent care products after each incontinent episode such as    - Consider nutrition services referral as needed  Outcome: Progressing     Problem: HEMATOLOGIC - ADULT  Goal: Maintains hematologic stability  Description: INTERVENTIONS  - Assess for signs and symptoms of bleeding or hemorrhage  - Monitor labs  - Administer supportive blood products/factors as ordered and appropriate  Outcome: Progressing     Problem: MUSCULOSKELETAL - ADULT  Goal: Maintain or return mobility to safest level of function  Description: INTERVENTIONS:  - Assess patient's ability to carry out ADLs; assess patient's baseline for ADL function and identify physical deficits which impact ability to perform ADLs (bathing, care of mouth/teeth, toileting, grooming, dressing, etc )  - Assess/evaluate cause of self-care deficits   - Assess range of motion  - Assess patient's mobility  - Assess patient's need for assistive devices and provide as appropriate  - Encourage maximum independence but intervene and supervise when necessary  - Involve family in performance of ADLs  - Assess for home care needs following discharge   - Consider OT consult to assist with ADL evaluation and planning for discharge  - Provide patient education as appropriate  Outcome: Progressing  Goal: Maintain proper alignment of affected body part  Description: INTERVENTIONS:  - Support, maintain and protect limb and body alignment  - Provide patient/ family with appropriate education  Outcome: Progressing     Problem: Nutrition/Hydration-ADULT  Goal: Nutrient/Hydration intake appropriate for improving, restoring or maintaining nutritional needs  Description: Monitor and assess patient's nutrition/hydration status for malnutrition  Collaborate with interdisciplinary team and initiate plan and interventions as ordered  Monitor patient's weight and dietary intake as ordered or per policy  Utilize nutrition screening tool and intervene as necessary  Determine patient's food preferences and provide high-protein, high-caloric foods as appropriate       INTERVENTIONS:  - Monitor oral intake, urinary output, labs, and treatment plans  - Assess nutrition and hydration status and recommend course of action  - Evaluate amount of meals eaten  - Assist patient with eating if necessary   - Allow adequate time for meals  - Recommend/ encourage appropriate diets, oral nutritional supplements, and vitamin/mineral supplements  - Order, calculate, and assess calorie counts as needed  - Recommend, monitor, and adjust tube feedings and TPN/PPN based on assessed needs  - Assess need for intravenous fluids  - Provide specific nutrition/hydration education as appropriate  - Include patient/family/caregiver in decisions related to nutrition  Outcome: Progressing

## 2021-07-22 NOTE — CONSULTS
Consult Cardiology    Kaylah Powers 1945, 64 y o  male MRN: [id number]    Unit/Bed#: -01 Encounter: [id number]    Attending Provider: Shaylee Carmichael *   Primary Care Provider: Serena Lobo MD   Date admitted to hospital: 7/18/2021       Consults    * Acute on chronic diastolic CHF (congestive heart failure) ([facility] )  Assessment & Plan  Wt Readings from Last 3 Encounters:   07/22/21 120 kg (265 lb 6 9 oz)     Echo 5/2021 with EF 55-60% and moderate aortic stenosis  Chest xray at admission reveals no pulmonary congestion  NT proBNP at admission 4500  Limited echo ordered to evaluate aortic valve and EF  Will start Albumin 25g TID   Discontinue Furosemide and start Bumex drip at 0 5mg/hr  Strict I's/O's and daily weights (standing only if deemed safe)  Ordered telemetry due to diuresis  Optimize electrolytes for K+ > 4, Mag > 2        Essential hypertension  Assessment & Plan  BP borderline during admission  Currently on Metoprolol succinate 50mg BID  Home Losartan and Amlodipine on hold  Furosemide switched to Bumex drip  Will continue to monitor    Moderate aortic stenosis by prior echocardiogram  Assessment & Plan  Echocardiogram 5/2021 reveals moderate aortic stenosis  Ordered limited echo to evaluate aortic valve and EF    PAF (paroxysmal atrial fibrillation) ([facility] )  Assessment & Plan  Pt with history of atrial fibrillation  In sinus rhythm with occasional PVC's on admission  Ordered telemetry during admission  Continue Metoprolol succinate 50mg daily  Anticoagulation on hold secondary to GI bleed  Optimize electrolytes for K+ > 4, Mag > 2              Physician Requesting Consult: Shaylee Carmichael *    Reason for Consult / Principal Problem:  1   Acute on chronic CHF, 2  Moderate aortic stenosis          HPI: Kaylah Powers is a 64y o  year old male who has a history of atrial fibrillation, aortic stenosis, HTN, TIA, T2DM, hypothyroid who follows with cardiologist at [facility] Rukhsana Ritchie  Patient presented to [facility] ER on 7/18/2021 after he slipped off the couch  EMS came to his home to help get him off the floor and were concerned about his bilateral lower extremity edema and safety in the home, which prompted them to take him to the ER  At the time of admission he was noted to have hemoglobin of 8 5  He has been evaluated by GI with EGD and colonoscopy for presence of GI bleed  His anticoagulation is on hold  Chest xray at admission was negative for pulmonary congestion  NT proBNP was 4,500  He initially responded well to diuresis with Furosemide IV 80mg BID and one dose of Torsemide, however, in the past 2 days his urine output has decreased with continued generalized edema  Of note, on review of his lab work performed today 7/22/2021 his hemoglobin is 8, albumin 2 5  Creatinine 2 06 (was 1 88 yesterday)  At the time of my exam he is pleasantly confused  He denies any complaints  No lightheadedness, chest pain, shortness of breath, or palpitations  Review of Systems   Constitutional: Negative for chills and fever  HENT: Negative for ear pain and sore throat  Eyes: Negative for pain and visual disturbance  Respiratory: Negative for cough and shortness of breath  Cardiovascular: Negative for chest pain and palpitations  Gastrointestinal: Negative for abdominal pain and vomiting  Genitourinary: Negative for dysuria and hematuria  Musculoskeletal: Negative for arthralgias and back pain  Skin: Negative for color change and rash  Neurological: Negative for seizures and syncope  All other systems reviewed and are negative         Historical Information     Past Medical History:   Diagnosis Date    Alzheimers disease ([facility] )     Diabetes mellitus ([facility] )     Hypertension      Past Surgical History:   Procedure Laterality Date    APPENDECTOMY      KNEE ARTHROPLASTY Bilateral      Social History     Substance and Sexual Activity   Alcohol Use Never     Social History     Substance and Sexual Activity   Drug Use Never     Social History     Tobacco Use   Smoking Status Never Smoker   Smokeless Tobacco Never Used       Family History: non-contributory    Meds/Allergies     current meds:   Current Facility-Administered Medications   Medication Dose Route Frequency    acetaminophen (TYLENOL) tablet 650 mg  650 mg Oral Q4H PRN    albumin human (FLEXBUMIN) 25 % injection 25 g  25 g Intravenous TID    atorvastatin (LIPITOR) tablet 80 mg  80 mg Oral Daily With Dinner    bumetanide (BUMEX) 12 5 mg infusion 50 mL  0 5 mg/hr Intravenous Continuous    clopidogrel (PLAVIX) tablet 75 mg  75 mg Oral Daily    donepezil (ARICEPT) tablet 5 mg  5 mg Oral HS    ferrous gluconate (FERGON) tablet 324 mg  324 mg Oral Daily    insulin lispro (HumaLOG) 100 units/mL subcutaneous injection 1-6 Units  1-6 Units Subcutaneous TID AC    levothyroxine tablet 112 mcg  112 mcg Oral Early Morning    metoprolol succinate (TOPROL-XL) 24 hr tablet 50 mg  50 mg Oral Daily    pantoprazole (PROTONIX) EC tablet 40 mg  40 mg Oral Early Morning    QUEtiapine (SEROquel) tablet 25 mg  25 mg Oral HS     bumetanide, 0 5 mg/hr        No Known Allergies    Objective     Vitals: Blood pressure 155/68, pulse 62, temperature 98 9 °F (37 2 °C), resp  rate 20, height 5' 7" (1 702 m), weight 120 kg (265 lb 6 9 oz), SpO2 91 % , Body mass index is 41 57 kg/m²      Orthostatic Blood Pressures      Most Recent Value   Blood Pressure  155/68 filed at 07/22/2021 0700   Patient Position - Orthostatic VS  Lying filed at 07/18/2021 8151          Systolic (10GGV), IUV:678 , Min:133 , WSM:354     Diastolic (38LFN), SWU:24, Min:59, Max:68        Intake/Output Summary (Last 24 hours) at 7/22/2021 1313  Last data filed at 7/22/2021 0758  Gross per 24 hour   Intake --   Output 806 ml   Net -806 ml       Weight (last 2 days)     Date/Time   Weight    07/22/21 0600   120 (265 43)    07/21/21 0600   121 (267 2) 21 0450   121 (265 8)              Invasive Devices     Peripheral Intravenous Line            Peripheral IV 21 Dorsal (posterior); Right Hand 1 day                Physical Exam  Vitals and nursing note reviewed  Constitutional:       Appearance: He is well-developed  He is obese  HENT:      Head: Normocephalic and atraumatic  Eyes:      Conjunctiva/sclera: Conjunctivae normal    Neck:      Vascular: JVD present  No carotid bruit  Cardiovascular:      Rate and Rhythm: Normal rate and regular rhythm  Frequent extrasystoles are present  Heart sounds: No murmur heard  Comments: Moderate generalized edema  Pulmonary:      Effort: Pulmonary effort is normal  No respiratory distress  Breath sounds: Examination of the right-lower field reveals rales  Examination of the left-lower field reveals rales  Rales present  Abdominal:      Palpations: Abdomen is soft  Tenderness: There is no abdominal tenderness  Musculoskeletal:      Cervical back: Neck supple  Right lower le+ Edema present  Left lower le+ Edema present  Skin:     General: Skin is warm and dry  Neurological:      Mental Status: He is alert  Psychiatric:         Attention and Perception: Attention normal          Mood and Affect: Mood normal          Speech: Speech normal          Behavior: Behavior normal  Behavior is cooperative  Cognition and Memory: Cognition is impaired  Memory is impaired                Laboratory Results:    Results from last 7 days   Lab Units 21  2114   CK TOTAL U/L 286   TROPONIN I ng/mL 0 04   CK MB INDEX % <1 0       CBC with diff:   Results from last 7 days   Lab Units 21  0623 21  0635 21  1311 21  2114   WBC Thousand/uL 5 66 6 41 5 99 5 72   HEMOGLOBIN g/dL 8 0* 8 5* 8 1* 8 5*   HEMATOCRIT % 27 2* 29 4* 27 8* 29 2*   MCV fL 91 92 91 91   PLATELETS Thousands/uL 297 313 338 357   MCH pg 26 8 26 6* 26 6* 26 6*   MCHC g/dL 29 4* 28 9* 29 1* 29 1*   RDW % 16 2* 16 0* 16 1* 15 9*   MPV fL 10 6 10 2 10 0 9 8   NRBC AUTO /100 WBCs 0 0  --  0         CMP:  Results from last 7 days   Lab Units 07/22/21  0623 07/21/21  0635 07/20/21  0444 07/18/21  2114   POTASSIUM mmol/L 3 6 3 8 3 4* 2 9*   CHLORIDE mmol/L 104 103 107 105   CO2 mmol/L 28 29 30 30   BUN mg/dL 22 19 19 25   CREATININE mg/dL 2 06* 1 88* 1 91* 2 21*   CALCIUM mg/dL 7 6* 7 9* 8 1* 8 4   AST U/L 17  --   --  27   ALT U/L 18  --   --  25   ALK PHOS U/L 100  --   --  120*   EGFR ml/min/1 73sq m 30 33 33 28       BMP:  Results from last 7 days   Lab Units 07/22/21  0623 07/21/21  0635 07/20/21  0444   POTASSIUM mmol/L 3 6 3 8 3 4*   CHLORIDE mmol/L 104 103 107   CO2 mmol/L 28 29 30   BUN mg/dL 22 19 19   CREATININE mg/dL 2 06* 1 88* 1 91*   CALCIUM mg/dL 7 6* 7 9* 8 1*       BNP:  4,500    Magnesium:   Results from last 7 days   Lab Units 07/22/21  0623 07/18/21  2114   MAGNESIUM mg/dL 2 1 2 3       Coags:   Results from last 7 days   Lab Units 07/18/21  2114   PTT seconds 35   INR  1 22*       TSH:   Results from last 7 days   Lab Units 07/20/21  1311   T3 TOTAL ng/mL 0 80       Hemoglobin A1C:   Results from last 7 days   Lab Units 07/19/21  0449   HEMOGLOBIN A1C % 6 6*       Lipid Profile:   Lab Results   Component Value Date    CHOLESTEROL 86 07/19/2021     Lab Results   Component Value Date    HDL 41 07/19/2021     Lab Results   Component Value Date    TRIG 58 07/19/2021     Lab Results   Component Value Date    NONHDLC 45 07/19/2021        Imaging: I have personally reviewed pertinent reports  EGD    Addendum Date: 7/20/2021 Addendum:   [facility]  [address] [facility] [address] [address] [phone] DATE OF SERVICE: 7/20/21 PHYSICIAN(S): Jaquan Valentine MD - Attending Physician INDICATION: Anemia, Gastrointestinal hemorrhage, unspecified gastrointestinal hemorrhage type POST-OP DIAGNOSIS: See the impression below   PREPROCEDURE: Informed consent was obtained for the procedure, including sedation  Risks of perforation, hemorrhage, adverse drug reaction and aspiration were discussed  The patient was placed in the left lateral decubitus position  Patient was explained about the risks and benefits of the procedure  Risks including but not limited to bleeding, infection, and perforation were explained in detail  Also explained about less than 100% sensitivity with the exam and other alternatives  DETAILS OF PROCEDURE: Patient was taken to the procedure room where a time out was performed to confirm correct patient and correct procedure  The patient underwent monitored anesthesia care, which was administered by an anesthesia professional  The patient's blood pressure, heart rate, level of consciousness, respirations and oxygen were monitored throughout the procedure  The scope was introduced through the mouth and advanced to the third part of the duodenum  Retroflexion was performed in the incisura  The patient experienced no blood loss  The procedure was not difficult  The patient tolerated the procedure well  There were no apparent complications  ANESTHESIA INFORMATION: ASA: III Anesthesia Type: IV Sedation with Anesthesia MEDICATIONS: dextrose 50 % IV solution 25 mL 25 mL (Totals for administrations occurring from 1304 to 1348 on 07/20/21) FINDINGS: C1M3 Og's esophagus observed where the top of gastric folds are 38 cm from the incisors with no associated nodule; performed cold forceps biopsy Small sliding hiatal hernia (type I hiatal hernia) without Avila Massa lesions present - GE junction 38 cm from the incisors, diaphragmatic impression 40 cm from the incisors   Otherwise normal stomach on direct and retroflexed views Performed biopsies to rule out celiac disease and H  pylori in the body of the stomach, incisura, antrum, duodenal bulb and 2nd part of the duodenum The duodenal bulb, 1st part of the duodenum, 2nd part of the duodenum and 3rd part of the duodenum appeared normal  SPECIMENS: ID Type Source Tests Collected by Time Destination 1 : biopsy retrieved by cold biopsy forceps, R/O celiac disease Tissue Duodenum TISSUE EXAM Rena Drummond MD 7/20/2021  1:39 PM  2 : gastric biopsy retrieved by cold biopsy forceps, R/O H  pylori Tissue Stomach TISSUE EXAM Rena Drummond MD 7/20/2021  1:39 PM  3 : biopsy retrieved by cold biopsy forceps, H/O Og's esophagus, R/O dysplasia Tissue Esophagus TISSUE EXAM Rena Drummond MD 7/20/2021  1:44 PM  IMPRESSION: C1 M3 Og's esophagus-biopsies taken to rule out dysplasia Small hiatal hernia otherwise normal stomach on direct and retroflexed views Random gastric biopsies taken to rule out H pylori Normal visualized portion of duodenum from duodenal bulb to D3 Random biopsies taken to rule out celiac disease RECOMMENDATION: Await pathology results Continue PPI for Og's esophagus; given his advanced age and comorbid conditions and after discussion with POA unlikely he would benefit from continued surveillance Proceed with previously scheduled colonoscopy  Rena Drummond MD    Result Date: 7/20/2021  Narrative: [facility]  Maur[address]-Miley Mariano [address] [place] [id number] [address] [facility] [facility] [address] OF SERVICE: 7/20/21 PHYSICIAN(S): Rena Drummond MD - Attending Physician INDICATION: Anemia, Gastrointestinal hemorrhage, unspecified gastrointestinal hemorrhage type POST-OP DIAGNOSIS: See the impression below  PREPROCEDURE: Informed consent was obtained for the procedure, including sedation  Risks of perforation, hemorrhage, adverse drug reaction and aspiration were discussed  The patient was placed in the left lateral decubitus position  Patient was explained about the risks and benefits of the procedure  Risks including but not limited to bleeding, infection, and perforation were explained in detail  Also explained about less than 100% sensitivity with the exam and other alternatives   DETAILS OF PROCEDURE: Patient was taken to the procedure room where a time out was performed to confirm correct patient and correct procedure  The patient underwent monitored anesthesia care, which was administered by an anesthesia professional  The patient's blood pressure, heart rate, level of consciousness, respirations and oxygen were monitored throughout the procedure  The scope was introduced through the mouth and advanced to the third part of the duodenum  Retroflexion was performed in the incisura  The patient experienced no blood loss  The procedure was not difficult  The patient tolerated the procedure well  There were no apparent complications  ANESTHESIA INFORMATION: ASA: III Anesthesia Type: IV Sedation with Anesthesia MEDICATIONS: dextrose 50 % IV solution 25 mL 25 mL (Totals for administrations occurring from 1304 to 1348 on 07/20/21) FINDINGS: C1M3 Og's esophagus observed where the top of gastric folds are 38 cm from the incisors with no associated nodule; performed cold forceps biopsy Small sliding hiatal hernia (type I hiatal hernia) without [facility] lesions present - GE junction 38 cm from the incisors, diaphragmatic impression 40 cm from the incisors   Otherwise normal stomach on direct and retroflexed views Performed biopsies to rule out celiac disease and H  pylori in the body of the stomach, incisura, antrum, duodenal bulb and 2nd part of the duodenum The duodenal bulb, 1st part of the duodenum, 2nd part of the duodenum and 3rd part of the duodenum appeared normal  SPECIMENS: ID Type Source Tests Collected by Time Destination 1 : biopsy retrieved by cold biopsy forceps, R/O celiac disease Tissue Duodenum TISSUE EXAM Zoltan Wheeler MD 7/20/2021  1:39 PM  2 : gastric biopsy retrieved by cold biopsy forceps, R/O H  pylori Tissue Stomach TISSUE EXAM Zoltan Wheeler MD 7/20/2021  1:39 PM  3 : biopsy retrieved by cold biopsy forceps, H/O Og's esophagus, R/O dysplasia Tissue Esophagus TISSUE EXAM Zoltan Wheeler MD 7/20/2021  1:44 PM      Impression: C1 M3 Og's esophagus-biopsies taken to rule out dysplasia Small hiatal hernia otherwise normal stomach on direct and retroflexed views Random gastric biopsies taken to rule out H pylori Normal visualized portion of duodenum from duodenal bulb to D3 Random biopsies taken to rule out celiac disease RECOMMENDATION: Await pathology results Proceed with previously scheduled colonoscopy  Juan Francisco León MD     Colonoscopy    Addendum Date: 7/20/2021 Addendum:   [facility]  [address] Mariano [address] [facility] [id number] [phone] DATE OF SERVICE: 7/20/21 PHYSICIAN(S): Juan Francisco León MD - Attending Physician INDICATION: Anemia, Gastrointestinal hemorrhage, unspecified gastrointestinal hemorrhage type Colonoscopy performed for a diagnostic indication  POST-OP DIAGNOSIS: See the impression below  HISTORY: Prior colonoscopy: 3 years ago  BOWEL PREPARATION: Miralax/Dulcolax PREPROCEDURE: Informed consent was obtained for the procedure, including sedation  Risks including but not limited to bleeding, infection, perforation, adverse drug reaction and aspiration were explained in detail  Also explained about less than 100% sensitivity with the exam and other alternatives  The patient was placed in the left lateral decubitus position  DETAILS OF PROCEDURE: Patient was taken to the procedure room where a time out was performed to confirm correct patient and correct procedure  The patient underwent monitored anesthesia care, which was administered by an anesthesia professional  The patient's blood pressure, heart rate, level of consciousness, oxygen and respirations were monitored throughout the procedure  A digital rectal exam was performed  The scope was introduced through the anus and advanced to the terminal ileum  The quality of bowel preparation was evaluated using the [facility] Bowel Preparation Scale with scores of: right colon = 2, transverse colon = 2, left colon = 2  The total BBPS score was 6  Bowel prep was adequate  The patient experienced no blood loss  The procedure was not difficult  The patient tolerated the procedure well  There were no apparent complications  ANESTHESIA INFORMATION: ASA: III Anesthesia Type: IV Sedation with Anesthesia MEDICATIONS: dextrose 50 % IV solution 25 mL 25 mL simethicone (MYLICON) 40 mg in sterile water 60 mL 40 mg (Totals for administrations occurring from 1304 to 1420 on 07/20/21) FINDINGS: The terminal ileum appeared normal  The ileocecal valve, cecum, ascending colon, hepatic flexure, transverse colon, splenic flexure, descending colon and sigmoid colon appeared normal  Mild erythematous and hemorrhagic mucosa with erosion in the rectosigmoid and rectum   Areas of erosive hemorrhagic lesions that were not actively bleeding possibly secondary to trauma from enemas; no identified diverticula in the area however this could also be secondary to diverticula that may have bled Moderate scattered diverticula Protruding, external, internal hemorrhoids EVENTS: Procedure Events Event Event Time ENDO CECUM REACHED 7/20/2021  1:59 PM ENDO SCOPE OUT TIME 7/20/2021  2:15 PM SPECIMENS: ID Type Source Tests Collected by Time Destination 1 : biopsy retrieved by cold biopsy forceps, R/O celiac disease Tissue Duodenum TISSUE EXAM Cinda Collins MD 7/20/2021  1:39 PM  2 : gastric biopsy retrieved by cold biopsy forceps, R/O H  pylori Tissue Stomach TISSUE EXAM Cinda Collins MD 7/20/2021  1:39 PM  3 : biopsy retrieved by cold biopsy forceps, H/O Og's esophagus, R/O dysplasia Tissue Esophagus TISSUE EXAM Cinda Collins MD 7/20/2021  1:44 PM  EQUIPMENT: [id number] IMPRESSION: No evidence of active bleeding Terminal ileum normal Scattered diverticula throughout the colon left greater than right without evidence of bleeding Area of erythema/ulceration/hemorrhage likely from trauma from enema versus old diverticular bleed Large internal/external hemorrhoids RECOMMENDATION: Repeat colonoscopy not recommended due to age (age = 77 or greater)  Return to floor for ongoing medical care as per primary team May resume regular diet as tolerated Okay to resume Plavix, however risks versus benefits should be assessed for dual anti-platelet therapy Continue to monitor hemoglobin daily and signs of active GI bleeding Defer management of Hemoglobin and iron levels 2 PCP as an outpatient GI follow-up on as-needed basis Ruddy Rodriguez MD     Result Date: 7/20/2021  Narrative: [facility]  [address] [place] [id number] [address] [place] [address] Street: 7/20/21 PHYSICIAN(S): Ruddy Rodriguez MD - Attending Physician INDICATION: Anemia, Gastrointestinal hemorrhage, unspecified gastrointestinal hemorrhage type Colonoscopy performed for a diagnostic indication  POST-OP DIAGNOSIS: See the impression below  HISTORY: Prior colonoscopy: 3 years ago  BOWEL PREPARATION: Miralax/Dulcolax PREPROCEDURE: Informed consent was obtained for the procedure, including sedation  Risks including but not limited to bleeding, infection, perforation, adverse drug reaction and aspiration were explained in detail  Also explained about less than 100% sensitivity with the exam and other alternatives  The patient was placed in the left lateral decubitus position  DETAILS OF PROCEDURE: Patient was taken to the procedure room where a time out was performed to confirm correct patient and correct procedure  The patient underwent monitored anesthesia care, which was administered by an anesthesia professional  The patient's blood pressure, heart rate, level of consciousness, oxygen and respirations were monitored throughout the procedure  A digital rectal exam was performed  The scope was introduced through the anus and advanced to the terminal ileum   The quality of bowel preparation was evaluated using the [place] Bowel Preparation Scale with scores of: right colon = 2, transverse colon = 2, left colon = 2  The total BBPS score was 6  Bowel prep was adequate  The patient experienced no blood loss  The procedure was not difficult  The patient tolerated the procedure well  There were no apparent complications  ANESTHESIA INFORMATION: ASA: III Anesthesia Type: IV Sedation with Anesthesia MEDICATIONS: dextrose 50 % IV solution 25 mL 25 mL simethicone (MYLICON) 40 mg in sterile water 60 mL 40 mg (Totals for administrations occurring from 1304 to 1420 on 07/20/21) FINDINGS: The terminal ileum appeared normal  The ileocecal valve, cecum, ascending colon, hepatic flexure, transverse colon, splenic flexure, descending colon and sigmoid colon appeared normal  Mild erythematous and hemorrhagic mucosa with erosion in the rectosigmoid and rectum   Areas of erosive hemorrhagic lesions that were not actively bleeding possibly secondary to trauma from enemas; no identified diverticula in the area however this could also be secondary to diverticula that may have bled Moderate scattered diverticula Protruding, external, internal hemorrhoids EVENTS: Procedure Events Event Event Time ENDO CECUM REACHED 7/20/2021  1:59 PM ENDO SCOPE OUT TIME 7/20/2021  2:15 PM SPECIMENS: ID Type Source Tests Collected by Time Destination 1 : biopsy retrieved by cold biopsy forceps, R/O celiac disease Tissue Duodenum TISSUE EXAM Eva Goldstein MD 7/20/2021  1:39 PM  2 : gastric biopsy retrieved by cold biopsy forceps, R/O H  pylori Tissue Stomach TISSUE EXAM Eva Goldstein MD 7/20/2021  1:39 PM  3 : biopsy retrieved by cold biopsy forceps, H/O Og's esophagus, R/O dysplasia Tissue Esophagus TISSUE EXAM Eva Goldstein MD 7/20/2021  1:44 PM  EQUIPMENT: [id number]     Impression: No evidence of active bleeding Terminal ileum Scattered diverticula throughout the colon left greater than right Area of erythema/ulceration/hemorrhage likely from trauma from enema versus old diverticular bleed Large internal/external hemorrhoids RECOMMENDATION: Repeat colonoscopy not recommended due to age (age = 77 or greater)  Return to floor for ongoing medical care as per primary team May resume regular diet as tolerated Okay to resume Plavix tomorrow Continue to monitor hemoglobin and signs of active GI bleeding Jules Saravia MD     XR chest 1 view portable    Result Date: 7/19/2021  Narrative: CHEST INDICATION:   edema  COMPARISON:  None EXAM PERFORMED/VIEWS:  XR CHEST PORTABLE FINDINGS: Heart top normal in size  Pulmonary vessels unremarkable  Suboptimal depth of inspiration  Elevation of the right hemidiaphragm  Mild subsegmental atelectasis in the mid right lung  Lungs otherwise clear  No evidence of pleural effusion or pneumothorax  Osseous structures appear within normal limits for patient age  Impression: Right basilar subsegmental atelectasis  Workstation performed: [id number]     CT head wo contrast    Result Date: 7/22/2021  Narrative: CT BRAIN - WITHOUT CONTRAST INDICATION:   Altered mental status COMPARISON:  None  TECHNIQUE:  CT examination of the brain was performed  In addition to axial images, sagittal and coronal 2D reformatted images were created and submitted for interpretation  Radiation dose length product (DLP) for this visit:  951 78 mGy-cm   This examination, like all CT scans performed in the [facility], was performed utilizing techniques to minimize radiation dose exposure, including the use of iterative  reconstruction and automated exposure control  IMAGE QUALITY:  Diagnostic  FINDINGS: PARENCHYMA: Encephalomalacia and volume loss involving left gyrus rectus and inferior left frontal lobe as well as right frontal cortical/subcortical white matter  Patchy and confluent hypoattenuation involving periventricular and subcortical white matter  Cerebral volume loss  No intracranial masslike lesion, mass effect or midline shift  No CT signs of acute infarction    No acute parenchymal hemorrhage  VENTRICLES AND EXTRA-AXIAL SPACES:  Normal for the patient's age  VISUALIZED ORBITS AND PARANASAL SINUSES:  Unremarkable  CALVARIUM AND EXTRACRANIAL SOFT TISSUES:  Normal      Impression: 1  New acute intracranial CT abnormality  2   Encephalomalacia involving left gyrus rectus and inferior left frontal lobe as well as right frontal cortical/subcortical white matter  3   Nonspecific extensive white matter change suggesting advanced microangiopathy  Workstation performed: [id number]     Echo follow up/limited with contrast if indicated    Result Date: 2021  Narrative: [facility], [address] [place] [address] Transthoracic Echocardiogram Limited 2D, M-mode, Doppler, and Color Doppler Study date:  2021 Patient: Rashaun Tay MR number: [id number] Account number: [de-identified] : 1945 Age: 64 years Gender: Male Status: Inpatient Location: [address] [place] [address] Height: 67 in Weight: 275 4 lb BP: 150/ 72 mmHg Indications: Heart Failure Primary Physician:  Greg Abbott Interpreting Physician:  DO MADHAVI Lala LEFT VENTRICLE: Systolic function was normal  Ejection fraction was estimated to be 55 %  There were no regional wall motion abnormalities  LEFT ATRIUM: The atrium was dilated  MITRAL VALVE: There was trace regurgitation  AORTIC VALVE: Heavily calcified aortic valve  Transaortic velocity was increased due to valvular stenosis  There was mild stenosis  Peak transaortic velocity of 2 7 m/s with a mean gradient of 15 mmHg  DI 0 42  CHIRAG 1 3 cm2  TRICUSPID VALVE: There was mild regurgitation  COMPARISONS: Compared to prior study 2021, there is no significant change  Prior peak transaortic velocity 2 4 m/s with a mean gradient of 10 mmHg  HISTORY: PRIOR HISTORY: Aortic Stenosis PROCEDURE: The study was performed in the [address] [place] [address]  This was a routine study  The transthoracic approach was used   The study included limited 2D imaging, M-mode, limited spectral Doppler, and color Doppler  The heart rate was 60 bpm, at the start of the study  Intravenous contrast ( 0 4 ml Definity) was administered  Image quality was adequate  LEFT VENTRICLE: Size was normal  Systolic function was normal  Ejection fraction was estimated to be 55 %  There were no regional wall motion abnormalities  No evidence of apical thrombus  DOPPLER: Left ventricular diastolic function parameters were normal  RIGHT VENTRICLE: The size was normal  Systolic function was normal  Wall thickness was normal  LEFT ATRIUM: The atrium was dilated  RIGHT ATRIUM: Size was normal  MITRAL VALVE: Valve structure was normal  There was normal leaflet separation  DOPPLER: The transmitral velocity was within the normal range  There was no evidence for stenosis  There was trace regurgitation  AORTIC VALVE: Heavily calcified aortic valve  DOPPLER: Transaortic velocity was increased due to valvular stenosis  There was mild stenosis  Peak transaortic velocity of 2 7 m/s with a mean gradient of 15 mmHg  DI 0 42  CHIRAG 1 3 cm2  There was no significant regurgitation  TRICUSPID VALVE: The valve structure was normal  There was normal leaflet separation  DOPPLER: The transtricuspid velocity was within the normal range  There was no evidence for stenosis  There was mild regurgitation  Estimated peak PA pressure was 35 mmHg (assuming an estimated right atrial pressure of 5 mmHg)  PULMONIC VALVE: Not well visualized  PERICARDIUM: There was no pericardial effusion  SYSTEMIC VEINS: IVC: Not assessed   SYSTEM MEASUREMENT TABLES 2D %FS: 25 51 % AV Diam: 2 98 cm EDV(Teich): 120 96 ml EF(Teich): 50 02 % ESV(Teich): 60 45 ml IVSd: 0 89 cm LA Diam: 5 07 cm LVEDV MOD A4C: 123 1 ml LVEF MOD A4C: 55 16 % LVESV MOD A4C: 55 19 ml LVIDd: 5 05 cm LVIDs: 3 76 cm LVLd A4C: 9 53 cm LVLs A4C: 8 29 cm LVOT Diam: 2 19 cm LVPWd: 1 11 cm RWT: 0 44 SV MOD A4C: 67 91 ml SV(Teich): 60 51 ml CW AV CI: 32 76 l/minm2 AV CO: 76 01 L/min AV Env  Ti: 334 92 ms AV SI: 182 87 ml/m2 AV SV: 424 27 ml AV VTI: 60 94 cm AV Vmax: 2 69 m/s AV Vmean: 1 82 m/s AV maxP 05 mmHg AV meanPG: 15 43 mmHg HR: 179 15 BPM TR Vmax: 2 77 m/s TR maxP 68 mmHg PW CHIRAG (VTI): 1 58 cm2 CHIRAG Vmax: 1 35 cm2 AVAI (VTI): 0 cm2/m2 AVAI Vmax: 0 cm2/m2 LVCI Dopp: 6 9 l/minm2 LVCO Dopp: 16 L/min LVOT Env  Ti: 361 56 ms LVOT VTI: 25 58 cm LVOT Vmax: 0 96 m/s LVOT Vmean: 0 71 m/s LVOT maxPG: 3 72 mmHg LVOT meanP 21 mmHg LVSI Dopp: 41 57 ml/m2 LVSV Dopp: 96 44 ml MV A Osmar: 0 82 m/s MV Dec [place]: 6 28 m/s2 MV DecT: 161 13 ms MV E Osmar: 1 01 m/s MV E/A Ratio: 1 23 [facility] Accredited Echocardiography Laboratory Prepared and electronically signed by Donna Murray DO Signed 2021 17:15:15       EKG reviewed personally: [id number]: sinus rhythm with occasional PVC, right bundle branch block  Telemetry: ordered      Counseling / Coordination of Care  Total floor / unit time spent today 45 minutes  Greater than 50% of total time was spent with the patient and / or family counseling and / or coordination of care    A description of the counseling / coordination of care: Case discussed with Dr Willie Child         Code Status: Level 1 - Full Code

## 2021-07-22 NOTE — ASSESSMENT & PLAN NOTE
BP borderline during admission  Currently on Metoprolol succinate 50mg BID  Resume home Amlodipine 2 5mg daily  Home Losartan remains on hold secondary to renal function  Will continue to monitor

## 2021-07-22 NOTE — ASSESSMENT & PLAN NOTE
Echocardiogram 5/2021 reveals moderate aortic stenosis  Limited echo 7/19/21 reveals EF 55% with mild aortic stenosis

## 2021-07-23 LAB
ALBUMIN SERPL BCP-MCNC: 3.2 G/DL (ref 3.5–5)
ALP SERPL-CCNC: 93 U/L (ref 46–116)
ALT SERPL W P-5'-P-CCNC: 18 U/L (ref 12–78)
ANION GAP SERPL CALCULATED.3IONS-SCNC: 9 MMOL/L (ref 4–13)
AST SERPL W P-5'-P-CCNC: 14 U/L (ref 5–45)
BASOPHILS # BLD AUTO: 0.07 THOUSANDS/ΜL (ref 0–0.1)
BASOPHILS NFR BLD AUTO: 1 % (ref 0–1)
BILIRUB SERPL-MCNC: 0.73 MG/DL (ref 0.2–1)
BUN SERPL-MCNC: 28 MG/DL (ref 5–25)
CALCIUM ALBUM COR SERPL-MCNC: 8.5 MG/DL (ref 8.3–10.1)
CALCIUM SERPL-MCNC: 7.9 MG/DL (ref 8.3–10.1)
CHLORIDE SERPL-SCNC: 105 MMOL/L (ref 100–108)
CO2 SERPL-SCNC: 31 MMOL/L (ref 21–32)
CREAT SERPL-MCNC: 2.11 MG/DL (ref 0.6–1.3)
EOSINOPHIL # BLD AUTO: 0.14 THOUSAND/ΜL (ref 0–0.61)
EOSINOPHIL NFR BLD AUTO: 3 % (ref 0–6)
ERYTHROCYTE [DISTWIDTH] IN BLOOD BY AUTOMATED COUNT: 16.4 % (ref 11.6–15.1)
GFR SERPL CREATININE-BSD FRML MDRD: 29 ML/MIN/1.73SQ M
GLUCOSE SERPL-MCNC: 113 MG/DL (ref 65–140)
GLUCOSE SERPL-MCNC: 158 MG/DL (ref 65–140)
GLUCOSE SERPL-MCNC: 91 MG/DL (ref 65–140)
GLUCOSE SERPL-MCNC: 94 MG/DL (ref 65–140)
GLUCOSE SERPL-MCNC: 98 MG/DL (ref 65–140)
HCT VFR BLD AUTO: 29.4 % (ref 36.5–49.3)
HGB BLD-MCNC: 8.4 G/DL (ref 12–17)
IMM GRANULOCYTES # BLD AUTO: 0.02 THOUSAND/UL (ref 0–0.2)
IMM GRANULOCYTES NFR BLD AUTO: 0 % (ref 0–2)
LYMPHOCYTES # BLD AUTO: 0.75 THOUSANDS/ΜL (ref 0.6–4.47)
LYMPHOCYTES NFR BLD AUTO: 14 % (ref 14–44)
MAGNESIUM SERPL-MCNC: 2.3 MG/DL (ref 1.6–2.6)
MCH RBC QN AUTO: 26.3 PG (ref 26.8–34.3)
MCHC RBC AUTO-ENTMCNC: 28.6 G/DL (ref 31.4–37.4)
MCV RBC AUTO: 92 FL (ref 82–98)
MONOCYTES # BLD AUTO: 0.71 THOUSAND/ΜL (ref 0.17–1.22)
MONOCYTES NFR BLD AUTO: 13 % (ref 4–12)
NEUTROPHILS # BLD AUTO: 3.78 THOUSANDS/ΜL (ref 1.85–7.62)
NEUTS SEG NFR BLD AUTO: 69 % (ref 43–75)
NRBC BLD AUTO-RTO: 0 /100 WBCS
PHOSPHATE SERPL-MCNC: 3.8 MG/DL (ref 2.3–4.1)
PLATELET # BLD AUTO: 328 THOUSANDS/UL (ref 149–390)
PMV BLD AUTO: 10 FL (ref 8.9–12.7)
POTASSIUM SERPL-SCNC: 3.5 MMOL/L (ref 3.5–5.3)
PROT SERPL-MCNC: 7.4 G/DL (ref 6.4–8.2)
RBC # BLD AUTO: 3.19 MILLION/UL (ref 3.88–5.62)
SODIUM SERPL-SCNC: 145 MMOL/L (ref 136–145)
WBC # BLD AUTO: 5.47 THOUSAND/UL (ref 4.31–10.16)

## 2021-07-23 PROCEDURE — 85025 COMPLETE CBC W/AUTO DIFF WBC: CPT | Performed by: STUDENT IN AN ORGANIZED HEALTH CARE EDUCATION/TRAINING PROGRAM

## 2021-07-23 PROCEDURE — 97530 THERAPEUTIC ACTIVITIES: CPT

## 2021-07-23 PROCEDURE — 84100 ASSAY OF PHOSPHORUS: CPT | Performed by: STUDENT IN AN ORGANIZED HEALTH CARE EDUCATION/TRAINING PROGRAM

## 2021-07-23 PROCEDURE — 99232 SBSQ HOSP IP/OBS MODERATE 35: CPT | Performed by: INTERNAL MEDICINE

## 2021-07-23 PROCEDURE — 82948 REAGENT STRIP/BLOOD GLUCOSE: CPT

## 2021-07-23 PROCEDURE — 83735 ASSAY OF MAGNESIUM: CPT | Performed by: STUDENT IN AN ORGANIZED HEALTH CARE EDUCATION/TRAINING PROGRAM

## 2021-07-23 PROCEDURE — 97535 SELF CARE MNGMENT TRAINING: CPT

## 2021-07-23 PROCEDURE — 97116 GAIT TRAINING THERAPY: CPT

## 2021-07-23 PROCEDURE — 99232 SBSQ HOSP IP/OBS MODERATE 35: CPT | Performed by: STUDENT IN AN ORGANIZED HEALTH CARE EDUCATION/TRAINING PROGRAM

## 2021-07-23 PROCEDURE — 97110 THERAPEUTIC EXERCISES: CPT

## 2021-07-23 PROCEDURE — 80053 COMPREHEN METABOLIC PANEL: CPT | Performed by: STUDENT IN AN ORGANIZED HEALTH CARE EDUCATION/TRAINING PROGRAM

## 2021-07-23 RX ORDER — AMLODIPINE BESYLATE 2.5 MG/1
2.5 TABLET ORAL DAILY
Status: DISCONTINUED | OUTPATIENT
Start: 2021-07-23 | End: 2021-07-27

## 2021-07-23 RX ORDER — POTASSIUM CHLORIDE 20 MEQ/1
40 TABLET, EXTENDED RELEASE ORAL ONCE
Status: COMPLETED | OUTPATIENT
Start: 2021-07-23 | End: 2021-07-23

## 2021-07-23 RX ADMIN — IRON SUCROSE 300 MG: 20 INJECTION, SOLUTION INTRAVENOUS at 11:51

## 2021-07-23 RX ADMIN — INSULIN LISPRO 1 UNITS: 100 INJECTION, SOLUTION INTRAVENOUS; SUBCUTANEOUS at 15:46

## 2021-07-23 RX ADMIN — CLOPIDOGREL BISULFATE 75 MG: 75 TABLET ORAL at 09:08

## 2021-07-23 RX ADMIN — ALBUMIN (HUMAN) 25 G: 0.25 INJECTION, SOLUTION INTRAVENOUS at 21:13

## 2021-07-23 RX ADMIN — PANTOPRAZOLE SODIUM 40 MG: 40 TABLET, DELAYED RELEASE ORAL at 05:24

## 2021-07-23 RX ADMIN — ALBUMIN (HUMAN) 25 G: 0.25 INJECTION, SOLUTION INTRAVENOUS at 09:05

## 2021-07-23 RX ADMIN — AMLODIPINE BESYLATE 2.5 MG: 2.5 TABLET ORAL at 09:09

## 2021-07-23 RX ADMIN — POTASSIUM CHLORIDE 40 MEQ: 1500 TABLET, EXTENDED RELEASE ORAL at 09:08

## 2021-07-23 RX ADMIN — FERROUS GLUCONATE 324 MG: 324 TABLET ORAL at 09:08

## 2021-07-23 RX ADMIN — LEVOTHYROXINE SODIUM 112 MCG: 112 TABLET ORAL at 05:24

## 2021-07-23 RX ADMIN — Medication 0.5 MG/HR: at 14:08

## 2021-07-23 RX ADMIN — ALBUMIN (HUMAN) 25 G: 0.25 INJECTION, SOLUTION INTRAVENOUS at 15:37

## 2021-07-23 RX ADMIN — ATORVASTATIN CALCIUM 80 MG: 40 TABLET, FILM COATED ORAL at 15:44

## 2021-07-23 RX ADMIN — QUETIAPINE FUMARATE 25 MG: 25 TABLET ORAL at 21:13

## 2021-07-23 RX ADMIN — DONEPEZIL HYDROCHLORIDE 5 MG: 5 TABLET ORAL at 21:13

## 2021-07-23 RX ADMIN — METOPROLOL SUCCINATE 50 MG: 50 TABLET, EXTENDED RELEASE ORAL at 09:08

## 2021-07-23 NOTE — OCCUPATIONAL THERAPY NOTE
Occupational Therapy Progress Note       Patient Name: Claudette CARMONA Date: 7/23/2021  Problem List  Principal Problem:    Acute on chronic diastolic CHF (congestive heart failure) ([facility] )  Active Problems:    Hypokalemia    Alzheimer's dementia ([facility] )    Acute on chronic anemia    Acute kidney injury superimposed on chronic kidney disease (HCC)    PAF (paroxysmal atrial fibrillation) (formerly [facility])    Type 2 diabetes mellitus with kidney complication, without long-term current use of insulin (formerly [facility])    Moderate aortic stenosis by prior echocardiogram    Ambulatory dysfunction    Essential hypertension    GI bleeding    History of TIA (transient ischemic attack)    Morbid obesity with BMI of 40 0-44 9, adult ([place] [address] )    Hypothyroidism    Past Medical History  Past Medical History:   Diagnosis Date    Alzheimers disease ([place] [address] )     Diabetes mellitus ([place] [address] )     Hypertension      Past Surgical History  Past Surgical History:   Procedure Laterality Date    APPENDECTOMY      KNEE ARTHROPLASTY Bilateral            07/23/21 0800   Note Type   Note Type Treatment for insurance authorization   Restrictions/Precautions   Other Precautions Cognitive; Chair Alarm; Bed Alarm; Fall Risk   Pain Assessment   Pain Assessment Tool 0-10   Pain Score No Pain   ADL   Where Assessed Edge of bed   Eating Assistance 5  Supervision/Setup   Eating Deficit Setup;Verbal cueing;Supervision/safety; Increased time to complete   Eating Comments Pt seated upright in recliner chair  tray table in front of Pt and food cut, containers opened  Pt then demonstrating ability to complete self-feeding with use of fork  Pt with good utensil management and able to switch between drinking coffee and eating food with minimal cues  initially Pt requiring cues to initiate self-feeding task    UB Bathing Assistance 4  Minimal Assistance   UB Bathing Deficit Verbal cueing;Supervision/safety; Increased time to complete   LB Bathing Assistance 3  Moderate Assistance   LB Bathing Deficit Steadying;Verbal cueing;Supervision/safety; Increased time to complete;Perineal area; Buttocks;Right lower leg including foot; Left lower leg including foot   LB Bathing Comments Pt completing upper leg bathing while seated at EOB  Pt then completing pericare cleaning while standing @ MIn A  Pt requirgin Mod A for lower leg washing and buttocks  UB Dressing Assistance 5  Supervision/Setup   UB Dressing Deficit Setup   LB Dressing Assistance 2  Maximal Assistance   LB Dressing Deficit Steadying; Requires assistive device for steadying;Verbal cueing;Supervision/safety; Increased time to complete; Don/doff R sock; Don/doff L sock; Thread RLE into pants; Thread LLE into pants;Pull up over hips   LB Dressing Comments Pt requiring Max A for LB Dressing including donning socks/pants and CM around waist while standing at RW   Bed Mobility   Supine to Sit   (Max x's 1 + Mod x's 1)   Additional items Assist x 2; Increased time required;LE management;HOB elevated; Bedrails   Additional Comments HOB elevated to 20*, use of bedrails  Transfers   Sit to Stand 3  Moderate assistance   Additional items Assist x 1; Increased time required;Verbal cues   Stand to Sit 3  Moderate assistance   Additional items Assist x 1; Increased time required;Verbal cues   Stand pivot 3  Moderate assistance   Additional items Assist x 1; Increased time required;Verbal cues   Additional Comments Pt completing functional transfers with use of RW for UB support  Mod A for STS and for SPT  Pt progressing to Min A for STS with proper technique  Pt with delayed responses, requiring consistant cues and initaition assistance   Cognition   Overall Cognitive Status Impaired   Arousal/Participation Alert; Responsive; Cooperative;Poorly responsive   Attention Difficulty attending to directions   Orientation Level Oriented to person;Oriented to place; Disoriented to time;Disoriented to situation   Memory Decreased long term memory   Following Commands Follows one step commands inconsistently   Comments Pt with increased lethargy  Pt alert durign treatment session although requiring consistant vc'ing to remain attentive to task  multiple repititions required in order for Pt to initiate and follow through with task  Activity Tolerance   Activity Tolerance Patient limited by fatigue   Medical Staff Made Aware Spoke with PT, Toby Epstein  Spoke with RN   Assessment   Assessment Pt seen for treatment session #1 this date  Pt alert and agreeable to participate at this time  Pt requiring increased assistance for bed mobility although has good performance during functional transfers  Pt continues to require increased assistance for ADLs  Pt with decreased cognitive status and receptiveness although participatory as able  Pt completing self-feeding with good performance and participation  At this time, Pt demonstrates Good potential to make progress towards goals, limited by decrease activity tolerance, decrease standing balance, decrease sitting balance, decrease performance during ADL tasks, decrease cognition, decrease safety awareness , decrease UB MS, decrease generalized strength, decrease activity engagement, decrease performance during functional transfers and frequent falls  Pt would benefit from continued acute OT services to address deficits as well as post acute rehab upon d/c from [address] [place] [address]  Plan   Treatment Interventions ADL retraining;Functional transfer training;UE strengthening/ROM; Endurance training;Cognitive reorientation;Patient/family training;Equipment evaluation/education; Neuromuscular reeducation; Compensatory technique education;Continued evaluation; Energy conservation; Activityengagement   Goal Expiration Date 07/29/21   OT Treatment Day 1   OT Frequency 3-5x/wk   Recommendation   OT Discharge Recommendation Post acute rehabilitation services   AM-PAC Daily Activity Inpatient   Lower Body Dressing 2   Bathing 2   Toileting 2   Upper Body Dressing 3 Grooming 3   Eating 3   Daily Activity Raw Score 15   Daily Activity Standardized Score (Calc for Raw Score >=11) 34 69   AM-PAC Applied Cognition Inpatient   Following a Speech/Presentation 2   Understanding Ordinary Conversation 2   Taking Medications 1   Remembering Where Things Are Placed or Put Away 1   Remembering List of 4-5 Errands 1   Taking Care of Complicated Tasks 1   Applied Cognition Raw Score 8   Applied Cognition Standardized Score 19 32       Pt goals to be met by 7/29/2021     1  Pt will demonstrate ability to complete grooming/hygiene tasks @ Mod I after set-up  2  Pt will demonstrate ability to complete supine<>sit @ Mod I in order to increase safety and independence during ADL tasks  3  Pt will demonstrate ability to complete UB ADLs including washing/dressing @ Mod I in order to increase performance and participation during meaningful tasks  4  Pt will demonstrate ability to complete LB dressing @ Min A in order to increase safety and independence during meaningful tasks  5  Pt will demonstrate ability to complete toileting tasks including CM and pericare @ Min A in order to increase safety and independence during meaningful tasks  6  Pt will demonstrate ability to complete EOB, chair, toilet/commode transfers @ S in order to increase performance and participation during functional tasks  7  Pt will demonstrate ability to stand for 2-3 minutes while maintaining F+ balance with use of RW for UB support PRN  8  Pt will demonstrate ability to tolerate 30-35 minute OT session with no vc'ing for deep breathing or use of energy conservation techniques in order to increase activity tolerance during functional tasks  9  Pt will demonstrate Good carryover of use of energy conservation/compensatory strategies during ADLs and functional tasks in order to increase safety and reduce risk for falls     10  Pt will demonstrate Good attention and participation in continued evaluation of functional ambulation house hold distances in order to assist with safe d/c planning  11  Pt will attend to continued cognitive assessments 100% of the time in order to provide most appropriate d/c recommendations  12  Pt will follow 100% simple 1-step commands and be A&O x2 consistently with environmental cues to increase participation in functional activities  13  Pt will identify 3 areas of interest/hobbies and 1 intervention on how to incorporate into daily life in order to increase interaction with environment and peers as well as increase participation in meaningful tasks     14  Pt will demonstrate 100% carryover of BUE HEP in order to increase BUE MS and increase performance during functional tasks upon d/c home      Ninfa Wright OTR/L

## 2021-07-23 NOTE — ASSESSMENT & PLAN NOTE
· Echocardiogram May 2021 at [facility]:  EF 55-60%  Normal diastolic function  Consistent with moderate AS, moderate tricuspid regurgitation  Moderately elevated estimated PA systolic pressure    · He takes Lasix 40 mg orally daily at home however was started on IV diuresis during this admission given fluid overload  · Patient currently on Bumex drip since 7/22

## 2021-07-23 NOTE — PLAN OF CARE
Problem: OCCUPATIONAL THERAPY ADULT  Goal: Performs self-care activities at highest level of function for planned discharge setting  See evaluation for individualized goals  Description: Treatment Interventions: ADL retraining, Functional transfer training, UE strengthening/ROM, Endurance training, Cognitive reorientation, Patient/family training, Equipment evaluation/education, Neuromuscular reeducation, Compensatory technique education, Fine motor coordination activities, Continued evaluation, Energy conservation, Activityengagement          See flowsheet documentation for full assessment, interventions and recommendations  Outcome: Progressing  Note: Limitation: Decreased ADL status, Decreased UE strength, Decreased Safe judgement during ADL, Decreased cognition, Decreased endurance, Decreased fine motor control, Decreased high-level ADLs, Decreased self-care trans  Prognosis: Good  Assessment: Pt seen for treatment session #1 this date  Pt alert and agreeable to participate at this time  Pt requiring increased assistance for bed mobility although has good performance during functional transfers  Pt continues to require increased assistance for ADLs  Pt with decreased cognitive status and receptiveness although participatory as able  Pt completing self-feeding with good performance and participation  At this time, Pt demonstrates Good potential to make progress towards goals, limited by decrease activity tolerance, decrease standing balance, decrease sitting balance, decrease performance during ADL tasks, decrease cognition, decrease safety awareness , decrease UB MS, decrease generalized strength, decrease activity engagement, decrease performance during functional transfers and frequent falls  Pt would benefit from continued acute OT services to address deficits as well as post acute rehab upon d/c from [address] [place] [address]       OT Discharge Recommendation: Post acute rehabilitation services

## 2021-07-23 NOTE — ASSESSMENT & PLAN NOTE
· Lower extremity 3+ pitting edema to bilateral lower extremities extending up to scrotum, bibasilar rales  · Chest x-ray:  Atelectasis  · BNP:  4500  · Daily weights  · I&Os  · Lasix IV twice daily for acute diuresis for now  Not much urine output with 40 IV b i d  Of Lasix  Decreased lasix dose to 40 from 80 IV bid however patient seems to have decreased urinary output so increased lasix back to 80 BID  Patient also received 1 dose of torsemide this afternoon  Not having a lot of urinary output on Closely monitor urine output  Hold Norvasc    · TTE: LV function showing 55% EF  · Low-salt diet, fluid restriction    Patient started on Bumex drip on 07/22 given poor response to Lasix IV as per cardiology recommendations  Will continue to monitor I&Os  Weight coming down and patient seems to be responding to Bumex drip well

## 2021-07-23 NOTE — ASSESSMENT & PLAN NOTE
· BP trending up  · Losartan on hold due to NORAH  · Continue metoprolol  · Started on low-dose amlodipine by Cardiology  · Monitor blood pressure

## 2021-07-23 NOTE — PLAN OF CARE
Problem: Potential for Falls  Goal: Patient will remain free of falls  Description: INTERVENTIONS:  - Educate patient/family on patient safety including physical limitations  - Instruct patient to call for assistance with activity   - Consult OT/PT to assist with strengthening/mobility   - Keep Call bell within reach  - Keep bed low and locked with side rails adjusted as appropriate  - Keep care items and personal belongings within reach  - Initiate and maintain comfort rounds  - Make Fall Risk Sign visible to staff  - Apply yellow socks and bracelet for high fall risk patients  - Consider moving patient to room near nurses station  Outcome: Progressing     Problem: MOBILITY - ADULT  Goal: Maintain or return to baseline ADL function  Description: INTERVENTIONS:  -  Assess patient's ability to carry out ADLs; assess patient's baseline for ADL function and identify physical deficits which impact ability to perform ADLs (bathing, care of mouth/teeth, toileting, grooming, dressing, etc )  - Assess/evaluate cause of self-care deficits   - Assess range of motion  - Assess patient's mobility; develop plan if impaired  - Assess patient's need for assistive devices and provide as appropriate  - Encourage maximum independence but intervene and supervise when necessary  - Involve family in performance of ADLs  - Assess for home care needs following discharge   - Consider OT consult to assist with ADL evaluation and planning for discharge  - Provide patient education as appropriate  Outcome: Progressing  Goal: Maintains/Returns to pre admission functional level  Description: INTERVENTIONS:  - Perform BMAT or MOVE assessment daily    - Set and communicate daily mobility goal to care team and patient/family/caregiver     - Collaborate with rehabilitation services on mobility goals if consulted  - Out of bed for toileting  - Record patient progress and toleration of activity level   Outcome: Progressing     Problem: Prexisting or High Potential for Compromised Skin Integrity  Goal: Skin integrity is maintained or improved  Description: INTERVENTIONS:  - Identify patients at risk for skin breakdown  - Assess and monitor skin integrity  - Assess and monitor nutrition and hydration status  - Monitor labs   - Assess for incontinence   - Turn and reposition patient  - Assist with mobility/ambulation  - Relieve pressure over bony prominences  - Avoid friction and shearing  - Provide appropriate hygiene as needed including keeping skin clean and dry  - Evaluate need for skin moisturizer/barrier cream  - Collaborate with interdisciplinary team   - Patient/family teaching  - Consider wound care consult   Outcome: Progressing     Problem: NEUROSENSORY - ADULT  Goal: Achieves stable or improved neurological status  Description: INTERVENTIONS  - Monitor and report changes in neurological status  - Monitor vital signs such as temperature, blood pressure, glucose, and any other labs ordered   - Initiate measures to prevent increased intracranial pressure  - Monitor for seizure activity and implement precautions if appropriate      Outcome: Progressing  Goal: Achieves maximal functionality and self care  Description: INTERVENTIONS  - Monitor swallowing and airway patency with patient fatigue and changes in neurological status  - Encourage and assist patient to increase activity and self care     - Encourage visually impaired, hearing impaired and aphasic patients to use assistive/communication devices  Outcome: Progressing     Problem: CARDIOVASCULAR - ADULT  Goal: Maintains optimal cardiac output and hemodynamic stability  Description: INTERVENTIONS:  - Monitor I/O, vital signs and rhythm  - Monitor for S/S and trends of decreased cardiac output  - Administer and titrate ordered vasoactive medications to optimize hemodynamic stability  - Assess quality of pulses, skin color and temperature  - Assess for signs of decreased coronary artery perfusion  - Instruct patient to report change in severity of symptoms  Outcome: Progressing  Goal: Absence of cardiac dysrhythmias or at baseline rhythm  Description: INTERVENTIONS:  - Continuous cardiac monitoring, vital signs, obtain 12 lead EKG if ordered  - Administer antiarrhythmic and heart rate control medications as ordered  - Monitor electrolytes and administer replacement therapy as ordered  Outcome: Progressing     Problem: RESPIRATORY - ADULT  Goal: Achieves optimal ventilation and oxygenation  Description: INTERVENTIONS:  - Assess for changes in respiratory status  - Assess for changes in mentation and behavior  - Position to facilitate oxygenation and minimize respiratory effort  - Oxygen administered by appropriate delivery if ordered  - Initiate smoking cessation education as indicated  - Encourage broncho-pulmonary hygiene including cough, deep breathe, Incentive Spirometry  - Assess the need for suctioning and aspirate as needed  - Assess and instruct to report SOB or any respiratory difficulty  - Respiratory Therapy support as indicated  Outcome: Progressing     Problem: GASTROINTESTINAL - ADULT  Goal: Minimal or absence of nausea and/or vomiting  Description: INTERVENTIONS:  - Administer IV fluids if ordered to ensure adequate hydration  - Maintain NPO status until nausea and vomiting are resolved  - Nasogastric tube if ordered  - Administer ordered antiemetic medications as needed  - Provide nonpharmacologic comfort measures as appropriate  - Advance diet as tolerated, if ordered  - Consider nutrition services referral to assist patient with adequate nutrition and appropriate food choices  Outcome: Progressing  Goal: Maintains or returns to baseline bowel function  Description: INTERVENTIONS:  - Assess bowel function  - Encourage oral fluids to ensure adequate hydration  - Administer IV fluids if ordered to ensure adequate hydration  - Administer ordered medications as needed  - Encourage mobilization and activity  - Consider nutritional services referral to assist patient with adequate nutrition and appropriate food choices  Outcome: Progressing  Goal: Maintains adequate nutritional intake  Description: INTERVENTIONS:  - Monitor percentage of each meal consumed  - Identify factors contributing to decreased intake, treat as appropriate  - Assist with meals as needed  - Monitor I&O, weight, and lab values if indicated  - Obtain nutrition services referral as needed  Outcome: Progressing  Goal: Oral mucous membranes remain intact  Description: INTERVENTIONS  - Assess oral mucosa and hygiene practices  - Implement preventative oral hygiene regimen  - Implement oral medicated treatments as ordered  - Initiate Nutrition services referral as needed  Outcome: Progressing     Problem: GENITOURINARY - ADULT  Goal: Maintains or returns to baseline urinary function  Description: INTERVENTIONS:  - Assess urinary function  - Encourage oral fluids to ensure adequate hydration if ordered  - Administer IV fluids as ordered to ensure adequate hydration  - Administer ordered medications as needed  - Offer frequent toileting  - Follow urinary retention protocol if ordered  Outcome: Progressing  Goal: Absence of urinary retention  Description: INTERVENTIONS:  - Assess patients ability to void and empty bladder  - Monitor I/O  - Bladder scan as needed  - Discuss with physician/AP medications to alleviate retention as needed  - Discuss catheterization for long term situations as appropriate  Outcome: Progressing     Problem: METABOLIC, FLUID AND ELECTROLYTES - ADULT  Goal: Electrolytes maintained within normal limits  Description: INTERVENTIONS:  - Monitor labs and assess patient for signs and symptoms of electrolyte imbalances  - Administer electrolyte replacement as ordered  - Monitor response to electrolyte replacements, including repeat lab results as appropriate  - Instruct patient on fluid and nutrition as appropriate  Outcome: Progressing  Goal: Fluid balance maintained  Description: INTERVENTIONS:  - Monitor labs   - Monitor I/O and WT  - Instruct patient on fluid and nutrition as appropriate  - Assess for signs & symptoms of volume excess or deficit  Outcome: Progressing  Goal: Glucose maintained within target range  Description: INTERVENTIONS:  - Monitor Blood Glucose as ordered  - Assess for signs and symptoms of hyperglycemia and hypoglycemia  - Administer ordered medications to maintain glucose within target range  - Assess nutritional intake and initiate nutrition service referral as needed  Outcome: Progressing     Problem: SKIN/TISSUE INTEGRITY - ADULT  Goal: Skin Integrity remains intact(Skin Breakdown Prevention)    Toileting:      Activity:    Skin Care  - Consider nutrition services referral as needed  Outcome: Progressing     Problem: HEMATOLOGIC - ADULT  Goal: Maintains hematologic stability  Description: INTERVENTIONS  - Assess for signs and symptoms of bleeding or hemorrhage  - Monitor labs  - Administer supportive blood products/factors as ordered and appropriate  Outcome: Progressing     Problem: MUSCULOSKELETAL - ADULT  Goal: Maintain or return mobility to safest level of function  Description: INTERVENTIONS:  - Assess patient's ability to carry out ADLs; assess patient's baseline for ADL function and identify physical deficits which impact ability to perform ADLs (bathing, care of mouth/teeth, toileting, grooming, dressing, etc )  - Assess/evaluate cause of self-care deficits   - Assess range of motion  - Assess patient's mobility  - Assess patient's need for assistive devices and provide as appropriate  - Encourage maximum independence but intervene and supervise when necessary  - Involve family in performance of ADLs  - Assess for home care needs following discharge   - Consider OT consult to assist with ADL evaluation and planning for discharge  - Provide patient education as appropriate  Outcome: Progressing  Goal: Maintain proper alignment of affected body part  Description: INTERVENTIONS:  - Support, maintain and protect limb and body alignment  - Provide patient/ family with appropriate education  Outcome: Progressing     Problem: Nutrition/Hydration-ADULT  Goal: Nutrient/Hydration intake appropriate for improving, restoring or maintaining nutritional needs  Description: Monitor and assess patient's nutrition/hydration status for malnutrition  Collaborate with interdisciplinary team and initiate plan and interventions as ordered  Monitor patient's weight and dietary intake as ordered or per policy  Utilize nutrition screening tool and intervene as necessary  Determine patient's food preferences and provide high-protein, high-caloric foods as appropriate       INTERVENTIONS:  - Monitor oral intake, urinary output, labs, and treatment plans  - Assess nutrition and hydration status and recommend course of action  - Evaluate amount of meals eaten  - Assist patient with eating if necessary   - Allow adequate time for meals  - Recommend/ encourage appropriate diets, oral nutritional supplements, and vitamin/mineral supplements  - Order, calculate, and assess calorie counts as needed  - Recommend, monitor, and adjust tube feedings and TPN/PPN based on assessed needs  - Assess need for intravenous fluids  - Provide specific nutrition/hydration education as appropriate  - Include patient/family/caregiver in decisions related to nutrition  Outcome: Progressing

## 2021-07-23 NOTE — CASE MANAGEMENT
Case Management Progress Note    Patient name Daniel Wilkins  Location /-25 MRN [id number]  : 1945 Date 2021       LOS (days): 5  Geometric Mean LOS (GMLOS) (days): 4 00  Days to GMLOS:-0 7        BUNDLE:      OBJECTIVE:  Pt is a 64y o  year old , white or  [1], male with Congregational preference of None admitted on 2021  9:06 PM  Pt is admitted to [facility] [id number] at [address] [facility] with complaints of Acute on chronic diastolic CHF (congestive heart failure) ([address] )  Current admission status: Inpatient  Preferred Pharmacy:   [facility] [address] [address]  Phone: [phone] Fax: [phone]    Primary Care Provider: Leeann Grant MD    PROGRESS NOTE:  Pt discussed in MDT rounds, not stable for d/c  CM updated [facility] via vm (Attila Padron [phone]) and faxed updated PT/OT notes ([phone])  CM updated [facility]  CM will f/u Monday re: insurance auth, and continued dcp  Addendum:  CM received f/u from [facility] Gaby Ree) [place] is good through Monday, if Pt remains will need to restart auth

## 2021-07-23 NOTE — PHYSICAL THERAPY NOTE
PHYSICAL THERAPY TREATMENT NOTE  NAME:  Thom Ruggiero  DATE: 07/23/21    Length Of Stay: 5  Performed at least 2 patient identifiers during session: Name and Birthday    TREATMENT FLOW SHEET:      07/23/21 0742   PT Last Visit   PT Visit Date 07/23/21   Note Type   Note Type Treatment for insurance authorization   Pain Assessment   Pain Assessment Tool 0-10   Pain Score No Pain   Cognition   Orientation Level Oriented to person;Oriented to place; Disoriented to time;Disoriented to situation   Following Commands Follows one step commands with increased time or repetition   Comments pt reports Tuesday, then Wednesday when asked current Month  Educated patient on Months, reports March as thatb is the last month therapist stopped at, when asked the year, patient reported "May"  delayed responses  verbalizes understanding, but requires cues to iniiate task  Subjective   Subjective "It's Tuesday" (when asked the current month)   Bed Mobility   Supine to Sit   (maxAx1 and modAx1)   Additional items Assist x 2; Increased time required;Verbal cues;LE management  (trunk management)   Additional Comments HOB elevated 20 degrees  completed bed mobility with maxAx1 of trunk and modAx1 of LEs (2 person assistance) with increased verbal cues for technique and sequence  Transfers   Sit to Stand   (mod to minAx1)   Additional items Assist x 1; Increased time required;Verbal cues   Stand to Sit 4  Minimal assistance   Additional items Assist x 1; Increased time required;Verbal cues   Stand pivot 4  Minimal assistance   Additional items Assist x 1; Increased time required;Verbal cues   Additional Comments use of RW  sit to stand with modAx1 progressing to miNAx1 as trials progressed with min cues for hand placement  stand to sit wiht minAx1 for controlled descent with mod verbal cues for hand placement  spt with RW with miNAx1 with min cues for RW management and wt shifting  wide MIRLANDE      Ambulation/Elevation   Gait pattern Wide MIRLANDE;Excessively slow; Short stride;Decreased foot clearance   Gait Assistance 4  Minimal assist   Additional items Assist x 1;Verbal cues   Assistive Device Rolling walker   Distance 18'x1 and 20'x1 with RW with miNAx1 with wide MIRLANDE, decreased step length, foot clearance, min manual cues for RW management, balance and min cues for inc step length and foot clearance  Balance   Static Sitting Fair +   Dynamic Sitting Fair   Static Standing Fair -   Dynamic Standing Poor +   Ambulatory Poor +   Endurance Deficit   Endurance Deficit Yes   Endurance Deficit Description fatigue   Activity Tolerance   Activity Tolerance Patient limited by fatigue   Medical Staff Made Aware OT, Terri and Maryann   Nurse Made Aware RN   Exercises   Balance training  static standing balance with UE support on RW steadying assistance  dynamic standing balance with 1 UE support minAx1  Assessment   Prognosis Good   Problem List Decreased strength;Decreased endurance; Impaired balance;Decreased mobility; Impaired judgement;Decreased safety awareness;Decreased cognition;Obesity; Decreased skin integrity   Barriers to Discharge Decreased caregiver support; Inaccessible home environment   Barriers to Discharge Comments reqires increased assistance to complete mobility  Goals   Patient Goals none stated   PT Treatment Day 1   Plan   Treatment/Interventions Functional transfer training;LE strengthening/ROM; Elevations; Therapeutic exercise; Endurance training;Cognitive reorientation;Patient/family training;Equipment eval/education; Bed mobility;Gait training; Compensatory technique education;Spoke to nursing;Spoke to case management;OT   Progress Slow progress, cognitive deficits   PT Frequency Other (Comment)  (3-5x/week)   Recommendation   PT Discharge Recommendation Post acute rehabilitation services   Equipment Recommended   (TBD by rehab)   PT - OK to Discharge   (when medically cleared to rehab)   Additional Comments pt sitting in relciner chair at end of session with all needs derrickin reach and posey alarm on   AM-PAC Basic Mobility Inpatient   Turning in Bed Without Bedrails 2   Lying on Back to Sitting on Edge of Flat Bed 1   Moving Bed to Chair 3   Standing Up From Chair 3   Walk in Room 3   Climb 3-5 Stairs 1   Basic Mobility Inpatient Raw Score 13   Basic Mobility Standardized Score 33 99       The patient's AM-PAC Basic Mobility Inpatient Short Form Raw Score is 13, Standardized Score is 33 99  A standardized score less than 42 9 suggests the patient may benefit from discharge to post-acute rehabilitation services  Please also refer to the recommendation of the Physical Therapist for safe discharge planning  Pt tolerated session fairly  He required significantly increased assistance to complete all bed mobility, but was able to transfer with decreasing assistance as trials progressed  He was able to tolerate increased ambulation this date  He requires increased time to respond and carry out/initiate commands  He is not oriented to time or situation  He is limited by decreased strength, balance, endurance, safety and impaired cognition  Pt with area of encephalomalacia in the inferior left frontal region on brain MRI  He will continue to benefit from PT services to maximize LOF       Colette Colin, PT,DPT

## 2021-07-23 NOTE — PLAN OF CARE
Problem: PHYSICAL THERAPY ADULT  Goal: Performs mobility at highest level of function for planned discharge setting  See evaluation for individualized goals  Description: Treatment/Interventions: Functional transfer training, LE strengthening/ROM, Elevations, Therapeutic exercise, Endurance training, Cognitive reorientation, Patient/family training, Equipment eval/education, Bed mobility, Gait training, Compensatory technique education, Spoke to nursing, Spoke to case management, OT  Equipment Recommended:  (TBD by rehab)       See flowsheet documentation for full assessment, interventions and recommendations  Outcome: Progressing  Note: Prognosis: Good  Problem List: Decreased strength, Decreased endurance, Impaired balance, Decreased mobility, Impaired judgement, Decreased safety awareness, Decreased cognition, Obesity, Decreased skin integrity  Assessment: Pt seen for PT treatment session this date with interventions consisting of Therapeutic exercise consisting of: AROM and AAROM 15 reps B LE in supine position and therapeutic activity consisting of training: bed mobility  Pt agreeable to PT treatment session upon arrival, pt found supine in bed w/ HOB elevated, in no apparent distress  In comparison to previous session, pt with no improvements as evidenced by increased fatigue and declining tranfsers and ambulation  Pt requiring AAROM with therex performed today due to fatigue  Post session: pt returned BTB, bed alarm engaged and all needs in reach Continue to recommend STR at time of d/c in order to maximize pt's functional independence and safety w/ mobility  Pt continues to be functioning below baseline level, and remains limited 2* factors listed above  PT will continue to see pt while here in order to address the deficits listed above and provide interventions consistent w/ POC in effort to achieve STGs    Barriers to Discharge: Decreased caregiver support, Inaccessible home environment  Barriers to Discharge Comments: reqires increased assistance to complete mobility  PT Discharge Recommendation: Post acute rehabilitation services     PT - OK to Discharge: Yes    See flowsheet documentation for full assessment

## 2021-07-23 NOTE — PHYSICAL THERAPY NOTE
PHYSICAL THERAPY TREATMENT NOTE  NAME:  Saint Lively  DATE: 07/23/21    Length Of Stay: 5  Performed at least 2 patient identifiers during session: Name and Birthday    TREATMENT:      07/23/21 1345   PT Last Visit   PT Visit Date 07/23/21   Note Type   Note Type Treatment   Pain Assessment   Pain Assessment Tool 0-10   Pain Score No Pain   Restrictions/Precautions   Other Precautions Cognitive; Chair Alarm; Bed Alarm; Fall Risk   General   Chart Reviewed Yes   Response to Previous Treatment Patient with no complaints from previous session  Family/Caregiver Present No   Cognition   Arousal/Participation Alert; Responsive   Orientation Level Oriented to person;Oriented to place; Disoriented to time;Disoriented to situation   Subjective   Subjective "I will exercise on the side of my bed" (Pt refusing after a few minutes of supine exercises)   Bed Mobility   Supine to Sit 2  Maximal assistance   Additional items Assist x 1;HOB elevated; Bedrails; Increased time required;Verbal cues;LE management  (Trunk)   Sit to Supine 2  Maximal assistance   Additional items Assist x 1;HOB elevated; Increased time required;Verbal cues;LE management  (Trunk management)   Additional Comments HOB elevated with vc on hand placement for trunk management  MaxAx1 for trunk and LE management with pt sitting for ~30" before stating he was too fatigued to sit up anymore  MaxAx1 for controlled decent back into supine       Transfers   Sit to Stand Unable to assess   Stand to Sit Unable to assess   Stand pivot Unable to assess   Additional Comments Pt refusing transfers at this time due to fatigue   Ambulation/Elevation   Distance Pt refusing ambulation today   Balance   Static Sitting Poor -   Dynamic Sitting Poor -   Static Standing   (Unable to assess)   Dynamic Standing   (Unable to assess)   Ambulatory   (Unable to assess)   Endurance Deficit   Endurance Deficit Yes   Endurance Deficit Description Fatgiue   Activity Tolerance   Activity Patient notified of below message   Tolerance Patient limited by fatigue   Nurse Made Aware RN   Exercises   Quad Sets Supine;15 reps;AROM; Bilateral   Heelslides Supine;15 reps;AAROM; Right;AROM; Left   Glute Sets Supine;15 reps;AROM; Bilateral   Hip Abduction Supine;15 reps;AROM; Bilateral   Knee AROM Short Arc Quad Supine;15 reps;AROM; Bilateral   Ankle Pumps Supine;15 reps;AROM; Bilateral   Assessment   Prognosis Good   Problem List Decreased strength;Decreased endurance; Impaired balance;Decreased mobility; Impaired judgement;Decreased safety awareness;Decreased cognition;Obesity; Decreased skin integrity   Barriers to Discharge Decreased caregiver support; Inaccessible home environment   Goals   Patient Goals None stated   PT Treatment Day 2   Plan   Treatment/Interventions Functional transfer training;LE strengthening/ROM; Elevations; Therapeutic exercise; Endurance training;Cognitive reorientation;Patient/family training;Bed mobility;Gait training   Progress Slow progress, decreased activity tolerance   PT Frequency   (3-5x/wk)   Recommendation   PT Discharge Recommendation Post acute rehabilitation services   Equipment Recommended   (TBD by rehab)   PT - OK to Discharge Yes   Additional Comments Pt supine with HOB elevated, bed alarm on, and all needs within reach   [facility] 8 in Bed Without Bedrails 2   Lying on Back to Sitting on Edge of Flat Bed 1   Moving Bed to Chair 3   Standing Up From Chair 3   Walk in Room 3   Climb 3-5 Stairs 1   Basic Mobility Inpatient Raw Score 13   Basic Mobility Standardized Score 33 99     The patient's AM-PAC Basic Mobility Inpatient Short Form Raw Score is 13, Standardized Score is 33 99  A standardized score less than 42 9 suggests the patient may benefit from discharge to post-acute rehabilitation services  Please also refer to the recommendation of the Physical Therapist for safe discharge planning      Pt seen for PT treatment session this date with interventions consisting of Therapeutic exercise consisting of: AROM and AAROM 15 reps B LE in supine position and therapeutic activity consisting of training: bed mobility  Pt agreeable to PT treatment session upon arrival, pt found supine in bed w/ HOB elevated, in no apparent distress  In comparison to previous session, pt with no improvements as evidenced by increased fatigue and declining tranfsers and ambulation  Pt requiring AAROM with therex performed today due to fatigue  Post session: pt returned BTB, bed alarm engaged and all needs in reach Continue to recommend STR at time of d/c in order to maximize pt's functional independence and safety w/ mobility  Pt continues to be functioning below baseline level, and remains limited 2* factors listed above  PT will continue to see pt while here in order to address the deficits listed above and provide interventions consistent w/ POC in effort to achieve STGs      Grant Meyer PTA

## 2021-07-23 NOTE — PLAN OF CARE
Problem: PHYSICAL THERAPY ADULT  Goal: Performs mobility at highest level of function for planned discharge setting  See evaluation for individualized goals  Description: Treatment/Interventions: Functional transfer training, LE strengthening/ROM, Elevations, Therapeutic exercise, Endurance training, Cognitive reorientation, Patient/family training, Equipment eval/education, Bed mobility, Gait training, Compensatory technique education, Spoke to nursing, Spoke to case management, OT  Equipment Recommended:  (TBD by rehab)       See flowsheet documentation for full assessment, interventions and recommendations  Note: Prognosis: Good  Problem List: Decreased strength, Decreased endurance, Impaired balance, Decreased mobility, Impaired judgement, Decreased safety awareness, Decreased cognition, Obesity, Decreased skin integrity  Assessment: Pt tolerated session fairly  He required significantly increased assistance to complete all bed mobility, but was able to transfer with decreasing assistance as trials progressed  He was able to tolerate increased ambulation this date  He requires increased time to respond and carry out/initiate commands  He is not oriented to time or situation  He is limited by decreased strength, balance, endurance, safety and impaired cognition  Pt with area of encephalomalacia in the inferior left frontal region on brain MRI  He will continue to benefit from PT services to maximize LOF  Barriers to Discharge: Decreased caregiver support, Inaccessible home environment  Barriers to Discharge Comments: reqires increased assistance to complete mobility  PT Discharge Recommendation: Post acute rehabilitation services     PT - OK to Discharge:  (when medically cleared to rehab)    See flowsheet documentation for full assessment

## 2021-07-23 NOTE — ASSESSMENT & PLAN NOTE
Lab Results   Component Value Date    EGFR 29 07/23/2021    EGFR 30 07/22/2021    EGFR 33 07/21/2021    CREATININE 2 11 (H) 07/23/2021    CREATININE 2 06 (H) 07/22/2021    CREATININE 1 88 (H) 07/21/2021     · Baseline creatinine around 1 4    Suspect partially due to losartan-will hold  · Hold off on fluids due to anasarca  · Daily metabolic panel and trend creatinine during diuresis  · Caution with nephrotoxins and avoid hypotension  · Improving, continue to monitor    Nephrology consulted and recommendations appreciated  Currently on Bumex drip

## 2021-07-23 NOTE — PROGRESS NOTES
Progress Note:Cardiology  Lorean Distance 1945, 64 y o  male MRN: [id number]    Unit/Bed#: -01 Encounter: [id number]  Attending Physician: Azra Adam *   Primary Care Provider: Heriberto Campos MD   Date admitted to hospital: 7/18/2021  Length of stay: 5         * Acute on chronic diastolic CHF (congestive heart failure) ([facility] 75 )  Assessment & Plan  Wt Readings from Last 3 Encounters:   07/22/21 120 kg (265 lb 6 9 oz)     Echo 5/2021 with EF 55-60% and moderate aortic stenosis  Echo 7/19/2021 EF 55% with mild aortic stenosis  Chest xray at admission reveals no pulmonary congestion  NT proBNP at admission 4500  Continue Albumin 25g TID   Continue Bumex drip at 0 5mg/hr  , would consider increasing dose but will discuss up-titration with nephrology  Strict I's/O's and daily weights (standing only if deemed safe)  Ordered telemetry due to diuresis  Optimize electrolytes for K+ > 4, Mag > 2  Ordered KCL 40mEq PO now        Essential hypertension  Assessment & Plan  BP borderline during admission  Currently on Metoprolol succinate 50mg BID  Resume home Amlodipine 2 5mg daily  Home Losartan remains on hold secondary to renal function  Will continue to monitor    Moderate aortic stenosis by prior echocardiogram  Assessment & Plan  Echocardiogram 5/2021 reveals moderate aortic stenosis  Limited echo 7/19/21 reveals EF 55% with mild aortic stenosis    PAF (paroxysmal atrial fibrillation) ([facility] )  Assessment & Plan  Pt with history of atrial fibrillation  In sinus rhythm with occasional PVC's on admission  Continue telemetry  Continue Metoprolol succinate 50mg daily  Anticoagulation on hold secondary to GI bleed  Optimize electrolytes for K+ > 4, Mag > 2        Subjective:   Patient seen and examined  No significant events overnight  Pt denies complaint  He is sitting up at bedside  Review of Systems   Constitutional: Negative  HENT: Negative  Cardiovascular: Positive for leg swelling   Negative for chest pain, dyspnea on exertion, irregular heartbeat, near-syncope, orthopnea and palpitations  Respiratory: Negative for cough and snoring  Endocrine: Negative  Skin: Negative  Musculoskeletal: Negative  Gastrointestinal: Negative  Genitourinary: Negative  Neurological: Negative  Psychiatric/Behavioral: Negative  Objective:     Vitals: Blood pressure (!) 156/113, pulse 60, temperature 98 2 °F (36 8 °C), resp  rate 18, height 5' 7" (1 702 m), weight 119 kg (261 lb 7 5 oz), SpO2 97 %  , Body mass index is 40 95 kg/m² ,     Orthostatic Blood Pressures      Most Recent Value   Blood Pressure  (!) 156/113 filed at 07/23/2021 1371   Patient Position - Orthostatic VS  Lying filed at 07/18/2021 2109          Physical Exam  Vitals and nursing note reviewed  Constitutional:       Appearance: He is well-developed  HENT:      Head: Normocephalic and atraumatic  Eyes:      Conjunctiva/sclera: Conjunctivae normal    Neck:      Vascular: JVD present  No carotid bruit  Cardiovascular:      Rate and Rhythm: Normal rate and regular rhythm  Frequent extrasystoles are present  Heart sounds: No murmur heard  Comments: Moderate lower extremity edema, scrotal edema  Pulmonary:      Effort: Pulmonary effort is normal  No respiratory distress  Breath sounds: Normal breath sounds  Abdominal:      Palpations: Abdomen is soft  Tenderness: There is no abdominal tenderness  Musculoskeletal:      Cervical back: Neck supple  Skin:     General: Skin is warm and dry  Neurological:      Mental Status: He is alert  Psychiatric:         Mood and Affect: Mood normal          Speech: Speech normal          Behavior: Behavior is cooperative  Cognition and Memory: Memory is impaired              Intake/Output Summary (Last 24 hours) at 7/23/2021 0922  Last data filed at 7/23/2021 0905  Gross per 24 hour   Intake 220 ml   Output 1416 ml   Net -1196 ml       Weight (last 2 days) Date/Time   Weight    07/23/21 0548   119 (261 47)    07/22/21 0600   120 (265 43)    07/21/21 0600   121 (267 2)                 Medications:      Current Facility-Administered Medications:     acetaminophen (TYLENOL) tablet 650 mg, 650 mg, Oral, Q4H PRN, Relda Fought, CRNP    albumin human (FLEXBUMIN) 25 % injection 25 g, 25 g, Intravenous, TID, Marliss Fling, CRNP, 25 g at 07/23/21 0905    amLODIPine (NORVASC) tablet 2 5 mg, 2 5 mg, Oral, Daily, Marliss Fling, CRNP, 2 5 mg at 07/23/21 0909    atorvastatin (LIPITOR) tablet 80 mg, 80 mg, Oral, Daily With Dinner, Relda Fought, CRNP, 80 mg at 07/22/21 1601    bumetanide (BUMEX) 12 5 mg infusion 50 mL, 0 5 mg/hr, Intravenous, Continuous, Marliss Fling, CRNP, Last Rate: 2 mL/hr at 07/22/21 2017, 0 5 mg/hr at 07/22/21 2017    clopidogrel (PLAVIX) tablet 75 mg, 75 mg, Oral, Daily, Ebony Baires MD, 75 mg at 07/23/21 0908    donepezil (ARICEPT) tablet 5 mg, 5 mg, Oral, HS, Relda Fought, CRNP, 5 mg at 07/22/21 2147    ferrous gluconate (FERGON) tablet 324 mg, 324 mg, Oral, Daily, Relda Fought, CRNP, 324 mg at 07/23/21 0908    insulin lispro (HumaLOG) 100 units/mL subcutaneous injection 1-6 Units, 1-6 Units, Subcutaneous, TID AC, 1 Units at 07/22/21 1153 **AND** Fingerstick Glucose (POCT), , , TID AC, Argentina Granger MD    levothyroxine tablet 112 mcg, 112 mcg, Oral, Early Morning, Ebony Baires MD, 112 mcg at 07/23/21 0524    metoprolol succinate (TOPROL-XL) 24 hr tablet 50 mg, 50 mg, Oral, Daily, Relda Fought, CRNP, 50 mg at 07/23/21 0908    pantoprazole (PROTONIX) EC tablet 40 mg, 40 mg, Oral, Early Morning, Relda Esperanza, SEYMOUR, 40 mg at 07/23/21 0524    QUEtiapine (SEROquel) tablet 25 mg, 25 mg, Oral, HS, Jeannine S Chiqui, CRNP, 25 mg at 07/22/21 2147     Labs & Results:    Results from last 7 days   Lab Units 07/18/21  2114   CK TOTAL U/L 286   TROPONIN I ng/mL 0 04   CK MB INDEX % <1 0 Results from last 7 days   Lab Units 07/23/21  0535 07/22/21  0623 07/21/21  0635   WBC Thousand/uL 5 47 5 66 6 41   HEMOGLOBIN g/dL 8 4* 8 0* 8 5*   HEMATOCRIT % 29 4* 27 2* 29 4*   PLATELETS Thousands/uL 328 297 313     Results from last 7 days   Lab Units 07/19/21  0449   TRIGLYCERIDES mg/dL 58   HDL mg/dL 41     Results from last 7 days   Lab Units 07/23/21  0535 07/22/21  0623 07/21/21  0635 07/18/21  2114   POTASSIUM mmol/L 3 5 3 6 3 8 2 9*   CHLORIDE mmol/L 105 104 103 105   CO2 mmol/L 31 28 29 30   BUN mg/dL 28* 22 19 25   CREATININE mg/dL 2 11* 2 06* 1 88* 2 21*   CALCIUM mg/dL 7 9* 7 6* 7 9* 8 4   ALK PHOS U/L 93 100  --  120*   ALT U/L 18 18  --  25   AST U/L 14 17  --  27     Results from last 7 days   Lab Units 07/18/21  2114   INR  1 22*   PTT seconds 35     Results from last 7 days   Lab Units 07/23/21  0535 07/22/21  0623 07/18/21  2114   MAGNESIUM mg/dL 2 3 2 1 2 3     Results from last 7 days   Lab Units 07/18/21  2114   NT-PRO BNP pg/mL 4,495*        Telemetry: sinus rhythm, rate 60's, frequent PVC's    Counseling / Coordination of Care  Total floor / unit time spent today 25 minutes  Greater than 50% of total time was spent with the patient and / or family counseling and / or coordination of care    A description of the counseling / coordination of care: Discussed case with Dr Enrike Thurston

## 2021-07-23 NOTE — PROGRESS NOTES
[address]  Progress Note Derek Hill 1945, 64 y o  male MRN: [id number]  Unit/Bed#: -Birgit Encounter: [id number]  Primary Care Provider: Kana Mehta MD   Date and time admitted to hospital: 7/18/2021  9:06 PM    * Acute on chronic diastolic CHF (congestive heart failure) ([address] )  Assessment & Plan  · Lower extremity 3+ pitting edema to bilateral lower extremities extending up to scrotum, bibasilar rales  · Chest x-ray:  Atelectasis  · BNP:  4500  · Daily weights  · I&Os  · Lasix IV twice daily for acute diuresis for now  Not much urine output with 40 IV b i d  Of Lasix  Decreased lasix dose to 40 from 80 IV bid however patient seems to have decreased urinary output so increased lasix back to 80 BID  Patient also received 1 dose of torsemide this afternoon  Not having a lot of urinary output on Closely monitor urine output  Hold Norvasc    · TTE: LV function showing 55% EF  · Low-salt diet, fluid restriction    Patient started on Bumex drip on 07/22 given poor response to Lasix IV as per cardiology recommendations  Will continue to monitor I&Os  Weight coming down and patient seems to be responding to Bumex drip well    Hypothyroidism  Assessment & Plan  Patient with a history of hypothyroidism and on levothyroxine 100 mcg at home  TSH done on admission elevated to 14 with ft4 at 1 2  Will increase levothyroxine dose to 112 mcg   Patient will need to obtain repeat TSH levels in 4-6 weeks    Morbid obesity with BMI of 40 0-44 9, adult [facility])  Assessment & Plan  Consult placed to dietitian    History of TIA (transient ischemic attack)  Assessment & Plan  · Resumed plavix, no active bleeding on endoscopy  · Continue statin    GI bleeding  Assessment & Plan  · Stool Hemoccult positive  · Hemoglobin 8 5  · EGD and Colonoscopy results noted  · Okay to start Plavix from GI standpoint  · Okay to resume diet    Essential hypertension  Assessment & Plan  · BP trending up  · Losartan on hold due to NORAH  · Continue metoprolol  · Started on low-dose amlodipine by Cardiology  · Monitor blood pressure    Ambulatory dysfunction  Assessment & Plan  · Patient has been having frequent falls, and today he could not get up from the floor  EMS was concerned that patient may be in an unsafe situation as patient's wife is having difficulty caring for him  · Fall precautions  · Case management consult  · PT OT consult - discharge to rehab    Moderate aortic stenosis by prior echocardiogram  Assessment & Plan  · Echocardiogram May 2021 at [facility]:  EF 55-60%  Normal diastolic function  Consistent with moderate AS, moderate tricuspid regurgitation  Moderately elevated estimated PA systolic pressure  · He takes Lasix 40 mg orally daily at home however was started on IV diuresis during this admission given fluid overload  · Patient currently on Bumex drip since 7/22    Type 2 diabetes mellitus with kidney complication, without long-term current use of insulin [facility])  Assessment & Plan  Lab Results   Component Value Date    HGBA1C 6 6 (H) 07/19/2021       Recent Labs     07/22/21  1104 07/22/21  1542 07/22/21  2049 07/23/21  0726   POCGLU 150* 124 112 91       Blood Sugar Average: Last 72 hrs:  (P) 205 0951   · Diabetic diet  · Fingerstick blood sugar sliding scale coverage  · Hold home oral agents  ·  hemoglobin A1c 6 6  Blood sugars are currently well controlled    PAF (paroxysmal atrial fibrillation) ([facility])  Assessment & Plan  · EKG: normal sinus rhythm  · He is not on anticoagulation  · Continue metoprolol    Acute kidney injury superimposed on chronic kidney disease [facility])  Assessment & Plan  Lab Results   Component Value Date    EGFR 29 07/23/2021    EGFR 30 07/22/2021    EGFR 33 07/21/2021    CREATININE 2 11 (H) 07/23/2021    CREATININE 2 06 (H) 07/22/2021    CREATININE 1 88 (H) 07/21/2021     · Baseline creatinine around 1 4    Suspect partially due to losartan-will hold  · Hold off on fluids due to anasarca  · Daily metabolic panel and trend creatinine during diuresis  · Caution with nephrotoxins and avoid hypotension  · Improving, continue to monitor    Nephrology consulted and recommendations appreciated  Currently on Bumex drip    Acute on chronic anemia  Assessment & Plan  · Hemoglobin is 8 5 with baseline around 13 admission now trended down to 7 8 this morning  · Stool Hemoccult is positive  · No evidence of gross bleeding noted  · Continue iron  · S/p EGD and colonoscopy on 7/20  "C1 M3 Og's esophagus-biopsies taken to rule out dysplasia  Small hiatal hernia otherwise normal stomach on direct and retroflexed views  Random gastric biopsies taken to rule out H pylori  Normal visualized portion of duodenum from duodenal bulb to D3  Random biopsies taken to rule out celiac disease "    "No evidence of active bleeding  Terminal ileum normal  Scattered diverticula throughout the colon left greater than right without evidence of bleeding  Area of erythema/ulceration/hemorrhage likely from trauma from enema versus old diverticular bleed  Large internal/external hemorrhoids"    Okay to resume plavix from GI standpoint  Patient okay to eat from GI standpoint        Alzheimer's dementia ([facility] [address] )  Assessment & Plan  · At baseline  · Continue Aricept  · Supportive care  · CT head showing encephalomalacia  · MRI showing the same    Hypokalemia  Assessment & Plan  · Potassium 2 9  · Repleted with 40 mEq orally and 20 mEq IV in the ER  · Monitor and replete accordingly    IMPROVED        VTE Pharmacologic Prophylaxis:   Pharmacologic: Pharmacologic VTE Prophylaxis contraindicated due to suspected GIB  Mechanical VTE Prophylaxis in Place: Yes    Patient Centered Rounds: I have performed bedside rounds with nursing staff today  Discussions with Specialists or Other Care Team Provider:  Cardiology, Nephrology    Education and Discussions with Family / Patient:  Patient and wife    Time Spent for Care: 30 minutes  More than 50% of total time spent on counseling and coordination of care as described above  Current Length of Stay: 5 day(s)    Current Patient Status: Inpatient   Certification Statement: The patient will continue to require additional inpatient hospital stay due to IV diuresis    Discharge Plan:  Rehab    Code Status: Level 1 - Full Code      Subjective:   Patient seen examined at bedside  Looking much better today denies any complaints at this time  Denies any shortness of breath  Review of Systems   Constitutional: Negative for chills and fever  HENT: Negative for ear pain and sore throat  Eyes: Negative for pain and visual disturbance  Respiratory: Negative for cough and shortness of breath  Cardiovascular: Negative for chest pain and palpitations  Gastrointestinal: Negative for abdominal pain and vomiting  Genitourinary: Negative for dysuria and hematuria  Musculoskeletal: Negative for arthralgias and back pain  Skin: Negative for color change and rash  Neurological: Negative for seizures and syncope  All other systems reviewed and are negative  Objective:     Vitals:   Temp (24hrs), Av 5 °F (36 9 °C), Min:98 2 °F (36 8 °C), Max:98 9 °F (37 2 °C)    Temp:  [98 2 °F (36 8 °C)-98 9 °F (37 2 °C)] 98 2 °F (36 8 °C)  HR:  [60-66] 60  Resp:  [18-24] 18  BP: (114-156)/() 156/113  SpO2:  [93 %-100 %] 97 %  Body mass index is 40 95 kg/m²  Input and Output Summary (last 24 hours): Intake/Output Summary (Last 24 hours) at 2021 1055  Last data filed at 2021 0905  Gross per 24 hour   Intake 220 ml   Output 1416 ml   Net -1196 ml       Physical Exam:     Physical Exam  Vitals and nursing note reviewed  Constitutional:       Appearance: He is well-developed  HENT:      Head: Normocephalic and atraumatic  Eyes:      Conjunctiva/sclera: Conjunctivae normal    Cardiovascular:      Rate and Rhythm: Normal rate and regular rhythm  Heart sounds:  No murmur heard  Comments: 2+ pitting edema bilateral lower extremities  Pulmonary:      Effort: Pulmonary effort is normal  No respiratory distress  Breath sounds: Normal breath sounds  Comments: Rales over all lung fields  Abdominal:      Palpations: Abdomen is soft  Tenderness: There is no abdominal tenderness  Musculoskeletal:      Cervical back: Neck supple  Skin:     General: Skin is warm and dry  Neurological:      Mental Status: He is alert  Additional Data:     Labs:    Results from last 7 days   Lab Units 07/23/21  0535   WBC Thousand/uL 5 47   HEMOGLOBIN g/dL 8 4*   HEMATOCRIT % 29 4*   PLATELETS Thousands/uL 328   NEUTROS PCT % 69   LYMPHS PCT % 14   MONOS PCT % 13*   EOS PCT % 3     Results from last 7 days   Lab Units 07/23/21  0535   SODIUM mmol/L 145   POTASSIUM mmol/L 3 5   CHLORIDE mmol/L 105   CO2 mmol/L 31   BUN mg/dL 28*   CREATININE mg/dL 2 11*   ANION GAP mmol/L 9   CALCIUM mg/dL 7 9*   ALBUMIN g/dL 3 2*   TOTAL BILIRUBIN mg/dL 0 73   ALK PHOS U/L 93   ALT U/L 18   AST U/L 14   GLUCOSE RANDOM mg/dL 94     Results from last 7 days   Lab Units 07/18/21  2114   INR  1 22*     Results from last 7 days   Lab Units 07/23/21  0726 07/22/21  2049 07/22/21  1542 07/22/21  1104 07/22/21  0702 07/21/21  2103 07/21/21  1601 07/21/21  1107 07/21/21  0735 07/20/21  2118 07/20/21  1621 07/20/21  1427   POC GLUCOSE mg/dl 91 112 124 150* 100 109 123 125 216* 179* 76 85     Results from last 7 days   Lab Units 07/19/21  0449   HEMOGLOBIN A1C % 6 6*     Results from last 7 days   Lab Units 07/18/21  2114   LACTIC ACID mmol/L 1 1           * I Have Reviewed All Lab Data Listed Above  * Additional Pertinent Lab Tests Reviewed:  [address] Admission Reviewed    Imaging:    Reviewed    Recent Cultures (last 7 days):           Last 24 Hours Medication List:   Current Facility-Administered Medications   Medication Dose Route Frequency Provider Last Rate    acetaminophen  650 mg Oral Q4H PRN Sherman Pack, CRNP      albumin human  25 g Intravenous TID Darheriberto Bermudez, CRNP      amLODIPine  2 5 mg Oral Daily Darral Booty, CRNP      atorvastatin  80 mg Oral Daily With Joshua 19 Abhi Vasquez, SEYMOUR      bumetanide  0 5 mg/hr Intravenous Continuous Darral Boalyson, SEYMOUR 0 5 mg/hr (07/22/21 2017)    clopidogrel  75 mg Oral Daily Fermín Baires MD      donepezil  5 mg Oral HS Sherman Pack, CRNP      insulin lispro  1-6 Units Subcutaneous TID AC Larry Juarez MD      iron sucrose  300 mg Intravenous Once Jorge A Shay MD      levothyroxine  112 mcg Oral Early Morning Fermín Bermudez MD      metoprolol succinate  50 mg Oral Daily Sherman Pack, CRNP      pantoprazole  40 mg Oral Early Morning Sherman Pack, CRNP      QUEtiapine  25 mg Oral HS SEYMOUR Montez          Today, Patient Was Seen By: Marta Morales MD    ** Please Note: Dictation voice to text software may have been used in the creation of this document   **

## 2021-07-23 NOTE — ASSESSMENT & PLAN NOTE
Lab Results   Component Value Date    HGBA1C 6 6 (H) 07/19/2021       Recent Labs     07/22/21  1104 07/22/21  1542 07/22/21 2049 07/23/21  0726   POCGLU 150* 124 112 91       Blood Sugar Average: Last 72 hrs:  (P) 144 1993   · Diabetic diet  · Fingerstick blood sugar sliding scale coverage  · Hold home oral agents  ·  hemoglobin A1c 6 6    Blood sugars are currently well controlled

## 2021-07-23 NOTE — PROGRESS NOTES
Progress Note - Nephrology   Hiral Marin 64 y o  male MRN: [id number]  Unit/Bed#: -01 Encounter: [id number]    A/P:  1  Acute kidney injury present on admission  · Admitted with a creatinine of 2 11 mg/dL  · Creatinine fluctuated over the next 3 days  Currently his creatinine is 2 11 mg/dL  · Fractional excretion of urea is 46% - he has intrinsic renal disease and may have tubulointerstitial injury due to volume overload with renal vascular congestion and anemia   · Continue to monitor and avoid nephrotoxins    2  Volume overload  · Received albumin 25 g tid and a Bumex infusion   · Albumin has risen to 3 2 g  · Continue Bumex infusion -> -2 246 liter loss with improvement in edema    3  Chronic kidney disease stage 3B  · Has had progression of his chronic kidney disease over the past year and a half on records review  His creatinine has trended up from 1 4 to 1 78 mg/dL  · He has longstanding diabetes and underlying vascular disease as well as uncontrolled hypertension  · He has mild micro albuminuria  Microalbumin to creatinine ratio was 79 milligrams/gram  · He was on losartan 100 mg prior to admission  Withhold this agent till his kidney function improves    4  Anemia due to GI bleed  · Underwent endoscopy with no signs of active bleeding  · Hemoglobin is 8 g today contributing to renal failure  · Iron saturation is 7% and he would benefit from Venofer    5   Acute on chronic diastolic congestive heart failure  · Echocardiogram demonstrates preserved left ventricular ejection fraction and moderate aortic stenosis  · Blood pressure has been high this morning 156 over 113 prior to medication  · Will continue to trend daily      Follow up reason for today's visit:  Acute kidney injury present on admission/CKD 3B/volume overload    Acute on chronic diastolic CHF (congestive heart failure) [facility])    Patient Active Problem List   Diagnosis    Hypokalemia    Alzheimer's dementia ([facility] )    Acute on chronic anemia    Acute kidney injury superimposed on chronic kidney disease ([facility])    Frequent falls    PAF (paroxysmal atrial fibrillation) ([facility])    Type 2 diabetes mellitus with kidney complication, without long-term current use of insulin ([facility])    Moderate aortic stenosis by prior echocardiogram    Ambulatory dysfunction    Essential hypertension    Acute on chronic diastolic CHF (congestive heart failure) ([facility])    GI bleeding    History of TIA (transient ischemic attack)    Morbid obesity with BMI of 40 0-44 9, adult ([facility])    Hypothyroidism         Subjective:   Denies chest pain, shortness of breath, abdominal pain, nausea vomiting  He is a bit vague but able to answer question    Objective:     Vitals: Blood pressure (!) 156/113, pulse 60, temperature 98 2 °F (36 8 °C), resp  rate 18, height 5' 7" (1 702 m), weight 119 kg (261 lb 7 5 oz), SpO2 97 %  ,Body mass index is 40 95 kg/m²  Weight (last 2 days)     Date/Time   Weight    07/23/21 0548   119 (261 47)    07/22/21 0600   120 (265 43)    07/21/21 0600   121 (267 2)                Intake/Output Summary (Last 24 hours) at 7/23/2021 0940  Last data filed at 7/23/2021 0905  Gross per 24 hour   Intake 220 ml   Output 1416 ml   Net -1196 ml     I/O last 3 completed shifts:   In: 100 [IV Piggyback:100]  Out: 2072 [Urine:2072]         Physical Exam: BP (!) 156/113   Pulse 60   Temp 98 2 °F (36 8 °C)   Resp 18   Ht 5' 7" (1 702 m)   Wt 119 kg (261 lb 7 5 oz)   SpO2 97%   BMI 40 95 kg/m²     General Appearance:    Alert, cooperative, no distress, appears stated age   Head:    Normocephalic, without obvious abnormality, atraumatic   Eyes:    Conjunctiva/corneas clear   Ears:    Normal external ears   Nose:   Nares normal, septum midline, mucosa normal, no drainage    or sinus tenderness   Throat:   Lips, mucosa, and tongue normal; teeth and gums normal   Neck:   Supple, symmetrical, trachea midline, no adenopathy;        thyroid:  No enlargement/tenderness/nodules; no carotid    bruit or JVD   Back:     Symmetric, no curvature, ROM normal, no CVA tenderness   Lungs:     C  Decreased at bases with exp crackles  to auscultation bilaterally, respirations unlabored   Chest wall:    No tenderness or deformity   Heart:    Regular rate and rhythm, S1 and S2 normal, no murmur, rub   or gallop   Abdomen:     Soft, non-tender, bowel sounds active   Extremities:   Extremities with edema up to knees  Legs are elevated in Gerichair   Skin:   Skin color, texture, turgor normal, no rashes or lesions   Lymph nodes:   Cervical normal   Neurologic:   CNII-XII intact            Lab, Imaging and other studies: I have personally reviewed pertinent labs  CBC:   Lab Results   Component Value Date    WBC 5 47 07/23/2021    HGB 8 4 (L) 07/23/2021    HCT 29 4 (L) 07/23/2021    MCV 92 07/23/2021     07/23/2021    MCH 26 3 (L) 07/23/2021    MCHC 28 6 (L) 07/23/2021    RDW 16 4 (H) 07/23/2021    MPV 10 0 07/23/2021    NRBC 0 07/23/2021     CMP:   Lab Results   Component Value Date    K 3 5 07/23/2021     07/23/2021    CO2 31 07/23/2021    BUN 28 (H) 07/23/2021    CREATININE 2 11 (H) 07/23/2021    CALCIUM 7 9 (L) 07/23/2021    AST 14 07/23/2021    ALT 18 07/23/2021    ALKPHOS 93 07/23/2021    EGFR 29 07/23/2021           Results from last 7 days   Lab Units 07/23/21  0535 07/22/21  0623 07/21/21  0635 07/18/21  2114   POTASSIUM mmol/L 3 5 3 6 3 8 2 9*   CHLORIDE mmol/L 105 104 103 105   CO2 mmol/L 31 28 29 30   BUN mg/dL 28* 22 19 25   CREATININE mg/dL 2 11* 2 06* 1 88* 2 21*   CALCIUM mg/dL 7 9* 7 6* 7 9* 8 4   ALK PHOS U/L 93 100  --  120*   ALT U/L 18 18  --  25   AST U/L 14 17  --  27         Phosphorus:   Lab Results   Component Value Date    PHOS 3 8 07/23/2021     Magnesium:   Lab Results   Component Value Date    MG 2 3 07/23/2021     Urinalysis:   Lab Results   Component Value Date    COLORU Yellow 07/22/2021    CLARITYU Clear 07/22/2021    SPECGRAV 1 015 07/22/2021    PHUR 5 5 07/22/2021    LEUKOCYTESUR Negative 07/22/2021    NITRITE Negative 07/22/2021    GLUCOSEU Negative 07/22/2021    KETONESU Negative 07/22/2021    BILIRUBINUR Negative 07/22/2021    BLOODU Negative 07/22/2021     Ionized Calcium: No results found for: CAION  Coagulation: No results found for: PT, INR, APTT  Troponin: No results found for: TROPONINI  ABG: No results found for: PHART, JUM8UBY, PO2ART, CJT3FJX, N7EZMCLA, BEART, SOURCE  Radiology review:     IMAGING  Procedure: EGD    Addendum Date: 7/20/2021 Addendum:   [facility]  [address] [facility] [id number] [phone] DATE OF SERVICE: 7/20/21 PHYSICIAN(S): Rosa Schilling MD - Attending Physician INDICATION: Anemia, Gastrointestinal hemorrhage, unspecified gastrointestinal hemorrhage type POST-OP DIAGNOSIS: See the impression below  PREPROCEDURE: Informed consent was obtained for the procedure, including sedation  Risks of perforation, hemorrhage, adverse drug reaction and aspiration were discussed  The patient was placed in the left lateral decubitus position  Patient was explained about the risks and benefits of the procedure  Risks including but not limited to bleeding, infection, and perforation were explained in detail  Also explained about less than 100% sensitivity with the exam and other alternatives  DETAILS OF PROCEDURE: Patient was taken to the procedure room where a time out was performed to confirm correct patient and correct procedure  The patient underwent monitored anesthesia care, which was administered by an anesthesia professional  The patient's blood pressure, heart rate, level of consciousness, respirations and oxygen were monitored throughout the procedure  The scope was introduced through the mouth and advanced to the third part of the duodenum  Retroflexion was performed in the incisura  The patient experienced no blood loss  The procedure was not difficult   The patient tolerated the procedure well  There were no apparent complications  ANESTHESIA INFORMATION: ASA: III Anesthesia Type: IV Sedation with Anesthesia MEDICATIONS: dextrose 50 % IV solution 25 mL 25 mL (Totals for administrations occurring from 1304 to 1348 on 07/20/21) FINDINGS: C1M3 Og's esophagus observed where the top of gastric folds are 38 cm from the incisors with no associated nodule; performed cold forceps biopsy Small sliding hiatal hernia (type I hiatal hernia) without Nelly Poet lesions present - GE junction 38 cm from the incisors, diaphragmatic impression 40 cm from the incisors   Otherwise normal stomach on direct and retroflexed views Performed biopsies to rule out celiac disease and H  pylori in the body of the stomach, incisura, antrum, duodenal bulb and 2nd part of the duodenum The duodenal bulb, 1st part of the duodenum, 2nd part of the duodenum and 3rd part of the duodenum appeared normal  SPECIMENS: ID Type Source Tests Collected by Time Destination 1 : biopsy retrieved by cold biopsy forceps, R/O celiac disease Tissue Duodenum TISSUE EXAM Memory MD Hoda 7/20/2021  1:39 PM  2 : gastric biopsy retrieved by cold biopsy forceps, R/O H  pylori Tissue Stomach TISSUE EXAM Memory MD Hoda 7/20/2021  1:39 PM  3 : biopsy retrieved by cold biopsy forceps, H/O Og's esophagus, R/O dysplasia Tissue Esophagus TISSUE EXAM Memory MD Hoda 7/20/2021  1:44 PM  IMPRESSION: C1 M3 Og's esophagus-biopsies taken to rule out dysplasia Small hiatal hernia otherwise normal stomach on direct and retroflexed views Random gastric biopsies taken to rule out H pylori Normal visualized portion of duodenum from duodenal bulb to D3 Random biopsies taken to rule out celiac disease RECOMMENDATION: Await pathology results Continue PPI for Og's esophagus; given his advanced age and comorbid conditions and after discussion with POA unlikely he would benefit from continued surveillance Proceed with previously scheduled colonoscopy  Rena Drummond MD    Result Date: 7/20/2021  Narrative: [facility]  [address] [phone] DATE OF SERVICE: 7/20/21 PHYSICIAN(S): Rena Drummond MD - Attending Physician INDICATION: Anemia, Gastrointestinal hemorrhage, unspecified gastrointestinal hemorrhage type POST-OP DIAGNOSIS: See the impression below  PREPROCEDURE: Informed consent was obtained for the procedure, including sedation  Risks of perforation, hemorrhage, adverse drug reaction and aspiration were discussed  The patient was placed in the left lateral decubitus position  Patient was explained about the risks and benefits of the procedure  Risks including but not limited to bleeding, infection, and perforation were explained in detail  Also explained about less than 100% sensitivity with the exam and other alternatives  DETAILS OF PROCEDURE: Patient was taken to the procedure room where a time out was performed to confirm correct patient and correct procedure  The patient underwent monitored anesthesia care, which was administered by an anesthesia professional  The patient's blood pressure, heart rate, level of consciousness, respirations and oxygen were monitored throughout the procedure  The scope was introduced through the mouth and advanced to the third part of the duodenum  Retroflexion was performed in the incisura  The patient experienced no blood loss  The procedure was not difficult  The patient tolerated the procedure well  There were no apparent complications   ANESTHESIA INFORMATION: ASA: III Anesthesia Type: IV Sedation with Anesthesia MEDICATIONS: dextrose 50 % IV solution 25 mL 25 mL (Totals for administrations occurring from 1304 to 1348 on 07/20/21) FINDINGS: C1M3 Og's esophagus observed where the top of gastric folds are 38 cm from the incisors with no associated nodule; performed cold forceps biopsy Small sliding hiatal hernia (type I hiatal hernia) without Richard lesions present - GE junction 38 cm from the incisors, diaphragmatic impression 40 cm from the incisors  Otherwise normal stomach on direct and retroflexed views Performed biopsies to rule out celiac disease and H  pylori in the body of the stomach, incisura, antrum, duodenal bulb and 2nd part of the duodenum The duodenal bulb, 1st part of the duodenum, 2nd part of the duodenum and 3rd part of the duodenum appeared normal  SPECIMENS: ID Type Source Tests Collected by Time Destination 1 : biopsy retrieved by cold biopsy forceps, R/O celiac disease Tissue Duodenum TISSUE EXAM William Tse MD 7/20/2021  1:39 PM  2 : gastric biopsy retrieved by cold biopsy forceps, R/O H  pylori Tissue Stomach TISSUE EXAM William Tse MD 7/20/2021  1:39 PM  3 : biopsy retrieved by cold biopsy forceps, H/O Og's esophagus, R/O dysplasia Tissue Esophagus TISSUE EXAM William Tse MD 7/20/2021  1:44 PM      Impression: C1 M3 Og's esophagus-biopsies taken to rule out dysplasia Small hiatal hernia otherwise normal stomach on direct and retroflexed views Random gastric biopsies taken to rule out H pylori Normal visualized portion of duodenum from duodenal bulb to D3 Random biopsies taken to rule out celiac disease RECOMMENDATION: Await pathology results Proceed with previously scheduled colonoscopy  William Tse MD     Procedure: Colonoscopy    Addendum Date: 7/20/2021 Addendum:   Lilliana Roberts  [facility] [address] [place] [id number] [phone] DATE OF SERVICE: 7/20/21 PHYSICIAN(S): William Tse MD - Attending Physician INDICATION: Anemia, Gastrointestinal hemorrhage, unspecified gastrointestinal hemorrhage type Colonoscopy performed for a diagnostic indication  POST-OP DIAGNOSIS: See the impression below  HISTORY: Prior colonoscopy: 3 years ago  BOWEL PREPARATION: Miralax/Dulcolax PREPROCEDURE: Informed consent was obtained for the procedure, including sedation   Risks including but not limited to bleeding, infection, perforation, adverse drug reaction and aspiration were explained in detail  Also explained about less than 100% sensitivity with the exam and other alternatives  The patient was placed in the left lateral decubitus position  DETAILS OF PROCEDURE: Patient was taken to the procedure room where a time out was performed to confirm correct patient and correct procedure  The patient underwent monitored anesthesia care, which was administered by an anesthesia professional  The patient's blood pressure, heart rate, level of consciousness, oxygen and respirations were monitored throughout the procedure  A digital rectal exam was performed  The scope was introduced through the anus and advanced to the terminal ileum  The quality of bowel preparation was evaluated using the [facility] Bowel Preparation Scale with scores of: right colon = 2, transverse colon = 2, left colon = 2  The total BBPS score was 6  Bowel prep was adequate  The patient experienced no blood loss  The procedure was not difficult  The patient tolerated the procedure well  There were no apparent complications  ANESTHESIA INFORMATION: ASA: III Anesthesia Type: IV Sedation with Anesthesia MEDICATIONS: dextrose 50 % IV solution 25 mL 25 mL simethicone (MYLICON) 40 mg in sterile water 60 mL 40 mg (Totals for administrations occurring from 1304 to 1420 on 07/20/21) FINDINGS: The terminal ileum appeared normal  The ileocecal valve, cecum, ascending colon, hepatic flexure, transverse colon, splenic flexure, descending colon and sigmoid colon appeared normal  Mild erythematous and hemorrhagic mucosa with erosion in the rectosigmoid and rectum   Areas of erosive hemorrhagic lesions that were not actively bleeding possibly secondary to trauma from enemas; no identified diverticula in the area however this could also be secondary to diverticula that may have bled Moderate scattered diverticula Protruding, external, internal hemorrhoids EVENTS: Procedure Events Event Event Time ENDO CECUM REACHED 7/20/2021  1:59 PM ENDO SCOPE OUT TIME 7/20/2021  2:15 PM SPECIMENS: ID Type Source Tests Collected by Time Destination 1 : biopsy retrieved by cold biopsy forceps, R/O celiac disease Tissue Duodenum TISSUE EXAM Sourav Betts MD 7/20/2021  1:39 PM  2 : gastric biopsy retrieved by cold biopsy forceps, R/O H  pylori Tissue Stomach TISSUE EXAM Sourav Betts MD 7/20/2021  1:39 PM  3 : biopsy retrieved by cold biopsy forceps, H/O Og's esophagus, R/O dysplasia Tissue Esophagus TISSUE EXAM Sourav Betts MD 7/20/2021  1:44 PM  EQUIPMENT: [id number] IMPRESSION: No evidence of active bleeding Terminal ileum normal Scattered diverticula throughout the colon left greater than right without evidence of bleeding Area of erythema/ulceration/hemorrhage likely from trauma from enema versus old diverticular bleed Large internal/external hemorrhoids RECOMMENDATION: Repeat colonoscopy not recommended due to age (age = 75 or greater)  Return to floor for ongoing medical care as per primary team May resume regular diet as tolerated Okay to resume Plavix, however risks versus benefits should be assessed for dual anti-platelet therapy Continue to monitor hemoglobin daily and signs of active GI bleeding Defer management of Hemoglobin and iron levels 2 PCP as an outpatient GI follow-up on as-needed basis Sourav Betts MD     Result Date: 7/20/2021  Narrative: [facility]  [address] [facility] [id number] [address] [place] [address] Street: 7/20/21 PHYSICIAN(S): Sourav Betts MD - Attending Physician INDICATION: Anemia, Gastrointestinal hemorrhage, unspecified gastrointestinal hemorrhage type Colonoscopy performed for a diagnostic indication  POST-OP DIAGNOSIS: See the impression below  HISTORY: Prior colonoscopy: 3 years ago  BOWEL PREPARATION: Miralax/Dulcolax PREPROCEDURE: Informed consent was obtained for the procedure, including sedation  Risks including but not limited to bleeding, infection, perforation, adverse drug reaction and aspiration were explained in detail  Also explained about less than 100% sensitivity with the exam and other alternatives  The patient was placed in the left lateral decubitus position  DETAILS OF PROCEDURE: Patient was taken to the procedure room where a time out was performed to confirm correct patient and correct procedure  The patient underwent monitored anesthesia care, which was administered by an anesthesia professional  The patient's blood pressure, heart rate, level of consciousness, oxygen and respirations were monitored throughout the procedure  A digital rectal exam was performed  The scope was introduced through the anus and advanced to the terminal ileum  The quality of bowel preparation was evaluated using the [facility] Bowel Preparation Scale with scores of: right colon = 2, transverse colon = 2, left colon = 2  The total BBPS score was 6  Bowel prep was adequate  The patient experienced no blood loss  The procedure was not difficult  The patient tolerated the procedure well  There were no apparent complications  ANESTHESIA INFORMATION: ASA: III Anesthesia Type: IV Sedation with Anesthesia MEDICATIONS: dextrose 50 % IV solution 25 mL 25 mL simethicone (MYLICON) 40 mg in sterile water 60 mL 40 mg (Totals for administrations occurring from 1304 to 1420 on 07/20/21) FINDINGS: The terminal ileum appeared normal  The ileocecal valve, cecum, ascending colon, hepatic flexure, transverse colon, splenic flexure, descending colon and sigmoid colon appeared normal  Mild erythematous and hemorrhagic mucosa with erosion in the rectosigmoid and rectum   Areas of erosive hemorrhagic lesions that were not actively bleeding possibly secondary to trauma from enemas; no identified diverticula in the area however this could also be secondary to diverticula that may have bled Moderate scattered diverticula Protruding, external, internal hemorrhoids EVENTS: Procedure Events Event Event Time ENDO CECUM REACHED 7/20/2021  1:59 PM ENDO SCOPE OUT TIME 7/20/2021  2:15 PM SPECIMENS: ID Type Source Tests Collected by Time Destination 1 : biopsy retrieved by cold biopsy forceps, R/O celiac disease Tissue Duodenum TISSUE EXAM Juan Francisco León MD 7/20/2021  1:39 PM  2 : gastric biopsy retrieved by cold biopsy forceps, R/O H  pylori Tissue Stomach TISSUE EXAM Juan Francisco León MD 7/20/2021  1:39 PM  3 : biopsy retrieved by cold biopsy forceps, H/O Og's esophagus, R/O dysplasia Tissue Esophagus TISSUE EXAM Juan Francisco León MD 7/20/2021  1:44 PM  EQUIPMENT: [id number]     Impression: No evidence of active bleeding Terminal ileum Scattered diverticula throughout the colon left greater than right Area of erythema/ulceration/hemorrhage likely from trauma from enema versus old diverticular bleed Large internal/external hemorrhoids RECOMMENDATION: Repeat colonoscopy not recommended due to age (age = 75 or greater)  Return to floor for ongoing medical care as per primary team May resume regular diet as tolerated Okay to resume Plavix tomorrow Continue to monitor hemoglobin and signs of active GI bleeding Juan Francisco León MD     Procedure: CT head wo contrast    Result Date: 7/22/2021  Narrative: CT BRAIN - WITHOUT CONTRAST INDICATION:   Altered mental status COMPARISON:  None  TECHNIQUE:  CT examination of the brain was performed  In addition to axial images, sagittal and coronal 2D reformatted images were created and submitted for interpretation  Radiation dose length product (DLP) for this visit:  951 78 mGy-cm   This examination, like all CT scans performed in the [facility], was performed utilizing techniques to minimize radiation dose exposure, including the use of iterative  reconstruction and automated exposure control  IMAGE QUALITY:  Diagnostic   FINDINGS: PARENCHYMA: Encephalomalacia and volume loss involving left gyrus rectus and inferior left frontal lobe as well as right frontal cortical/subcortical white matter  Patchy and confluent hypoattenuation involving periventricular and subcortical white matter  Cerebral volume loss  No intracranial masslike lesion, mass effect or midline shift  No CT signs of acute infarction  No acute parenchymal hemorrhage  VENTRICLES AND EXTRA-AXIAL SPACES:  Normal for the patient's age  VISUALIZED ORBITS AND PARANASAL SINUSES:  Unremarkable  CALVARIUM AND EXTRACRANIAL SOFT TISSUES:  Normal      Impression: 1  New acute intracranial CT abnormality  2   Encephalomalacia involving left gyrus rectus and inferior left frontal lobe as well as right frontal cortical/subcortical white matter  3   Nonspecific extensive white matter change suggesting advanced microangiopathy  Workstation performed: [id number]     Procedure: MRI brain wo contrast    Result Date: 7/22/2021  Narrative: MRI BRAIN WITHOUT CONTRAST INDICATION: confusion  COMPARISON:   None  TECHNIQUE:  Sagittal T1, axial T2, axial FLAIR, axial T1, axial Huffman and axial diffusion imaging  IMAGE QUALITY:  Evaluation degraded by motion FINDINGS: BRAIN PARENCHYMA:  There is no discrete mass, mass effect or midline shift  There is no intracranial hemorrhage  There is no evidence of acute infarction and diffusion imaging is unremarkable  Small scattered hyperintensities on T2/FLAIR imaging are noted in the periventricular and subcortical white matter demonstrating an appearance that is statistically most likely to represent advanced microangiopathic change  Focal area of T2 hyperintensity noted in the presenting encephalomalacia in the left frontal region VENTRICLES: Dilation of the ventricular system noted SELLA AND PITUITARY GLAND:  Normal  ORBITS:  Normal  PARANASAL SINUSES:  T2 hyperintensity noted within the both mastoid air cells VASCULATURE:  Evaluation of the major intracranial vasculature demonstrates appropriate flow voids   CALVARIUM AND SKULL BASE:  Normal  EXTRACRANIAL SOFT TISSUES:  Normal      Impression: No acute infarct seen Severe periventricular and white matter T2 hyperintensity, may be due to chronic small vessel ischemic changes was also described on the MRI performed at [facility] facility on October 30, 2017 Area of encephalomalacia in the inferior left frontal region Workstation performed: [id number]     Procedure: US kidney and bladder    Result Date: 7/22/2021  Narrative: RENAL ULTRASOUND INDICATION:   ckd  COMPARISON: None TECHNIQUE:   Ultrasound of the retroperitoneum was performed with a curvilinear transducer utilizing volumetric sweeps and still imaging techniques  FINDINGS: KIDNEYS: Symmetric and normal size  Right kidney:  11 3 x 5 7 x 4 7 cm  Left kidney:  11 1 x 5 9 x 4 4 cm  Right kidney Normal echogenicity and contour  No suspicious masses detected  No hydronephrosis  No shadowing calculi  No perinephric fluid collections  Left kidney Normal echogenicity and contour  No suspicious masses detected  No hydronephrosis  No shadowing calculi  No perinephric fluid collections  URETERS: Nonvisualized  BLADDER: Collapsed, limiting evaluation No focal thickening or mass lesions  Bilateral ureteral jets not detected       Impression: Unremarkable exam  Workstation performed: [id number]       Current Facility-Administered Medications   Medication Dose Route Frequency    acetaminophen (TYLENOL) tablet 650 mg  650 mg Oral Q4H PRN    albumin human (FLEXBUMIN) 25 % injection 25 g  25 g Intravenous TID    amLODIPine (NORVASC) tablet 2 5 mg  2 5 mg Oral Daily    atorvastatin (LIPITOR) tablet 80 mg  80 mg Oral Daily With Dinner    bumetanide (BUMEX) 12 5 mg infusion 50 mL  0 5 mg/hr Intravenous Continuous    clopidogrel (PLAVIX) tablet 75 mg  75 mg Oral Daily    donepezil (ARICEPT) tablet 5 mg  5 mg Oral HS    ferrous gluconate (FERGON) tablet 324 mg  324 mg Oral Daily    insulin lispro (HumaLOG) 100 units/mL subcutaneous injection 1-6 Units  1-6 Units Subcutaneous TID AC    levothyroxine tablet 112 mcg  112 mcg Oral Early Morning    metoprolol succinate (TOPROL-XL) 24 hr tablet 50 mg  50 mg Oral Daily    pantoprazole (PROTONIX) EC tablet 40 mg  40 mg Oral Early Morning    QUEtiapine (SEROquel) tablet 25 mg  25 mg Oral HS     Medications Discontinued During This Encounter   Medication Reason    potassium chloride 40 mEq IVPB (premix) Availability    furosemide (LASIX) tablet 40 mg     Ferrous Gluconate TABS 246 mg     amLODIPine (NORVASC) tablet 2 5 mg     furosemide (LASIX) injection 40 mg     furosemide (LASIX) injection 20 mg     insulin lispro (HumaLOG) 100 units/mL subcutaneous injection 2-12 Units     insulin lispro (HumaLOG) 100 units/mL subcutaneous injection 2-12 Units     fentaNYL (SUBLIMAZE) injection 12 5 mcg Patient Transfer    ondansetron (ZOFRAN) injection 4 mg Patient Transfer    dextrose 5 % infusion     furosemide (LASIX) injection 60 mg     torsemide (DEMADEX) tablet 40 mg     torsemide (DEMADEX) tablet 40 mg     levothyroxine tablet 100 mcg     furosemide (LASIX) injection 80 mg        Eunice Wiseman MD      This progress note was produced in part using a dictation device which may document imprecise wording from author's original intent

## 2021-07-24 LAB
ANION GAP SERPL CALCULATED.3IONS-SCNC: 6 MMOL/L (ref 4–13)
BASOPHILS # BLD AUTO: 0.05 THOUSANDS/ΜL (ref 0–0.1)
BASOPHILS NFR BLD AUTO: 1 % (ref 0–1)
BUN SERPL-MCNC: 29 MG/DL (ref 5–25)
CALCIUM SERPL-MCNC: 8.1 MG/DL (ref 8.3–10.1)
CHLORIDE SERPL-SCNC: 105 MMOL/L (ref 100–108)
CO2 SERPL-SCNC: 36 MMOL/L (ref 21–32)
CREAT SERPL-MCNC: 1.91 MG/DL (ref 0.6–1.3)
EOSINOPHIL # BLD AUTO: 0.15 THOUSAND/ΜL (ref 0–0.61)
EOSINOPHIL NFR BLD AUTO: 3 % (ref 0–6)
ERYTHROCYTE [DISTWIDTH] IN BLOOD BY AUTOMATED COUNT: 16.1 % (ref 11.6–15.1)
GFR SERPL CREATININE-BSD FRML MDRD: 33 ML/MIN/1.73SQ M
GLUCOSE SERPL-MCNC: 125 MG/DL (ref 65–140)
GLUCOSE SERPL-MCNC: 138 MG/DL (ref 65–140)
GLUCOSE SERPL-MCNC: 175 MG/DL (ref 65–140)
GLUCOSE SERPL-MCNC: 93 MG/DL (ref 65–140)
GLUCOSE SERPL-MCNC: 97 MG/DL (ref 65–140)
HCT VFR BLD AUTO: 28.5 % (ref 36.5–49.3)
HGB BLD-MCNC: 8.1 G/DL (ref 12–17)
IMM GRANULOCYTES # BLD AUTO: 0.02 THOUSAND/UL (ref 0–0.2)
IMM GRANULOCYTES NFR BLD AUTO: 0 % (ref 0–2)
LYMPHOCYTES # BLD AUTO: 0.58 THOUSANDS/ΜL (ref 0.6–4.47)
LYMPHOCYTES NFR BLD AUTO: 10 % (ref 14–44)
MAGNESIUM SERPL-MCNC: 2.2 MG/DL (ref 1.6–2.6)
MCH RBC QN AUTO: 26 PG (ref 26.8–34.3)
MCHC RBC AUTO-ENTMCNC: 28.4 G/DL (ref 31.4–37.4)
MCV RBC AUTO: 92 FL (ref 82–98)
MONOCYTES # BLD AUTO: 0.72 THOUSAND/ΜL (ref 0.17–1.22)
MONOCYTES NFR BLD AUTO: 12 % (ref 4–12)
NEUTROPHILS # BLD AUTO: 4.58 THOUSANDS/ΜL (ref 1.85–7.62)
NEUTS SEG NFR BLD AUTO: 74 % (ref 43–75)
NRBC BLD AUTO-RTO: 0 /100 WBCS
PHOSPHATE SERPL-MCNC: 3.4 MG/DL (ref 2.3–4.1)
PLATELET # BLD AUTO: 302 THOUSANDS/UL (ref 149–390)
PMV BLD AUTO: 10.3 FL (ref 8.9–12.7)
POTASSIUM SERPL-SCNC: 3.1 MMOL/L (ref 3.5–5.3)
RBC # BLD AUTO: 3.11 MILLION/UL (ref 3.88–5.62)
SODIUM SERPL-SCNC: 147 MMOL/L (ref 136–145)
WBC # BLD AUTO: 6.1 THOUSAND/UL (ref 4.31–10.16)

## 2021-07-24 PROCEDURE — 82948 REAGENT STRIP/BLOOD GLUCOSE: CPT

## 2021-07-24 PROCEDURE — 83735 ASSAY OF MAGNESIUM: CPT | Performed by: STUDENT IN AN ORGANIZED HEALTH CARE EDUCATION/TRAINING PROGRAM

## 2021-07-24 PROCEDURE — 85025 COMPLETE CBC W/AUTO DIFF WBC: CPT | Performed by: STUDENT IN AN ORGANIZED HEALTH CARE EDUCATION/TRAINING PROGRAM

## 2021-07-24 PROCEDURE — 99232 SBSQ HOSP IP/OBS MODERATE 35: CPT | Performed by: PHYSICIAN ASSISTANT

## 2021-07-24 PROCEDURE — 80048 BASIC METABOLIC PNL TOTAL CA: CPT | Performed by: INTERNAL MEDICINE

## 2021-07-24 PROCEDURE — 99232 SBSQ HOSP IP/OBS MODERATE 35: CPT | Performed by: STUDENT IN AN ORGANIZED HEALTH CARE EDUCATION/TRAINING PROGRAM

## 2021-07-24 PROCEDURE — 84100 ASSAY OF PHOSPHORUS: CPT | Performed by: STUDENT IN AN ORGANIZED HEALTH CARE EDUCATION/TRAINING PROGRAM

## 2021-07-24 RX ORDER — POTASSIUM CHLORIDE 20MEQ/15ML
40 LIQUID (ML) ORAL ONCE
Status: COMPLETED | OUTPATIENT
Start: 2021-07-24 | End: 2021-07-24

## 2021-07-24 RX ORDER — POTASSIUM CHLORIDE 14.9 MG/ML
20 INJECTION INTRAVENOUS ONCE
Status: COMPLETED | OUTPATIENT
Start: 2021-07-24 | End: 2021-07-24

## 2021-07-24 RX ADMIN — POTASSIUM CHLORIDE 40 MEQ: 20 SOLUTION ORAL at 09:55

## 2021-07-24 RX ADMIN — ATORVASTATIN CALCIUM 80 MG: 40 TABLET, FILM COATED ORAL at 16:38

## 2021-07-24 RX ADMIN — POTASSIUM CHLORIDE 20 MEQ: 14.9 INJECTION, SOLUTION INTRAVENOUS at 09:55

## 2021-07-24 RX ADMIN — ALBUMIN (HUMAN) 25 G: 0.25 INJECTION, SOLUTION INTRAVENOUS at 08:07

## 2021-07-24 RX ADMIN — INSULIN LISPRO 1 UNITS: 100 INJECTION, SOLUTION INTRAVENOUS; SUBCUTANEOUS at 16:39

## 2021-07-24 RX ADMIN — LEVOTHYROXINE SODIUM 112 MCG: 112 TABLET ORAL at 05:52

## 2021-07-24 RX ADMIN — Medication 1 MG/HR: at 18:15

## 2021-07-24 RX ADMIN — PANTOPRAZOLE SODIUM 40 MG: 40 TABLET, DELAYED RELEASE ORAL at 05:52

## 2021-07-24 RX ADMIN — AMLODIPINE BESYLATE 2.5 MG: 2.5 TABLET ORAL at 08:11

## 2021-07-24 RX ADMIN — Medication 0.5 MG/HR: at 01:27

## 2021-07-24 RX ADMIN — ALBUMIN (HUMAN) 25 G: 0.25 INJECTION, SOLUTION INTRAVENOUS at 16:38

## 2021-07-24 RX ADMIN — CLOPIDOGREL BISULFATE 75 MG: 75 TABLET ORAL at 08:11

## 2021-07-24 RX ADMIN — ALBUMIN (HUMAN) 25 G: 0.25 INJECTION, SOLUTION INTRAVENOUS at 20:19

## 2021-07-24 RX ADMIN — DONEPEZIL HYDROCHLORIDE 5 MG: 5 TABLET ORAL at 21:11

## 2021-07-24 RX ADMIN — METOPROLOL SUCCINATE 50 MG: 50 TABLET, EXTENDED RELEASE ORAL at 08:11

## 2021-07-24 RX ADMIN — QUETIAPINE FUMARATE 25 MG: 25 TABLET ORAL at 21:11

## 2021-07-24 NOTE — ASSESSMENT & PLAN NOTE
Lab Results   Component Value Date    HGBA1C 6 6 (H) 07/19/2021       Recent Labs     07/23/21  1545 07/23/21  2049 07/24/21  0717 07/24/21  1102   POCGLU 158* 113 93 138       Blood Sugar Average: Last 72 hrs:  (P) 125   · Diabetic diet  · Fingerstick blood sugar sliding scale coverage  · Hold home oral agents  ·  hemoglobin A1c 6 6    Blood sugars are currently well controlled

## 2021-07-24 NOTE — ASSESSMENT & PLAN NOTE
· Lower extremity 3+ pitting edema to bilateral lower extremities extending up to scrotum, bibasilar rales  · Chest x-ray:  Atelectasis  · BNP:  4500  · Daily weights  · I&Os  · Lasix IV twice daily for acute diuresis for now  Not much urine output with 40 IV b i d  Of Lasix  Decreased lasix dose to 40 from 80 IV bid however patient seems to have decreased urinary output so increased lasix back to 80 BID  Patient also received 1 dose of torsemide this afternoon  Not having a lot of urinary output on Closely monitor urine output  Hold Norvasc  · TTE: LV function showing 55% EF  · Low-salt diet, fluid restriction    Patient started on Bumex drip on 07/22 given poor response to Lasix IV as per cardiology recommendations  Will continue to monitor I&Os  Weight coming down and patient seems to be responding to Bumex drip well   Bumex drip rate increased 7/24 by Nephrology

## 2021-07-24 NOTE — PLAN OF CARE
Problem: Potential for Falls  Goal: Patient will remain free of falls  Description: INTERVENTIONS:  - Educate patient/family on patient safety including physical limitations  - Instruct patient to call for assistance with activity   - Consult OT/PT to assist with strengthening/mobility   - Keep Call bell within reach  - Keep bed low and locked with side rails adjusted as appropriate  - Keep care items and personal belongings within reach  - Initiate and maintain comfort rounds  - Make Fall Risk Sign visible to staff  - Offer Toileting every 2 Hours, in advance of need  - Initiate/Maintain bed alarm  - Obtain necessary fall risk management equipment: yellow bracelet and socks  - Apply yellow socks and bracelet for high fall risk patients  - Consider moving patient to room near nurses station  Outcome: Progressing     Problem: MOBILITY - ADULT  Goal: Maintain or return to baseline ADL function  Description: INTERVENTIONS:  -  Assess patient's ability to carry out ADLs; assess patient's baseline for ADL function and identify physical deficits which impact ability to perform ADLs (bathing, care of mouth/teeth, toileting, grooming, dressing, etc )  - Assess/evaluate cause of self-care deficits   - Assess range of motion  - Assess patient's mobility; develop plan if impaired  - Assess patient's need for assistive devices and provide as appropriate  - Encourage maximum independence but intervene and supervise when necessary  - Involve family in performance of ADLs  - Assess for home care needs following discharge   - Consider OT consult to assist with ADL evaluation and planning for discharge  - Provide patient education as appropriate  Outcome: Progressing  Goal: Maintains/Returns to pre admission functional level  Description: INTERVENTIONS:  - Perform BMAT or MOVE assessment daily    - Set and communicate daily mobility goal to care team and patient/family/caregiver     - Collaborate with rehabilitation services on mobility goals if consulted  - Perform Range of Motion 4 times a day  - Reposition patient every 2 hours  - Dangle patient 4 times a day  - Stand patient 4 times a day  - Ambulate patient 4 times a day  - Out of bed to chair 3 times a day   - Out of bed for meals 3 times a day  - Out of bed for toileting  - Record patient progress and toleration of activity level   Outcome: Progressing     Problem: Prexisting or High Potential for Compromised Skin Integrity  Goal: Skin integrity is maintained or improved  Description: INTERVENTIONS:  - Identify patients at risk for skin breakdown  - Assess and monitor skin integrity  - Assess and monitor nutrition and hydration status  - Monitor labs   - Assess for incontinence   - Turn and reposition patient  - Assist with mobility/ambulation  - Relieve pressure over bony prominences  - Avoid friction and shearing  - Provide appropriate hygiene as needed including keeping skin clean and dry  - Evaluate need for skin moisturizer/barrier cream  - Collaborate with interdisciplinary team   - Patient/family teaching  - Consider wound care consult   Outcome: Progressing     Problem: NEUROSENSORY - ADULT  Goal: Achieves stable or improved neurological status  Description: INTERVENTIONS  - Monitor and report changes in neurological status  - Monitor vital signs such as temperature, blood pressure, glucose, and any other labs ordered   - Initiate measures to prevent increased intracranial pressure  - Monitor for seizure activity and implement precautions if appropriate      Outcome: Progressing  Goal: Achieves maximal functionality and self care  Description: INTERVENTIONS  - Monitor swallowing and airway patency with patient fatigue and changes in neurological status  - Encourage and assist patient to increase activity and self care     - Encourage visually impaired, hearing impaired and aphasic patients to use assistive/communication devices  Outcome: Progressing     Problem: CARDIOVASCULAR - ADULT  Goal: Maintains optimal cardiac output and hemodynamic stability  Description: INTERVENTIONS:  - Monitor I/O, vital signs and rhythm  - Monitor for S/S and trends of decreased cardiac output  - Administer and titrate ordered vasoactive medications to optimize hemodynamic stability  - Assess quality of pulses, skin color and temperature  - Assess for signs of decreased coronary artery perfusion  - Instruct patient to report change in severity of symptoms  Outcome: Progressing  Goal: Absence of cardiac dysrhythmias or at baseline rhythm  Description: INTERVENTIONS:  - Continuous cardiac monitoring, vital signs, obtain 12 lead EKG if ordered  - Administer antiarrhythmic and heart rate control medications as ordered  - Monitor electrolytes and administer replacement therapy as ordered  Outcome: Progressing     Problem: RESPIRATORY - ADULT  Goal: Achieves optimal ventilation and oxygenation  Description: INTERVENTIONS:  - Assess for changes in respiratory status  - Assess for changes in mentation and behavior  - Position to facilitate oxygenation and minimize respiratory effort  - Oxygen administered by appropriate delivery if ordered  - Initiate smoking cessation education as indicated  - Encourage broncho-pulmonary hygiene including cough, deep breathe, Incentive Spirometry  - Assess the need for suctioning and aspirate as needed  - Assess and instruct to report SOB or any respiratory difficulty  - Respiratory Therapy support as indicated  Outcome: Progressing     Problem: GASTROINTESTINAL - ADULT  Goal: Minimal or absence of nausea and/or vomiting  Description: INTERVENTIONS:  - Administer IV fluids if ordered to ensure adequate hydration  - Maintain NPO status until nausea and vomiting are resolved  - Nasogastric tube if ordered  - Administer ordered antiemetic medications as needed  - Provide nonpharmacologic comfort measures as appropriate  - Advance diet as tolerated, if ordered  - Consider nutrition services referral to assist patient with adequate nutrition and appropriate food choices  Outcome: Progressing  Goal: Maintains or returns to baseline bowel function  Description: INTERVENTIONS:  - Assess bowel function  - Encourage oral fluids to ensure adequate hydration  - Administer IV fluids if ordered to ensure adequate hydration  - Administer ordered medications as needed  - Encourage mobilization and activity  - Consider nutritional services referral to assist patient with adequate nutrition and appropriate food choices  Outcome: Progressing  Goal: Maintains adequate nutritional intake  Description: INTERVENTIONS:  - Monitor percentage of each meal consumed  - Identify factors contributing to decreased intake, treat as appropriate  - Assist with meals as needed  - Monitor I&O, weight, and lab values if indicated  - Obtain nutrition services referral as needed  Outcome: Progressing  Goal: Oral mucous membranes remain intact  Description: INTERVENTIONS  - Assess oral mucosa and hygiene practices  - Implement preventative oral hygiene regimen  - Implement oral medicated treatments as ordered  - Initiate Nutrition services referral as needed  Outcome: Progressing     Problem: GENITOURINARY - ADULT  Goal: Maintains or returns to baseline urinary function  Description: INTERVENTIONS:  - Assess urinary function  - Encourage oral fluids to ensure adequate hydration if ordered  - Administer IV fluids as ordered to ensure adequate hydration  - Administer ordered medications as needed  - Offer frequent toileting  - Follow urinary retention protocol if ordered  Outcome: Progressing  Goal: Absence of urinary retention  Description: INTERVENTIONS:  - Assess patients ability to void and empty bladder  - Monitor I/O  - Bladder scan as needed  - Discuss with physician/AP medications to alleviate retention as needed  - Discuss catheterization for long term situations as appropriate  Outcome: Progressing Problem: METABOLIC, FLUID AND ELECTROLYTES - ADULT  Goal: Electrolytes maintained within normal limits  Description: INTERVENTIONS:  - Monitor labs and assess patient for signs and symptoms of electrolyte imbalances  - Administer electrolyte replacement as ordered  - Monitor response to electrolyte replacements, including repeat lab results as appropriate  - Instruct patient on fluid and nutrition as appropriate  Outcome: Progressing  Goal: Fluid balance maintained  Description: INTERVENTIONS:  - Monitor labs   - Monitor I/O and WT  - Instruct patient on fluid and nutrition as appropriate  - Assess for signs & symptoms of volume excess or deficit  Outcome: Progressing  Goal: Glucose maintained within target range  Description: INTERVENTIONS:  - Monitor Blood Glucose as ordered  - Assess for signs and symptoms of hyperglycemia and hypoglycemia  - Administer ordered medications to maintain glucose within target range  - Assess nutritional intake and initiate nutrition service referral as needed  Outcome: Progressing     Problem: SKIN/TISSUE INTEGRITY - ADULT  Goal: Skin Integrity remains intact(Skin Breakdown Prevention)  Description: Assess:  -Perform Wale assessment every 8  -Clean and moisturize skin every 8  -Inspect skin when repositioning, toileting, and assisting with ADLS  -Assess under medical devices such as Masimo band  -Assess extremities for adequate circulation and sensation     Bed Management:  -Have minimal linens on bed & keep smooth, unwrinkled  -Change linens as needed when moist or perspiring  -Avoid sitting or lying in one position for more than 2 hours while in bed  -Keep HOB at 60 degrees     Toileting:  -Offer bedside commode  -Assess for incontinence every 2  -Use incontinent care products after each incontinent episode such as pads    Activity:  -Mobilize patient 4 times a day  -Encourage activity and walks on unit  -Encourage or provide ROM exercises   -Turn and reposition patient every 2 Hours  -Use appropriate equipment to lift or move patient in bed  -Instruct/ Assist with weight shifting every 2 when out of bed in chair  -Consider limitation of chair time 2 hour intervals    Skin Care:  -Avoid use of baby powder, tape, friction and shearing, hot water or constrictive clothing  -Relieve pressure over bony prominences using pillows  -Do not massage red bony areas    Next Steps:  -Teach patient strategies to minimize risks such as weight shifting  -Consider consults to  interdisciplinary teams such as PT  Outcome: Progressing  Goal: Incision(s), wounds(s) or drain site(s) healing without S/S of infection  Description: INTERVENTIONS  - Assess and document dressing, incision, wound bed, drain sites and surrounding tissue  - Provide patient and family education  - Perform skin care/dressing changes every 8  Outcome: Progressing  Goal: Pressure injury heals and does not worsen  Description: Interventions:  - Implement low air loss mattress or specialty surface (Criteria met)  - Apply silicone foam dressing  - Instruct/assist with weight shifting every 30 minutes when in chair   - Limit chair time to 2 hour intervals  - Use special pressure reducing interventions such as pillows when in chair   - Apply fecal or urinary incontinence containment device   - Perform passive or active ROM every 2  - Turn and reposition patient & offload bony prominences every 2 hours   - Utilize friction reducing device or surface for transfers   - Consider consults to  interdisciplinary teams such as PT  - Use incontinent care products after each incontinent episode such as pads  - Consider nutrition services referral as needed  Outcome: Progressing     Problem: HEMATOLOGIC - ADULT  Goal: Maintains hematologic stability  Description: INTERVENTIONS  - Assess for signs and symptoms of bleeding or hemorrhage  - Monitor labs  - Administer supportive blood products/factors as ordered and appropriate  Outcome: Progressing Problem: MUSCULOSKELETAL - ADULT  Goal: Maintain or return mobility to safest level of function  Description: INTERVENTIONS:  - Assess patient's ability to carry out ADLs; assess patient's baseline for ADL function and identify physical deficits which impact ability to perform ADLs (bathing, care of mouth/teeth, toileting, grooming, dressing, etc )  - Assess/evaluate cause of self-care deficits   - Assess range of motion  - Assess patient's mobility  - Assess patient's need for assistive devices and provide as appropriate  - Encourage maximum independence but intervene and supervise when necessary  - Involve family in performance of ADLs  - Assess for home care needs following discharge   - Consider OT consult to assist with ADL evaluation and planning for discharge  - Provide patient education as appropriate  Outcome: Progressing  Goal: Maintain proper alignment of affected body part  Description: INTERVENTIONS:  - Support, maintain and protect limb and body alignment  - Provide patient/ family with appropriate education  Outcome: Progressing     Problem: Nutrition/Hydration-ADULT  Goal: Nutrient/Hydration intake appropriate for improving, restoring or maintaining nutritional needs  Description: Monitor and assess patient's nutrition/hydration status for malnutrition  Collaborate with interdisciplinary team and initiate plan and interventions as ordered  Monitor patient's weight and dietary intake as ordered or per policy  Utilize nutrition screening tool and intervene as necessary  Determine patient's food preferences and provide high-protein, high-caloric foods as appropriate       INTERVENTIONS:  - Monitor oral intake, urinary output, labs, and treatment plans  - Assess nutrition and hydration status and recommend course of action  - Evaluate amount of meals eaten  - Assist patient with eating if necessary   - Allow adequate time for meals  - Recommend/ encourage appropriate diets, oral nutritional supplements, and vitamin/mineral supplements  - Order, calculate, and assess calorie counts as needed  - Recommend, monitor, and adjust tube feedings and TPN/PPN based on assessed needs  - Assess need for intravenous fluids  - Provide specific nutrition/hydration education as appropriate  - Include patient/family/caregiver in decisions related to nutrition  Outcome: Progressing

## 2021-07-24 NOTE — PROGRESS NOTES
[address]  Progress Note Ramona Grijalva 1945, 64 y o  male MRN: [id number]  Unit/Bed#: -01 Encounter: [id number]  Primary Care Provider: Enedelia العراقي MD   Date and time admitted to hospital: 7/18/2021  9:06 PM    * Acute on chronic diastolic CHF (congestive heart failure) ([address] )  Assessment & Plan  · Lower extremity 3+ pitting edema to bilateral lower extremities extending up to scrotum, bibasilar rales  · Chest x-ray:  Atelectasis  · BNP:  4500  · Daily weights  · I&Os  · Lasix IV twice daily for acute diuresis for now  Not much urine output with 40 IV b i d  Of Lasix  Decreased lasix dose to 40 from 80 IV bid however patient seems to have decreased urinary output so increased lasix back to 80 BID  Patient also received 1 dose of torsemide this afternoon  Not having a lot of urinary output on Closely monitor urine output  Hold Norvasc  · TTE: LV function showing 55% EF  · Low-salt diet, fluid restriction    Patient started on Bumex drip on 07/22 given poor response to Lasix IV as per cardiology recommendations  Will continue to monitor I&Os  Weight coming down and patient seems to be responding to Bumex drip well   Bumex drip rate increased 7/24 by Nephrology    Hypothyroidism  Assessment & Plan  Patient with a history of hypothyroidism and on levothyroxine 100 mcg at home  TSH done on admission elevated to 14 with ft4 at 1 2  Will increase levothyroxine dose to 112 mcg   Patient will need to obtain repeat TSH levels in 4-6 weeks    Morbid obesity with BMI of 40 0-44 9, adult [facility])  Assessment & Plan  Consult placed to dietitian    History of TIA (transient ischemic attack)  Assessment & Plan  · Resumed plavix, no active bleeding on endoscopy  · Continue statin    GI bleeding  Assessment & Plan  · Stool Hemoccult positive  · Hemoglobin 8 5  · EGD and Colonoscopy results noted  · Okay to start Plavix from GI standpoint  · Okay to resume diet    Essential hypertension  Assessment & Plan  · BP trending up  · Losartan on hold due to NORAH  · Continue metoprolol  · Started on low-dose amlodipine by Cardiology  · Monitor blood pressure    Ambulatory dysfunction  Assessment & Plan  · Patient has been having frequent falls, and today he could not get up from the floor  EMS was concerned that patient may be in an unsafe situation as patient's wife is having difficulty caring for him  · Fall precautions  · Case management consult  · PT OT consult - discharge to rehab    Moderate aortic stenosis by prior echocardiogram  Assessment & Plan  · Echocardiogram May 2021 at [facility]:  EF 55-60%  Normal diastolic function  Consistent with moderate AS, moderate tricuspid regurgitation  Moderately elevated estimated PA systolic pressure  · He takes Lasix 40 mg orally daily at home however was started on IV diuresis during this admission given fluid overload  · Patient currently on Bumex drip since 7/22    Type 2 diabetes mellitus with kidney complication, without long-term current use of insulin [facility])  Assessment & Plan  Lab Results   Component Value Date    HGBA1C 6 6 (H) 07/19/2021       Recent Labs     07/23/21  1545 07/23/21  2049 07/24/21  0717 07/24/21  1102   POCGLU 158* 113 93 138       Blood Sugar Average: Last 72 hrs:  (P) 125   · Diabetic diet  · Fingerstick blood sugar sliding scale coverage  · Hold home oral agents  ·  hemoglobin A1c 6 6  Blood sugars are currently well controlled    PAF (paroxysmal atrial fibrillation) ([facility])  Assessment & Plan  · EKG: normal sinus rhythm  · He is not on anticoagulation  · Continue metoprolol    Acute kidney injury superimposed on chronic kidney disease [facility])  Assessment & Plan  Lab Results   Component Value Date    EGFR 33 07/24/2021    EGFR 29 07/23/2021    EGFR 30 07/22/2021    CREATININE 1 91 (H) 07/24/2021    CREATININE 2 11 (H) 07/23/2021    CREATININE 2 06 (H) 07/22/2021     · Baseline creatinine around 1 4    Suspect partially due to losartan-will hold  · Hold off on fluids due to anasarca  · Daily metabolic panel and trend creatinine during diuresis  · Caution with nephrotoxins and avoid hypotension  · Improving, continue to monitor    Nephrology consulted and recommendations appreciated  Currently on Bumex drip  Will need to follow-up with nephrology as outpatient    Acute on chronic anemia  Assessment & Plan  · Hemoglobin is 8 5 with baseline around 13 admission now trended down to 7 8 this morning  · Stool Hemoccult is positive  · No evidence of gross bleeding noted  · Continue iron  · S/p EGD and colonoscopy on 7/20  "C1 M3 Og's esophagus-biopsies taken to rule out dysplasia  Small hiatal hernia otherwise normal stomach on direct and retroflexed views  Random gastric biopsies taken to rule out H pylori  Normal visualized portion of duodenum from duodenal bulb to D3  Random biopsies taken to rule out celiac disease "    "No evidence of active bleeding  Terminal ileum normal  Scattered diverticula throughout the colon left greater than right without evidence of bleeding  Area of erythema/ulceration/hemorrhage likely from trauma from enema versus old diverticular bleed  Large internal/external hemorrhoids"    Okay to resume plavix from GI standpoint  Patient okay to eat from GI standpoint        Alzheimer's dementia ([facility] [address] )  Assessment & Plan  · At baseline  · Continue Aricept  · Supportive care  · CT head showing encephalomalacia  · MRI showing the same    Hypokalemia  Assessment & Plan  · Potassium 2 9  · Repleted with 40 mEq orally and 20 mEq IV in the ER  · Monitor and replete accordingly    IMPROVED        VTE Pharmacologic Prophylaxis:   Pharmacologic: Pharmacologic VTE Prophylaxis contraindicated due to anemia and presumed GIB  Mechanical VTE Prophylaxis in Place: Yes    Patient Centered Rounds: I have performed bedside rounds with nursing staff today      Discussions with Specialists or Other Care Team Provider: nephrology, cardiology    Education and Discussions with Family / Patient: patient and family at bedside    Time Spent for Care: 30 minutes  More than 50% of total time spent on counseling and coordination of care as described above  Current Length of Stay: 6 day(s)    Current Patient Status: Inpatient   Certification Statement: The patient will continue to require additional inpatient hospital stay due to IV diuresis    Discharge Plan: rehab    Code Status: Level 1 - Full Code      Subjective:   Patient seen examined at bedside  Appears to be weaker today however patient states he feels fine  Responses that not seem very reliable today however  Review of Systems   Unable to perform ROS: Dementia          Objective:     Vitals:   Temp (24hrs), Av 8 °F (36 6 °C), Min:97 1 °F (36 2 °C), Max:99 1 °F (37 3 °C)    Temp:  [97 1 °F (36 2 °C)-99 1 °F (37 3 °C)] 97 1 °F (36 2 °C)  HR:  [58-65] 64  Resp:  [13-20] 20  BP: (138-171)/(56-84) 156/66  SpO2:  [93 %-98 %] 95 %  Body mass index is 40 68 kg/m²  Input and Output Summary (last 24 hours): Intake/Output Summary (Last 24 hours) at 2021 1338  Last data filed at 2021 0914  Gross per 24 hour   Intake 390 ml   Output 1650 ml   Net -1260 ml       Physical Exam:     Physical Exam  Vitals and nursing note reviewed  Constitutional:       Appearance: He is well-developed  He is obese  Comments: Sluggish repeat   HENT:      Head: Normocephalic and atraumatic  Eyes:      Conjunctiva/sclera: Conjunctivae normal    Cardiovascular:      Rate and Rhythm: Normal rate and regular rhythm  Heart sounds: No murmur heard  Comments: 3+ pitting edema on dependent areas over back, buttocks, legs - edema seems to be improving from yesterday  Pulmonary:      Effort: Pulmonary effort is normal  No respiratory distress  Comments: Bilateral rales  Comfortable on 1 L of O2 via nasal cannula  Abdominal:      Palpations: Abdomen is soft  Tenderness: There is no abdominal tenderness  Musculoskeletal:      Cervical back: Neck supple  Skin:     General: Skin is warm and dry  Neurological:      Mental Status: He is alert  Additional Data:     Labs:    Results from last 7 days   Lab Units 07/24/21  0509   WBC Thousand/uL 6 10   HEMOGLOBIN g/dL 8 1*   HEMATOCRIT % 28 5*   PLATELETS Thousands/uL 302   NEUTROS PCT % 74   LYMPHS PCT % 10*   MONOS PCT % 12   EOS PCT % 3     Results from last 7 days   Lab Units 07/24/21  0509 07/23/21  0535   SODIUM mmol/L 147* 145   POTASSIUM mmol/L 3 1* 3 5   CHLORIDE mmol/L 105 105   CO2 mmol/L 36* 31   BUN mg/dL 29* 28*   CREATININE mg/dL 1 91* 2 11*   ANION GAP mmol/L 6 9   CALCIUM mg/dL 8 1* 7 9*   ALBUMIN g/dL  --  3 2*   TOTAL BILIRUBIN mg/dL  --  0 73   ALK PHOS U/L  --  93   ALT U/L  --  18   AST U/L  --  14   GLUCOSE RANDOM mg/dL 97 94     Results from last 7 days   Lab Units 07/18/21  2114   INR  1 22*     Results from last 7 days   Lab Units 07/24/21  1102 07/24/21  0717 07/23/21  2049 07/23/21  1545 07/23/21  1113 07/23/21  0726 07/22/21  2049 07/22/21  1542 07/22/21  1104 07/22/21  0702 07/21/21  2103 07/21/21  1601   POC GLUCOSE mg/dl 138 93 113 158* 98 91 112 124 150* 100 109 123     Results from last 7 days   Lab Units 07/19/21  0449   HEMOGLOBIN A1C % 6 6*     Results from last 7 days   Lab Units 07/18/21  2114   LACTIC ACID mmol/L 1 1           * I Have Reviewed All Lab Data Listed Above  * Additional Pertinent Lab Tests Reviewed:  [address] Admission Reviewed    Imaging:    Reviewed    Recent Cultures (last 7 days):           Last 24 Hours Medication List:   Current Facility-Administered Medications   Medication Dose Route Frequency Provider Last Rate    acetaminophen  650 mg Oral Q4H PRN SEYMOUR Mcallister      albumin human  25 g Intravenous TID SEYMOUR Roper      amLODIPine  2 5 mg Oral Daily SEYMOUR Roper      atorvastatin  80 mg Oral Daily With Dinner SEYMOUR Beltran      bumetanide  1 mg/hr Intravenous Continuous Robert Ferreira PA-C 1 mg/hr (07/24/21 1103)    clopidogrel  75 mg Oral Daily Lindy Baires MD      donepezil  5 mg Oral HS SEYMOUR Beltran      insulin lispro  1-6 Units Subcutaneous TID AC Easton Reynoso MD      levothyroxine  112 mcg Oral Early Morning Lindy Holliday MD      metoprolol succinate  50 mg Oral Daily SEYMOUR Beltran      pantoprazole  40 mg Oral Early Morning SEYMOUR Beltran      QUEtiapine  25 mg Oral HS SEYMOUR Montez          Today, Patient Was Seen By: Moody English MD    ** Please Note: Dictation voice to text software may have been used in the creation of this document   **

## 2021-07-24 NOTE — PLAN OF CARE
Problem: Potential for Falls  Goal: Patient will remain free of falls  Description: INTERVENTIONS:  - Educate patient/family on patient safety including physical limitations  - Instruct patient to call for assistance with activity   - Consult OT/PT to assist with strengthening/mobility   - Keep Call bell within reach  - Keep bed low and locked with side rails adjusted as appropriate  - Keep care items and personal belongings within reach  - Initiate and maintain comfort rounds  - Make Fall Risk Sign visible to staff  - Obtain necessary fall risk management equipment  - Apply yellow socks and bracelet for high fall risk patients  - Consider moving patient to room near nurses station  Outcome: Progressing     Problem: MOBILITY - ADULT  Goal: Maintain or return to baseline ADL function  Description: INTERVENTIONS:  -  Assess patient's ability to carry out ADLs; assess patient's baseline for ADL function and identify physical deficits which impact ability to perform ADLs (bathing, care of mouth/teeth, toileting, grooming, dressing, etc )  - Assess/evaluate cause of self-care deficits   - Assess range of motion  - Assess patient's mobility; develop plan if impaired  - Assess patient's need for assistive devices and provide as appropriate  - Encourage maximum independence but intervene and supervise when necessary  - Involve family in performance of ADLs  - Assess for home care needs following discharge   - Consider OT consult to assist with ADL evaluation and planning for discharge  - Provide patient education as appropriate  Outcome: Progressing  Goal: Maintains/Returns to pre admission functional level  Description: INTERVENTIONS:  - Perform BMAT or MOVE assessment daily    - Set and communicate daily mobility goal to care team and patient/family/caregiver     - Collaborate with rehabilitation services on mobility goals if consulted  - Record patient progress and toleration of activity level   Outcome: Progressing Problem: Prexisting or High Potential for Compromised Skin Integrity  Goal: Skin integrity is maintained or improved  Description: INTERVENTIONS:  - Identify patients at risk for skin breakdown  - Assess and monitor skin integrity  - Assess and monitor nutrition and hydration status  - Monitor labs   - Assess for incontinence   - Turn and reposition patient  - Assist with mobility/ambulation  - Relieve pressure over bony prominences  - Avoid friction and shearing  - Provide appropriate hygiene as needed including keeping skin clean and dry  - Evaluate need for skin moisturizer/barrier cream  - Collaborate with interdisciplinary team   - Patient/family teaching  - Consider wound care consult   Outcome: Progressing     Problem: NEUROSENSORY - ADULT  Goal: Achieves stable or improved neurological status  Description: INTERVENTIONS  - Monitor and report changes in neurological status  - Monitor vital signs such as temperature, blood pressure, glucose, and any other labs ordered   - Initiate measures to prevent increased intracranial pressure  - Monitor for seizure activity and implement precautions if appropriate      Outcome: Progressing  Goal: Achieves maximal functionality and self care  Description: INTERVENTIONS  - Monitor swallowing and airway patency with patient fatigue and changes in neurological status  - Encourage and assist patient to increase activity and self care     - Encourage visually impaired, hearing impaired and aphasic patients to use assistive/communication devices  Outcome: Progressing     Problem: CARDIOVASCULAR - ADULT  Goal: Maintains optimal cardiac output and hemodynamic stability  Description: INTERVENTIONS:  - Monitor I/O, vital signs and rhythm  - Monitor for S/S and trends of decreased cardiac output  - Administer and titrate ordered vasoactive medications to optimize hemodynamic stability  - Assess quality of pulses, skin color and temperature  - Assess for signs of decreased coronary artery perfusion  - Instruct patient to report change in severity of symptoms  Outcome: Progressing  Goal: Absence of cardiac dysrhythmias or at baseline rhythm  Description: INTERVENTIONS:  - Continuous cardiac monitoring, vital signs, obtain 12 lead EKG if ordered  - Administer antiarrhythmic and heart rate control medications as ordered  - Monitor electrolytes and administer replacement therapy as ordered  Outcome: Progressing     Problem: RESPIRATORY - ADULT  Goal: Achieves optimal ventilation and oxygenation  Description: INTERVENTIONS:  - Assess for changes in respiratory status  - Assess for changes in mentation and behavior  - Position to facilitate oxygenation and minimize respiratory effort  - Oxygen administered by appropriate delivery if ordered  - Initiate smoking cessation education as indicated  - Encourage broncho-pulmonary hygiene including cough, deep breathe, Incentive Spirometry  - Assess the need for suctioning and aspirate as needed  - Assess and instruct to report SOB or any respiratory difficulty  - Respiratory Therapy support as indicated  Outcome: Progressing     Problem: GASTROINTESTINAL - ADULT  Goal: Minimal or absence of nausea and/or vomiting  Description: INTERVENTIONS:  - Administer IV fluids if ordered to ensure adequate hydration  - Maintain NPO status until nausea and vomiting are resolved  - Nasogastric tube if ordered  - Administer ordered antiemetic medications as needed  - Provide nonpharmacologic comfort measures as appropriate  - Advance diet as tolerated, if ordered  - Consider nutrition services referral to assist patient with adequate nutrition and appropriate food choices  Outcome: Progressing  Goal: Maintains or returns to baseline bowel function  Description: INTERVENTIONS:  - Assess bowel function  - Encourage oral fluids to ensure adequate hydration  - Administer IV fluids if ordered to ensure adequate hydration  - Administer ordered medications as needed  - Encourage mobilization and activity  - Consider nutritional services referral to assist patient with adequate nutrition and appropriate food choices  Outcome: Progressing  Goal: Maintains adequate nutritional intake  Description: INTERVENTIONS:  - Monitor percentage of each meal consumed  - Identify factors contributing to decreased intake, treat as appropriate  - Assist with meals as needed  - Monitor I&O, weight, and lab values if indicated  - Obtain nutrition services referral as needed  Outcome: Progressing  Goal: Oral mucous membranes remain intact  Description: INTERVENTIONS  - Assess oral mucosa and hygiene practices  - Implement preventative oral hygiene regimen  - Implement oral medicated treatments as ordered  - Initiate Nutrition services referral as needed  Outcome: Progressing     Problem: GENITOURINARY - ADULT  Goal: Maintains or returns to baseline urinary function  Description: INTERVENTIONS:  - Assess urinary function  - Encourage oral fluids to ensure adequate hydration if ordered  - Administer IV fluids as ordered to ensure adequate hydration  - Administer ordered medications as needed  - Offer frequent toileting  - Follow urinary retention protocol if ordered  Outcome: Progressing  Goal: Absence of urinary retention  Description: INTERVENTIONS:  - Assess patients ability to void and empty bladder  - Monitor I/O  - Bladder scan as needed  - Discuss with physician/AP medications to alleviate retention as needed  - Discuss catheterization for long term situations as appropriate  Outcome: Progressing     Problem: METABOLIC, FLUID AND ELECTROLYTES - ADULT  Goal: Electrolytes maintained within normal limits  Description: INTERVENTIONS:  - Monitor labs and assess patient for signs and symptoms of electrolyte imbalances  - Administer electrolyte replacement as ordered  - Monitor response to electrolyte replacements, including repeat lab results as appropriate  - Instruct patient on fluid and nutrition as appropriate  Outcome: Progressing  Goal: Fluid balance maintained  Description: INTERVENTIONS:  - Monitor labs   - Monitor I/O and WT  - Instruct patient on fluid and nutrition as appropriate  - Assess for signs & symptoms of volume excess or deficit  Outcome: Progressing  Goal: Glucose maintained within target range  Description: INTERVENTIONS:  - Monitor Blood Glucose as ordered  - Assess for signs and symptoms of hyperglycemia and hypoglycemia  - Administer ordered medications to maintain glucose within target range  - Assess nutritional intake and initiate nutrition service referral as needed  Outcome: Progressing     Problem: SKIN/TISSUE INTEGRITY - ADULT  Goal: Skin Integrity remains intact(Skin Breakdown Prevention)  Description: Assess:  -Perform Wale assessment   -Clean and moisturize skin   -Inspect skin when repositioning, toileting, and assisting with ADLS  -Assess under medical devices   -Assess extremities for adequate circulation and sensation     Bed Management:  -Have minimal linens on bed & keep smooth, unwrinkled  -Change linens as needed when moist or perspiring  -Avoid sitting or lying in one position for more  -Keep HOB     Toileting:  -Offer bedside commode  -Assess for incontinence  -Use incontinent care products after each incontinent episode    Activity:      Skin Care:  -Avoid use of baby powder, tape, friction and shearing, hot water or constrictive clothing  -Relieve pressure over bony prominences   -Do not massage red bony areas    Next Steps:  -Consider consults to  interdisciplinary teams   Outcome: Progressing  Goal: Incision(s), wounds(s) or drain site(s) healing without S/S of infection  Description: INTERVENTIONS  - Assess and document dressing, incision, wound bed, drain sites and surrounding tissue  - Provide patient and family education  - Perform skin care/dressing changes   Outcome: Progressing  Goal: Pressure injury heals and does not worsen  Description: Interventions:  - Implement low air loss mattress or specialty surface (Criteria met)  - Apply silicone foam dressing  - Instruct/assist with weight shifting   - Limit chair time  - Use special pressure reducing interventions    - Apply fecal or urinary incontinence containment device   - Perform passive or active ROM   - Turn and reposition patient & offload bony prominences   - Utilize friction reducing device or surface for transfers   - Consider consults to  interdisciplinary team  - Use incontinent care products after each incontinent episode   - Consider nutrition services referral as needed  Outcome: Progressing     Problem: HEMATOLOGIC - ADULT  Goal: Maintains hematologic stability  Description: INTERVENTIONS  - Assess for signs and symptoms of bleeding or hemorrhage  - Monitor labs  - Administer supportive blood products/factors as ordered and appropriate  Outcome: Progressing     Problem: MUSCULOSKELETAL - ADULT  Goal: Maintain or return mobility to safest level of function  Description: INTERVENTIONS:  - Assess patient's ability to carry out ADLs; assess patient's baseline for ADL function and identify physical deficits which impact ability to perform ADLs (bathing, care of mouth/teeth, toileting, grooming, dressing, etc )  - Assess/evaluate cause of self-care deficits   - Assess range of motion  - Assess patient's mobility  - Assess patient's need for assistive devices and provide as appropriate  - Encourage maximum independence but intervene and supervise when necessary  - Involve family in performance of ADLs  - Assess for home care needs following discharge   - Consider OT consult to assist with ADL evaluation and planning for discharge  - Provide patient education as appropriate  Outcome: Progressing  Goal: Maintain proper alignment of affected body part  Description: INTERVENTIONS:  - Support, maintain and protect limb and body alignment  - Provide patient/ family with appropriate education  Outcome: Progressing     Problem: Nutrition/Hydration-ADULT  Goal: Nutrient/Hydration intake appropriate for improving, restoring or maintaining nutritional needs  Description: Monitor and assess patient's nutrition/hydration status for malnutrition  Collaborate with interdisciplinary team and initiate plan and interventions as ordered  Monitor patient's weight and dietary intake as ordered or per policy  Utilize nutrition screening tool and intervene as necessary  Determine patient's food preferences and provide high-protein, high-caloric foods as appropriate       INTERVENTIONS:  - Monitor oral intake, urinary output, labs, and treatment plans  - Assess nutrition and hydration status and recommend course of action  - Evaluate amount of meals eaten  - Assist patient with eating if necessary   - Allow adequate time for meals  - Recommend/ encourage appropriate diets, oral nutritional supplements, and vitamin/mineral supplements  - Order, calculate, and assess calorie counts as needed  - Recommend, monitor, and adjust tube feedings and TPN/PPN based on assessed needs  - Assess need for intravenous fluids  - Provide specific nutrition/hydration education as appropriate  - Include patient/family/caregiver in decisions related to nutrition  Outcome: Progressing

## 2021-07-24 NOTE — ASSESSMENT & PLAN NOTE
· Echocardiogram May 2021 at [facility]:  EF 55-60%  Normal diastolic function  Consistent with moderate AS, moderate tricuspid regurgitation  Moderately elevated estimated PA systolic pressure    · He takes Lasix 40 mg orally daily at home however was started on IV diuresis during this admission given fluid overload  · Patient currently on Bumex drip since 7/22

## 2021-07-24 NOTE — PROGRESS NOTES
Progress Note - Nephrology   Alaina Reed 64 y o  male MRN: [id number]  Unit/Bed#: -01 Encounter: [id number]    Assessment and Plan    Alaina Reed is a 63 yo M with HTN, DM, Alzheimer disease admitted to [facility] on 7/18/21 after fall, admitted for NORAH, hypokalemia, hypervolemia  Nephrology is consulted for evaluation of acute kidney injury present on admission with creatinine 2 21 mg/dL superimposed on chronic kidney disease stage IIIB  Brief A/P: Patient is still requiring oxygen, lung sounds are decreased, with peripheral edema  Renal function is improved sCr 2 2->1 9  Will increase diuresis to bumetanide 1 mg/hr  See below  1  Acute kidney injury pressure remission superimposed on chronic kidney disease  · Presenting BUN 25 mg/dL with creatinine 2 21 mg/dL estimated GFR of 28 mL/min on 07/18/2021  · Etiology attributed to acute tubular necrosis due to blood loss in patient with underlying intrinsic renal disease due to vascular disease and long-term diabetes  · Losartan 100 mg daily was held  · Nonoliguric with 1000 mL urine output yesterday, -3086 mL since admission  · Improved to BUN of 29 mg/dL with creatinine 1 91 mg/dL estimated GFR 33 mL/min on 07/24/2021     2  Chronic kidney disease, stage IIIB with baseline creatinine ranging from 1 4-1 7 mg/dL  · Not currently followed by outpatient Nephrology  · With progression in the past year  · Microalbumin to creatinine ratio was 79 milligram/gram creatinine  · Losartan is being held due to acute kidney injury  · Will need outpatient nephrology follow-up  3  Volume overload  · Weight is decreased 17 lb since admission, per bed scale  · On bumetanide infusion and albumin  · Patient is still requiring oxygen, lung sounds are decreased, with peripheral edema, examines wet  Renal function is improved  Will increase diuresis      4  Anemia due to GI bleed superimposed on anemia of chronic disease  · Hemoglobin 8 1 grams/deciliter with hematocrit 28 5% on 07/24/2021  · Status post Venofer on 07/23/2021  Will monitor  · Monitor for need for blood transfusion with hemoglobin less than 7, per primary  5  Diabetes mellitus, 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  · Continue fingerstick blood glucose and insulin, per primary  6  Hypokalemia  · Potassium 3 1 millimole per L with magnesium 2 2 mg/dL on 07/24/2021  Replete per primary  7  Hypertension  · Elevated this morning, 171/84, but was well controlled with systolics 702H 351L over diastolics 83Q yesterday  · Continue amlodipine 2 5 mg daily metoprolol succinate 50 mg once daily  · Increase amlodipine if needed  8  Alzheimer disease  · On donepezil 5 mg daily  Poor historian  Follow up reason for today's visit: NORAH (POA) on CKD    Acute on chronic diastolic CHF (congestive heart failure) ([facility] )    Patient Active Problem List   Diagnosis    Hypokalemia    Alzheimer's dementia ([place] [id number] )    Acute on chronic anemia    Acute kidney injury superimposed on chronic kidney disease ([place] [address] )    Frequent falls    [facility]oxysmal [facility] fibrillation) [facility]    Type 2 diabetes mellitus with kidney complication, without long-term current use of insulin ([facility])    Moderate aortic stenosis by prior echocardiogram    Ambulatory dysfunction    Essential hypertension    Acute on chronic diastolic CHF (congestive heart failure) ([facility])    GI bleeding    History of TIA (transient ischemic attack)    Morbid obesity with BMI of 40 0-44 9, adult ([facility] )    Hypothyroidism         Subjective:   Patient makes eye contact but does not answer questions, with the exception of "no" when he was asked if he utilizes oxygen at home  On 2 L oxygen currently  Objective:     Vitals: Blood pressure (!) 171/84, pulse 58, temperature (!) 97 1 °F (36 2 °C), resp  rate 20, height 5' 7" (1 702 m), weight 118 kg (259 lb 11 2 oz), SpO2 94 %  ,Body mass index is 40 68 kg/m²      Weight (last 2 days) Date/Time   Weight    07/24/21 0509   118 (259 7)    07/23/21 0548   119 (261 47)    07/22/21 0600   120 (265 43)                Intake/Output Summary (Last 24 hours) at 7/24/2021 0906  Last data filed at 7/24/2021 0854  Gross per 24 hour   Intake 510 ml   Output 850 ml   Net -340 ml     I/O last 3 completed shifts: In: 610 [P O :360; IV Piggyback:250]  Out: 9387 [Urine:1816]         Physical Exam: BP (!) 171/84   Pulse 58   Temp (!) 97 1 °F (36 2 °C)   Resp 20   Ht 5' 7" (1 702 m)   Wt 118 kg (259 lb 11 2 oz)   SpO2 94%   BMI 40 68 kg/m²     General Appearance:    Alert, cooperative, no distress, appears stated age, obese   Head:    Normocephalic, without obvious abnormality, atraumatic   Eyes:    Conjunctiva/corneas clear   Ears:    Normal external ears   Nose:   Nasal cannula Nares normal, septum midline, mucosa normal, no drainage  or sinus tenderness   Throat:   Lips, mucosa, and tongue normal; teeth and gums normal   Neck:   Supple, symmetrical   Back:     Symmetric, no curvature, ROM normal, no CVA tenderness   Lungs: On 2 L supplemental oxygen, Lung sounds decreased, respirations unlabored   Chest wall:    No tenderness or deformity   Heart:    Regular rate and rhythm, S1 and S2 normal, no murmur, rub   or gallop   Abdomen:     Soft, non-tender, bowel sounds active   Extremities:   Extremities normal, atraumatic, no cyanosis, 1+ bilateral lower extremity edema   Skin:   Skin color, texture, turgor normal, no rashes or lesions   Lymph nodes:   Cervical normal   Neurologic:  Makes eye contact, does not answer questions            Lab, Imaging and other studies: I have personally reviewed pertinent labs    CBC:   Lab Results   Component Value Date    WBC 6 10 07/24/2021    HGB 8 1 (L) 07/24/2021    HCT 28 5 (L) 07/24/2021    MCV 92 07/24/2021     07/24/2021    MCH 26 0 (L) 07/24/2021    MCHC 28 4 (L) 07/24/2021    RDW 16 1 (H) 07/24/2021    MPV 10 3 07/24/2021    NRBC 0 07/24/2021     CMP: Lab Results   Component Value Date    K 3 1 (L) 07/24/2021     07/24/2021    CO2 36 (H) 07/24/2021    BUN 29 (H) 07/24/2021    CREATININE 1 91 (H) 07/24/2021    CALCIUM 8 1 (L) 07/24/2021    EGFR 33 07/24/2021         Results from last 7 days   Lab Units 07/24/21  0509 07/23/21  0535 07/22/21  0623 07/18/21  2114   POTASSIUM mmol/L 3 1* 3 5 3 6 2 9*   CHLORIDE mmol/L 105 105 104 105   CO2 mmol/L 36* 31 28 30   BUN mg/dL 29* 28* 22 25   CREATININE mg/dL 1 91* 2 11* 2 06* 2 21*   CALCIUM mg/dL 8 1* 7 9* 7 6* 8 4   ALK PHOS U/L  --  93 100 120*   ALT U/L  --  18 18 25   AST U/L  --  14 17 27         Phosphorus:   Lab Results   Component Value Date    PHOS 3 4 07/24/2021     Magnesium:   Lab Results   Component Value Date    MG 2 2 07/24/2021     Urinalysis: No results found for: COLORU, CLARITYU, SPECGRAV, PHUR, LEUKOCYTESUR, NITRITE, PROTEINUA, GLUCOSEU, KETONESU, BILIRUBINUR, BLOODU  Ionized Calcium: No results found for: CAION  Coagulation: No results found for: PT, INR, APTT  Troponin: No results found for: TROPONINI  ABG: No results found for: PHART, NEC2YXN, PO2ART, TXK2HPR, Y6XAEINO, BEART, SOURCE  Radiology review:     IMAGING  Procedure: CT head wo contrast    Result Date: 7/22/2021  Narrative: CT BRAIN - WITHOUT CONTRAST INDICATION:   Altered mental status COMPARISON:  None  TECHNIQUE:  CT examination of the brain was performed  In addition to axial images, sagittal and coronal 2D reformatted images were created and submitted for interpretation  Radiation dose length product (DLP) for this visit:  951 78 mGy-cm   This examination, like all CT scans performed in the [facility], was performed utilizing techniques to minimize radiation dose exposure, including the use of iterative  reconstruction and automated exposure control  IMAGE QUALITY:  Diagnostic   FINDINGS: PARENCHYMA: Encephalomalacia and volume loss involving left gyrus rectus and inferior left frontal lobe as well as right frontal cortical/subcortical white matter  Patchy and confluent hypoattenuation involving periventricular and subcortical white matter  Cerebral volume loss  No intracranial masslike lesion, mass effect or midline shift  No CT signs of acute infarction  No acute parenchymal hemorrhage  VENTRICLES AND EXTRA-AXIAL SPACES:  Normal for the patient's age  VISUALIZED ORBITS AND PARANASAL SINUSES:  Unremarkable  CALVARIUM AND EXTRACRANIAL SOFT TISSUES:  Normal      Impression: 1  New acute intracranial CT abnormality  2   Encephalomalacia involving left gyrus rectus and inferior left frontal lobe as well as right frontal cortical/subcortical white matter  3   Nonspecific extensive white matter change suggesting advanced microangiopathy  Workstation performed: [id number]     Procedure: MRI brain wo contrast    Result Date: 7/22/2021  Narrative: MRI BRAIN WITHOUT CONTRAST INDICATION: confusion  COMPARISON:   None  TECHNIQUE:  Sagittal T1, axial T2, axial FLAIR, axial T1, axial Independence and axial diffusion imaging  IMAGE QUALITY:  Evaluation degraded by motion FINDINGS: BRAIN PARENCHYMA:  There is no discrete mass, mass effect or midline shift  There is no intracranial hemorrhage  There is no evidence of acute infarction and diffusion imaging is unremarkable  Small scattered hyperintensities on T2/FLAIR imaging are noted in the periventricular and subcortical white matter demonstrating an appearance that is statistically most likely to represent advanced microangiopathic change  Focal area of T2 hyperintensity noted in the presenting encephalomalacia in the left frontal region VENTRICLES: Dilation of the ventricular system noted SELLA AND PITUITARY GLAND:  Normal  ORBITS:  Normal  PARANASAL SINUSES:  T2 hyperintensity noted within the both mastoid air cells VASCULATURE:  Evaluation of the major intracranial vasculature demonstrates appropriate flow voids   CALVARIUM AND SKULL BASE:  Normal  EXTRACRANIAL SOFT TISSUES: Normal      Impression: No acute infarct seen Severe periventricular and white matter T2 hyperintensity, may be due to chronic small vessel ischemic changes was also described on the MRI performed at [facility] facility on October 30, 2017 Area of encephalomalacia in the inferior left frontal region Workstation performed: [id number]     Procedure: US kidney and bladder    Result Date: 7/22/2021  Narrative: RENAL ULTRASOUND INDICATION:   ckd  COMPARISON: None TECHNIQUE:   Ultrasound of the retroperitoneum was performed with a curvilinear transducer utilizing volumetric sweeps and still imaging techniques  FINDINGS: KIDNEYS: Symmetric and normal size  Right kidney:  11 3 x 5 7 x 4 7 cm  Left kidney:  11 1 x 5 9 x 4 4 cm  Right kidney Normal echogenicity and contour  No suspicious masses detected  No hydronephrosis  No shadowing calculi  No perinephric fluid collections  Left kidney Normal echogenicity and contour  No suspicious masses detected  No hydronephrosis  No shadowing calculi  No perinephric fluid collections  URETERS: Nonvisualized  BLADDER: Collapsed, limiting evaluation No focal thickening or mass lesions  Bilateral ureteral jets not detected       Impression: Unremarkable exam  Workstation performed: [id number]       Current Facility-Administered Medications   Medication Dose Route Frequency    acetaminophen (TYLENOL) tablet 650 mg  650 mg Oral Q4H PRN    albumin human (FLEXBUMIN) 25 % injection 25 g  25 g Intravenous TID    amLODIPine (NORVASC) tablet 2 5 mg  2 5 mg Oral Daily    atorvastatin (LIPITOR) tablet 80 mg  80 mg Oral Daily With Dinner    bumetanide (BUMEX) 12 5 mg infusion 50 mL  0 5 mg/hr Intravenous Continuous    clopidogrel (PLAVIX) tablet 75 mg  75 mg Oral Daily    donepezil (ARICEPT) tablet 5 mg  5 mg Oral HS    insulin lispro (HumaLOG) 100 units/mL subcutaneous injection 1-6 Units  1-6 Units Subcutaneous TID AC    levothyroxine tablet 112 mcg  112 mcg Oral Early Morning    metoprolol succinate (TOPROL-XL) 24 hr tablet 50 mg  50 mg Oral Daily    pantoprazole (PROTONIX) EC tablet 40 mg  40 mg Oral Early Morning    potassium chloride 20 mEq IVPB (premix)  20 mEq Intravenous Once    potassium chloride oral solution 40 mEq  40 mEq Oral Once    QUEtiapine (SEROquel) tablet 25 mg  25 mg Oral HS     Medications Discontinued During This Encounter   Medication Reason    potassium chloride 40 mEq IVPB (premix) Availability    furosemide (LASIX) tablet 40 mg     Ferrous Gluconate TABS 246 mg     amLODIPine (NORVASC) tablet 2 5 mg     furosemide (LASIX) injection 40 mg     furosemide (LASIX) injection 20 mg     insulin lispro (HumaLOG) 100 units/mL subcutaneous injection 2-12 Units     insulin lispro (HumaLOG) 100 units/mL subcutaneous injection 2-12 Units     fentaNYL (SUBLIMAZE) injection 12 5 mcg Patient Transfer    ondansetron (ZOFRAN) injection 4 mg Patient Transfer    dextrose 5 % infusion     furosemide (LASIX) injection 60 mg     torsemide (DEMADEX) tablet 40 mg     torsemide (DEMADEX) tablet 40 mg     levothyroxine tablet 100 mcg     furosemide (LASIX) injection 80 mg     ferrous gluconate (FERGON) tablet 324 mg        Dillan Vasquez PA-C    Portions of the record may have been created with voice recognition software  Occasional wrong word or "sound a like" substitutions may have occurred due to the inherent limitations of voice recognition software  Read the chart carefully and recognize, using context, where substitutions have occurred

## 2021-07-24 NOTE — ASSESSMENT & PLAN NOTE
Lab Results   Component Value Date    EGFR 33 07/24/2021    EGFR 29 07/23/2021    EGFR 30 07/22/2021    CREATININE 1 91 (H) 07/24/2021    CREATININE 2 11 (H) 07/23/2021    CREATININE 2 06 (H) 07/22/2021     · Baseline creatinine around 1 4    Suspect partially due to losartan-will hold  · Hold off on fluids due to anasarca  · Daily metabolic panel and trend creatinine during diuresis  · Caution with nephrotoxins and avoid hypotension  · Improving, continue to monitor    Nephrology consulted and recommendations appreciated  Currently on Bumex drip  Will need to follow-up with nephrology as outpatient

## 2021-07-25 ENCOUNTER — APPOINTMENT (INPATIENT)
Dept: RADIOLOGY | Facility: HOSPITAL | Age: 76
DRG: 291 | End: 2021-07-25
Payer: COMMERCIAL

## 2021-07-25 PROBLEM — D50.0 IRON DEFICIENCY ANEMIA DUE TO CHRONIC BLOOD LOSS: Status: ACTIVE | Noted: 2018-04-17

## 2021-07-25 PROBLEM — I77.9 PERIPHERAL ARTERIAL OCCLUSIVE DISEASE (HCC): Chronic | Status: ACTIVE | Noted: 2021-04-05

## 2021-07-25 PROBLEM — I77.9 PERIPHERAL ARTERIAL OCCLUSIVE DISEASE (HCC): Status: ACTIVE | Noted: 2021-04-05

## 2021-07-25 PROBLEM — Z86.73 HISTORY OF TIA (TRANSIENT ISCHEMIC ATTACK): Status: RESOLVED | Noted: 2021-07-18 | Resolved: 2021-07-25

## 2021-07-25 PROBLEM — J96.01 ACUTE RESPIRATORY FAILURE WITH HYPOXIA AND HYPERCAPNIA (HCC): Status: ACTIVE | Noted: 2021-07-25

## 2021-07-25 PROBLEM — I48.0 PAF (PAROXYSMAL ATRIAL FIBRILLATION) (HCC): Chronic | Status: ACTIVE | Noted: 2021-07-18

## 2021-07-25 PROBLEM — F03.90 DEMENTIA WITHOUT BEHAVIORAL DISTURBANCE (HCC): Chronic | Status: ACTIVE | Noted: 2020-07-20

## 2021-07-25 PROBLEM — K92.2 GI BLEEDING: Status: RESOLVED | Noted: 2021-07-18 | Resolved: 2021-07-25

## 2021-07-25 PROBLEM — G93.41 ACUTE METABOLIC ENCEPHALOPATHY: Status: ACTIVE | Noted: 2021-07-25

## 2021-07-25 PROBLEM — R29.6 FREQUENT FALLS: Chronic | Status: ACTIVE | Noted: 2021-07-18

## 2021-07-25 PROBLEM — J96.02 ACUTE RESPIRATORY FAILURE WITH HYPOXIA AND HYPERCAPNIA (HCC): Status: ACTIVE | Noted: 2021-07-25

## 2021-07-25 PROBLEM — E03.9 HYPOTHYROIDISM: Chronic | Status: ACTIVE | Noted: 2021-07-21

## 2021-07-25 PROBLEM — F03.90 DEMENTIA WITHOUT BEHAVIORAL DISTURBANCE (HCC): Status: ACTIVE | Noted: 2020-07-20

## 2021-07-25 PROBLEM — E11.29 TYPE 2 DIABETES MELLITUS WITH KIDNEY COMPLICATION, WITHOUT LONG-TERM CURRENT USE OF INSULIN (HCC): Chronic | Status: ACTIVE | Noted: 2021-07-18

## 2021-07-25 PROBLEM — I35.0 MODERATE AORTIC STENOSIS BY PRIOR ECHOCARDIOGRAM: Chronic | Status: ACTIVE | Noted: 2021-07-18

## 2021-07-25 PROBLEM — I10 ESSENTIAL HYPERTENSION: Chronic | Status: ACTIVE | Noted: 2021-07-18

## 2021-07-25 PROBLEM — E87.6 HYPOKALEMIA: Status: RESOLVED | Noted: 2021-07-18 | Resolved: 2021-07-25

## 2021-07-25 PROBLEM — D50.0 IRON DEFICIENCY ANEMIA DUE TO CHRONIC BLOOD LOSS: Chronic | Status: ACTIVE | Noted: 2018-04-17

## 2021-07-25 PROBLEM — E66.01 CLASS 3 SEVERE OBESITY WITH BODY MASS INDEX (BMI) OF 40.0 TO 44.9 IN ADULT (HCC): Chronic | Status: ACTIVE | Noted: 2021-03-26

## 2021-07-25 PROBLEM — E66.01 MORBID OBESITY WITH BMI OF 40.0-44.9, ADULT (HCC): Status: RESOLVED | Noted: 2021-07-19 | Resolved: 2021-07-25

## 2021-07-25 PROBLEM — E66.01 CLASS 3 SEVERE OBESITY WITH BODY MASS INDEX (BMI) OF 40.0 TO 44.9 IN ADULT (HCC): Status: ACTIVE | Noted: 2021-03-26

## 2021-07-25 PROBLEM — G93.40 ACUTE ENCEPHALOPATHY: Status: ACTIVE | Noted: 2021-07-25

## 2021-07-25 PROBLEM — E87.3 METABOLIC ALKALOSIS: Status: ACTIVE | Noted: 2021-07-25

## 2021-07-25 LAB
ALBUMIN SERPL BCP-MCNC: 4.1 G/DL (ref 3.5–5)
ALP SERPL-CCNC: 83 U/L (ref 46–116)
ALT SERPL W P-5'-P-CCNC: 18 U/L (ref 12–78)
ANION GAP SERPL CALCULATED.3IONS-SCNC: 2 MMOL/L (ref 4–13)
ANION GAP SERPL CALCULATED.3IONS-SCNC: 9 MMOL/L (ref 4–13)
ARTERIAL PATENCY WRIST A: YES
ARTERIAL PATENCY WRIST A: YES
AST SERPL W P-5'-P-CCNC: 11 U/L (ref 5–45)
BASE EXCESS BLDA CALC-SCNC: 10.8 MMOL/L
BASE EXCESS BLDA CALC-SCNC: 12.6 MMOL/L
BASOPHILS # BLD AUTO: 0.06 THOUSANDS/ΜL (ref 0–0.1)
BASOPHILS NFR BLD AUTO: 1 % (ref 0–1)
BILIRUB SERPL-MCNC: 1.2 MG/DL (ref 0.2–1)
BILIRUB UR QL STRIP: NEGATIVE
BUN SERPL-MCNC: 27 MG/DL (ref 5–25)
BUN SERPL-MCNC: 29 MG/DL (ref 5–25)
CA-I BLD-SCNC: 1.03 MMOL/L (ref 1.12–1.32)
CALCIUM SERPL-MCNC: 8.6 MG/DL (ref 8.3–10.1)
CALCIUM SERPL-MCNC: 8.7 MG/DL (ref 8.3–10.1)
CHLORIDE SERPL-SCNC: 105 MMOL/L (ref 100–108)
CHLORIDE SERPL-SCNC: 106 MMOL/L (ref 100–108)
CLARITY UR: CLEAR
CO2 SERPL-SCNC: 34 MMOL/L (ref 21–32)
CO2 SERPL-SCNC: 39 MMOL/L (ref 21–32)
COLOR UR: YELLOW
CREAT SERPL-MCNC: 1.9 MG/DL (ref 0.6–1.3)
CREAT SERPL-MCNC: 2.01 MG/DL (ref 0.6–1.3)
EOSINOPHIL # BLD AUTO: 0.09 THOUSAND/ΜL (ref 0–0.61)
EOSINOPHIL NFR BLD AUTO: 1 % (ref 0–6)
ERYTHROCYTE [DISTWIDTH] IN BLOOD BY AUTOMATED COUNT: 16.5 % (ref 11.6–15.1)
FOLATE SERPL-MCNC: 8.9 NG/ML (ref 3.1–17.5)
GFR SERPL CREATININE-BSD FRML MDRD: 31 ML/MIN/1.73SQ M
GFR SERPL CREATININE-BSD FRML MDRD: 33 ML/MIN/1.73SQ M
GLUCOSE SERPL-MCNC: 129 MG/DL (ref 65–140)
GLUCOSE SERPL-MCNC: 142 MG/DL (ref 65–140)
GLUCOSE SERPL-MCNC: 148 MG/DL (ref 65–140)
GLUCOSE SERPL-MCNC: 165 MG/DL (ref 65–140)
GLUCOSE SERPL-MCNC: 169 MG/DL (ref 65–140)
GLUCOSE SERPL-MCNC: 170 MG/DL (ref 65–140)
GLUCOSE UR STRIP-MCNC: NEGATIVE MG/DL
HCO3 BLDA-SCNC: 36.9 MMOL/L (ref 22–28)
HCO3 BLDA-SCNC: 37.8 MMOL/L (ref 22–28)
HCT VFR BLD AUTO: 31.1 % (ref 36.5–49.3)
HGB BLD-MCNC: 8.7 G/DL (ref 12–17)
HGB UR QL STRIP.AUTO: NEGATIVE
IMM GRANULOCYTES # BLD AUTO: 0.04 THOUSAND/UL (ref 0–0.2)
IMM GRANULOCYTES NFR BLD AUTO: 1 % (ref 0–2)
KETONES UR STRIP-MCNC: NEGATIVE MG/DL
LEUKOCYTE ESTERASE UR QL STRIP: NEGATIVE
LYMPHOCYTES # BLD AUTO: 0.57 THOUSANDS/ΜL (ref 0.6–4.47)
LYMPHOCYTES NFR BLD AUTO: 8 % (ref 14–44)
MAGNESIUM SERPL-MCNC: 2.3 MG/DL (ref 1.6–2.6)
MAGNESIUM SERPL-MCNC: 2.4 MG/DL (ref 1.6–2.6)
MCH RBC QN AUTO: 26.4 PG (ref 26.8–34.3)
MCHC RBC AUTO-ENTMCNC: 28 G/DL (ref 31.4–37.4)
MCV RBC AUTO: 95 FL (ref 82–98)
MONOCYTES # BLD AUTO: 0.73 THOUSAND/ΜL (ref 0.17–1.22)
MONOCYTES NFR BLD AUTO: 10 % (ref 4–12)
NEUTROPHILS # BLD AUTO: 5.94 THOUSANDS/ΜL (ref 1.85–7.62)
NEUTS SEG NFR BLD AUTO: 79 % (ref 43–75)
NITRITE UR QL STRIP: NEGATIVE
NRBC BLD AUTO-RTO: 0 /100 WBCS
O2 CT BLDA-SCNC: 12.6 ML/DL (ref 16–23)
O2 CT BLDA-SCNC: 12.6 ML/DL (ref 16–23)
OXYHGB MFR BLDA: 93.9 % (ref 94–97)
OXYHGB MFR BLDA: 96 % (ref 94–97)
PCO2 BLDA: 52.7 MM HG (ref 36–44)
PCO2 BLDA: 58.5 MM HG (ref 36–44)
PH BLDA: 7.42 [PH] (ref 7.35–7.45)
PH BLDA: 7.47 [PH] (ref 7.35–7.45)
PH UR STRIP.AUTO: 7.5 [PH]
PHOSPHATE SERPL-MCNC: 3.4 MG/DL (ref 2.3–4.1)
PHOSPHATE SERPL-MCNC: 3.4 MG/DL (ref 2.3–4.1)
PLATELET # BLD AUTO: 308 THOUSANDS/UL (ref 149–390)
PMV BLD AUTO: 10.6 FL (ref 8.9–12.7)
PO2 BLDA: 79.3 MM HG (ref 75–129)
PO2 BLDA: 90.3 MM HG (ref 75–129)
POTASSIUM SERPL-SCNC: 3.5 MMOL/L (ref 3.5–5.3)
POTASSIUM SERPL-SCNC: 4.1 MMOL/L (ref 3.5–5.3)
PROCALCITONIN SERPL-MCNC: 0.16 NG/ML
PROT SERPL-MCNC: 7.8 G/DL (ref 6.4–8.2)
PROT UR STRIP-MCNC: NEGATIVE MG/DL
RBC # BLD AUTO: 3.29 MILLION/UL (ref 3.88–5.62)
SODIUM SERPL-SCNC: 147 MMOL/L (ref 136–145)
SODIUM SERPL-SCNC: 148 MMOL/L (ref 136–145)
SP GR UR STRIP.AUTO: 1.01 (ref 1–1.03)
SPECIMEN SOURCE: ABNORMAL
SPECIMEN SOURCE: ABNORMAL
T4 FREE SERPL-MCNC: 1.31 NG/DL (ref 0.76–1.46)
TROPONIN I SERPL-MCNC: <0.02 NG/ML
TSH SERPL DL<=0.05 MIU/L-ACNC: 4.62 UIU/ML (ref 0.36–3.74)
UROBILINOGEN UR QL STRIP.AUTO: 0.2 E.U./DL
VIT B12 SERPL-MCNC: 2288 PG/ML (ref 100–900)
WBC # BLD AUTO: 7.43 THOUSAND/UL (ref 4.31–10.16)

## 2021-07-25 PROCEDURE — 99497 ADVNCD CARE PLAN 30 MIN: CPT | Performed by: PHYSICIAN ASSISTANT

## 2021-07-25 PROCEDURE — 82607 VITAMIN B-12: CPT | Performed by: PHYSICIAN ASSISTANT

## 2021-07-25 PROCEDURE — 94660 CPAP INITIATION&MGMT: CPT

## 2021-07-25 PROCEDURE — 82805 BLOOD GASES W/O2 SATURATION: CPT | Performed by: PHYSICIAN ASSISTANT

## 2021-07-25 PROCEDURE — 84100 ASSAY OF PHOSPHORUS: CPT | Performed by: STUDENT IN AN ORGANIZED HEALTH CARE EDUCATION/TRAINING PROGRAM

## 2021-07-25 PROCEDURE — 84484 ASSAY OF TROPONIN QUANT: CPT | Performed by: PHYSICIAN ASSISTANT

## 2021-07-25 PROCEDURE — 82948 REAGENT STRIP/BLOOD GLUCOSE: CPT

## 2021-07-25 PROCEDURE — 84145 PROCALCITONIN (PCT): CPT | Performed by: PHYSICIAN ASSISTANT

## 2021-07-25 PROCEDURE — 84439 ASSAY OF FREE THYROXINE: CPT | Performed by: PHYSICIAN ASSISTANT

## 2021-07-25 PROCEDURE — 85025 COMPLETE CBC W/AUTO DIFF WBC: CPT | Performed by: STUDENT IN AN ORGANIZED HEALTH CARE EDUCATION/TRAINING PROGRAM

## 2021-07-25 PROCEDURE — 71045 X-RAY EXAM CHEST 1 VIEW: CPT

## 2021-07-25 PROCEDURE — 83735 ASSAY OF MAGNESIUM: CPT | Performed by: STUDENT IN AN ORGANIZED HEALTH CARE EDUCATION/TRAINING PROGRAM

## 2021-07-25 PROCEDURE — 36600 WITHDRAWAL OF ARTERIAL BLOOD: CPT

## 2021-07-25 PROCEDURE — 80053 COMPREHEN METABOLIC PANEL: CPT | Performed by: PHYSICIAN ASSISTANT

## 2021-07-25 PROCEDURE — 82805 BLOOD GASES W/O2 SATURATION: CPT | Performed by: STUDENT IN AN ORGANIZED HEALTH CARE EDUCATION/TRAINING PROGRAM

## 2021-07-25 PROCEDURE — 84443 ASSAY THYROID STIM HORMONE: CPT | Performed by: PHYSICIAN ASSISTANT

## 2021-07-25 PROCEDURE — 99232 SBSQ HOSP IP/OBS MODERATE 35: CPT | Performed by: PHYSICIAN ASSISTANT

## 2021-07-25 PROCEDURE — 82746 ASSAY OF FOLIC ACID SERUM: CPT | Performed by: PHYSICIAN ASSISTANT

## 2021-07-25 PROCEDURE — 99223 1ST HOSP IP/OBS HIGH 75: CPT | Performed by: ANESTHESIOLOGY

## 2021-07-25 PROCEDURE — 81003 URINALYSIS AUTO W/O SCOPE: CPT | Performed by: PHYSICIAN ASSISTANT

## 2021-07-25 PROCEDURE — 94760 N-INVAS EAR/PLS OXIMETRY 1: CPT

## 2021-07-25 PROCEDURE — 99233 SBSQ HOSP IP/OBS HIGH 50: CPT | Performed by: STUDENT IN AN ORGANIZED HEALTH CARE EDUCATION/TRAINING PROGRAM

## 2021-07-25 PROCEDURE — 83735 ASSAY OF MAGNESIUM: CPT | Performed by: PHYSICIAN ASSISTANT

## 2021-07-25 PROCEDURE — 84100 ASSAY OF PHOSPHORUS: CPT | Performed by: PHYSICIAN ASSISTANT

## 2021-07-25 PROCEDURE — 80048 BASIC METABOLIC PNL TOTAL CA: CPT | Performed by: STUDENT IN AN ORGANIZED HEALTH CARE EDUCATION/TRAINING PROGRAM

## 2021-07-25 PROCEDURE — 94664 DEMO&/EVAL PT USE INHALER: CPT

## 2021-07-25 PROCEDURE — 82330 ASSAY OF CALCIUM: CPT | Performed by: PHYSICIAN ASSISTANT

## 2021-07-25 RX ORDER — LABETALOL 20 MG/4 ML (5 MG/ML) INTRAVENOUS SYRINGE
10 EVERY 6 HOURS PRN
Status: DISCONTINUED | OUTPATIENT
Start: 2021-07-25 | End: 2021-07-27

## 2021-07-25 RX ORDER — ACETAZOLAMIDE 500 MG/5ML
250 INJECTION, POWDER, LYOPHILIZED, FOR SOLUTION INTRAVENOUS ONCE
Status: COMPLETED | OUTPATIENT
Start: 2021-07-25 | End: 2021-07-25

## 2021-07-25 RX ORDER — POTASSIUM CHLORIDE 14.9 MG/ML
20 INJECTION INTRAVENOUS ONCE
Status: COMPLETED | OUTPATIENT
Start: 2021-07-25 | End: 2021-07-26

## 2021-07-25 RX ORDER — LEVALBUTEROL 1.25 MG/.5ML
1.25 SOLUTION, CONCENTRATE RESPIRATORY (INHALATION) EVERY 8 HOURS PRN
Status: DISCONTINUED | OUTPATIENT
Start: 2021-07-25 | End: 2021-07-30

## 2021-07-25 RX ADMIN — ACETAZOLAMIDE SODIUM 250 MG: 500 INJECTION, POWDER, LYOPHILIZED, FOR SOLUTION INTRAVENOUS at 11:45

## 2021-07-25 RX ADMIN — WATER: 1 INJECTION INTRAMUSCULAR; INTRAVENOUS; SUBCUTANEOUS at 12:04

## 2021-07-25 RX ADMIN — ALBUMIN (HUMAN) 25 G: 0.25 INJECTION, SOLUTION INTRAVENOUS at 09:28

## 2021-07-25 RX ADMIN — POTASSIUM CHLORIDE 20 MEQ: 14.9 INJECTION, SOLUTION INTRAVENOUS at 22:09

## 2021-07-25 RX ADMIN — ATORVASTATIN CALCIUM 80 MG: 40 TABLET, FILM COATED ORAL at 17:09

## 2021-07-25 RX ADMIN — INSULIN LISPRO 1 UNITS: 100 INJECTION, SOLUTION INTRAVENOUS; SUBCUTANEOUS at 17:00

## 2021-07-25 RX ADMIN — DONEPEZIL HYDROCHLORIDE 5 MG: 5 TABLET ORAL at 21:24

## 2021-07-25 RX ADMIN — LABETALOL 20 MG/4 ML (5 MG/ML) INTRAVENOUS SYRINGE 10 MG: at 12:03

## 2021-07-25 RX ADMIN — INSULIN LISPRO 1 UNITS: 100 INJECTION, SOLUTION INTRAVENOUS; SUBCUTANEOUS at 11:51

## 2021-07-25 RX ADMIN — QUETIAPINE FUMARATE 25 MG: 25 TABLET ORAL at 21:24

## 2021-07-25 NOTE — PROGRESS NOTES
[address]  Progress Note Alexa Hutchinson 1945, 64 y o  male MRN: [id number]  Unit/Bed#: -01 Encounter: [id number]  Primary Care Provider: Eduarda Stewart MD   Date and time admitted to hospital: 7/18/2021  9:06 PM    * Acute on chronic diastolic CHF (congestive heart failure) ([facility] )  Assessment & Plan  · Lower extremity 3+ pitting edema to bilateral lower extremities extending up to scrotum, bibasilar rales  · Chest x-ray:  Atelectasis  · BNP:  4500  · Daily weights  · I&Os  · Lasix IV twice daily for acute diuresis for now  Not much urine output with 40 IV b i d  Of Lasix  Decreased lasix dose to 40 from 80 IV bid however patient seems to have decreased urinary output so increased lasix back to 80 BID  Patient also received 1 dose of torsemide this afternoon  Not having a lot of urinary output on Closely monitor urine output  Hold Norvasc  · TTE: LV function showing 55% EF  · Low-salt diet, fluid restriction    Patient started on Bumex drip on 07/22 given poor response to Lasix IV as per cardiology recommendations  Will continue to monitor I&Os  Weight coming down and patient seems to be responding to Bumex drip well   Bumex drip rate increased 7/24 by Nephrology  Discontinued on 7/25 as patient developing hypernatremia and may have developed contraction alkalosis  Doing trial of bipap today and will monitor ABGs  Critical care service on board    Acute respiratory failure with hypoxia and hypercapnia ([facility] )  Assessment & Plan  Was being diuresed with bumex drip for volume overload however this was discontinued by Nephrology today as patient's weight significantly dropped from yesterday and started developing hypernatremia  UO was -2450 mL yesterday  Plan to reinitiate diuretics once sodium normalizes  Fluid restriction also discontinued  Patient's ABG this am showed CO2 of 58 5  Trial of bipap initiated  Diamox was recommended by Critical care service for possible contraction alkalosis and was administered today  Will continue to monitor ABGs    Acute metabolic encephalopathy  Assessment & Plan  Patient's mental status waxes and wanes at baseline given his Alzheimer's Dementia however today, patient appeared more somnolent on exam  Multifactorial, possibly due to metabolic derangements, delirium given change in environment   MRI a couple of days ago showed encephalomalacia but no acute strokes  Will continue to monitor  Frequent reorientation  Seroquel was added by Critical care today    Hypothyroidism  Assessment & Plan  Patient with a history of hypothyroidism and on levothyroxine 100 mcg at home  TSH done on admission elevated to 14 with ft4 at 1 2  Will increase levothyroxine dose to 112 mcg   Patient will need to obtain repeat TSH levels in 4-6 weeks    Essential hypertension  Assessment & Plan  · BP trending up  · Losartan on hold due to NORAH  · Continue metoprolol  · Started on low-dose amlodipine by Cardiology  · Monitor blood pressure    Ambulatory dysfunction  Assessment & Plan  · Patient has been having frequent falls, and today he could not get up from the floor  EMS was concerned that patient may be in an unsafe situation as patient's wife is having difficulty caring for him  · Fall precautions  · Case management consult  · PT OT consult - discharge to rehab    Moderate aortic stenosis by prior echocardiogram  Assessment & Plan  · Echocardiogram May 2021 at [facility]:  EF 55-60%  Normal diastolic function  Consistent with moderate AS, moderate tricuspid regurgitation  Moderately elevated estimated PA systolic pressure    · He takes Lasix 40 mg orally daily at home however was started on IV diuresis during this admission given fluid overload  · Patient on Bumex drip 7/22 - 7/25 am      Type 2 diabetes mellitus with kidney complication, without long-term current use of insulin [facility])  Assessment & Plan  Lab Results   Component Value Date    HGBA1C 6 6 (H) 07/19/2021 Recent Labs     07/24/21  1600 07/24/21 2056 07/25/21  0718 07/25/21  1136   POCGLU 175* 125 129 169*       Blood Sugar Average: Last 72 hrs:  (P) 126 [id number]   · Diabetic diet  · Fingerstick blood sugar sliding scale coverage  · Hold home oral agents  ·  hemoglobin A1c 6 6  Blood sugars are currently well controlled    PAF (paroxysmal atrial fibrillation) ([facility])  Assessment & Plan  · EKG: normal sinus rhythm  · He is not on anticoagulation  · Continue metoprolol    Acute kidney injury superimposed on chronic kidney disease [facility])  Assessment & Plan  Lab Results   Component Value Date    EGFR 33 07/25/2021    EGFR 33 07/24/2021    EGFR 29 07/23/2021    CREATININE 1 90 (H) 07/25/2021    CREATININE 1 91 (H) 07/24/2021    CREATININE 2 11 (H) 07/23/2021     · Baseline creatinine around 1 4  Suspect partially due to losartan-will hold  · Hold off on fluids due to anasarca  · Daily metabolic panel and trend creatinine during diuresis  · Caution with nephrotoxins and avoid hypotension  · Improving, continue to monitor    Nephrology consulted and recommendations appreciated  Bumex discontinued by Nephrology 7/25 as he has developed hypernatremia and seems to have developed contraction alkalosis  Will continue to monitor creatinine  Will need to follow-up with nephrology as outpatient    Acute on chronic anemia  Assessment & Plan  · Hemoglobin is 8 5 with baseline around 13 admission now trended down to 7 8 this morning  · Stool Hemoccult is positive  · No evidence of gross bleeding noted     · Continue iron  · S/p EGD and colonoscopy on 7/20  "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 Og's esophagus-biopsies taken to rule out dysplasia  Small hiatal hernia otherwise normal stomach on direct and retroflexed views  Random gastric biopsies taken to rule out H pylori  Normal visualized portion of duodenum from duodenal bulb to D3  Random biopsies taken to rule out celiac disease "    "No evidence of active bleeding  Terminal ileum normal  Scattered diverticula throughout the colon left greater than right without evidence of bleeding  Area of erythema/ulceration/hemorrhage likely from trauma from enema versus old diverticular bleed  Large internal/external hemorrhoids"    Okay to resume plavix from GI standpoint  Patient okay to eat from GI standpoint        Dementia without behavioral disturbance (HCC)  Assessment & Plan  · CT head showing encephalomalacia  · MRI showing the same  · Patient's mental status waxes and wanes however mental status today seemed significantly worse  · Possibly metabolic in origin        VTE Pharmacologic Prophylaxis:   Pharmacologic: Pharmacologic VTE Prophylaxis contraindicated due to suspected GIB  Mechanical VTE Prophylaxis in Place: Yes    Patient Centered Rounds: I have performed bedside rounds with nursing staff today  Discussions with Specialists or Other Care Team Provider: critical care, nephrology, cardiology, neurology    Education and Discussions with Family / Patient: patient's significant other    Time Spent for Care: 30 minutes  More than 50% of total time spent on counseling and coordination of care as described above  Current Length of Stay: 7 day(s)    Current Patient Status: Inpatient   Certification Statement: The patient will continue to require additional inpatient hospital stay due to pending medical improvement    Discharge Plan: rehab    Code Status: Level 3 - DNAR and DNI      Subjective:   Patient seen and examined at bedside however was too somnolent to respond to anything today  Was on BIPAP  ROS: unable to be performed due to acute change in mental status    Objective:     Vitals:   Temp (24hrs), Av 7 °F (37 1 °C), Min:98 °F (36 7 °C), Max:99 5 °F (37 5 °C)    Temp:  [98 °F (36 7 °C)-99 5 °F (37 5 °C)] 98 °F (36 7 °C)  HR:  [64-78] 64  Resp:  [17-37] 21  BP: (108-185)/(61-88) 146/65  SpO2:  [92 %-99 %] 96 %  Body mass index is 39 34 kg/m²       Input and Output Summary (last 24 hours): Intake/Output Summary (Last 24 hours) at 7/25/2021 1415  Last data filed at 7/25/2021 1000  Gross per 24 hour   Intake 393 33 ml   Output 900 ml   Net -506 67 ml       Physical Exam:     Physical Exam  Constitutional:       Appearance: He is obese  He is ill-appearing  Comments: Somnolent    HENT:      Head: Normocephalic and atraumatic  Cardiovascular:      Rate and Rhythm: Normal rate  Rhythm irregular  Pulses: Normal pulses  Heart sounds: Normal heart sounds  Comments: 3+ pitting edema bilateral lower extremities    Pulmonary:      Effort: Pulmonary effort is normal  No respiratory distress  Comments: Exams wet   On bipap  Abdominal:      General: Bowel sounds are normal  There is no distension  Palpations: Abdomen is soft  Tenderness: There is no abdominal tenderness  There is no guarding  Musculoskeletal:      Cervical back: Neck supple  Right lower leg: Edema present  Left lower leg: Edema present  Skin:     General: Skin is warm and dry  Comments: Very pale     Neurological:      Mental Status: He is disoriented        Comments: Somnolent and not alert  No eye opening on name calling           Additional Data:     Labs:    Results from last 7 days   Lab Units 07/25/21  0643   WBC Thousand/uL 7 43   HEMOGLOBIN g/dL 8 7*   HEMATOCRIT % 31 1*   PLATELETS Thousands/uL 308   NEUTROS PCT % 79*   LYMPHS PCT % 8*   MONOS PCT % 10   EOS PCT % 1     Results from last 7 days   Lab Units 07/25/21  0643 07/23/21  0535   SODIUM mmol/L 148* 145   POTASSIUM mmol/L 4 1 3 5   CHLORIDE mmol/L 105 105   CO2 mmol/L 34* 31   BUN mg/dL 29* 28*   CREATININE mg/dL 1 90* 2 11*   ANION GAP mmol/L 9 9   CALCIUM mg/dL 8 7 7 9*   ALBUMIN g/dL  --  3 2*   TOTAL BILIRUBIN mg/dL  --  0 73   ALK PHOS U/L  --  93   ALT U/L  --  18   AST U/L  --  14   GLUCOSE RANDOM mg/dL 142* 94     Results from last 7 days   Lab Units 07/18/21  2114   INR  1 22*     Results from last 7 days   Lab Units 07/25/21  1136 07/25/21  0718 07/24/21  2056 07/24/21  1600 07/24/21  1102 07/24/21  0717 07/23/21  2049 07/23/21  1545 07/23/21  1113 07/23/21  0726 07/22/21  2049 07/22/21  1542   POC GLUCOSE mg/dl 169* 129 125 175* 138 93 113 158* 98 91 112 124     Results from last 7 days   Lab Units 07/19/21  0449   HEMOGLOBIN A1C % 6 6*     Results from last 7 days   Lab Units 07/18/21  2114   LACTIC ACID mmol/L 1 1           * I Have Reviewed All Lab Data Listed Above  * Additional Pertinent Lab Tests Reviewed: [facility] 64 Admission Reviewed    Imaging:    reviewed    Recent Cultures (last 7 days):           Last 24 Hours Medication List:   Current Facility-Administered Medications   Medication Dose Route Frequency Provider Last Rate    acetaminophen  650 mg Oral Q4H PRN Jose Hunter PA-C      amLODIPine  2 5 mg Oral Daily Jeremiah Pichardo PA-C      atorvastatin  80 mg Oral Daily With Cesar Lujan PA-C      clopidogrel  75 mg Oral Daily Conception NIRMALA Hunter      donepezil  5 mg Oral HS Conception NIRMALA Hunter      insulin lispro  1-6 Units Subcutaneous TID AC Jose Hunter PA-C      Labetalol HCl  10 mg Intravenous Q6H PRN Conception NIRMALA Hnuter      levalbuterol  1 25 mg Nebulization Q8H PRN Conception NIRMALA Hunter      levothyroxine  112 mcg Oral Early Morning Conception NIRMALA Hunter      metoprolol succinate  50 mg Oral Daily [place], [place]      pantoprazole  40 mg Oral Early Morning Conception NIRMALA Hunter      QUEtiapine  25 mg Oral HS Conception NIRMALA Hunter          Today, Patient Was Seen By: Mikey Nguyễn MD    ** Please Note: Dictation voice to text software may have been used in the creation of this document   **

## 2021-07-25 NOTE — ASSESSMENT & PLAN NOTE
Was being diuresed with bumex drip for volume overload however this was discontinued by Nephrology today as patient's weight significantly dropped from yesterday and started developing hypernatremia  UO was -2450 mL yesterday  Plan to reinitiate diuretics once sodium normalizes  Fluid restriction also discontinued  Patient's ABG this am showed CO2 of 58 5  Trial of bipap initiated  Diamox was recommended by Critical care service for possible contraction alkalosis and was administered today  Will continue to monitor ABGs

## 2021-07-25 NOTE — PLAN OF CARE
Problem: Potential for Falls  Goal: Patient will remain free of falls  Description: INTERVENTIONS:  - Educate patient/family on patient safety including physical limitations  - Instruct patient to call for assistance with activity   - Consult OT/PT to assist with strengthening/mobility   - Keep Call bell within reach  - Keep bed low and locked with side rails adjusted as appropriate  - Keep care items and personal belongings within reach  - Initiate and maintain comfort rounds  - Make Fall Risk Sign visible to staff  - Offer Toileting every   Hours, in advance of need  - Initiate/Maintain   alarm  - Obtain necessary fall risk management equipment:   - Apply yellow socks and bracelet for high fall risk patients  - Consider moving patient to room near nurses station  Outcome: Progressing     Problem: MOBILITY - ADULT  Goal: Maintain or return to baseline ADL function  Description: INTERVENTIONS:  -  Assess patient's ability to carry out ADLs; assess patient's baseline for ADL function and identify physical deficits which impact ability to perform ADLs (bathing, care of mouth/teeth, toileting, grooming, dressing, etc )  - Assess/evaluate cause of self-care deficits   - Assess range of motion  - Assess patient's mobility; develop plan if impaired  - Assess patient's need for assistive devices and provide as appropriate  - Encourage maximum independence but intervene and supervise when necessary  - Involve family in performance of ADLs  - Assess for home care needs following discharge   - Consider OT consult to assist with ADL evaluation and planning for discharge  - Provide patient education as appropriate  Outcome: Progressing  Goal: Maintains/Returns to pre admission functional level  Description: INTERVENTIONS:  - Perform BMAT or MOVE assessment daily    - Set and communicate daily mobility goal to care team and patient/family/caregiver     - Collaborate with rehabilitation services on mobility goals if consulted  - Perform Range of Motion   times a day  - Reposition patient every   hours  - Dangle patient   times a day  - Stand patient   times a day  - Ambulate patient   times a day  - Out of bed to chair   times a day   - Out of bed for meals   times a day  - Out of bed for toileting  - Record patient progress and toleration of activity level   Outcome: Progressing     Problem: Prexisting or High Potential for Compromised Skin Integrity  Goal: Skin integrity is maintained or improved  Description: INTERVENTIONS:  - Identify patients at risk for skin breakdown  - Assess and monitor skin integrity  - Assess and monitor nutrition and hydration status  - Monitor labs   - Assess for incontinence   - Turn and reposition patient  - Assist with mobility/ambulation  - Relieve pressure over bony prominences  - Avoid friction and shearing  - Provide appropriate hygiene as needed including keeping skin clean and dry  - Evaluate need for skin moisturizer/barrier cream  - Collaborate with interdisciplinary team   - Patient/family teaching  - Consider wound care consult   Outcome: Progressing     Problem: NEUROSENSORY - ADULT  Goal: Achieves stable or improved neurological status  Description: INTERVENTIONS  - Monitor and report changes in neurological status  - Monitor vital signs such as temperature, blood pressure, glucose, and any other labs ordered   - Initiate measures to prevent increased intracranial pressure  - Monitor for seizure activity and implement precautions if appropriate      Outcome: Progressing  Goal: Achieves maximal functionality and self care  Description: INTERVENTIONS  - Monitor swallowing and airway patency with patient fatigue and changes in neurological status  - Encourage and assist patient to increase activity and self care     - Encourage visually impaired, hearing impaired and aphasic patients to use assistive/communication devices  Outcome: Progressing     Problem: CARDIOVASCULAR - ADULT  Goal: Maintains optimal cardiac output and hemodynamic stability  Description: INTERVENTIONS:  - Monitor I/O, vital signs and rhythm  - Monitor for S/S and trends of decreased cardiac output  - Administer and titrate ordered vasoactive medications to optimize hemodynamic stability  - Assess quality of pulses, skin color and temperature  - Assess for signs of decreased coronary artery perfusion  - Instruct patient to report change in severity of symptoms  Outcome: Progressing  Goal: Absence of cardiac dysrhythmias or at baseline rhythm  Description: INTERVENTIONS:  - Continuous cardiac monitoring, vital signs, obtain 12 lead EKG if ordered  - Administer antiarrhythmic and heart rate control medications as ordered  - Monitor electrolytes and administer replacement therapy as ordered  Outcome: Progressing     Problem: RESPIRATORY - ADULT  Goal: Achieves optimal ventilation and oxygenation  Description: INTERVENTIONS:  - Assess for changes in respiratory status  - Assess for changes in mentation and behavior  - Position to facilitate oxygenation and minimize respiratory effort  - Oxygen administered by appropriate delivery if ordered  - Initiate smoking cessation education as indicated  - Encourage broncho-pulmonary hygiene including cough, deep breathe, Incentive Spirometry  - Assess the need for suctioning and aspirate as needed  - Assess and instruct to report SOB or any respiratory difficulty  - Respiratory Therapy support as indicated  Outcome: Progressing     Problem: GASTROINTESTINAL - ADULT  Goal: Minimal or absence of nausea and/or vomiting  Description: INTERVENTIONS:  - Administer IV fluids if ordered to ensure adequate hydration  - Maintain NPO status until nausea and vomiting are resolved  - Nasogastric tube if ordered  - Administer ordered antiemetic medications as needed  - Provide nonpharmacologic comfort measures as appropriate  - Advance diet as tolerated, if ordered  - Consider nutrition services referral to assist patient with adequate nutrition and appropriate food choices  Outcome: Progressing  Goal: Maintains or returns to baseline bowel function  Description: INTERVENTIONS:  - Assess bowel function  - Encourage oral fluids to ensure adequate hydration  - Administer IV fluids if ordered to ensure adequate hydration  - Administer ordered medications as needed  - Encourage mobilization and activity  - Consider nutritional services referral to assist patient with adequate nutrition and appropriate food choices  Outcome: Progressing  Goal: Maintains adequate nutritional intake  Description: INTERVENTIONS:  - Monitor percentage of each meal consumed  - Identify factors contributing to decreased intake, treat as appropriate  - Assist with meals as needed  - Monitor I&O, weight, and lab values if indicated  - Obtain nutrition services referral as needed  Outcome: Progressing  Goal: Oral mucous membranes remain intact  Description: INTERVENTIONS  - Assess oral mucosa and hygiene practices  - Implement preventative oral hygiene regimen  - Implement oral medicated treatments as ordered  - Initiate Nutrition services referral as needed  Outcome: Progressing     Problem: GENITOURINARY - ADULT  Goal: Maintains or returns to baseline urinary function  Description: INTERVENTIONS:  - Assess urinary function  - Encourage oral fluids to ensure adequate hydration if ordered  - Administer IV fluids as ordered to ensure adequate hydration  - Administer ordered medications as needed  - Offer frequent toileting  - Follow urinary retention protocol if ordered  Outcome: Progressing  Goal: Absence of urinary retention  Description: INTERVENTIONS:  - Assess patients ability to void and empty bladder  - Monitor I/O  - Bladder scan as needed  - Discuss with physician/AP medications to alleviate retention as needed  - Discuss catheterization for long term situations as appropriate  Outcome: Progressing     Problem: METABOLIC, FLUID AND ELECTROLYTES - ADULT  Goal: Electrolytes maintained within normal limits  Description: INTERVENTIONS:  - Monitor labs and assess patient for signs and symptoms of electrolyte imbalances  - Administer electrolyte replacement as ordered  - Monitor response to electrolyte replacements, including repeat lab results as appropriate  - Instruct patient on fluid and nutrition as appropriate  Outcome: Progressing  Goal: Fluid balance maintained  Description: INTERVENTIONS:  - Monitor labs   - Monitor I/O and WT  - Instruct patient on fluid and nutrition as appropriate  - Assess for signs & symptoms of volume excess or deficit  Outcome: Progressing  Goal: Glucose maintained within target range  Description: INTERVENTIONS:  - Monitor Blood Glucose as ordered  - Assess for signs and symptoms of hyperglycemia and hypoglycemia  - Administer ordered medications to maintain glucose within target range  - Assess nutritional intake and initiate nutrition service referral as needed  Outcome: Progressing     Problem: SKIN/TISSUE INTEGRITY - ADULT  Goal: Skin Integrity remains intact(Skin Breakdown Prevention)  Description: Assess:  -Perform Wale assessment every    -Clean and moisturize skin every    -Inspect skin when repositioning, toileting, and assisting with ADLS  -Assess under medical devices such as   every    -Assess extremities for adequate circulation and sensation     Bed Management:  -Have minimal linens on bed & keep smooth, unwrinkled  -Change linens as needed when moist or perspiring  -Avoid sitting or lying in one position for more than   hours while in bed  -Keep HOB at  degrees     Toileting:  -Offer bedside commode  -Assess for incontinence every   -Use incontinent care products after each incontinent episode such as   Activity:  -Mobilize patient   times a day  -Encourage activity and walks on unit  -Encourage or provide ROM exercises   -Turn and reposition patient every    Hours  -Use appropriate equipment to lift or move patient in bed  -Instruct/ Assist with weight shifting every   when out of bed in chair  -Consider limitation of chair time   hour intervals    Skin Care:  -Avoid use of baby powder, tape, friction and shearing, hot water or constrictive clothing  -Relieve pressure over bony prominences using    -Do not massage red bony areas    Next Steps:  -Teach patient strategies to minimize risks such as     -Consider consults to  interdisciplinary teams such as   Outcome: Progressing  Goal: Incision(s), wounds(s) or drain site(s) healing without S/S of infection  Description: INTERVENTIONS  - Assess and document dressing, incision, wound bed, drain sites and surrounding tissue  - Provide patient and family education  - Perform skin care/dressing changes every   Outcome: Progressing  Goal: Pressure injury heals and does not worsen  Description: Interventions:  - Implement low air loss mattress or specialty surface (Criteria met)  - Apply silicone foam dressing  - Instruct/assist with weight shifting every   minutes when in chair   - Limit chair time to   hour intervals  - Use special pressure reducing interventions such as   when in chair   - Apply fecal or urinary incontinence containment device   - Perform passive or active ROM every   - Turn and reposition patient & offload bony prominences every    hours   - Utilize friction reducing device or surface for transfers   - Consider consults to  interdisciplinary teams such as    - Use incontinent care products after each incontinent episode such as    - Consider nutrition services referral as needed  Outcome: Progressing     Problem: HEMATOLOGIC - ADULT  Goal: Maintains hematologic stability  Description: INTERVENTIONS  - Assess for signs and symptoms of bleeding or hemorrhage  - Monitor labs  - Administer supportive blood products/factors as ordered and appropriate  Outcome: Progressing     Problem: MUSCULOSKELETAL - ADULT  Goal: Maintain or return mobility to safest level of function  Description: INTERVENTIONS:  - Assess patient's ability to carry out ADLs; assess patient's baseline for ADL function and identify physical deficits which impact ability to perform ADLs (bathing, care of mouth/teeth, toileting, grooming, dressing, etc )  - Assess/evaluate cause of self-care deficits   - Assess range of motion  - Assess patient's mobility  - Assess patient's need for assistive devices and provide as appropriate  - Encourage maximum independence but intervene and supervise when necessary  - Involve family in performance of ADLs  - Assess for home care needs following discharge   - Consider OT consult to assist with ADL evaluation and planning for discharge  - Provide patient education as appropriate  Outcome: Progressing  Goal: Maintain proper alignment of affected body part  Description: INTERVENTIONS:  - Support, maintain and protect limb and body alignment  - Provide patient/ family with appropriate education  Outcome: Progressing     Problem: Nutrition/Hydration-ADULT  Goal: Nutrient/Hydration intake appropriate for improving, restoring or maintaining nutritional needs  Description: Monitor and assess patient's nutrition/hydration status for malnutrition  Collaborate with interdisciplinary team and initiate plan and interventions as ordered  Monitor patient's weight and dietary intake as ordered or per policy  Utilize nutrition screening tool and intervene as necessary  Determine patient's food preferences and provide high-protein, high-caloric foods as appropriate       INTERVENTIONS:  - Monitor oral intake, urinary output, labs, and treatment plans  - Assess nutrition and hydration status and recommend course of action  - Evaluate amount of meals eaten  - Assist patient with eating if necessary   - Allow adequate time for meals  - Recommend/ encourage appropriate diets, oral nutritional supplements, and vitamin/mineral supplements  - Order, calculate, and assess calorie counts as needed  - Recommend, monitor, and adjust tube feedings and TPN/PPN based on assessed needs  - Assess need for intravenous fluids  - Provide specific nutrition/hydration education as appropriate  - Include patient/family/caregiver in decisions related to nutrition  Outcome: Progressing

## 2021-07-25 NOTE — ASSESSMENT & PLAN NOTE
Lab Results   Component Value Date    EGFR 33 07/25/2021    EGFR 33 07/24/2021    EGFR 29 07/23/2021    CREATININE 1 90 (H) 07/25/2021    CREATININE 1 91 (H) 07/24/2021    CREATININE 2 11 (H) 07/23/2021     · Baseline creatinine around 1 4    Suspect partially due to losartan-will hold  · Hold off on fluids due to anasarca  · Daily metabolic panel and trend creatinine during diuresis  · Caution with nephrotoxins and avoid hypotension  · Improving, continue to monitor    Nephrology consulted and recommendations appreciated  Bumex discontinued by Nephrology 7/25 as he has developed hypernatremia and seems to have developed contraction alkalosis  Will continue to monitor creatinine  Will need to follow-up with nephrology as outpatient

## 2021-07-25 NOTE — ASSESSMENT & PLAN NOTE
Patient's mental status waxes and wanes at baseline given his Alzheimer's Dementia however today, patient appeared more somnolent on exam  Multifactorial, possibly due to metabolic derangements, delirium given change in environment   MRI a couple of days ago showed encephalomalacia but no acute strokes  Will continue to monitor  Frequent reorientation  Seroquel was added by Critical care today

## 2021-07-25 NOTE — ACP (ADVANCE CARE PLANNING)
Critical Care Advanced Care Planning Note  Nicole Ceballos 64 y o  male MRN: [id number]  Unit/Bed#: -01 Encounter: [id number]    Nicole Ceballos is a 64 y o  male requiring critical care evaluation and advanced care planning  The patient has chronic comorbidities, including but not limited to morbid obesity, progressive cognitive decline related to dementia, chronic systolic heart failure, pAfIb, mod AS, CKD III, type 2 DM, and chronic anemia which is now further complicated by the following acute conditions: acute encephalopathy, acute on chronic diastolic heart failure, acute respiratory failure, and metabolic derangements  Due to the severity of the patient's acute condition and/or the extent of chronic conditions, additional conversations pertaining to advanced care planning were required  Today's discussion, which was held in a face-to-face meeting, included patient's significant other, and it was established that all stake holders understood the rationale for the advanced care planning  The patient was unable to participate in the discussion due to acute encephalopathy and altered mental status  Summary of Discussion:    Patient's significant other Faustino Adamson, at patient's bedside, given update in reference to patient's condition, possible treatment plans, and prognosis  All questions were answered  Dora Espino affirmed that patient had declared her his medical POA and had/has established advanced directives stating he would not want heroic measures performed on him, including no usage of "breathing tubes", mechanical ventilation, or the utilization of ACLS protocol (CPR-chest compressions)  Dora Espino vocalized that if patient's condition were to deteriorate to where intubation, mechanical ventilation or cardiopulmonary resuscitation were to be necessary, that he would not want any of these services, and he would want us to transition the focus of his care to comfort measures only      Dora Espino stated she will bring in medical POA and patient's advanced directive documentation tomorrow morning  Total time spent, (25) minutes (12:35 pm to 1:00pm)      CODE STATUS: DNR/DNI - Level 3    SIGNATURE: Russel Khoury PA-C  DATE: July 25, 2021

## 2021-07-25 NOTE — ASSESSMENT & PLAN NOTE
· Echocardiogram May 2021 at [facility]:  EF 55-60%  Normal diastolic function  Consistent with moderate AS, moderate tricuspid regurgitation  Moderately elevated estimated PA systolic pressure    · He takes Lasix 40 mg orally daily at home however was started on IV diuresis during this admission given fluid overload  · Patient on Bumex drip 7/22 - 7/25 am

## 2021-07-25 NOTE — PROGRESS NOTES
Progress Note - Nephrology   Lopez David 64 y o  male MRN: [id number]  Unit/Bed#: -01 Encounter: [id number]    Assessment and Plan    Lopez David is a 65 yo M with HTN, DM, Alzheimer disease admitted to [facility] on 7/18/21 after fall, admitted for NORAH, hypokalemia, hypervolemia  Nephrology is consulted for evaluation of acute kidney injury present on admission with creatinine 2 21 mg/dL superimposed on chronic kidney disease stage IIIB     Brief A/P: Renal function remains stable, but sodium is elevated and blood pressure is low  Patient is over diuresed  Will discontinue fluid restriction and bumetanide drip  Now clear that I&Os were not being properly recorded  Monitor labs and volume status  See below  1  Acute kidney injury (POA) superimposed on chronic kidney disease  · Presenting BUN 25 mg/dL with creatinine 2 21 mg/dL estimated GFR of 28 mL/min on 07/18/2021  · Etiology attributed to acute tubular necrosis due to blood loss in patient with underlying intrinsic renal disease due to vascular disease and long-term diabetes  · Losartan 100 mg daily was held  · Renal function stable with BUN of 29 mg/dL with creatinine 1 9 mg/dL estimated GFR 33 mL/min on 07/25/2021  Will hold diuresis  Will check a m  Labs  2  Chronic kidney disease, stage IIIB with baseline creatinine ranging from 1 4-1 7 mg/dL  · Not currently established with outpatient nephrologist   · With progression in the past year  · No significant proteinuria with microalbumin creatinine ratio of 79 milligram/gram creatinine  · Losartan currently held due to acute kidney injury  Consider restarting at low dose with lab monitoring follow up if blood pressure becomes elevated after acute kidney injury has resolved  · Will need outpatient follow-up  3  Volume overload, resolved  · Weight is recorded as 9 lb loss since yesterday, per bed scale weights  · Urine output is recorded as -2450 mL yesterday    · Patient has become hypernatremic; will discontinue bumetanide drip  Will need to monitor closely, may require initiation of oral diuretics in a day or so  Discontinued fluid restriction today  4  Hypernatremia, iatrogenic  · Sodium 148 millimole per L on 07/25/2021  Will discontinue fluid restriction to allow free water intake  Discontinue bumetanide drip  Will recheck a m  Labs  5  Anemia due to GI bleed superimposed on anemia of chronic disease  · Hemoglobin appears to be improving, 8 7 grams/deciliter  Possible hemoconcentration  Management per primary  6  Diabetes mellitus, 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  · Continue insulin coverage, per primary  7  Hypokalemia  · Potassium improved from 3 1 millimole per L yesterday to 4 1 millimole per L on 07/25/2021  Magnesium is sufficient at 2 4 mg/dL on 07/25/2021  Continue to replete per protocol  Suspect there will be improvement with discontinuation of loop diuretic     8   Hypertension  · Blood pressures are labile, 1 low reading this morning 108/82 with a heart rate of 78  Monitor  Continue metoprolol succinate 50 mg once daily  Consider discontinuation of amlodipine if hypotension recurs  Expect improvement with discontinuation of bumetanide drip  9  Alzheimer disease  · On the donezepil 5 mg daily      Follow up reason for today's visit:  Acute kidney injury present on admission superimposed on chronic kidney disease stage IIIB    Acute on chronic diastolic CHF (congestive heart failure) ([facility] )    Patient Active Problem List   Diagnosis    Hypokalemia    Alzheimer's dementia ([facility] )    Acute on chronic anemia    Acute kidney injury superimposed on chronic kidney disease ([facility] [address] )    Frequent falls    PAF [facility]paroxysmal atrial fibrillation) ([facility])    Type 2 diabetes mellitus with kidney complication, without long-term current use of insulin ([facility])    Moderate aortic stenosis by prior echocardiogram    Ambulatory dysfunction    Essential hypertension    Acute on chronic diastolic CHF (congestive heart failure) ([facility])    GI bleeding    History of TIA (transient ischemic attack)    Morbid obesity with BMI of 40 0-44 9, adult ([facility])    Hypothyroidism         Subjective:   Patient makes eye contact but does not participate in ROS and history  On 3 5 L supplemental oxygen  Blackburn catheter has been disconnected  Patient appears comfortable  Objective:     Vitals: Blood pressure 108/82, pulse 78, temperature 98 7 °F (37 1 °C), resp  rate 18, height 5' 7" (1 702 m), weight 114 kg (251 lb 3 2 oz), SpO2 94 %  ,Body mass index is 39 34 kg/m²  Weight (last 2 days)     Date/Time   Weight    07/25/21 0600   114 (251 2)    07/25/21 0500   113 (250 22)    07/24/21 0509   118 (259 7)    07/23/21 0548   119 (261 47)                Intake/Output Summary (Last 24 hours) at 7/25/2021 0702  Last data filed at 7/25/2021 0028  Gross per 24 hour   Intake 120 ml   Output 2450 ml   Net -2330 ml     I/O last 3 completed shifts:   In: 120 [P O :120]  Out: 2700 [Urine:2700]         Physical Exam: /82   Pulse 78   Temp 98 7 °F (37 1 °C)   Resp 18   Ht 5' 7" (1 702 m)   Wt 114 kg (251 lb 3 2 oz)   SpO2 94%   BMI 39 34 kg/m²     General Appearance:    Alert, cooperative, no distress, appears stated age   Head:    Normocephalic, without obvious abnormality, atraumatic   Eyes:    Conjunctiva/corneas clear   Ears:    Normal external ears   Nose:   Nasal cannula, Nares normal, septum midline, mucosa normal, no drainage  or sinus tenderness   Throat:   Lips, mucosa, and tongue normal; teeth and gums normal   Neck:   Supple, symmetrical   Back:     Symmetric, no curvature, ROM normal, no CVA tenderness   Lungs:     Clear to auscultation bilaterally, respirations unlabored   Chest wall:    No tenderness or deformity   Heart:    Regular rate and rhythm, S1 and S2 normal, +murmur, rub   or gallop   Abdomen:     Soft, non-tender, bowel sounds active, Extremities:   Extremities normal, atraumatic, no cyanosis, no edema   Skin:   Skin color, texture, tenting, no rashes or lesions   Lymph nodes:   Cervical normal   Neurologic:   CNII-XII intact            Lab, Imaging and other studies: I have personally reviewed pertinent labs  CBC:   Lab Results   Component Value Date    WBC 7 43 07/25/2021    HGB 8 7 (L) 07/25/2021    HCT 31 1 (L) 07/25/2021    MCV 95 07/25/2021     07/25/2021    MCH 26 4 (L) 07/25/2021    MCHC 28 0 (L) 07/25/2021    RDW 16 5 (H) 07/25/2021    MPV 10 6 07/25/2021    NRBC 0 07/25/2021     CMP: No results found for: NA, K, CL, CO2, ANIONGAP, BUN, CREATININE, GLUCOSE, CALCIUM, AST, ALT, ALKPHOS, PROT, BILITOT, EGFR      Results from last 7 days   Lab Units 07/24/21  0509 07/23/21  0535 07/22/21  0623 07/18/21  2114   POTASSIUM mmol/L 3 1* 3 5 3 6 2 9*   CHLORIDE mmol/L 105 105 104 105   CO2 mmol/L 36* 31 28 30   BUN mg/dL 29* 28* 22 25   CREATININE mg/dL 1 91* 2 11* 2 06* 2 21*   CALCIUM mg/dL 8 1* 7 9* 7 6* 8 4   ALK PHOS U/L  --  93 100 120*   ALT U/L  --  18 18 25   AST U/L  --  14 17 27         Phosphorus: No results found for: PHOS  Magnesium: No results found for: MG  Urinalysis: No results found for: COLORU, CLARITYU, SPECGRAV, PHUR, LEUKOCYTESUR, NITRITE, PROTEINUA, GLUCOSEU, KETONESU, BILIRUBINUR, BLOODU  Ionized Calcium: No results found for: CAION  Coagulation: No results found for: PT, INR, APTT  Troponin: No results found for: TROPONINI  ABG: No results found for: PHART, NRG0ZEC, PO2ART, ZTC1TGB, L9HPEJCV, BEART, SOURCE  Radiology review:     IMAGING  Procedure: CT head wo contrast    Result Date: 7/22/2021  Narrative: CT BRAIN - WITHOUT CONTRAST INDICATION:   Altered mental status COMPARISON:  None  TECHNIQUE:  CT examination of the brain was performed  In addition to axial images, sagittal and coronal 2D reformatted images were created and submitted for interpretation   Radiation dose length product (DLP) for this visit: 951 78 mGy-cm   This examination, like all CT scans performed in the [facility], was performed utilizing techniques to minimize radiation dose exposure, including the use of iterative  reconstruction and automated exposure control  IMAGE QUALITY:  Diagnostic  FINDINGS: PARENCHYMA: Encephalomalacia and volume loss involving left gyrus rectus and inferior left frontal lobe as well as right frontal cortical/subcortical white matter  Patchy and confluent hypoattenuation involving periventricular and subcortical white matter  Cerebral volume loss  No intracranial masslike lesion, mass effect or midline shift  No CT signs of acute infarction  No acute parenchymal hemorrhage  VENTRICLES AND EXTRA-AXIAL SPACES:  Normal for the patient's age  VISUALIZED ORBITS AND PARANASAL SINUSES:  Unremarkable  CALVARIUM AND EXTRACRANIAL SOFT TISSUES:  Normal      Impression: 1  New acute intracranial CT abnormality  2   Encephalomalacia involving left gyrus rectus and inferior left frontal lobe as well as right frontal cortical/subcortical white matter  3   Nonspecific extensive white matter change suggesting advanced microangiopathy  Workstation performed: [id number]     Procedure: MRI brain wo contrast    Result Date: 7/22/2021  Narrative: MRI BRAIN WITHOUT CONTRAST INDICATION: confusion  COMPARISON:   None  TECHNIQUE:  Sagittal T1, axial T2, axial FLAIR, axial T1, axial [place] and axial diffusion imaging  IMAGE QUALITY:  Evaluation degraded by motion FINDINGS: BRAIN PARENCHYMA:  There is no discrete mass, mass effect or midline shift  There is no intracranial hemorrhage  There is no evidence of acute infarction and diffusion imaging is unremarkable  Small scattered hyperintensities on T2/FLAIR imaging are noted in the periventricular and subcortical white matter demonstrating an appearance that is statistically most likely to represent advanced microangiopathic change       Focal area of T2 hyperintensity noted in the presenting encephalomalacia in the left frontal region VENTRICLES: Dilation of the ventricular system noted SELLA AND PITUITARY GLAND:  Normal  ORBITS:  Normal  PARANASAL SINUSES:  T2 hyperintensity noted within the both mastoid air cells VASCULATURE:  Evaluation of the major intracranial vasculature demonstrates appropriate flow voids  CALVARIUM AND SKULL BASE:  Normal  EXTRACRANIAL SOFT TISSUES:  Normal      Impression: No acute infarct seen Severe periventricular and white matter T2 hyperintensity, may be due to chronic small vessel ischemic changes was also described on the MRI performed at [facility] facility on October 30, 2017 Area of encephalomalacia in the inferior left frontal region Workstation performed: [id number]     Procedure: US kidney and bladder    Result Date: 7/22/2021  Narrative: RENAL ULTRASOUND INDICATION:   ckd  COMPARISON: None TECHNIQUE:   Ultrasound of the retroperitoneum was performed with a curvilinear transducer utilizing volumetric sweeps and still imaging techniques  FINDINGS: KIDNEYS: Symmetric and normal size  Right kidney:  11 3 x 5 7 x 4 7 cm  Left kidney:  11 1 x 5 9 x 4 4 cm  Right kidney Normal echogenicity and contour  No suspicious masses detected  No hydronephrosis  No shadowing calculi  No perinephric fluid collections  Left kidney Normal echogenicity and contour  No suspicious masses detected  No hydronephrosis  No shadowing calculi  No perinephric fluid collections  URETERS: Nonvisualized  BLADDER: Collapsed, limiting evaluation No focal thickening or mass lesions  Bilateral ureteral jets not detected       Impression: Unremarkable exam  Workstation performed: [id number]       Current Facility-Administered Medications   Medication Dose Route Frequency    acetaminophen (TYLENOL) tablet 650 mg  650 mg Oral Q4H PRN    albumin human (FLEXBUMIN) 25 % injection 25 g  25 g Intravenous TID    amLODIPine (NORVASC) tablet 2 5 mg  2 5 mg Oral Daily    atorvastatin (LIPITOR) tablet 80 mg  80 mg Oral Daily With Dinner    bumetanide (BUMEX) 12 5 mg infusion 50 mL  1 mg/hr Intravenous Continuous    clopidogrel (PLAVIX) tablet 75 mg  75 mg Oral Daily    donepezil (ARICEPT) tablet 5 mg  5 mg Oral HS    insulin lispro (HumaLOG) 100 units/mL subcutaneous injection 1-6 Units  1-6 Units Subcutaneous TID AC    levothyroxine tablet 112 mcg  112 mcg Oral Early Morning    metoprolol succinate (TOPROL-XL) 24 hr tablet 50 mg  50 mg Oral Daily    pantoprazole (PROTONIX) EC tablet 40 mg  40 mg Oral Early Morning    QUEtiapine (SEROquel) tablet 25 mg  25 mg Oral HS     Medications Discontinued During This Encounter   Medication Reason    potassium chloride 40 mEq IVPB (premix) Availability    furosemide (LASIX) tablet 40 mg     Ferrous Gluconate TABS 246 mg     amLODIPine (NORVASC) tablet 2 5 mg     furosemide (LASIX) injection 40 mg     furosemide (LASIX) injection 20 mg     insulin lispro (HumaLOG) 100 units/mL subcutaneous injection 2-12 Units     insulin lispro (HumaLOG) 100 units/mL subcutaneous injection 2-12 Units     fentaNYL (SUBLIMAZE) injection 12 5 mcg Patient Transfer    ondansetron (ZOFRAN) injection 4 mg Patient Transfer    dextrose 5 % infusion     furosemide (LASIX) injection 60 mg     torsemide (DEMADEX) tablet 40 mg     torsemide (DEMADEX) tablet 40 mg     levothyroxine tablet 100 mcg     furosemide (LASIX) injection 80 mg     ferrous gluconate (FERGON) tablet 324 mg        Jose Fischer PA-C    Portions of the record may have been created with voice recognition software  Occasional wrong word or "sound a like" substitutions may have occurred due to the inherent limitations of voice recognition software  Read the chart carefully and recognize, using context, where substitutions have occurred

## 2021-07-25 NOTE — CONSULTS
Krystyna Black [age] y o  male MRN: [id number]  Unit/Bed#: -01 Encounter: [id number]    -------------------------------------------------------------------------------------------------------------  Chief Complaint:  Patient confused but denies acute distress or pain  Reports "my breathing is fine"  Denies chest pain, back pain, palpitations  (+) tachypnea  (+) MARTE reported by nursing staff    History of Present Illness   HX and PE limited by: patient's mental status    Dany Ballesteros is a 64 y o  male with past medical history of morbid obesity, dementia, history of GI bleed, pA  Fib, history of TIA, type 2 DM, HTN, CKD III, and aortic stenosis presented to [address] [place] [address] on 7/18/21  with secondary to worsening ambulatory dysfunction & frequent falls  Patient was admitted to the [facility] service with primary diagnosis of acute kidney injury on CKD III, acute on chronic diastolic heart failure, acute on chronic anemia, and ambulatory dysfunction  Diuresis started with Lasix 40 mg IV BID  Cardiology & Nephrology  consulted    Stool heme occult (+) on admission with hemoglobin found to be trending down  Plavix held  Patient not receiving systemic anticoagulation for known history of pA  Fib  GI consulted  7/21 EGD & Colonoscopy performed:  No signs of active GI bleeding found  PPI recommended & Plavix restarted  7/22 IV Lasix BID stopped  Patient started on Bumex gtt & albumin 25g IV TID  Patient diuresed with total recorded volume status since admission documented at negative 5L  Total weight loss of 12 lbs documented  This morning, a DI alert was activated secondary to patient having hypoxia when removing his oxygen supplementation via NC @ 4L  CXR & ABG completed  Patient with tachypnea & MARTE, however comfortable with no distress or fatigue noted  Patient's level of care updated to SD2 and critical care consultation was placed  At this time patient denies concerns but is AAO x 1 only  Patient purposeful with all 4 extremities, following commands with all 4  Patient denies pain  Patient states "my breathing is fine"  (+) dyspnea    History obtained from chart review, the patient and discussions with [facility] nephrology teams  -------------------------------------------------------------------------------------------------------------  Assessment and Plan:    Neuro:    Acute Encephalopathy, on Chronic Dementia/Cognitive Dysfunction  o Per discussion with nursing team, patient's confusion & disorientation progressively worsening  o Etiology likely multifactorial, with delirium, metabolic disturbances, and disruption of sleep-wake cycle all contributing  o Plan:  - Adjust sleep-wake cycle   OOB TID   Continue Seroquel QHS  - Correct electrolyte and acid-base dysfunctions  - Avoid sedative or opiate medications  - R/O cardiac etiology   TTE (7/19/21) reviewed   Recheck EKG & cardiac enzymes at this time  - Hx  of TIA   Continue daily Plavix  - MRI Brain (7/22) & CTH (7/22) reviewed  - R/O infectious etiology   Recheck UA   Ambulatory Dysfunction  o Plan:   - PT/OT evaluations  - CM consult to assess need for rehab/placemnt options  CV:    Acute on Chronic Diastolic Heart Failure  o Plan:  - Cardiology following  - TTE (7/19/21) reviewed  - Repeat CXR from this morning consistent with persistent vascular congestion & pulmonary edema   - Bumex gtt stopped this morning in setting of metabolic alkalosis & hypernatremia  - BiPAP prn/qhs  - Continue Toprol XL 50 mg QD  - Diamox 250 mg IV x 1 now    Strict I's & O's   Repeat lytes & ABG @ 16:00     Hx  of pA   Fib- currently NSR  o Plan:  - Continue Toprol XL 50 mg QD  - Patient not on systemic anticoagulation due to history of GI Bleed    Pulm:     Acute Hypoxic Respiratory Failure secondary to Acute on Chronic Diastolic Heart Failure  o BiPAP prn only; wean O2 NC as able  o Bumex gtt stopped this morning  o Diamox 250 mg VV x 1 now  o Continue strict I's & O's  o Xoponex nebs prn  o Chest Physiotherapy  o OOB TID    GI/Heme/Onc:    Acute on Chronic Anemia  o EGD & Colonoscopy 7/21 with no active bleeding found  o Continue daily PPI  o Check iron panel, folate & B12 levels  o Recheck TFTs    F/E/N:      Hypernatremia   Contraction Metabolic Alkalosis secondary to Loop Diuretics  o Plan:  - Bumex gtt stopped this morning  - Patient appears to remain volume (+)  - Diamox 250 mg IV x 1 now  - Recheck BMP & lytes @ 16:00  - Strict I's & O's    Endo:    History of Type 2 DM  o Plan:  - Accuchecks QID with Humalog SS coverage   - Holding patient's home PO diabetic medicatuio  ID:    No clinical, physical exam, or laboratory findings consistent with active infection  o Plan:  - Check UA  - Check Procalcitonin    Disposition: Continue Stepdown Level 2 level of care   Code Status: Level 1 - Full Code  --------------------------------------------------------------------------------------------------------------  Review of Systems    Review of systems was unable to be performed secondary to mental status    Physical Exam  Constitutional:       General: He is not in acute distress  Appearance: He is ill-appearing  He is not toxic-appearing or diaphoretic  Comments: AAO x 1; confused to time & place  Follows commands with all 4 extremities with symmetrical strength   HENT:      Head: Normocephalic and atraumatic  Right Ear: External ear normal       Left Ear: External ear normal       Nose: No congestion or rhinorrhea  Mouth/Throat:      Pharynx: Oropharynx is clear  No oropharyngeal exudate or posterior oropharyngeal erythema  Eyes:      General:         Right eye: No discharge  Left eye: No discharge  Extraocular Movements: Extraocular movements intact  Conjunctiva/sclera: Conjunctivae normal       Pupils: Pupils are equal, round, and reactive to light     Neck:      Comments: No JVD noted  Cardiovascular: Rate and Rhythm: Regular rhythm  Tachycardia present  Pulses: Normal pulses  Heart sounds: Murmur heard  No friction rub  Comments: Sinus tachycardia in 110s  Pulmonary:      Effort: No respiratory distress  Breath sounds: No stridor  Wheezing present  No rales  Comments: Decreased BS at bases; wheezing at bases  Chest:      Chest wall: No tenderness  Abdominal:      General: There is no distension  Palpations: Abdomen is soft  Tenderness: There is no abdominal tenderness  There is no guarding or rebound  Musculoskeletal:         General: Swelling present  No tenderness  Cervical back: Normal range of motion and neck supple  No rigidity or tenderness  Right lower leg: Edema present  Left lower leg: Edema present  Skin:     General: Skin is warm and dry  Capillary Refill: Capillary refill takes less than 2 seconds  Coloration: Skin is not jaundiced  Findings: No bruising or erythema  Neurological:      General: No focal deficit present  Mental Status: He is disoriented  Cranial Nerves: No cranial nerve deficit  Comments: AAO x 1     --------------------------------------------------------------------------------------------------------------  Vitals:   Vitals:    07/25/21 0254 07/25/21 0500 07/25/21 0600 07/25/21 0717   BP: 108/82   109/88   Pulse: 78   77   Resp: 18   (!) 24   Temp: 98 7 °F (37 1 °C)   99 5 °F (37 5 °C)   TempSrc:       SpO2: 94%   92%   Weight:  113 kg (250 lb 3 6 oz) 114 kg (251 lb 3 2 oz)    Height:         Temp  Min: 96 3 °F (35 7 °C)  Max: 99 5 °F (37 5 °C)  IBW (Ideal Body Weight): 66 1 kg  Height: 5' 7" (170 2 cm)  Body mass index is 39 34 kg/m²      Laboratory and Diagnostics:  Results from last 7 days   Lab Units 07/25/21  0643 07/24/21  0509 07/23/21  0535 07/22/21  0623 07/21/21  0635 07/20/21  1311 07/20/21  0444 07/18/21  2114   WBC Thousand/uL 7 43 6 10 5 47 5 66 6 41 5 99 4 61 5 72   HEMOGLOBIN g/dL 8 7* 8 1* 8 4* 8 0* 8 5* 8 1* 7 9* 8 5*   HEMATOCRIT % 31 1* 28 5* 29 4* 27 2* 29 4* 27 8* 26 9* 29 2*   PLATELETS Thousands/uL 308 302 328 297 313 338 315 357   NEUTROS PCT % 79* 74 69 68 65  --   --  71   MONOS PCT % 10 12 13* 15* 13*  --   --  14*     Results from last 7 days   Lab Units 21  0643 21  0509 21  0535 21  0623 21  0635 21  0444 21  0449 21  2114   SODIUM mmol/L 148* 147* 145 140 139 143 144 145   POTASSIUM mmol/L 4 1 3 1* 3 5 3 6 3 8 3 4* 3 2* 2 9*   CHLORIDE mmol/L 105 105 105 104 103 107 107 105   CO2 mmol/L 34* 36* 31 28 29 30 28 30   ANION GAP mmol/L 9 6 9 8 7 6 9 10   BUN mg/dL 29* 29* 28* 22 19 19 23 25   CREATININE mg/dL 1 90* 1 91* 2 11* 2 06* 1 88* 1 91* 2 06* 2 21*   CALCIUM mg/dL 8 7 8 1* 7 9* 7 6* 7 9* 8 1* 8 4 8 4   GLUCOSE RANDOM mg/dL 142* 97 94 90 115 76 93 133   ALT U/L  --   --  18 18  --   --   --  25   AST U/L  --   --  14 17  --   --   --  27   ALK PHOS U/L  --   --  93 100  --   --   --  120*   ALBUMIN g/dL  --   --  3 2* 2 7*  --   --   --  3 0*   TOTAL BILIRUBIN mg/dL  --   --  0 73 0 67  --   --   --  0 58     Results from last 7 days   Lab Units 21  0643 21  0509 21  0535 21  0623 21  2114   MAGNESIUM mg/dL 2 4 2 2 2 3 2 1 2 3   PHOSPHORUS mg/dL 3 4 3 4 3 8 3 6  --       ABG:  Results from last 7 days   Lab Units 21  0835   PH ART  7 418   PCO2 ART mm Hg 58 5*   PO2 ART mm Hg 79 3   HCO3 ART mmol/L 36 9*   BASE EXC ART mmol/L 10 8   ABG SOURCE  Radial, Right     EK/25 EKG: pending    Imagin/22 Renal Ultrasound: Unremarkable   MRI Brain: No acute infarct seen  Severe periventricular and white matter T2 hyperintensity, may be due to chronic small vessel ischemic changes  Area of encephalomalacia in the inferior left frontal region   CXR: B pulmonary vascular congestion   I have personally reviewed pertinent reports     and I have personally reviewed pertinent films in PACS    Historical Information   Past Medical History:   Diagnosis Date    [facility]     Diabetes mellitus ([facility]     Hypertension      Past Surgical History:   Procedure Laterality Date    APPENDECTOMY      KNEE ARTHROPLASTY Bilateral      Social History   Social History     Substance and Sexual Activity   Alcohol Use Never     Social History     Substance and Sexual Activity   Drug Use Never     Social History     Tobacco Use   Smoking Status Never Smoker   Smokeless Tobacco Never Used     Family History:   Family History   Problem Relation Age of Onset    Heart disease Mother     Stroke Mother      Family history unknown    Medications:  Current Facility-Administered Medications   Medication Dose Route Frequency    acetaminophen (TYLENOL) tablet 650 mg  650 mg Oral Q4H PRN    albumin human (FLEXBUMIN) 25 % injection 25 g  25 g Intravenous TID    amLODIPine (NORVASC) tablet 2 5 mg  2 5 mg Oral Daily    atorvastatin (LIPITOR) tablet 80 mg  80 mg Oral Daily With Dinner    clopidogrel (PLAVIX) tablet 75 mg  75 mg Oral Daily    donepezil (ARICEPT) tablet 5 mg  5 mg Oral HS    insulin lispro (HumaLOG) 100 units/mL subcutaneous injection 1-6 Units  1-6 Units Subcutaneous TID AC    levothyroxine tablet 112 mcg  112 mcg Oral Early Morning    metoprolol succinate (TOPROL-XL) 24 hr tablet 50 mg  50 mg Oral Daily    pantoprazole (PROTONIX) EC tablet 40 mg  40 mg Oral Early Morning    QUEtiapine (SEROquel) tablet 25 mg  25 mg Oral HS     Home medications:  Prior to Admission Medications   Prescriptions Last Dose Informant Patient Reported? Taking?    Ferrous Gluconate 256 (28 Fe) MG TABS 7/18/2021 at 0900  Yes No   Sig: Take 246 mg by mouth   amLODIPine (NORVASC) 2 5 mg tablet 7/17/2021 at 2100  Yes Yes   Sig: Take 2 5 mg by mouth   atorvastatin (LIPITOR) 80 mg tablet 7/17/2021 at 2100  Yes Yes   Sig: Take 80 mg by mouth   clopidogrel (PLAVIX) 75 mg tablet Not Taking at Unknown time  Yes No Sig: Take 75 mg by mouth   Patient not taking: Reported on 7/19/2021   donepezil (ARICEPT) 5 mg tablet   Yes Yes   Sig: Take 5 mg by mouth   furosemide (LASIX) 40 mg tablet 7/18/2021 at 0900  Yes Yes   Sig: Take 40 mg by mouth daily   glimepiride (AMARYL) 4 mg tablet   Yes Yes   Sig: Take 4 mg by mouth 2 (two) times a day After breakfast, before bed time   levothyroxine 100 mcg tablet 7/17/2021 at 0900  Yes Yes   Sig: Take 100 mcg by mouth   losartan (COZAAR) 100 MG tablet 7/17/2021 at 2100  Yes Yes   Sig: Take 100 mg by mouth daily   metoprolol succinate (TOPROL-XL) 50 mg 24 hr tablet 7/17/2021 at 2100  Yes Yes   Sig: Take 50 mg by mouth daily   pantoprazole (PROTONIX) 40 mg tablet 7/17/2021 at 2100  Yes Yes   Sig: Take 40 mg by mouth   pioglitazone (ACTOS) 30 mg tablet 7/17/2021 at 0900  Yes Yes   Sig: Take one tablet by mouth daily      Facility-Administered Medications: None     Allergies: Allergies   Allergen Reactions    Contrast [Iodinated Diagnostic Agents] Anaphylaxis    Iodine - Food Allergy Anaphylaxis     IV DYE    Ace Inhibitors Other (See Comments)    Hydralazine Other (See Comments)        Lisinopril Other (See Comments)           Penicillins Other (See Comments)     Patient denies pcn allergy       ------------------------------------------------------------------------------------------------------------  Anticipated Length of Stay is > 2 midnights    Care Time Delivered:   No Critical Care time spent     Giovanni NIRMALA Baldwin

## 2021-07-25 NOTE — RESPIRATORY THERAPY NOTE
RT Protocol Note  Sakina Guevara 64 y o  male MRN: [id number]  Unit/Bed#: -01 Encounter: [id number]    Assessment    Principal Problem:    Acute on chronic diastolic CHF (congestive heart failure) ([facility])  Active Problems:    Hypokalemia    Alzheimer's dementia ([place] [address] )    Acute on chronic anemia    Acute kidney injury superimposed on chronic kidney disease ([facility])    PAF (paroxysmal atrial fibrillation) ([facility])    Type 2 diabetes mellitus with kidney complication, without long-term current use of insulin ([facility])    Moderate aortic stenosis by prior echocardiogram    Ambulatory dysfunction    Essential hypertension    GI bleeding    History of TIA (transient ischemic attack)    Morbid obesity with BMI of 40 0-44 9, adult ([place] [address] )    Hypothyroidism      Home Pulmonary Medications:         Past Medical History:   Diagnosis Date    Alzheimers disease ([place] [address] )     Diabetes mellitus ([place] [address] )     Hypertension      Social History     Socioeconomic History    Marital status:      Spouse name: None    Number of children: None    Years of education: None    Highest education level: None   Occupational History    None   Tobacco Use    Smoking status: Never Smoker    Smokeless tobacco: Never Used   Vaping Use    Vaping Use: Never used   Substance and Sexual Activity    Alcohol use: Never    Drug use: Never    Sexual activity: None     Comment: defer   Other Topics Concern    None   Social History Narrative    None     Social Determinants of Health     Financial Resource Strain:     Difficulty of Paying Living Expenses:    Food Insecurity:     Worried About Running Out of Food in the Last Year:     [address] in the Last Year:    Transportation Needs:     Lack of Transportation (Medical):      Lack of Transportation (Non-Medical):    Physical Activity:     Days of Exercise per Week:     Minutes of Exercise per Session:    Stress:     Feeling of Stress :    Social Connections:     Frequency of Communication with Friends and Family:     Frequency of Social Gatherings with Friends and Family:     Attends [employer] Services:     Active Member of Clubs or Organizations:     Attends Club or Organization Meetings:     Marital Status:    Intimate Partner Violence:     Fear of Current or Ex-Partner:     Emotionally Abused:     Physically Abused:     Sexually Abused:        Subjective         Objective    Physical Exam:   Assessment Type: (P) Assess only  General Appearance: (P) Awake, Drowsy  Respiratory Pattern: (P) Dyspnea with exertion, Dyspnea at rest  Chest Assessment: (P) Chest expansion symmetrical  Bilateral Breath Sounds: (P) Diminished  O2 Device: (P) 40% Bipap    Vitals:  Blood pressure (!) 176/78, pulse 71, temperature 98 °F (36 7 °C), temperature source Temporal, resp  rate (!) 33, height 5' 7" (1 702 m), weight 114 kg (251 lb 3 2 oz), SpO2 97 %  Results from last 7 days   Lab Units 07/25/21  0835   PH ART  7 418   PCO2 ART mm Hg 58 5*   PO2 ART mm Hg 79 3   HCO3 ART mmol/L 36 9*   BASE EXC ART mmol/L 10 8   O2 CONTENT ART mL/dL 12 6*   O2 HGB, ARTERIAL % 93 9*   ABG SOURCE  Radial, Right   ZAFAR TEST  Yes       Imaging and other studies: I have personally reviewed pertinent reports  O2 Device: (P) 40% Bipap     Plan    Respiratory Plan: (P) Vent/NIV/HFNC        Resp Comments: (P) Pt w/ HO CHF , arrived in ed w/ pitting edema after fall from couch  ABG drawn this am on 2L  ph 7 4 CO2 58 5 Po2 7903 HOC3 36 9  Pt moved to 307 and bipap started per Dr Rama Arzola   10/5 40% abg to be redrawn in  [address]

## 2021-07-25 NOTE — RESPIRATORY THERAPY NOTE
Pt briefly placed on bipap after am ABG  After repeat ABG @ 1500, pt taken off bipap and placed on 3L NC   Bipap on stby and only to be used if pt has increased WOB

## 2021-07-25 NOTE — ASSESSMENT & PLAN NOTE
Lab Results   Component Value Date    HGBA1C 6 6 (H) 07/19/2021       Recent Labs     07/24/21  1600 07/24/21 2056 07/25/21  0718 07/25/21  1136   POCGLU 175* 125 129 169*       Blood Sugar Average: Last 72 hrs:  (P) 126 [id number]   · Diabetic diet  · Fingerstick blood sugar sliding scale coverage  · Hold home oral agents  ·  hemoglobin A1c 6 6    Blood sugars are currently well controlled

## 2021-07-25 NOTE — ASSESSMENT & PLAN NOTE
· Lower extremity 3+ pitting edema to bilateral lower extremities extending up to scrotum, bibasilar rales  · Chest x-ray:  Atelectasis  · BNP:  4500  · Daily weights  · I&Os  · Lasix IV twice daily for acute diuresis for now  Not much urine output with 40 IV b i d  Of Lasix  Decreased lasix dose to 40 from 80 IV bid however patient seems to have decreased urinary output so increased lasix back to 80 BID  Patient also received 1 dose of torsemide this afternoon  Not having a lot of urinary output on Closely monitor urine output  Hold Norvasc  · TTE: LV function showing 55% EF  · Low-salt diet, fluid restriction    Patient started on Bumex drip on 07/22 given poor response to Lasix IV as per cardiology recommendations  Will continue to monitor I&Os  Weight coming down and patient seems to be responding to Bumex drip well   Bumex drip rate increased 7/24 by Nephrology  Discontinued on 7/25 as patient developing hypernatremia and may have developed contraction alkalosis  Doing trial of bipap today and will monitor AB  Critical care service on board

## 2021-07-25 NOTE — ASSESSMENT & PLAN NOTE
· CT head showing encephalomalacia  · MRI showing the same  · Patient's mental status waxes and wanes however mental status today seemed significantly worse  · Possibly metabolic in origin

## 2021-07-25 NOTE — QUICK NOTE
QUICK NOTE - Deterioration Index  Valdene Cabot 64 y o  male MRN: [id number]  Unit/Bed#: -01 Encounter: [id number]    Date Paged: 21  Time Paged: 8:07 am  Room #: 421  Arrival Time: 8:09 am  Deterioration index score: 62 3, returned to  49 1  %  Spoke with Ethel Damon (RN) from primary team & [address] with Dr Levi Cardenas  Need to escalate level of care: no     PROBLEMS resulting in high DI score:    Hypoxia, when 4L NC removed from nose  o 92% on 4L NC   Tachypnea  o Patient comfortable with no increased work of breathing or accessory muscle usage    PLAN:     Check CXR & ABG   Cardiology, Nephrology, and SLIM teams actively managing & optimizing patient's volume status    Please contact critical care via [employer] with any questions or concerns       Vitals:   Vitals:    21 0254 21 0500 21 0600 21 0717   BP: 108/82   109/88   Pulse: 78   77   Resp: 18   (!) 24   Temp: 98 7 °F (37 1 °C)   99 5 °F (37 5 °C)   TempSrc:       SpO2: 94%   92%   Weight:  113 kg (250 lb 3 6 oz) 114 kg (251 lb 3 2 oz)    Height:         Respiratory:   SpO2: 92 %,   O2 Device: Nasal cannula     Temperature: Temp (24hrs), Av 6 °F (37 °C), Min:97 7 °F (36 5 °C), Max:99 5 °F (37 5 °C)  Current: Temperature: 99 5 °F (37 5 °C)    Code Status: Level 1 - Full Code

## 2021-07-26 PROBLEM — E87.0 HYPERNATREMIA: Status: ACTIVE | Noted: 2021-07-26

## 2021-07-26 PROBLEM — E87.3 METABOLIC ALKALOSIS: Status: RESOLVED | Noted: 2021-07-25 | Resolved: 2021-07-26

## 2021-07-26 PROBLEM — G93.40 ACUTE ENCEPHALOPATHY: Status: RESOLVED | Noted: 2021-07-25 | Resolved: 2021-07-26

## 2021-07-26 LAB
ANION GAP SERPL CALCULATED.3IONS-SCNC: 4 MMOL/L (ref 4–13)
ANION GAP SERPL CALCULATED.3IONS-SCNC: 5 MMOL/L (ref 4–13)
ATRIAL RATE: 74 BPM
BASOPHILS # BLD AUTO: 0.06 THOUSANDS/ΜL (ref 0–0.1)
BASOPHILS NFR BLD AUTO: 1 % (ref 0–1)
BUN SERPL-MCNC: 31 MG/DL (ref 5–25)
BUN SERPL-MCNC: 32 MG/DL (ref 5–25)
CALCIUM SERPL-MCNC: 8.6 MG/DL (ref 8.3–10.1)
CALCIUM SERPL-MCNC: 8.8 MG/DL (ref 8.3–10.1)
CHLORIDE SERPL-SCNC: 107 MMOL/L (ref 100–108)
CHLORIDE SERPL-SCNC: 108 MMOL/L (ref 100–108)
CO2 SERPL-SCNC: 36 MMOL/L (ref 21–32)
CO2 SERPL-SCNC: 40 MMOL/L (ref 21–32)
CREAT SERPL-MCNC: 1.96 MG/DL (ref 0.6–1.3)
CREAT SERPL-MCNC: 2.05 MG/DL (ref 0.6–1.3)
EOSINOPHIL # BLD AUTO: 0.11 THOUSAND/ΜL (ref 0–0.61)
EOSINOPHIL NFR BLD AUTO: 2 % (ref 0–6)
ERYTHROCYTE [DISTWIDTH] IN BLOOD BY AUTOMATED COUNT: 16.7 % (ref 11.6–15.1)
GFR SERPL CREATININE-BSD FRML MDRD: 30 ML/MIN/1.73SQ M
GFR SERPL CREATININE-BSD FRML MDRD: 32 ML/MIN/1.73SQ M
GLUCOSE SERPL-MCNC: 129 MG/DL (ref 65–140)
GLUCOSE SERPL-MCNC: 138 MG/DL (ref 65–140)
GLUCOSE SERPL-MCNC: 153 MG/DL (ref 65–140)
GLUCOSE SERPL-MCNC: 164 MG/DL (ref 65–140)
GLUCOSE SERPL-MCNC: 190 MG/DL (ref 65–140)
GLUCOSE SERPL-MCNC: 224 MG/DL (ref 65–140)
HCT VFR BLD AUTO: 27.8 % (ref 36.5–49.3)
HGB BLD-MCNC: 7.9 G/DL (ref 12–17)
IMM GRANULOCYTES # BLD AUTO: 0.02 THOUSAND/UL (ref 0–0.2)
IMM GRANULOCYTES NFR BLD AUTO: 0 % (ref 0–2)
LYMPHOCYTES # BLD AUTO: 0.69 THOUSANDS/ΜL (ref 0.6–4.47)
LYMPHOCYTES NFR BLD AUTO: 12 % (ref 14–44)
MAGNESIUM SERPL-MCNC: 2.4 MG/DL (ref 1.6–2.6)
MCH RBC QN AUTO: 26.4 PG (ref 26.8–34.3)
MCHC RBC AUTO-ENTMCNC: 28.4 G/DL (ref 31.4–37.4)
MCV RBC AUTO: 93 FL (ref 82–98)
MONOCYTES # BLD AUTO: 0.65 THOUSAND/ΜL (ref 0.17–1.22)
MONOCYTES NFR BLD AUTO: 11 % (ref 4–12)
NEUTROPHILS # BLD AUTO: 4.32 THOUSANDS/ΜL (ref 1.85–7.62)
NEUTS SEG NFR BLD AUTO: 74 % (ref 43–75)
NRBC BLD AUTO-RTO: 0 /100 WBCS
P AXIS: 66 DEGREES
PHOSPHATE SERPL-MCNC: 3.4 MG/DL (ref 2.3–4.1)
PLATELET # BLD AUTO: 280 THOUSANDS/UL (ref 149–390)
PMV BLD AUTO: 10.3 FL (ref 8.9–12.7)
POTASSIUM SERPL-SCNC: 3.5 MMOL/L (ref 3.5–5.3)
POTASSIUM SERPL-SCNC: 3.9 MMOL/L (ref 3.5–5.3)
PR INTERVAL: 192 MS
PROCALCITONIN SERPL-MCNC: 0.15 NG/ML
QRS AXIS: 15 DEGREES
QRSD INTERVAL: 192 MS
QT INTERVAL: 524 MS
QTC INTERVAL: 581 MS
RBC # BLD AUTO: 2.99 MILLION/UL (ref 3.88–5.62)
SODIUM SERPL-SCNC: 148 MMOL/L (ref 136–145)
SODIUM SERPL-SCNC: 152 MMOL/L (ref 136–145)
T WAVE AXIS: 32 DEGREES
VENTRICULAR RATE: 74 BPM
WBC # BLD AUTO: 5.85 THOUSAND/UL (ref 4.31–10.16)

## 2021-07-26 PROCEDURE — 94760 N-INVAS EAR/PLS OXIMETRY 1: CPT

## 2021-07-26 PROCEDURE — 84145 PROCALCITONIN (PCT): CPT | Performed by: PHYSICIAN ASSISTANT

## 2021-07-26 PROCEDURE — 85025 COMPLETE CBC W/AUTO DIFF WBC: CPT | Performed by: PHYSICIAN ASSISTANT

## 2021-07-26 PROCEDURE — 80048 BASIC METABOLIC PNL TOTAL CA: CPT | Performed by: PHYSICIAN ASSISTANT

## 2021-07-26 PROCEDURE — 99232 SBSQ HOSP IP/OBS MODERATE 35: CPT | Performed by: STUDENT IN AN ORGANIZED HEALTH CARE EDUCATION/TRAINING PROGRAM

## 2021-07-26 PROCEDURE — 92610 EVALUATE SWALLOWING FUNCTION: CPT

## 2021-07-26 PROCEDURE — 99233 SBSQ HOSP IP/OBS HIGH 50: CPT | Performed by: NURSE PRACTITIONER

## 2021-07-26 PROCEDURE — 84100 ASSAY OF PHOSPHORUS: CPT | Performed by: STUDENT IN AN ORGANIZED HEALTH CARE EDUCATION/TRAINING PROGRAM

## 2021-07-26 PROCEDURE — 93010 ELECTROCARDIOGRAM REPORT: CPT | Performed by: INTERNAL MEDICINE

## 2021-07-26 PROCEDURE — 99232 SBSQ HOSP IP/OBS MODERATE 35: CPT | Performed by: INTERNAL MEDICINE

## 2021-07-26 PROCEDURE — 82948 REAGENT STRIP/BLOOD GLUCOSE: CPT

## 2021-07-26 PROCEDURE — 97110 THERAPEUTIC EXERCISES: CPT

## 2021-07-26 PROCEDURE — 94660 CPAP INITIATION&MGMT: CPT

## 2021-07-26 PROCEDURE — 83735 ASSAY OF MAGNESIUM: CPT | Performed by: STUDENT IN AN ORGANIZED HEALTH CARE EDUCATION/TRAINING PROGRAM

## 2021-07-26 RX ORDER — DEXTROSE MONOHYDRATE 50 MG/ML
75 INJECTION, SOLUTION INTRAVENOUS CONTINUOUS
Status: DISCONTINUED | OUTPATIENT
Start: 2021-07-26 | End: 2021-07-26

## 2021-07-26 RX ORDER — DEXTROSE MONOHYDRATE 50 MG/ML
125 INJECTION, SOLUTION INTRAVENOUS ONCE
Status: COMPLETED | OUTPATIENT
Start: 2021-07-26 | End: 2021-07-26

## 2021-07-26 RX ORDER — POTASSIUM CHLORIDE 14.9 MG/ML
20 INJECTION INTRAVENOUS
Status: COMPLETED | OUTPATIENT
Start: 2021-07-26 | End: 2021-07-26

## 2021-07-26 RX ADMIN — DEXTROSE 75 ML/HR: 5 SOLUTION INTRAVENOUS at 00:57

## 2021-07-26 RX ADMIN — ATORVASTATIN CALCIUM 80 MG: 40 TABLET, FILM COATED ORAL at 16:58

## 2021-07-26 RX ADMIN — POTASSIUM CHLORIDE 20 MEQ: 14.9 INJECTION, SOLUTION INTRAVENOUS at 10:49

## 2021-07-26 RX ADMIN — AMLODIPINE BESYLATE 2.5 MG: 2.5 TABLET ORAL at 08:04

## 2021-07-26 RX ADMIN — METOPROLOL SUCCINATE 50 MG: 50 TABLET, EXTENDED RELEASE ORAL at 08:04

## 2021-07-26 RX ADMIN — DONEPEZIL HYDROCHLORIDE 5 MG: 5 TABLET ORAL at 21:28

## 2021-07-26 RX ADMIN — LEVOTHYROXINE SODIUM 112 MCG: 112 TABLET ORAL at 06:00

## 2021-07-26 RX ADMIN — QUETIAPINE FUMARATE 25 MG: 25 TABLET ORAL at 21:28

## 2021-07-26 RX ADMIN — PANTOPRAZOLE SODIUM 40 MG: 40 TABLET, DELAYED RELEASE ORAL at 06:00

## 2021-07-26 RX ADMIN — POTASSIUM CHLORIDE 20 MEQ: 14.9 INJECTION, SOLUTION INTRAVENOUS at 08:05

## 2021-07-26 RX ADMIN — CLOPIDOGREL BISULFATE 75 MG: 75 TABLET ORAL at 08:04

## 2021-07-26 RX ADMIN — DEXTROSE 125 ML/HR: 5 SOLUTION INTRAVENOUS at 08:53

## 2021-07-26 NOTE — ASSESSMENT & PLAN NOTE
Lab Results   Component Value Date    EGFR 30 07/26/2021    EGFR 32 07/26/2021    EGFR 31 07/25/2021    CREATININE 2 05 (H) 07/26/2021    CREATININE 1 96 (H) 07/26/2021    CREATININE 2 01 (H) 07/25/2021     · Baseline creatinine around 1 4    Suspect partially due to losartan-will hold  · Hold off on fluids due to anasarca  · Daily metabolic panel and trend creatinine during diuresis  · Caution with nephrotoxins and avoid hypotension  · Improving, continue to monitor    Nephrology consulted and recommendations appreciated  Bumex discontinued by Nephrology 7/25 as he has developed hypernatremia and seems to have developed contraction alkalosis  Will continue to monitor creatinine  Will need to follow-up with nephrology as outpatient

## 2021-07-26 NOTE — PROGRESS NOTES
[address]  Progress Note- Critical Care consult - Niya Wilsondavina 1945, 64 y o  male MRN: [id number]  Unit/Bed#: -01 Encounter: [id number]  Primary Care Provider: Shawn Small MD   Date and time admitted to hospital: 7/18/2021  9:06 PM    Acute encephalopathy  Assessment & Plan  · Patient's confusion & disorientation progressively worsening since admission  · Etiology likely multifactorial, with delirium, metabolic disturbances, and disruption of sleep-wake cycle all contributing  · Adjust sleep-wake cycle  · Early mobilization; OOB TID  · Continue Seroquel QHS  · Correct electrolyte and acid-base dysfunctions  · Avoid sedative or opiate medications  · R/O cardiac etiology  · TTE (7/19/21) reviewed - EF 55% with mild aortic stenosis  · Troponin negative  · Hx  of TIA  · Monitor neuro exam  · Continue plaxix  · MRI brain (07/22) - Severe periventricular and white matter T2 hyperintensity, may be due to chronic small vessel ischemic changes was also described on the MRI performed at [facility] facility on October 30, 2017  Area of encephalomalacia in the inferior left frontal region  · 14 [facility] (07/22) - New acute intracranial CT abnormality  Encephalomalacia involving left gyrus rectus and inferior left frontal lobe as well as right frontal cortical/subcortical white matter  Nonspecific extensive white matter change suggesting advanced microangiopathy    · R/O infectious etiology  · Trend WBC and fever curve  · Recheck UA: negative  · PCT 0 16  · CXR (07/25) - Mild pulmonary edema with trace right effusion    Dementia without behavioral disturbance (HCC)  Assessment & Plan  · CTH and MRI brain showing area of encephalomalacia  · Mental staus waxes/wanes however progressively worse since admission  · Metabolic disturbances likely contributing to worsening mental status  · Continue home aricept  · Continue seroquel HS  · Delirium precautions    * Acute on chronic diastolic CHF (congestive heart failure) (HCC)  Assessment & Plan  Wt Readings from Last 3 Encounters:   07/25/21 114 kg (251 lb 3 2 oz)     · Cardiology following  · TTE (7/19/21) -  EF 55% and mild aortic stenosis  · Repeat CXR from 07/25 consistent with persistent vascular congestion & pulmonary edema  · Initially was placed on lasix BID however not responding and started on Bumex gtt and albumen on 07/22  Bumex gtt stopped morning of 07/25  in setting of metabolic alkalosis & hypernatremia  · Diamox 250 mg IV given x1 on 07/25  · BiPAP initiated on 07/25 and then weaned off  · ContinueBiPAP prn/qhs  Likely has undiagnosed sleep apnea    · Continue Toprol XL 50 mg QD  · Strict I's & O's  · Daily weight - pt is down 12 lbs since admission  · Trend electrolytes and replete for goals: K > 4, Mg > 2, Phos  >4    Acute respiratory failure with hypoxia and hypercapnia ([facility])  Assessment & Plan  · Secondary to Acute on chronic heart failure  · CXR (07/25) - mild pulmonary edema, trace right effusion  · Diuretic plan: bumex gtt stopped am of 07/25, given diamox 250 mg IV x1  · Monitor fluid balance, strict I&O   · Supplemental O2 to maintain sat > 90%  · Desaturates quickly when pulling off O2  · BiPAP prn/HS  · Xopenex prn  · Early mobilization    Acute kidney injury superimposed on chronic kidney disease [facility])  Assessment & Plan  Lab Results   Component Value Date    EGFR 31 07/25/2021    EGFR 33 07/25/2021    EGFR 33 07/24/2021    CREATININE 2 01 (H) 07/25/2021    CREATININE 1 90 (H) 07/25/2021    CREATININE 1 91 (H) 07/24/2021     · Nephrology on consult  · History of CKD stage 3B with baseline creatinine 1 4-1 7   · On admission (07/18), creatinine 2 21 with estimated GFR of 28 mL/min  · Etiology attributed to acute tubular necrosis due to blood loss in patient with underlying intrinsic renal disease due to vascular disease and long-term diabetes  · Bumex gtt discontinued 07/25  · Diamox 250 mg IV x1 given 07/25  · Not currently established with outpatient Nephrology  Will need outpatient follow-up  Metabolic alkalosis  Assessment & Plan  · Contraction Metabolic Alkalosis secondary to Loop Diuretics  · Bumex gtt stopped 07/25  · Diamox 250 mg IV given 07/25  · Trend BMP  · Strict I&O    Hypernatremia  Assessment & Plan  · Likely secondary to free water restriction and diuretics  · Free water restriction liberalized on 07/25 however due to patient's mental status had very limited p o  Intake  · Trend sodium  · Noted to be increasing last evening; 148 > 152  · D5W at 75 mL/hr x6 hours administered, recheck sodium this a m      PAF (paroxysmal atrial fibrillation) ([facility])  Assessment & Plan  · Not of systemic anticoagulation due to history of GIB  · Continue Toprol XL    Acute on chronic anemia  Assessment & Plan  · EGD & Colonoscopy 7/21 with no active bleeding found  · Continue daily PPI  · Iron 26, Iron sat 7%, TIBC 352  · Folate 8 9  · B12 2288  · Monitor H/H and plts    Essential hypertension  Assessment & Plan  · BP borderline during admission  · Continue home amlodipine 2 5 mg daily  · Continue Toprol XL 50 mg daily  · Holding home losartan due to renal function    Hypothyroidism  Assessment & Plan  · History of hypothyroidism and on levothyroxine 100 mcg at home  · TSH elevated on admission  · TSH 14, Free T4 1 3  · Levothyroxine dose to increased to 112 mcg on 07/24 by primary team  · Follow up outpatient with repeat TSH level in 4-6 weeks    Type 2 diabetes mellitus with kidney complication, without long-term current use of insulin [facility])  Assessment & Plan  Lab Results   Component Value Date    HGBA1C 6 6 (H) 07/19/2021       Recent Labs     07/25/21  0718 07/25/21  1136 07/25/21  1602 07/25/21  2124   POCGLU 129 169* 170* 148*       Blood Sugar Average: Last 72 hrs:  (P) 130 8125     · Accuchecks QID with SS coverage  · Home PO diabetic medications on hold    Ambulatory dysfunction  Assessment & Plan  · PT and OT consults  · CM consult  · Early mobilization      ----------------------------------------------------------------------------------------  HPI/24hr events:  Yesterday (), DI alert activated secondary to patient having hypoxia when removing his supplemental oxygen  Patient was tachypneic and MARTE  Chest x-ray showed pulmonary vascular congestion  Of note prior to this patient had been on Bumex infusion which was discontinued ( AM)  Labs showed metabolic alkalosis and hypernatremia suspected secondary to diuretics  Diamox 250 mg IV x1 administered  Patient was placed on BiPAP by primary team and patient was upgraded to step-down level 2  Critical care was consulted  After transfer patient's BiPAP was weaned off and he was returned to 4 L nasal cannula  Labs continue to show metabolic alkalosis and worsening hypernatremia  D5W bolus administered with repeat labs pending at this time  Overnight did wear HS BiPAP  40%  Disposition: Continue Stepdown Level 2 level of care   Code Status: Level 3 - DNAR and DNI  ---------------------------------------------------------------------------------------  SUBJECTIVE  Patient offers no complaints  Denies chest pain, shortness of breath, nausea, vomiting    States "I am fine "    Review of Systems  Review of systems was reviewed and negative unless stated above in HPI/24-hour events   ---------------------------------------------------------------------------------------  OBJECTIVE    Vitals   Vitals:    21 0000 21 0200 21 0217 21 0400   BP: 112/68 111/56  112/57   BP Location: Right arm   Right arm   Pulse: 65 58  57   Resp: (!) 23 (!) 24  21   Temp: (!) 97 3 °F (36 3 °C)   97 8 °F (36 6 °C)   TempSrc: Temporal   Temporal   SpO2: 96% 97% 97% 97%   Weight:       Height:         Temp (24hrs), Av 2 °F (36 8 °C), Min:97 3 °F (36 3 °C), Max:99 5 °F (37 5 °C)  Current: Temperature: 97 8 °F (36 6 °C)          Respiratory:  SpO2: SpO2: 97 %, SpO2 Activity: SpO2 Activity: At Rest, SpO2 Device: O2 Device: BiPAP (12/6)  Nasal Cannula O2 Flow Rate (L/min): 4 L/min    Invasive/non-invasive ventilation settings   Respiratory    Lab Data (Last 4 hours)    None         O2/Vent Data (Last 4 hours)      07/26 0217          Non-Invasive Ventilation Mode BiPAP                   Physical Exam  Vitals reviewed  Constitutional:       General: He is not in acute distress  Appearance: He is obese  He is ill-appearing  He is not diaphoretic  HENT:      Head: Normocephalic and atraumatic  Nose: Nose normal       Mouth/Throat:      Mouth: Mucous membranes are moist       Pharynx: Oropharynx is clear  No oropharyngeal exudate  Eyes:      Conjunctiva/sclera: Conjunctivae normal       Pupils: Pupils are equal, round, and reactive to light  Cardiovascular:      Rate and Rhythm: Rhythm regularly irregular  Pulses: Normal pulses  Heart sounds: Normal heart sounds  Pulmonary:      Effort: Pulmonary effort is normal  No respiratory distress  Breath sounds: No stridor  Rales present  No wheezing or rhonchi  Abdominal:      General: Bowel sounds are normal       Palpations: Abdomen is soft  Tenderness: There is no abdominal tenderness  Musculoskeletal:         General: Normal range of motion  Cervical back: Normal range of motion and neck supple  Right lower leg: Edema present  Left lower leg: Edema present  Skin:     General: Skin is warm and dry  Capillary Refill: Capillary refill takes less than 2 seconds  Coloration: Skin is not jaundiced or pale  Neurological:      General: No focal deficit present  Mental Status: He is alert  He is disoriented  GCS: GCS eye subscore is 3  GCS verbal subscore is 4  GCS motor subscore is 6  Psychiatric:         Behavior: Behavior is cooperative           Laboratory and Diagnostics:  Results from last 7 days   Lab Units 07/25/21  0643 07/24/21  8987 07/23/21  0535 07/22/21  0623 07/21/21  0635 07/20/21  1311 07/20/21  0444   WBC Thousand/uL 7 43 6 10 5 47 5 66 6 41 5 99 4 61   HEMOGLOBIN g/dL 8 7* 8 1* 8 4* 8 0* 8 5* 8 1* 7 9*   HEMATOCRIT % 31 1* 28 5* 29 4* 27 2* 29 4* 27 8* 26 9*   PLATELETS Thousands/uL 308 302 328 297 313 338 315   NEUTROS PCT % 79* 74 69 68 65  --   --    MONOS PCT % 10 12 13* 15* 13*  --   --      Results from last 7 days   Lab Units 07/26/21  0013 07/25/21  1613 07/25/21  0643 07/24/21  0509 07/23/21  0535 07/22/21  0623 07/21/21  0635   SODIUM mmol/L 152* 147* 148* 147* 145 140 139   POTASSIUM mmol/L 3 9 3 5 4 1 3 1* 3 5 3 6 3 8   CHLORIDE mmol/L 108 106 105 105 105 104 103   CO2 mmol/L 40* 39* 34* 36* 31 28 29   ANION GAP mmol/L 4 2* 9 6 9 8 7   BUN mg/dL 32* 27* 29* 29* 28* 22 19   CREATININE mg/dL 1 96* 2 01* 1 90* 1 91* 2 11* 2 06* 1 88*   CALCIUM mg/dL 8 6 8 6 8 7 8 1* 7 9* 7 6* 7 9*   GLUCOSE RANDOM mg/dL 138 165* 142* 97 94 90 115   ALT U/L  --  18  --   --  18 18  --    AST U/L  --  11  --   --  14 17  --    ALK PHOS U/L  --  83  --   --  93 100  --    ALBUMIN g/dL  --  4 1  --   --  3 2* 2 7*  --    TOTAL BILIRUBIN mg/dL  --  1 20*  --   --  0 73 0 67  --      Results from last 7 days   Lab Units 07/25/21  1613 07/25/21  0643 07/24/21  0509 07/23/21  0535 07/22/21  0623   MAGNESIUM mg/dL 2 3 2 4 2 2 2 3 2 1   PHOSPHORUS mg/dL 3 4 3 4 3 4 3 8 3 6           Results from last 7 days   Lab Units 07/25/21  1614   TROPONIN I ng/mL <0 02         ABG:  Results from last 7 days   Lab Units 07/25/21  1419   PH ART  7 473*   PCO2 ART mm Hg 52 7*   PO2 ART mm Hg 90 3   HCO3 ART mmol/L 37 8*   BASE EXC ART mmol/L 12 6   ABG SOURCE  Radial, Right     VBG:  Results from last 7 days   Lab Units 07/25/21  1419   ABG SOURCE  Radial, Right     Results from last 7 days   Lab Units 07/25/21  1613   PROCALCITONIN ng/ml 0 16       Micro        EKG: Atrial fibrillation,   Imaging: I have personally reviewed pertinent reports     and I have personally reviewed pertinent films in PACS    Intake and Output  I/O       07/24 0701 - 07/25 0700 07/25 0701 - 07/26 0700    P  O  120 150    I V  (mL/kg)  442 1 (3 9)    IV Piggyback  205    Total Intake(mL/kg) 120 (1 1) 797 1 (7)    Urine (mL/kg/hr) 2450 (0 9) 1000 (0 4)    Stool  0    Total Output 2450 1000    Net -2330 -202 9          Unmeasured Urine Occurrence 1 x 5 x    Unmeasured Stool Occurrence  3 x          Height and Weights   Height: 5' 7" (170 2 cm)  IBW (Ideal Body Weight): 66 1 kg  Body mass index is 39 34 kg/m²  Weight (last 2 days)     Date/Time   Weight    07/25/21 0600   114 (251 2)    07/25/21 0500   113 (250 22)    07/24/21 0509   118 (259 7)                Nutrition       Diet Orders   (From admission, onward)             Start     Ordered    07/25/21 0826  Diet Regular; Regular House; Sodium 2 GM  Diet effective now     Question Answer Comment   Diet Type Regular    Regular Regular House    Other Restriction(s): Sodium 2 GM    RD to adjust diet per protocol?  Yes        07/25/21 0825                  Active Medications  Scheduled Meds:  Current Facility-Administered Medications   Medication Dose Route Frequency Provider Last Rate    acetaminophen  650 mg Oral Q4H PRN Kike Messing, NIRMALA      amLODIPine  2 5 mg Oral Daily Kike Messing, NIRMALA      atorvastatin  80 mg Oral Daily With Cesar Lujan, NIRMALA      clopidogrel  75 mg Oral Daily Kike NIRMALA Cerrato      dextrose  75 mL/hr Intravenous Continuous SEYMOUR Varela 75 mL/hr (07/26/21 0057)    donepezil  5 mg Oral HS Kike NIRMALA Cerrato      insulin lispro  1-6 Units Subcutaneous TID AC SEYMOUR Varela      Labetalol HCl  10 mg Intravenous Q6H PRN Kike Messing, NIRMALA      levalbuterol  1 25 mg Nebulization Q8H PRN Kike Messshar, NIRMALA      levothyroxine  112 mcg Oral Early Morning Kike Messing, Vernadine Julia      metoprolol succinate  50 mg Oral Daily Kike Messing, Vernadine Julia      pantoprazole  40 mg Oral Early Morning Kike Messing, NIRMALA      QUEtiapine  25 mg Oral HS Kike Cerrato PA-C       Continuous Infusions:  dextrose, 75 mL/hr, Last Rate: 75 mL/hr (07/26/21 0057)      PRN Meds:   acetaminophen, 650 mg, Q4H PRN  Labetalol HCl, 10 mg, Q6H PRN  levalbuterol, 1 25 mg, Q8H PRN        Invasive Devices Review  Invasive Devices     Peripheral Intravenous Line            Peripheral IV 07/24/21 Distal;Left;Upper;Ventral (anterior) Arm 1 day    Peripheral IV 07/24/21 Dorsal (posterior); Left Hand 1 day                Rationale for remaining devices: PIV for medication administration  ---------------------------------------------------------------------------------------  Advance Directive and Living Will:      Power of :    POLST:    ---------------------------------------------------------------------------------------  Care Time Delivered:   No Critical Care time spent       [facility]      Portions of the record may have been created with voice recognition software  Occasional wrong word or "sound a like" substitutions may have occurred due to the inherent limitations of voice recognition software    Read the chart carefully and recognize, using context, where substitutions have occurred

## 2021-07-26 NOTE — ASSESSMENT & PLAN NOTE
· EGD & Colonoscopy 7/21 with no active bleeding found  · Continue daily PPI  · Iron 26, Iron sat 7%, TIBC 352  · Folate 8 9  · B12 2288  · Monitor H/H and plts

## 2021-07-26 NOTE — ASSESSMENT & PLAN NOTE
· CTH and MRI brain showing area of encephalomalacia  · Mental staus waxes/wanes however progressively worse since admission  · Metabolic disturbances likely contributing to worsening mental status  · Continue home aricept  · Continue seroquel HS  · Delirium precautions

## 2021-07-26 NOTE — ASSESSMENT & PLAN NOTE
· Echocardiogram May 2021 at [facility]:  EF 55-60%  Normal diastolic function  Consistent with moderate AS, moderate tricuspid regurgitation  Moderately elevated estimated PA systolic pressure    · He takes Lasix 40 mg orally daily at home however was started on IV diuresis during this admission given fluid overload  · Patient on Bumex drip 7/22 - 7/25 am

## 2021-07-26 NOTE — ASSESSMENT & PLAN NOTE
Lab Results   Component Value Date    HGBA1C 6 6 (H) 07/19/2021       Recent Labs     07/25/21  1602 07/25/21  2124 07/26/21  0713 07/26/21  1116   POCGLU 170* 148* 164* 224*       Blood Sugar Average: Last 72 hrs:  (P) 142 5   · Diabetic diet  · Fingerstick blood sugar sliding scale coverage  · Hold home oral agents  ·  hemoglobin A1c 6 6    Blood sugars are currently well controlled

## 2021-07-26 NOTE — ASSESSMENT & PLAN NOTE
· Likely secondary to free water restriction and diuretics  · Free water restriction liberalized on 07/25 however due to patient's mental status had very limited p o  Intake  · Trend sodium  · Noted to be increasing last evening; 148 > 152  · D5W at 75 mL/hr x6 hours administered, recheck sodium this a m

## 2021-07-26 NOTE — ASSESSMENT & PLAN NOTE
Patient's mental status waxes and wanes at baseline given his Alzheimer's Dementia however on 07/25, patient appeared more somnolent on exam  Multifactorial, possibly due to metabolic derangements, delirium given change in environment   MRI a couple of days ago showed encephalomalacia but no acute strokes  Will continue to monitor  Frequent reorientation  Seroquel was added by Critical care today    On 07/26, patient's mental status seemed to be back to baseline

## 2021-07-26 NOTE — ASSESSMENT & PLAN NOTE
· Secondary to Acute on chronic heart failure  · CXR (07/25) - mild pulmonary edema, trace right effusion  · Diuretic plan: bumex gtt stopped am of 07/25, given diamox 250 mg IV x1  · Monitor fluid balance, strict I&O   · Supplemental O2 to maintain sat > 90%  · Desaturates quickly when pulling off O2  · BiPAP prn/HS  · Xopenex prn  · Early mobilization

## 2021-07-26 NOTE — ASSESSMENT & PLAN NOTE
· BP borderline during admission  · Continue home amlodipine 2 5 mg daily  · Continue Toprol XL 50 mg daily  · Holding home losartan due to renal function

## 2021-07-26 NOTE — ASSESSMENT & PLAN NOTE
Lab Results   Component Value Date    EGFR 31 07/25/2021    EGFR 33 07/25/2021    EGFR 33 07/24/2021    CREATININE 2 01 (H) 07/25/2021    CREATININE 1 90 (H) 07/25/2021    CREATININE 1 91 (H) 07/24/2021     · Nephrology on consult  · History of CKD stage 3B with baseline creatinine 1 4-1 7   · On admission (07/18), creatinine 2 21 with estimated GFR of 28 mL/min  · Etiology attributed to acute tubular necrosis due to blood loss in patient with underlying intrinsic renal disease due to vascular disease and long-term diabetes  · Bumex gtt discontinued 07/25  · Diamox 250 mg IV x1 given 07/25  · Not currently established with outpatient Nephrology  Will need outpatient follow-up

## 2021-07-26 NOTE — ASSESSMENT & PLAN NOTE
Lab Results   Component Value Date    HGBA1C 6 6 (H) 07/19/2021       Recent Labs     07/25/21  0718 07/25/21  1136 07/25/21  1602 07/25/21 2124   POCGLU 129 169* 170* 148*       Blood Sugar Average: Last 72 hrs:  (P) 130 8125     · Accuchecks QID with SS coverage  · Home PO diabetic medications on hold

## 2021-07-26 NOTE — ASSESSMENT & PLAN NOTE
· Contraction Metabolic Alkalosis secondary to Loop Diuretics  · Bumex gtt stopped 07/25  · Diamox 250 mg IV given 07/25  · Trend BMP  · Strict I&O

## 2021-07-26 NOTE — PROGRESS NOTES
Progress Note - Cardiology   Thom Ruggiero 64 y o  male MRN: [id number]  Unit/Bed#: -01 Encounter: [id number]    Assessment:  1  HFPEF, likely due in part to nephropathy  2  Mild-mod AS  3  Normal LVEF  4  Hypoalbuminemia  5  MS changes- encephalopathy  6  Continued hypoxemia     Plan:  1  Restarting diuresis, at least to maintain euvolemia  Would favor diuresing with loop diuretic alone rather than with albumin- Alb was 4 1 yesterday  2  Follow cr and lytes  3   Renal following - rapid correction of hypoalbuminemia suggests he does not have a primary nephrotic syndrome      Subjective/Objective       Subjective: I'm ok    Objective:   Seems alert, oriented to place  Says he lives with family  Denies CP or SOB    Current Facility-Administered Medications   Medication Dose Route Frequency Provider Last Rate Last Admin    acetaminophen (TYLENOL) tablet 650 mg  650 mg Oral Q4H PRN Stella Valverde PA-C        amLODIPine (NORVASC) tablet 2 5 mg  2 5 mg Oral Daily Stella Valverde PA-C   2 5 mg at 07/26/21 0804    atorvastatin (LIPITOR) tablet 80 mg  80 mg Oral Daily With Cesar Lujan PA-C   80 mg at 07/25/21 1709    clopidogrel (PLAVIX) tablet 75 mg  75 mg Oral Daily Stella Valverde PA-C   75 mg at 07/26/21 0804    donepezil (ARICEPT) tablet 5 mg  5 mg Oral HS Stella Valverde PA-C   5 mg at 07/25/21 2124    insulin lispro (HumaLOG) 100 units/mL subcutaneous injection 1-6 Units  1-6 Units Subcutaneous TID SEYMOUR Torres   1 Units at 07/26/21 0802    Labetalol HCl (NORMODYNE) injection 10 mg  10 mg Intravenous Q6H PRN Stella Valverde PA-C   10 mg at 07/25/21 1203    levalbuterol (XOPENEX) inhalation solution 1 25 mg  1 25 mg Nebulization Q8H PRN Stella Valverde PA-C        levothyroxine tablet 112 mcg  112 mcg Oral Early Morning Stella Valverde PA-C   112 mcg at 07/26/21 0600    metoprolol succinate (TOPROL-XL) 24 hr tablet 50 mg  50 mg Oral Daily Stella Valverde PA-C   50 mg at 07/26/21 0804    pantoprazole (PROTONIX) EC tablet 40 mg  40 mg Oral Early Morning Ally Tadeo, PA-C   40 mg at 07/26/21 0600    potassium chloride 20 mEq IVPB (premix)  20 mEq Intravenous Q2H Donavon Vazshar StorySEYMOUR 50 mL/hr at 07/26/21 0805 20 mEq at 07/26/21 0805    QUEtiapine (SEROquel) tablet 25 mg  25 mg Oral HS Ally Fish, PA-C   25 mg at 07/25/21 2124       Vitals: /61 (BP Location: Right arm)   Pulse 77   Temp 97 8 °F (36 6 °C) (Temporal)   Resp (!) 26   Ht 5' 7" (1 702 m)   Wt 107 kg (236 lb 15 9 oz) Comment: new bed  SpO2 98%   BMI 37 12 kg/m²   Vitals:    07/25/21 0600 07/26/21 0600   Weight: 114 kg (251 lb 3 2 oz) 107 kg (236 lb 15 9 oz)     Orthostatic Blood Pressures      Most Recent Value   Blood Pressure  128/61 filed at 07/26/2021 0700   Patient Position - Orthostatic VS  Lying filed at 07/26/2021 0700            Intake/Output Summary (Last 24 hours) at 7/26/2021 1033  Last data filed at 7/26/2021 0853  Gross per 24 hour   Intake 403 75 ml   Output 1185 ml   Net -781 25 ml       Invasive Devices     Peripheral Intravenous Line            Peripheral IV 07/24/21 Distal;Left;Upper;Ventral (anterior) Arm 2 days    Peripheral IV 07/24/21 Dorsal (posterior); Left Hand 2 days                Review of Systems: neg x HPI    Physical Exam: General appearance: alert  Neck: no JVD  Back: symmetric, no curvature  ROM normal  No CVA tenderness  Lungs: clear to auscultation bilaterally  Chest wall: no tenderness  Heart: regular rate and rhythm, S1, S2 normal, no murmur, click, rub or gallop  Extremities: edema 1+  Neurologic: Grossly normal    Lab Results: I have personally reviewed pertinent lab results  Imaging: I have personally reviewed pertinent reports

## 2021-07-26 NOTE — ASSESSMENT & PLAN NOTE
· History of hypothyroidism and on levothyroxine 100 mcg at home  · TSH elevated on admission  · TSH 14, Free T4 1 3  · Levothyroxine dose to increased to 112 mcg on 07/24 by primary team  · Follow up outpatient with repeat TSH level in 4-6 weeks

## 2021-07-26 NOTE — ASSESSMENT & PLAN NOTE
Continue to trend BMP  Will resume diuresis once this improves  Patient received gentle IVF hydration by Nephrology

## 2021-07-26 NOTE — PROGRESS NOTES
[address]  Progress Note Farhat Frey 1945, 64 y o  male MRN: [id number]  Unit/Bed#: -01 Encounter: [id number]  Primary Care Provider: Heriberto Campos MD   Date and time admitted to hospital: 7/18/2021  9:06 PM    * Acute on chronic diastolic CHF (congestive heart failure) ([facility] [address] )  Assessment & Plan  · Lower extremity 3+ pitting edema to bilateral lower extremities extending up to scrotum, bibasilar rales  · Chest x-ray:  Atelectasis  · BNP:  4500  · Daily weights  · I&Os  · Lasix IV twice daily for acute diuresis for now  Not much urine output with 40 IV b i d  Of Lasix  Decreased lasix dose to 40 from 80 IV bid however patient seems to have decreased urinary output so increased lasix back to 80 BID  Patient also received 1 dose of torsemide this afternoon  Not having a lot of urinary output on Closely monitor urine output  Hold Norvasc  · TTE: LV function showing 55% EF  · Low-salt diet, fluid restriction    Patient started on Bumex drip on 07/22 given poor response to Lasix IV as per cardiology recommendations  Will continue to monitor I&Os  Weight coming down and patient seems to be responding to Bumex drip well   Bumex drip rate increased 7/24 by Nephrology  Discontinued on 7/25 as patient developing hypernatremia and may have developed contraction alkalosis  Did trial of bipap today given CO2 of 58 5 on ABG, critical care was consulted on 07/25 and they gave a dose of Diamox as well  Patient tolerated BiPAP well and numbers improved  Diuresis still held today given patient's hypernatremia, nephrology gave patient gentle fluid hydration earlier today  Monitor BMP and reassess for resumption of diuretics on 07/27    Acute respiratory failure with hypoxia and hypercapnia ([facility]  Assessment & Plan  Was being diuresed with bumex drip for volume overload however this was discontinued by Nephrology today as patient's weight significantly dropped from yesterday and started developing hypernatremia  UO was -2450 mL yesterday  Plan to reinitiate diuretics once sodium normalizes  Fluid restriction also discontinued  Patient's ABG this am showed CO2 of 58 5  Trial of bipap initiated  Diamox was recommended by Critical care service for possible contraction alkalosis and was administered today  Will continue to monitor ABGs    Hypernatremia  Assessment & Plan  Continue to trend BMP  Will resume diuresis once this improves  Patient received gentle IVF hydration by Nephrology    Acute metabolic encephalopathy  Assessment & Plan  Patient's mental status waxes and wanes at baseline given his Alzheimer's Dementia however on 07/25, patient appeared more somnolent on exam  Multifactorial, possibly due to metabolic derangements, delirium given change in environment   MRI a couple of days ago showed encephalomalacia but no acute strokes  Will continue to monitor  Frequent reorientation  Seroquel was added by Critical care today    On 07/26, patient's mental status seemed to be back to baseline    Hypothyroidism  Assessment & Plan  Patient with a history of hypothyroidism and on levothyroxine 100 mcg at home  TSH done on admission elevated to 14 with ft4 at 1 2  Will increase levothyroxine dose to 112 mcg   Patient will need to obtain repeat TSH levels in 4-6 weeks    Essential hypertension  Assessment & Plan  · BP trending up  · Losartan on hold due to NORAH  · Continue metoprolol  · Started on low-dose amlodipine by Cardiology  · Monitor blood pressure    Ambulatory dysfunction  Assessment & Plan  · Patient has been having frequent falls, and today he could not get up from the floor    EMS was concerned that patient may be in an unsafe situation as patient's wife is having difficulty caring for him  · Fall precautions  · Case management consult  · PT OT consult - discharge to rehab    Moderate aortic stenosis by prior echocardiogram  Assessment & Plan  · [facility]:  EF 55-60%  Normal diastolic function  Consistent with moderate AS, moderate tricuspid regurgitation  Moderately elevated estimated PA systolic pressure  · He takes Lasix 40 mg orally daily at home however was started on IV diuresis during this admission given fluid overload  · Patient on Bumex drip 7/22 - 7/25 am      Type 2 diabetes mellitus with kidney complication, without long-term current use of insulin [facility])  Assessment & Plan  Lab Results   Component Value Date    HGBA1C 6 6 (H) 07/19/2021       Recent Labs     07/25/21  1602 07/25/21  2124 07/26/21  0713 07/26/21  1116   POCGLU 170* 148* 164* 224*       Blood Sugar Average: Last 72 hrs:  (P) 142 5   · Diabetic diet  · Fingerstick blood sugar sliding scale coverage  · Hold home oral agents  ·  hemoglobin A1c 6 6  Blood sugars are currently well controlled    PAF (paroxysmal atrial fibrillation) ([facility])  Assessment & Plan  · EKG: normal sinus rhythm  · He is not on anticoagulation for suspected GI bleed  · Continue metoprolol    Acute kidney injury superimposed on chronic kidney disease [facility])  Assessment & Plan  Lab Results   Component Value Date    EGFR 30 07/26/2021    EGFR 32 07/26/2021    EGFR 31 07/25/2021    CREATININE 2 05 (H) 07/26/2021    CREATININE 1 96 (H) 07/26/2021    CREATININE 2 01 (H) 07/25/2021     · Baseline creatinine around 1 4    Suspect partially due to losartan-will hold  · Hold off on fluids due to anasarca  · Daily metabolic panel and trend creatinine during diuresis  · Caution with nephrotoxins and avoid hypotension  · Improving, continue to monitor    Nephrology consulted and recommendations appreciated  Bumex discontinued by Nephrology 7/25 as he has developed hypernatremia and seems to have developed contraction alkalosis  Will continue to monitor creatinine  Will need to follow-up with nephrology as outpatient    Acute on chronic anemia  Assessment & Plan  · Hemoglobin is 8 5 with baseline around 13 admission now trended down to 7 8 this morning  · Stool Hemoccult is positive  · No evidence of gross bleeding noted  · Continue iron  · S/p EGD and colonoscopy on 7/20  "C1 M3 Og's esophagus-biopsies taken to rule out dysplasia  Small hiatal hernia otherwise normal stomach on direct and retroflexed views  Random gastric biopsies taken to rule out H pylori  Normal visualized portion of duodenum from duodenal bulb to D3  Random biopsies taken to rule out celiac disease "    "No evidence of active bleeding  Terminal ileum normal  Scattered diverticula throughout the colon left greater than right without evidence of bleeding  Area of erythema/ulceration/hemorrhage likely from trauma from enema versus old diverticular bleed  Large internal/external hemorrhoids"    Okay to resume plavix from GI standpoint  Patient okay to eat from GI standpoint        [facility]  Assessment & Plan  · CT head showing encephalomalacia  · MRI showing the same  · Patient's mental status waxes and wanes   · Improved today compared to yesterday          VTE Pharmacologic Prophylaxis:   Pharmacologic: Pharmacologic VTE Prophylaxis contraindicated due to Suspected GI bleed  Mechanical VTE Prophylaxis in Place: Yes    Patient Centered Rounds: I have performed bedside rounds with nursing staff today  Discussions with Specialists or Other Care Team Provider:  Critical Care, Nephrology, Cardiology    Education and Discussions with Family / Patient:  Patient and significant other    Time Spent for Care: 30 minutes  More than 50% of total time spent on counseling and coordination of care as described above      Current Length of Stay: 8 day(s)    Current Patient Status: Inpatient   Certification Statement: The patient will continue to require additional inpatient hospital stay due to Medical optimization    Discharge Plan:  Rehab    Code Status: Level 3 - DNAR and DNI      Subjective:   Patient seen and examined at bedside patient states he is okay and denies any complaints at this time  Seems more awake than yesterday  ROS unable to be performed due to patient's dementia       Objective:     Vitals:   Temp (24hrs), Av 1 °F (36 7 °C), Min:97 3 °F (36 3 °C), Max:99 7 °F (37 6 °C)    Temp:  [97 3 °F (36 3 °C)-99 7 °F (37 6 °C)] 99 7 °F (37 6 °C)  HR:  [57-82] 58  Resp:  [20-26] 26  BP: (100-140)/(51-78) 136/64  SpO2:  [87 %-98 %] 97 %  Body mass index is 37 12 kg/m²  Input and Output Summary (last 24 hours): Intake/Output Summary (Last 24 hours) at 2021 1655  Last data filed at 2021 1458  Gross per 24 hour   Intake 1665 42 ml   Output 238 ml   Net 1427 42 ml       Physical Exam:     Physical Exam  Vitals and nursing note reviewed  Constitutional:       Appearance: He is well-developed  HENT:      Head: Normocephalic and atraumatic  Eyes:      Conjunctiva/sclera: Conjunctivae normal    Cardiovascular:      Rate and Rhythm: Normal rate and regular rhythm  Heart sounds: No murmur heard  Comments: 3+ pitting edema on dependent areas such as buttocks and back  Edema over bilateral lower extremities have significantly improved and skin not a stretched out  Pulmonary:      Effort: Pulmonary effort is normal  No respiratory distress  Comments: Course breath sounds bilaterally  Abdominal:      Palpations: Abdomen is soft  Tenderness: There is no abdominal tenderness  Musculoskeletal:      Cervical back: Neck supple  Skin:     General: Skin is warm and dry  Neurological:      Mental Status: He is alert  He is disoriented             Additional Data:     Labs:    Results from last 7 days   Lab Units 21  0606   WBC Thousand/uL 5 85   HEMOGLOBIN g/dL 7 9*   HEMATOCRIT % 27 8*   PLATELETS Thousands/uL 280   NEUTROS PCT % 74   LYMPHS PCT % 12*   MONOS PCT % 11   EOS PCT % 2     Results from last 7 days   Lab Units 21  0606 21  1613   SODIUM mmol/L 148* 147*   POTASSIUM mmol/L 3 5 3  5   CHLORIDE mmol/L 107 106   CO2 mmol/L 36* 39*   BUN mg/dL 31* 27*   CREATININE mg/dL 2 05* 2 01*   ANION GAP mmol/L 5 2*   CALCIUM mg/dL 8 8 8 6   ALBUMIN g/dL  --  4 1   TOTAL BILIRUBIN mg/dL  --  1 20*   ALK PHOS U/L  --  83   ALT U/L  --  18   AST U/L  --  11   GLUCOSE RANDOM mg/dL 153* 165*         Results from last 7 days   Lab Units 07/26/21  1116 07/26/21  0713 07/25/21  2124 07/25/21  1602 07/25/21  1136 07/25/21  0718 07/24/21  2056 07/24/21  1600 07/24/21  1102 07/24/21  0717 07/23/21  2049 07/23/21  1545   POC GLUCOSE mg/dl 224* 164* 148* 170* 169* 129 125 175* 138 93 113 158*         Results from last 7 days   Lab Units 07/26/21  0606 07/25/21  1613   PROCALCITONIN ng/ml 0 15 0 16           * I Have Reviewed All Lab Data Listed Above  * Additional Pertinent Lab Tests Reviewed:  Elsie [address] Admission Reviewed    Imaging:    Reviewed    Recent Cultures (last 7 days):           Last 24 Hours Medication List:   Current Facility-Administered Medications   Medication Dose Route Frequency Provider Last Rate    acetaminophen  650 mg Oral Q4H PRN Alycia Aschoff, PA-C      amLODIPine  2 5 mg Oral Daily Alycia Aschoff, PA-C      atorvastatin  80 mg Oral Daily With Cesar Lujan PA-C      clopidogrel  75 mg Oral Daily Alycia Aschoff, PA-C      donepezil  5 mg Oral HS Alycia Aschoff, PA-C      insulin lispro  1-6 Units Subcutaneous TID AC SEYMOUR Varela      Labetalol HCl  10 mg Intravenous Q6H PRN Alycia Aschoff, PA-C      levalbuterol  1 25 mg Nebulization Q8H PRN Alycia Aschoff, PA-C      levothyroxine  112 mcg Oral Early Morning Alycia Aschoff, PA-C      metoprolol succinate  50 mg Oral Daily Alycia JoseYarnell, [place]      pantoprazole  40 mg Oral Early Morning Alycia Aschoff, PA-C      QUEtiapine  25 mg Oral HS Alycia Aschoff, PA-C          Today, Patient Was Seen By: Maryann Wayne MD    ** Please Note: Dictation voice to text software may have been used in the creation of this document   **

## 2021-07-26 NOTE — ASSESSMENT & PLAN NOTE
Wt Readings from Last 3 Encounters:   07/25/21 114 kg (251 lb 3 2 oz)     · Cardiology following  · TTE (7/19/21) -  EF 55% and mild aortic stenosis  · Repeat CXR from 07/25 consistent with persistent vascular congestion & pulmonary edema  · Initially was placed on lasix BID however not responding and started on Bumex gtt and albumen on 07/22  Bumex gtt stopped morning of 07/25  in setting of metabolic alkalosis & hypernatremia  · Diamox 250 mg IV given x1 on 07/25  · BiPAP initiated on 07/25 and then weaned off  · ContinueBiPAP prn/qhs  Likely has undiagnosed sleep apnea    · Continue Toprol XL 50 mg QD  · Strict I's & O's  · Daily weight - pt is down 12 lbs since admission  · Trend electrolytes and replete for goals: K > 4, Mg > 2, Phos  >4

## 2021-07-26 NOTE — PROGRESS NOTES
Progress Note - Nephrology   Valdene Cabot 64 y o  male MRN: [id number]  Unit/Bed#: -01 Encounter: [id number]    A/P:  1  Acute kidney injury on top of chronic kidney disease   Creatinine essentially stable over last 24 hours  Continue avoid potential nephrotoxins and focus on optimizing the patient's overall hemodynamics  Angiotensin receptor blocker remains on hold  May reinitiate diuresis, please refer below  2  Chronic kidney disease stage 3 with baseline creatinine between 1 4-1 7 mg/dL  3  Diabetic nephropathy likely   Re-initiation of angiotensin receptor blocker when clinically appropriate, patient may also benefit from being placed on an SGLT-2 inhibitor  Patient should have further workup in the outpatient setting and will defer at this time  4  Hypernatremia   Serum sodium level has improved, will continue to hydrate with 1 L of D5W   5  Volume overload   May restart diuresis, continue closely monitor volume status as well as her dynamics  6  Acute encephalopathy   Patient found to have new encephalomalacia in left and right frontal lobe, overall appears have waxing and waning confusion  7  Dementia potentially due to vascular processes  8  Acute on chronic diastolic congestive heart failure   Volume status appears to be improved, unclear if the patient is appropriately compensated, continue to diurese according to clinical needs      Follow up reason for today's visit:  Acute kidney injury/chronic kidney disease/hypernatremia    Acute on chronic diastolic CHF (congestive heart failure) ([facility] [address] )    Patient Active Problem List   Diagnosis    Dementia without behavioral disturbance ([facility] [address] )    Acute on chronic anemia    Acute kidney injury superimposed on chronic kidney disease ([facility] [address] )    Frequent falls    [facility]oxysmal atrial fibrillation) ([facility])    Type 2 diabetes mellitus with kidney complication, without long-term current use of insulin ([facility])    Moderate aortic stenosis by prior echocardiogram    Ambulatory dysfunction    Essential hypertension    Acute on chronic diastolic CHF (congestive heart failure) (HCC)    Hypothyroidism    Iron deficiency anemia due to chronic blood loss    Class 3 severe obesity with body mass index (BMI) of 40 0 to 44 9 in adult [facility])    Peripheral arterial occlusive disease (HCC)    Transient cerebral ischemia    Acute encephalopathy    Acute respiratory failure with hypoxia and hypercapnia (HCC)    Acute metabolic encephalopathy    Metabolic alkalosis    Hypernatremia         Subjective:   Patient continues to be somewhat encephalopathic but able answer and respond to basic questions  Objective:     Vitals: Blood pressure 128/61, pulse 77, temperature 97 8 °F (36 6 °C), temperature source Temporal, resp  rate (!) 26, height 5' 7" (1 702 m), weight 107 kg (236 lb 15 9 oz), SpO2 98 %  ,Body mass index is 37 12 kg/m²  Weight (last 2 days)     Date/Time   Weight    07/26/21 0600   107 (236 99)    Weight: new bed at 07/26/21 0600    07/25/21 0600   114 (251 2)    07/25/21 0500   113 (250 22)    07/24/21 0509   118 (259 7)                Intake/Output Summary (Last 24 hours) at 7/26/2021 0921  Last data filed at 7/26/2021 0853  Gross per 24 hour   Intake 503 75 ml   Output 1185 ml   Net -681 25 ml     I/O last 3 completed shifts: In: 797 1 [P O :150; I V :442 1;  IV Piggyback:205]  Out: 1900 [Urine:1900]         Physical Exam: /61 (BP Location: Right arm)   Pulse 77   Temp 97 8 °F (36 6 °C) (Temporal)   Resp (!) 26   Ht 5' 7" (1 702 m)   Wt 107 kg (236 lb 15 9 oz) Comment: new bed  SpO2 98%   BMI 37 12 kg/m²     General Appearance:    Alert, cooperative, no distress, appears stated age   Head:    Normocephalic, without obvious abnormality, atraumatic   Eyes:    Conjunctiva/corneas clear   Ears:    Normal external ears   Nose:   Nares normal, septum midline, mucosa normal, no drainage    or sinus tenderness   Throat:   Lips, mucosa, and tongue normal; teeth and gums normal   Neck:   Supple   Back:     Symmetric, no curvature, ROM normal, no CVA tenderness   Lungs:     Clear to auscultation bilaterally, respirations unlabored   Chest wall:    No tenderness or deformity   Heart:    Regular rate and rhythm, S1 and S2 normal, no murmur, rub   or gallop   Abdomen:     Soft, non-tender, bowel sounds active   Extremities:   Extremities normal, atraumatic, no cyanosis, +2 bilateral lower extremity edema presacral edema   Skin:   Skin color, texture, turgor normal, no rashes or lesions   Lymph nodes:   Cervical normal   Neurologic:   CNII-XII intact            Lab, Imaging and other studies: I have personally reviewed pertinent labs  CBC:   Lab Results   Component Value Date    WBC 5 85 07/26/2021    HGB 7 9 (L) 07/26/2021    HCT 27 8 (L) 07/26/2021    MCV 93 07/26/2021     07/26/2021    MCH 26 4 (L) 07/26/2021    MCHC 28 4 (L) 07/26/2021    RDW 16 7 (H) 07/26/2021    MPV 10 3 07/26/2021    NRBC 0 07/26/2021     CMP:   Lab Results   Component Value Date    K 3 5 07/26/2021     07/26/2021    CO2 36 (H) 07/26/2021    BUN 31 (H) 07/26/2021    CREATININE 2 05 (H) 07/26/2021    CALCIUM 8 8 07/26/2021    AST 11 07/25/2021    ALT 18 07/25/2021    ALKPHOS 83 07/25/2021    EGFR 30 07/26/2021           Results from last 7 days   Lab Units 07/26/21  0606 07/26/21  0013 07/25/21  1613 07/23/21  0535 07/22/21  0623   POTASSIUM mmol/L 3 5 3 9 3 5 3 5 3 6   CHLORIDE mmol/L 107 108 106 105 104   CO2 mmol/L 36* 40* 39* 31 28   BUN mg/dL 31* 32* 27* 28* 22   CREATININE mg/dL 2 05* 1 96* 2 01* 2 11* 2 06*   CALCIUM mg/dL 8 8 8 6 8 6 7 9* 7 6*   ALK PHOS U/L  --   --  83 93 100   ALT U/L  --   --  18 18 18   AST U/L  --   --  11 14 17         Phosphorus:   Lab Results   Component Value Date    PHOS 3 4 07/26/2021     Magnesium:   Lab Results   Component Value Date    MG 2 4 07/26/2021     Urinalysis:   Lab Results   Component Value Date    COLORU Yellow 07/25/2021 CLARITYU Clear 07/25/2021    SPECGRAV 1 015 07/25/2021    PHUR 7 5 07/25/2021    LEUKOCYTESUR Negative 07/25/2021    NITRITE Negative 07/25/2021    GLUCOSEU Negative 07/25/2021    KETONESU Negative 07/25/2021    BILIRUBINUR Negative 07/25/2021    BLOODU Negative 07/25/2021     Ionized Calcium: No results found for: CAION  Coagulation: No results found for: PT, INR, APTT  Troponin:   Lab Results   Component Value Date    TROPONINI <0 02 07/25/2021     ABG:   Lab Results   Component Value Date    PHART 7 473 (H) 07/25/2021    KQN4RDN 52 7 (H) 07/25/2021    PO2ART 90 3 07/25/2021    ECY9EIP 37 8 (H) 07/25/2021    BEART 12 6 07/25/2021    SOURCE Radial, Right 07/25/2021     Radiology review:     IMAGING  Procedure: XR chest portable    Result Date: 7/25/2021  Narrative: CHEST INDICATION:   hypoxia  COMPARISON:  Chest radiograph from 7/18/2021  EXAM PERFORMED/VIEWS:  XR CHEST PORTABLE FINDINGS: Cardiomediastinal silhouette appears unremarkable  Mild pulmonary edema  Trace right effusion  No pneumothorax  Persistent mild elevation of the right hemidiaphragm  Osseous structures appear within normal limits for patient age  Impression: Mild pulmonary edema with trace right effusion   Workstation performed: [id number]       Current Facility-Administered Medications   Medication Dose Route Frequency    acetaminophen (TYLENOL) tablet 650 mg  650 mg Oral Q4H PRN    amLODIPine (NORVASC) tablet 2 5 mg  2 5 mg Oral Daily    atorvastatin (LIPITOR) tablet 80 mg  80 mg Oral Daily With Dinner    clopidogrel (PLAVIX) tablet 75 mg  75 mg Oral Daily    donepezil (ARICEPT) tablet 5 mg  5 mg Oral HS    insulin lispro (HumaLOG) 100 units/mL subcutaneous injection 1-6 Units  1-6 Units Subcutaneous TID AC    Labetalol HCl (NORMODYNE) injection 10 mg  10 mg Intravenous Q6H PRN    levalbuterol (XOPENEX) inhalation solution 1 25 mg  1 25 mg Nebulization Q8H PRN    levothyroxine tablet 112 mcg  112 mcg Oral Early Morning    metoprolol succinate (TOPROL-XL) 24 hr tablet 50 mg  50 mg Oral Daily    pantoprazole (PROTONIX) EC tablet 40 mg  40 mg Oral Early Morning    potassium chloride 20 mEq IVPB (premix)  20 mEq Intravenous Q2H    QUEtiapine (SEROquel) tablet 25 mg  25 mg Oral HS     Medications Discontinued During This Encounter   Medication Reason    potassium chloride 40 mEq IVPB (premix) Availability    furosemide (LASIX) tablet 40 mg     Ferrous Gluconate TABS 246 mg     amLODIPine (NORVASC) tablet 2 5 mg     furosemide (LASIX) injection 40 mg     furosemide (LASIX) injection 20 mg     insulin lispro (HumaLOG) 100 units/mL subcutaneous injection 2-12 Units     insulin lispro (HumaLOG) 100 units/mL subcutaneous injection 2-12 Units     fentaNYL (SUBLIMAZE) injection 12 5 mcg Patient Transfer    ondansetron (ZOFRAN) injection 4 mg Patient Transfer    dextrose 5 % infusion     furosemide (LASIX) injection 60 mg     torsemide (DEMADEX) tablet 40 mg     torsemide (DEMADEX) tablet 40 mg     levothyroxine tablet 100 mcg     furosemide (LASIX) injection 80 mg     ferrous gluconate (FERGON) tablet 324 mg     bumetanide (BUMEX) 12 5 mg infusion 50 mL     albumin human (FLEXBUMIN) 25 % injection 25 g     insulin lispro (HumaLOG) 100 units/mL subcutaneous injection 1-6 Units        Carlos Ferguson, DO      This progress note was produced in part using a dictation device which may document imprecise wording from author's original intent

## 2021-07-26 NOTE — ASSESSMENT & PLAN NOTE
· CT head showing encephalomalacia  · MRI showing the same  · Patient's mental status waxes and wanes   · Improved today compared to yesterday

## 2021-07-26 NOTE — SPEECH THERAPY NOTE
Speech-Language Pathology Bedside Swallow Evaluation    Patient Name: Vamsi Perdomo    SVUHA'L Date: 7/26/2021     Problem List  Principal Problem:    Acute on chronic diastolic CHF (congestive heart failure) ([facility] )  Active Problems:    Dementia without behavioral disturbance (HCC)    Acute on chronic anemia    Acute kidney injury superimposed on chronic kidney disease (HCC)    PAF (paroxysmal atrial fibrillation) (HCC)    Type 2 diabetes mellitus with kidney complication, without long-term current use of insulin ([facility])    Moderate aortic stenosis by prior echocardiogram    Ambulatory dysfunction    Essential hypertension    Hypothyroidism    Acute encephalopathy    Acute respiratory failure with hypoxia and hypercapnia (HCC)    Acute metabolic encephalopathy    Metabolic alkalosis    Hypernatremia      Past Medical History  Past Medical History:   Diagnosis Date    Alzheimers disease ([facility] )     [facility] mellitus [facility]     Hypertension        Past Surgical History  Past Surgical History:   Procedure Laterality Date    APPENDECTOMY      KNEE ARTHROPLASTY Bilateral        Summary  Pt presented with s/s suggestive of mild oropharyngeal dysphagia  Symptoms or concerns included decreased mastication and suspected decreased control of thin liquids, as well as  mild pharyngeal swallow delay, although hyolaryngeal rise appeared [facility]  Pt did have a cough response with several trials of liquids (thin liquid via cup and straw, NTL via consecutive straw)  NTL via cup sip tolerated without s/s aspiration  Pt has upper denture but did not want to wear it due to discomfort  Mastication of soft solids was functional without dentition       Risk/s for Aspiration: mild, manageable with diet modification    Recommended Diet: mechanically altered/level 2 diet and nectar thick liquids   Recommended Form of Meds: whole with puree, crush if needed  Aspiration precautions and swallowing strategies: upright posture, only feed when fully alert, consecutive straw)  NTL via cup sip tolerated without s/s aspiration  Esophageal Concerns: none reported      Summary and Recommendations (see above)    Results Reviewed with: patient, RN and MD     Treatment Recommended: yes     Frequency of treatment: 2-3x/week    Dysphagia LTG  -Patient will demonstrate safe and effective oral intake (without overt s/s significant oral/pharyngeal dysphagia including s/s penetration or aspiration) for the highest appropriate diet level  Short Term Goals:  -Pt will tolerate Dysphagia 2/mechanical soft diet and nectar thick liquid with no significant s/s oral or pharyngeal dysphagia across 1-3 diagnostic sessions     -Patient will tolerate trials of upgraded food and/or liquid texture with no significant s/s of oral or pharyngeal dysphagia including aspiration across 1-3 diagnostic sessions

## 2021-07-26 NOTE — ASSESSMENT & PLAN NOTE
· Patient's confusion & disorientation progressively worsening since admission  · Etiology likely multifactorial, with delirium, metabolic disturbances, and disruption of sleep-wake cycle all contributing  · Adjust sleep-wake cycle  · Early mobilization; OOB TID  · Continue Seroquel QHS  · Correct electrolyte and acid-base dysfunctions  · Avoid sedative or opiate medications  · R/O cardiac etiology  · TTE (7/19/21) reviewed - EF 55% with mild aortic stenosis  · Troponin negative  · Hx  of TIA  · Monitor neuro exam  · Continue plaxix  · MRI brain (07/22) - Severe periventricular and white matter T2 hyperintensity, may be due to chronic small vessel ischemic changes was also described on the MRI performed at [facility] facility on October 30, 2017  Area of encephalomalacia in the inferior left frontal region  · [facility] (07/22) - New acute intracranial CT abnormality  Encephalomalacia involving left gyrus rectus and inferior left frontal lobe as well as right frontal cortical/subcortical white matter  Nonspecific extensive white matter change suggesting advanced microangiopathy    · R/O infectious etiology  · Trend WBC and fever curve  · Recheck UA: negative  · PCT 0 16  · CXR (07/25) - Mild pulmonary edema with trace right effusion

## 2021-07-26 NOTE — CASE MANAGEMENT
Case Management Progress Note    Patient name Yazmin Coon  Location /-85 MRN [id number]  : 1945 Date 2021       LOS (days): 8  Geometric Mean LOS (GMLOS) (days): 4 00  Days to GMLOS:-3 5        BUNDLE:      OBJECTIVE:  Pt is a 64y o  year old , white or  [1], male with Cheondoism preference of None admitted on 2021  9:06 PM  Pt is admitted to -[address] at [address] [place] [address] with complaints of Acute on chronic diastolic CHF (congestive heart failure) ([facility] )  Current admission status: Inpatient  Preferred Pharmacy:   [facility], [address] [place] [address]  Phone: [phone] Fax: [phone]    Primary Care Provider: Peng Horton MD    PROGRESS NOTE:  Pt discussed in am rounds, not stable for d/c  CM updated [facility] via [place], will update as needed  CM placed call to [facility] via vm (Michelle Key [phone]), will need to start auth process again when Pt is stable  CM will continue to follow for dcp

## 2021-07-26 NOTE — PHYSICAL THERAPY NOTE
PHYSICAL THERAPY TREATMENT NOTE  NAME:  Nicole Ceballos  DATE: 21    Length Of Stay: 8  Performed at least 2 patient identifiers during session: Name, Birthday and ID bracelet    TREATMENT:    21 1303   PT Last Visit   PT Visit Date 21   Note Type   Note Type Treatment   Pain Assessment   Pain Assessment Tool Pain Assessment not indicated - pt denies pain   Pain Score No Pain   Restrictions/Precautions   Other Precautions Cognitive; Chair Alarm; Bed Alarm; Fall Risk   General   Chart Reviewed Yes   Response to Previous Treatment Patient with no complaints from previous session  Family/Caregiver Present Yes   Cognition   Overall Cognitive Status Impaired   Orientation Level Disoriented X4  (Pt able to recal name, not )   Following Commands Follows one step commands inconsistently   Subjective   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"   Bed Mobility   Additional Comments Unable to assess  Attempted to sit pt on EOB however pt becoming agitated with supine<>sit attempt yelling "no no no"    Transfers   Sit to Stand Unable to assess   Stand to Sit Unable to assess   Stand pivot Unable to assess   Additional Comments Pt refusing this treatment session   Ambulation/Elevation   Distance Pt refusing this treatment session  Balance   Static Sitting Poor -   Dynamic Sitting Poor -   Static Standing   (Unable to assess)   Dynamic Standing   (Unable to assess)   Ambulatory   (Unable to assess)   Endurance Deficit   Endurance Deficit Yes   Endurance Deficit Description Fatigue   Activity Tolerance   Activity Tolerance Patient limited by fatigue   Nurse Made Aware RN   Exercises   Heelslides Supine;10 reps;AAROM; Bilateral   Hip Abduction Supine;10 reps;Bilateral;AAROM   Ankle Pumps Supine;20 reps;AROM; Bilateral   Balance training  SLR x10 ea    Assessment   Prognosis Fair   Problem List Decreased strength;Decreased endurance; Impaired balance;Decreased mobility; Impaired judgement;Decreased safety awareness;Decreased cognition;Obesity; Decreased skin integrity   Barriers to Discharge Decreased caregiver support; Inaccessible home environment   Goals   Patient Goals None stated   PT Treatment Day 3   Plan   Treatment/Interventions Functional transfer training;LE strengthening/ROM; Elevations; Therapeutic exercise; Endurance training;Cognitive reorientation;Equipment eval/education; Bed mobility;Gait training   Progress Slow progress, decreased activity tolerance   PT Frequency   (3-5x/wk)   Recommendation   PT Discharge Recommendation Post acute rehabilitation services   PT - OK to Discharge Yes   Additional Comments When medically cleared   Additional Comments 2 Pt supine with HOB elevated, bed alarm on, and all needs within reach  AM-PAC Basic Mobility Inpatient   Turning in Bed Without Bedrails 2   Lying on Back to Sitting on Edge of Flat Bed 2   Moving Bed to Chair 1   Standing Up From Chair 1   Walk in Room 1   Climb 3-5 Stairs 1   Basic Mobility Inpatient Raw Score 8   Turning Head Towards Sound 4   Follow Simple Instructions 2   Low Function Basic Mobility Raw Score 14   Low Function Basic Mobility Standardized Score 22 01     The patient's AM-Located within [facility] Basic Mobility Inpatient Short Form Raw Score is 14, Standardized Score is 22 01  A standardized score less than 42 9 suggests the patient may benefit from discharge to post-acute rehabilitation services  Please also refer to the recommendation of the Physical Therapist for safe discharge planning  Pt seen for PT treatment session this date with interventions consisting of Therapeutic exercise consisting of: AROM and AAROM B LE in supine position and therapeutic activity consisting of attempting to get pt seated from supine with increased agitation from pt  Pt agreeable to PT treatment session upon arrival but with increased confusion throughout treatment session including not knowing    In comparison to previous session, pt with continued refusal for transfers and ambulation with decreased attention to therex  Despite verbal and tactile cues for therex, pt only able to do AROM with ankle pumps  Pt educated on importance of functional mobility and exercise  Continue to recommend STR at time of d/c in order to maximize pt's functional independence and safety w/ mobility  Pt continues to be functioning below baseline level, and remains limited 2* factors listed above  PT will continue to see pt while here in order to address the deficits listed above and provide interventions consistent w/ POC in effort to achieve STGs      Olegario Homans, MARIBEL

## 2021-07-26 NOTE — ASSESSMENT & PLAN NOTE
· EKG: normal sinus rhythm  · He is not on anticoagulation for suspected GI bleed  · Continue metoprolol

## 2021-07-26 NOTE — ASSESSMENT & PLAN NOTE
· Lower extremity 3+ pitting edema to bilateral lower extremities extending up to scrotum, bibasilar rales  · Chest x-ray:  Atelectasis  · BNP:  4500  · Daily weights  · I&Os  · Lasix IV twice daily for acute diuresis for now  Not much urine output with 40 IV b i d  Of Lasix  Decreased lasix dose to 40 from 80 IV bid however patient seems to have decreased urinary output so increased lasix back to 80 BID  Patient also received 1 dose of torsemide this afternoon  Not having a lot of urinary output on Closely monitor urine output  Hold Norvasc  · TTE: LV function showing 55% EF  · Low-salt diet, fluid restriction    Patient started on Bumex drip on 07/22 given poor response to Lasix IV as per cardiology recommendations  Will continue to monitor I&Os  Weight coming down and patient seems to be responding to Bumex drip well   Bumex drip rate increased 7/24 by Nephrology  Discontinued on 7/25 as patient developing hypernatremia and may have developed contraction alkalosis  Did trial of bipap today given CO2 of 58 5 on ABG, critical care was consulted on 07/25 and they gave a dose of Diamox as well  Patient tolerated BiPAP well and numbers improved  Diuresis still held today given patient's hypernatremia, nephrology gave patient gentle fluid hydration earlier today  Monitor BMP and reassess for resumption of diuretics on 07/27

## 2021-07-27 ENCOUNTER — APPOINTMENT (INPATIENT)
Dept: RADIOLOGY | Facility: HOSPITAL | Age: 76
DRG: 291 | End: 2021-07-27
Payer: COMMERCIAL

## 2021-07-27 LAB
ABO GROUP BLD: NORMAL
ABO GROUP BLD: NORMAL
ALBUMIN SERPL BCP-MCNC: 3.4 G/DL (ref 3.5–5)
ALP SERPL-CCNC: 82 U/L (ref 46–116)
ALT SERPL W P-5'-P-CCNC: 15 U/L (ref 12–78)
ANION GAP SERPL CALCULATED.3IONS-SCNC: 3 MMOL/L (ref 4–13)
ARTERIAL PATENCY WRIST A: YES
AST SERPL W P-5'-P-CCNC: 13 U/L (ref 5–45)
BASE EXCESS BLDA CALC-SCNC: 8.4 MMOL/L
BASOPHILS # BLD AUTO: 0.05 THOUSANDS/ΜL (ref 0–0.1)
BASOPHILS NFR BLD AUTO: 1 % (ref 0–1)
BILIRUB SERPL-MCNC: 1.13 MG/DL (ref 0.2–1)
BLD GP AB SCN SERPL QL: NEGATIVE
BUN SERPL-MCNC: 36 MG/DL (ref 5–25)
CALCIUM ALBUM COR SERPL-MCNC: 9.1 MG/DL (ref 8.3–10.1)
CALCIUM SERPL-MCNC: 8.6 MG/DL (ref 8.3–10.1)
CHLORIDE SERPL-SCNC: 106 MMOL/L (ref 100–108)
CO2 SERPL-SCNC: 38 MMOL/L (ref 21–32)
CREAT SERPL-MCNC: 2.24 MG/DL (ref 0.6–1.3)
EOSINOPHIL # BLD AUTO: 0.21 THOUSAND/ΜL (ref 0–0.61)
EOSINOPHIL NFR BLD AUTO: 3 % (ref 0–6)
ERYTHROCYTE [DISTWIDTH] IN BLOOD BY AUTOMATED COUNT: 16.9 % (ref 11.6–15.1)
GFR SERPL CREATININE-BSD FRML MDRD: 27 ML/MIN/1.73SQ M
GLUCOSE SERPL-MCNC: 121 MG/DL (ref 65–140)
GLUCOSE SERPL-MCNC: 131 MG/DL (ref 65–140)
GLUCOSE SERPL-MCNC: 135 MG/DL (ref 65–140)
GLUCOSE SERPL-MCNC: 171 MG/DL (ref 65–140)
GLUCOSE SERPL-MCNC: 267 MG/DL (ref 65–140)
HCO3 BLDA-SCNC: 33.9 MMOL/L (ref 22–28)
HCT VFR BLD AUTO: 27.6 % (ref 36.5–49.3)
HGB BLD-MCNC: 7.8 G/DL (ref 12–17)
IMM GRANULOCYTES # BLD AUTO: 0.02 THOUSAND/UL (ref 0–0.2)
IMM GRANULOCYTES NFR BLD AUTO: 0 % (ref 0–2)
LYMPHOCYTES # BLD AUTO: 0.87 THOUSANDS/ΜL (ref 0.6–4.47)
LYMPHOCYTES NFR BLD AUTO: 13 % (ref 14–44)
MAGNESIUM SERPL-MCNC: 2.4 MG/DL (ref 1.6–2.6)
MCH RBC QN AUTO: 26.1 PG (ref 26.8–34.3)
MCHC RBC AUTO-ENTMCNC: 28.3 G/DL (ref 31.4–37.4)
MCV RBC AUTO: 92 FL (ref 82–98)
MONOCYTES # BLD AUTO: 0.72 THOUSAND/ΜL (ref 0.17–1.22)
MONOCYTES NFR BLD AUTO: 11 % (ref 4–12)
NEUTROPHILS # BLD AUTO: 4.79 THOUSANDS/ΜL (ref 1.85–7.62)
NEUTS SEG NFR BLD AUTO: 72 % (ref 43–75)
NRBC BLD AUTO-RTO: 0 /100 WBCS
O2 CT BLDA-SCNC: 12.6 ML/DL (ref 16–23)
OXYHGB MFR BLDA: 96.2 % (ref 94–97)
PCO2 BLDA: 53 MM HG (ref 36–44)
PH BLDA: 7.42 [PH] (ref 7.35–7.45)
PLATELET # BLD AUTO: 272 THOUSANDS/UL (ref 149–390)
PMV BLD AUTO: 10.4 FL (ref 8.9–12.7)
PO2 BLDA: 91.5 MM HG (ref 75–129)
POTASSIUM SERPL-SCNC: 3.6 MMOL/L (ref 3.5–5.3)
PROT SERPL-MCNC: 7.1 G/DL (ref 6.4–8.2)
RBC # BLD AUTO: 2.99 MILLION/UL (ref 3.88–5.62)
RH BLD: POSITIVE
RH BLD: POSITIVE
SODIUM SERPL-SCNC: 147 MMOL/L (ref 136–145)
SPECIMEN EXPIRATION DATE: NORMAL
SPECIMEN SOURCE: ABNORMAL
WBC # BLD AUTO: 6.66 THOUSAND/UL (ref 4.31–10.16)

## 2021-07-27 PROCEDURE — 71046 X-RAY EXAM CHEST 2 VIEWS: CPT

## 2021-07-27 PROCEDURE — 86900 BLOOD TYPING SEROLOGIC ABO: CPT | Performed by: FAMILY MEDICINE

## 2021-07-27 PROCEDURE — 86920 COMPATIBILITY TEST SPIN: CPT

## 2021-07-27 PROCEDURE — 99232 SBSQ HOSP IP/OBS MODERATE 35: CPT | Performed by: FAMILY MEDICINE

## 2021-07-27 PROCEDURE — 86850 RBC ANTIBODY SCREEN: CPT | Performed by: FAMILY MEDICINE

## 2021-07-27 PROCEDURE — 82948 REAGENT STRIP/BLOOD GLUCOSE: CPT

## 2021-07-27 PROCEDURE — 80053 COMPREHEN METABOLIC PANEL: CPT | Performed by: STUDENT IN AN ORGANIZED HEALTH CARE EDUCATION/TRAINING PROGRAM

## 2021-07-27 PROCEDURE — 85025 COMPLETE CBC W/AUTO DIFF WBC: CPT | Performed by: STUDENT IN AN ORGANIZED HEALTH CARE EDUCATION/TRAINING PROGRAM

## 2021-07-27 PROCEDURE — P9016 RBC LEUKOCYTES REDUCED: HCPCS

## 2021-07-27 PROCEDURE — 94760 N-INVAS EAR/PLS OXIMETRY 1: CPT

## 2021-07-27 PROCEDURE — 30233N1 TRANSFUSION OF NONAUTOLOGOUS RED BLOOD CELLS INTO PERIPHERAL VEIN, PERCUTANEOUS APPROACH: ICD-10-PCS | Performed by: FAMILY MEDICINE

## 2021-07-27 PROCEDURE — 82805 BLOOD GASES W/O2 SATURATION: CPT | Performed by: FAMILY MEDICINE

## 2021-07-27 PROCEDURE — 86901 BLOOD TYPING SEROLOGIC RH(D): CPT | Performed by: FAMILY MEDICINE

## 2021-07-27 PROCEDURE — 99232 SBSQ HOSP IP/OBS MODERATE 35: CPT | Performed by: INTERNAL MEDICINE

## 2021-07-27 PROCEDURE — 94660 CPAP INITIATION&MGMT: CPT

## 2021-07-27 PROCEDURE — 83735 ASSAY OF MAGNESIUM: CPT | Performed by: STUDENT IN AN ORGANIZED HEALTH CARE EDUCATION/TRAINING PROGRAM

## 2021-07-27 RX ORDER — INSULIN GLARGINE 100 [IU]/ML
7 INJECTION, SOLUTION SUBCUTANEOUS EVERY MORNING
Status: DISCONTINUED | OUTPATIENT
Start: 2021-07-27 | End: 2021-07-29

## 2021-07-27 RX ORDER — AMLODIPINE BESYLATE 5 MG/1
5 TABLET ORAL DAILY
Status: DISCONTINUED | OUTPATIENT
Start: 2021-07-28 | End: 2021-08-03 | Stop reason: HOSPADM

## 2021-07-27 RX ORDER — FERROUS SULFATE 325(65) MG
325 TABLET ORAL
Status: DISCONTINUED | OUTPATIENT
Start: 2021-07-28 | End: 2021-07-29

## 2021-07-27 RX ADMIN — QUETIAPINE FUMARATE 25 MG: 25 TABLET ORAL at 21:10

## 2021-07-27 RX ADMIN — PANTOPRAZOLE SODIUM 40 MG: 40 TABLET, DELAYED RELEASE ORAL at 05:54

## 2021-07-27 RX ADMIN — FUROSEMIDE 60 MG: 40 TABLET ORAL at 17:21

## 2021-07-27 RX ADMIN — INSULIN GLARGINE 7 UNITS: 100 INJECTION, SOLUTION SUBCUTANEOUS at 12:56

## 2021-07-27 RX ADMIN — AMLODIPINE BESYLATE 2.5 MG: 2.5 TABLET ORAL at 08:41

## 2021-07-27 RX ADMIN — LEVOTHYROXINE SODIUM 112 MCG: 112 TABLET ORAL at 05:54

## 2021-07-27 RX ADMIN — METOPROLOL SUCCINATE 50 MG: 50 TABLET, EXTENDED RELEASE ORAL at 08:40

## 2021-07-27 RX ADMIN — DONEPEZIL HYDROCHLORIDE 5 MG: 5 TABLET ORAL at 21:10

## 2021-07-27 RX ADMIN — ATORVASTATIN CALCIUM 80 MG: 40 TABLET, FILM COATED ORAL at 16:01

## 2021-07-27 RX ADMIN — CLOPIDOGREL BISULFATE 75 MG: 75 TABLET ORAL at 08:40

## 2021-07-27 NOTE — PROGRESS NOTES
Progress Note - Cardiology   Ashley Denis 64 y o  male MRN: [id number]  Unit/Bed#: -01 Encounter: [id number]    Assessment:  1  HFPEF, likely due in part to nephropathy, not grossly volume overloaded today  2  Mild-mod AS  3  Normal LVEF  4  Hypoalbuminemia  5  MS changes- encephalopathy  6  Continued hypoxemia   7  PAF- not anticoagulated, frequent falls    Plan:  1  Can resume lasix 60 mg PO daily  2  Continue rate control with BB   3  Follow recommendations from nephro  4  Will sign off      Subjective/Objective       Subjective: no complaints       Objective:   Seems comfortable in hospital chair  Says he lives with family  Denies CP or SOB    Current Facility-Administered Medications   Medication Dose Route Frequency Provider Last Rate Last Admin    acetaminophen (TYLENOL) tablet 650 mg  650 mg Oral Q4H PRN Ally Fish, PA-C        amLODIPine (NORVASC) tablet 2 5 mg  2 5 mg Oral Daily Ally Fish, PA-C   2 5 mg at 07/26/21 0804    atorvastatin (LIPITOR) tablet 80 mg  80 mg Oral Daily With Cesar Lujan PA-C   80 mg at 07/26/21 1658    clopidogrel (PLAVIX) tablet 75 mg  75 mg Oral Daily Ally Tadeo, PA-C   75 mg at 07/26/21 0804    donepezil (ARICEPT) tablet 5 mg  5 mg Oral HS Ally Fish, PA-C   5 mg at 07/26/21 2128    insulin lispro (HumaLOG) 100 units/mL subcutaneous injection 1-6 Units  1-6 Units Subcutaneous TID AC SEYMOUR Varela   2 Units at 07/26/21 1706    Labetalol HCl (NORMODYNE) injection 10 mg  10 mg Intravenous Q6H PRN Ally Fish, PA-C   10 mg at 07/25/21 1203    levalbuterol (XOPENEX) inhalation solution 1 25 mg  1 25 mg Nebulization Q8H PRN Ally Fish, PA-C        levothyroxine tablet 112 mcg  112 mcg Oral Early Morning Ally Fish, PA-C   112 mcg at 07/27/21 0554    metoprolol succinate (TOPROL-XL) 24 hr tablet 50 mg  50 mg Oral Daily Ally Fish, PA-C   50 mg at 07/26/21 0804    pantoprazole (PROTONIX) EC tablet 40 mg  40 mg Oral Early Morning Sherrill Tran NIRMALA Pichardo   40 mg at 07/27/21 0554    QUEtiapine (SEROquel) tablet 25 mg  25 mg Oral HS Jacki NIRMALA King   25 mg at 07/26/21 2128       Vitals: /65 (BP Location: Right arm)   Pulse 58   Temp 99 3 °F (37 4 °C) (Oral)   Resp 21   Ht 5' 7" (1 702 m)   Wt 109 kg (240 lb)   SpO2 96%   BMI 37 59 kg/m²   Vitals:    07/27/21 0600 07/27/21 0713   Weight: 108 kg (237 lb 14 oz) 109 kg (240 lb)     Orthostatic Blood Pressures      Most Recent Value   Blood Pressure  144/65 filed at 07/27/2021 2451   Patient Position - Orthostatic VS  Sitting filed at 07/27/2021 0713            Intake/Output Summary (Last 24 hours) at 7/27/2021 0830  Last data filed at 7/27/2021 3756  Gross per 24 hour   Intake 2095 84 ml   Output 558 ml   Net 1537 84 ml       Invasive Devices     Peripheral Intravenous Line            Peripheral IV 07/24/21 Distal;Left;Upper;Ventral (anterior) Arm 3 days    Peripheral IV 07/24/21 Dorsal (posterior); Left Hand 3 days                Review of Systems: neg x HPI    Physical Exam: General appearance: alert  Neck: no JVD  Back: symmetric, no curvature  ROM normal  No CVA tenderness  Lungs: clear to auscultation bilaterally  Chest wall: no tenderness  Heart: regular rate and rhythm, S1, S2 normal, no murmur, click, rub or gallop  Extremities: no edema  Neurologic: Grossly normal    Lab Results: I have personally reviewed pertinent lab results  Imaging: I have personally reviewed pertinent reports

## 2021-07-27 NOTE — ASSESSMENT & PLAN NOTE
· Echocardiogram May 2021 at [facility]:  EF 55-60%  Normal diastolic function  Consistent with moderate AS, moderate tricuspid regurgitation  Moderately elevated estimated PA systolic pressure    · Repeat echocardiogram done on July 19 shows a mild AS

## 2021-07-27 NOTE — ASSESSMENT & PLAN NOTE
Secondary to diuresis and dehydration improving he is and nectar thick poor p o  Intake still    Continue holding diuresis at this time

## 2021-07-27 NOTE — ASSESSMENT & PLAN NOTE
· Hemoglobin is 8 5 with baseline around 13 admission now trended down to 7 8 this morning  · Stool Hemoccult is positive  · No evidence of gross bleeding noted  · Continue iron I just started on 07/27 ferrous sulfate 325 p o  Daily  · S/p EGD and colonoscopy on 7/20  "C1 M3 Og's esophagus-biopsies taken to rule out dysplasia  Small hiatal hernia otherwise normal stomach on direct and retroflexed views  Random gastric biopsies taken to rule out H pylori  Normal visualized portion of duodenum from duodenal bulb to D3  Random biopsies taken to rule out celiac disease "    "No evidence of active bleeding  Terminal ileum normal  Scattered diverticula throughout the colon left greater than right without evidence of bleeding  Area of erythema/ulceration/hemorrhage likely from trauma from enema versus old diverticular bleed  Large internal/external hemorrhoids"    Okay to resume plavix from GI standpoint  There is no gross bleeding with kidney injury I would like to keep hemoglobin higher greater than 8 5 as discussed with wife got concerned to transfuse 1 unit of PRBC

## 2021-07-27 NOTE — ASSESSMENT & PLAN NOTE
· EKG: normal sinus rhythm  · He is not on anticoagulation for suspected GI bleed  · Continue metoprolol he is on Plavix

## 2021-07-27 NOTE — ASSESSMENT & PLAN NOTE
Lab Results   Component Value Date    HGBA1C 6 6 (H) 07/19/2021       Recent Labs     07/26/21  1706 07/26/21 2051 07/27/21  0716 07/27/21  1111   POCGLU 190* 129 135 267*       Blood Sugar Average: Last 72 hrs:  (P) 161 [id number]   · Diabetic diet  · Fingerstick blood sugar sliding scale coverage  · Hold home oral agents  ·  hemoglobin A1c 6 6    His fasting is controlled towards lunch time and throughout the day it has uncontrolled he still not eating 100% will place him on Lantus 7 units in the morning is and monitor closely

## 2021-07-27 NOTE — ASSESSMENT & PLAN NOTE
Patient's mental status waxes and wanes at baseline given his Alzheimer's Dementia however on 07/25, patient appeared more somnolent on exam  Multifactorial, possibly due to metabolic derangements, delirium given change in environment   MRI a couple of days ago showed encephalomalacia but no acute strokes  Patient is at baseline he is outpatient urology evaluation with encephalomalacia and dementia as a reviewed his PCPs notes back in 2020 he has cognitive decline  He is oriented to self and place    Continue Seroquel at night

## 2021-07-27 NOTE — ASSESSMENT & PLAN NOTE
Lab Results   Component Value Date    EGFR 27 07/27/2021    EGFR 30 07/26/2021    EGFR 32 07/26/2021    CREATININE 2 24 (H) 07/27/2021    CREATININE 2 05 (H) 07/26/2021    CREATININE 1 96 (H) 07/26/2021     · CKD stage 3 with creatinine baseline 1 4-1 7  He is only in taking 20-40% he is on nectar thick liquid secondary to dysphagia  Creatinine slightly elevated patient is clinically compensated will recheck another chest x-ray will hold any further diuretics at this time    Repeat labs tomorrow  · Will do a bladder scan  · Ultrasound of the kidney and bladder done on 07/20 2-for acute finding

## 2021-07-27 NOTE — ASSESSMENT & PLAN NOTE
· Lower extremity 3+ pitting edema to bilateral lower extremities extending up to scrotum, bibasilar rales all seem to resolved  · Chest x-ray:  Atelectasis  · BNP:  4500  · Daily weights  · I&Os  · Clinically he is compensated I will check a chest x-ray two view is there are some rales in the right lower lobe to see if there is any further edema  Will hold any further Lasix  As the creatinine still coming up he is clinically not overloaded poor p o  Intake in sodium is still elevated    · TTE: LV function showing 55% EF  · Low-salt diet, fluid restriction    · Initially diuresed with Lasix and Bumex drip  · On admission was 276 lb went down to 237

## 2021-07-27 NOTE — PROGRESS NOTES
[address]  Progress Note Jay Summers 1945, 64 y o  male MRN: [id number]  Unit/Bed#: -01 Encounter: [id number]  Primary Care Provider: Anali Staley MD   Date and time admitted to hospital: 7/18/2021  9:06 PM    Hypernatremia  Assessment & Plan  Secondary to diuresis and dehydration improving he is and nectar thick poor p o  Intake still  Continue holding diuresis at this time    Acute metabolic encephalopathy  Assessment & Plan  Patient's mental status waxes and wanes at baseline given his Alzheimer's Dementia however on 07/25, patient appeared more somnolent on exam  Multifactorial, possibly due to metabolic derangements, delirium given change in environment   MRI a couple of days ago showed encephalomalacia but no acute strokes  Patient is at baseline he is outpatient urology evaluation with encephalomalacia and dementia as a reviewed his PCPs notes back in 2020 he has cognitive decline  He is oriented to self and place  Continue Seroquel at night    Acute respiratory failure with hypoxia and hypercapnia (HCC)  Assessment & Plan  · Secondary to CHF which is clinically has resolved DC lost almost 30 lb    Discontinue BiPAP at night recheck an ABG tomorrow continue 3 L recheck a chest x-ray PA and lateral ABG looks compensated as patient is having metabolic alkalosis secondary to contraction alkalosis and could be compensating with respiratory acidosis never had hypercapnia prior    Hypothyroidism  Assessment & Plan  Patient with a history of hypothyroidism and on levothyroxine 100 mcg at home  TSH done on admission elevated to 14 with ft4 at 1 2  Increased levothyroxine dose to 112 mcg   Patient will need to obtain repeat TSH levels in 4-6 weeks    Essential hypertension  Assessment & Plan  · BP suboptimal increase Norvasc to 5 mg daily but hold of less than 130s secondary to acute kidney injury that is persisting losartan on hold secondary to acute kidney injury continue metoprolol  Ambulatory dysfunction  Assessment & Plan  · Patient has been having frequent falls, and today he could not get up from the floor  EMS was concerned that patient may be in an unsafe situation as patient's wife is having difficulty caring for him  · Once medically cleared will need to go to rehab    Moderate aortic stenosis by prior echocardiogram  Assessment & Plan  · E[facility] May 2021 at [facility]:  EF 55-60%  Normal diastolic function  Consistent with moderate AS, moderate tricuspid regurgitation  Moderately elevated estimated PA systolic pressure  · Repeat echocardiogram done on July 19 shows a mild AS      Type 2 diabetes mellitus with kidney complication, without long-term current use of insulin [facility])  Assessment & Plan  Lab Results   Component Value Date    HGBA1C 6 6 (H) 07/19/2021       Recent Labs     07/26/21  1706 07/26/21 2051 07/27/21  0716 07/27/21  1111   POCGLU 190* 129 135 267*       Blood Sugar Average: Last 72 hrs:  (P) 161 [id number]   · Diabetic diet  · Fingerstick blood sugar sliding scale coverage  · Hold home oral agents  ·  hemoglobin A1c 6 6  His fasting is controlled towards lunch time and throughout the day it has uncontrolled he still not eating 100% will place him on Lantus 7 units in the morning is and monitor closely    PAF (paroxysmal atrial fibrillation) ([facility])  Assessment & Plan  · EKG: normal sinus rhythm  · He is not on anticoagulation for suspected GI bleed  · Continue metoprolol he is on Plavix    Acute kidney injury superimposed on chronic kidney disease [facility])  Assessment & Plan  Lab Results   Component Value Date    EGFR 27 07/27/2021    EGFR 30 07/26/2021    EGFR 32 07/26/2021    CREATININE 2 24 (H) 07/27/2021    CREATININE 2 05 (H) 07/26/2021    CREATININE 1 96 (H) 07/26/2021     · CKD stage 3 with creatinine baseline 1 4-1 7  He is only in taking 20-40% he is on nectar thick liquid secondary to dysphagia    Creatinine slightly elevated patient is clinically compensated will recheck another chest x-ray will hold any further diuretics at this time  Repeat labs tomorrow  · Will do a bladder scan  · Ultrasound of the kidney and bladder done on 07/20 2-for acute finding            Acute on chronic anemia  Assessment & Plan  · Hemoglobin is 8 5 with baseline around 13 admission now trended down to 7 8 this morning  · Stool Hemoccult is positive  · No evidence of gross bleeding noted  · Continue iron I just started on 07/27 ferrous sulfate 325 p o  Daily  · S/p EGD and colonoscopy on 7/20  "C1 M3 Og's esophagus-biopsies taken to rule out dysplasia  Small hiatal hernia otherwise normal stomach on direct and retroflexed views  Random gastric biopsies taken to rule out H pylori  Normal visualized portion of duodenum from duodenal bulb to D3  Random biopsies taken to rule out celiac disease "    "No evidence of active bleeding  Terminal ileum normal  Scattered diverticula throughout the colon left greater than right without evidence of bleeding  Area of erythema/ulceration/hemorrhage likely from trauma from enema versus old diverticular bleed  Large internal/external hemorrhoids"    Okay to resume plavix from GI standpoint  There is no gross bleeding with kidney injury I would like to keep hemoglobin higher greater than 8 5 as discussed with wife got concerned to transfuse 1 unit of PRBC          Dementia without behavioral disturbance ([facility])  Assessment & Plan  · CT head showing encephalomalacia  · MRI showing the same  · Patient's mental status waxes and wanes   · Continue Aricept, patient will need to follow up outpatient with Neurology patient is alert and oriented x2 today to place and self      * Acute on chronic diastolic CHF (congestive heart failure) ([facility])  Assessment & Plan  · Lower extremity 3+ pitting edema to bilateral lower extremities extending up to scrotum, bibasilar rales all seem to resolved  · Chest x-ray: Atelectasis  · BNP:  4500  · Daily weights  · I&Os  · Clinically he is compensated I will check a chest x-ray two view is there are some rales in the right lower lobe to see if there is any further edema  Will hold any further Lasix  As the creatinine still coming up he is clinically not overloaded poor p o  Intake in sodium is still elevated  · TTE: LV function showing 55% EF  · Low-salt diet, fluid restriction    · Initially diuresed with Lasix and Bumex drip  · On admission was 276 lb went down to 237          VTE Pharmacologic Prophylaxis: VTE Score: 7 High Risk (Score >/= 5) - Pharmacological DVT Prophylaxis Contraindicated  Sequential Compression Devices Ordered  Patient Centered Rounds: I performed bedside rounds with nursing staff today  Discussions with Specialists or Other Care Team Provider:  I discussed with case management and nursing today    Education and Discussions with Family / Patient: Updated  (wife) via phone  Time Spent for Care: 30 minutes  More than 50% of total time spent on counseling and coordination of care as described above  Current Length of Stay: 9 day(s)  Current Patient Status: Inpatient   Certification Statement: The patient will continue to require additional inpatient hospital stay due to Acute on chronic anemia hypernatremia  Discharge Plan: Anticipate discharge in >72 hrs to rehab facility  Code Status: Level 3 - DNAR and DNI    Subjective:   Patient seen and examined he denies any chest pain or shortness of breath discussed with nursing he is not having any GI bleed but his p o  Intake is 40-50%    Objective:     Vitals:   Temp (24hrs), Av 8 °F (37 1 °C), Min:98 °F (36 7 °C), Max:99 7 °F (37 6 °C)    Temp:  [98 °F (36 7 °C)-99 7 °F (37 6 °C)] 98 7 °F (37 1 °C)  HR:  [52-58] 58  Resp:  [21-33] 22  BP: (120-164)/(56-69) 164/68  SpO2:  [94 %-99 %] 99 %  Body mass index is 37 59 kg/m²       Input and Output Summary (last 24 hours): Intake/Output Summary (Last 24 hours) at 7/27/2021 1202  Last data filed at 7/27/2021 1101  Gross per 24 hour   Intake 1694 17 ml   Output 541 ml   Net 1153 17 ml       Physical Exam:   Physical Exam  Vitals and nursing note reviewed  Constitutional:       Appearance: He is well-developed  HENT:      Head: Normocephalic and atraumatic  Eyes:      Conjunctiva/sclera: Conjunctivae normal    Cardiovascular:      Rate and Rhythm: Normal rate and regular rhythm  Heart sounds: No murmur heard  Pulmonary:      Effort: Pulmonary effort is normal  No respiratory distress  Breath sounds: Rales present  Abdominal:      General: There is no distension  Palpations: Abdomen is soft  Tenderness: There is no abdominal tenderness  Musculoskeletal:         General: Swelling (Trace) present  Cervical back: Neck supple  Skin:     General: Skin is warm and dry  Neurological:      General: No focal deficit present  Mental Status: He is alert  Mental status is at baseline        Comments: Oriented to self and place   Psychiatric:         Mood and Affect: Mood normal           Additional Data:     Labs:  Results from last 7 days   Lab Units 07/27/21  0455   WBC Thousand/uL 6 66   HEMOGLOBIN g/dL 7 8*   HEMATOCRIT % 27 6*   PLATELETS Thousands/uL 272   NEUTROS PCT % 72   LYMPHS PCT % 13*   MONOS PCT % 11   EOS PCT % 3     Results from last 7 days   Lab Units 07/27/21  0455   SODIUM mmol/L 147*   POTASSIUM mmol/L 3 6   CHLORIDE mmol/L 106   CO2 mmol/L 38*   BUN mg/dL 36*   CREATININE mg/dL 2 24*   ANION GAP mmol/L 3*   CALCIUM mg/dL 8 6   ALBUMIN g/dL 3 4*   TOTAL BILIRUBIN mg/dL 1 13*   ALK PHOS U/L 82   ALT U/L 15   AST U/L 13   GLUCOSE RANDOM mg/dL 131         Results from last 7 days   Lab Units 07/27/21  1111 07/27/21  0716 07/26/21  2051 07/26/21  1706 07/26/21  1116 07/26/21  0713 07/25/21  2124 07/25/21  1602 07/25/21  1136 07/25/21  0718 07/24/21  2056 07/24/21  1600   POC GLUCOSE mg/dl 267* 135 129 190* 224* 164* 148* 170* 169* 129 125 175*         Results from last 7 days   Lab Units 07/26/21  0606 07/25/21  1613   PROCALCITONIN ng/ml 0 15 0 16       Lines/Drains:  Invasive Devices     Peripheral Intravenous Line            Peripheral IV 07/24/21 Distal;Left;Upper;Ventral (anterior) Arm 3 days    Peripheral IV 07/24/21 Dorsal (posterior); Left Hand 3 days                      Imaging: Reviewed radiology reports from this admission including: chest xray    Recent Cultures (last 7 days):         Last 24 Hours Medication List:   Current Facility-Administered Medications   Medication Dose Route Frequency Provider Last Rate    acetaminophen  650 mg Oral Q4H PRN Jacki King PA-C      [START ON 7/28/2021] amLODIPine  5 mg Oral Daily Lydia Recio MD      atorvastatin  80 mg Oral Daily With Cesar Lujan PA-C      clopidogrel  75 mg Oral Daily Jacki King PA-C      donepezil  5 mg Oral HS [place], [place]      [START ON 7/28/2021] ferrous sulfate  325 mg Oral Daily With Breakfast Lydia Recio MD      insulin glargine  7 Units Subcutaneous QAM Lydia Recio MD      insulin lispro  1-6 Units Subcutaneous TID AC SEYMOUR Varela      levalbuterol  1 25 mg Nebulization Q8H PRN Jacki King PA-C      levothyroxine  112 mcg Oral Early Morning Jacki King PA-C      metoprolol succinate  50 mg Oral Daily [place], [place]      pantoprazole  40 mg Oral Early Morning Jacki King PA-C      QUEtiapine  25 mg Oral HS Jacki King PA-C          Today, Patient Was Seen By: Lydia Recio MD    **Please Note: This note may have been constructed using a voice recognition system  **

## 2021-07-27 NOTE — ASSESSMENT & PLAN NOTE
Patient with a history of hypothyroidism and on levothyroxine 100 mcg at home  TSH done on admission elevated to 14 with ft4 at 1 2  Increased levothyroxine dose to 112 mcg   Patient will need to obtain repeat TSH levels in 4-6 weeks

## 2021-07-27 NOTE — ASSESSMENT & PLAN NOTE
· Patient has been having frequent falls, and today he could not get up from the floor    EMS was concerned that patient may be in an unsafe situation as patient's wife is having difficulty caring for him  · Once medically cleared will need to go to rehab

## 2021-07-27 NOTE — ASSESSMENT & PLAN NOTE
· CT head showing encephalomalacia  · MRI showing the same  · Patient's mental status waxes and wanes   · Continue Aricept, patient will need to follow up outpatient with Neurology patient is alert and oriented x2 today to place and self

## 2021-07-27 NOTE — PROGRESS NOTES
Progress Note - Nephrology   Gregorio Leach 64 y o  male MRN: [id number]  Unit/Bed#: -01 Encounter: [id number]    A/P:  1  Acute kidney injury on top of chronic kidney disease               creatinine slightly higher last 24 hours, continue to hold Arb at this time, patient may have diuresis re-initiated according to clinical needs  Continue to avoid potential nephrotoxins and focus on optimization of the patient's overall hemodynamics  2  Chronic kidney disease stage 3 with baseline creatinine between 1 4-1 7 mg/dL  3  Diabetic nephropathy likely               as mentioned previously, patient will benefit from re-initiation of Arb once he has achieved baseline creatinine, a consideration for an SGLT -2 inhibitor in the outpatient setting may also be helpful  4  Hypernatremia/dehydration               serum sodium level slightly improved over last 24 hours  Continue to encourage the patient to drink to thirst, avoid fluid restriction at this time  May provide additional D5W intravenously if clinically appropriate, will defer at this time but should be offered to the patient if clinically indicated later today  5  Volume overload               continue care according to Cardiology, re-initiation of loop diuretics appears to be appropriate  6  Acute encephalopathy               new encephalomalacia noted in the right and left frontal lobes  Patient appears to be stable this morning  7  Dementia potentially due to vascular processes  8  Acute on chronic diastolic congestive heart failure               continue diuretics according to Cardiology recommendations       Follow up reason for today's visit:  Acute kidney injury/chronic kidney disease/hypernatremia    Acute on chronic diastolic CHF (congestive heart failure) ([facility] [address] )    Patient Active Problem List   Diagnosis    Dementia without behavioral disturbance ([facility] [address] )    Acute on chronic anemia    Acute kidney injury superimposed on chronic kidney disease ([facility] [address] [facility]    Frequent falls    PAF (paroxysmal atrial fibrillation) (HCC)    Type 2 diabetes mellitus with kidney complication, without long-term current use of insulin (HCC)    Moderate aortic stenosis by prior echocardiogram    Ambulatory dysfunction    Essential hypertension    Acute on chronic diastolic CHF (congestive heart failure) (HCC)    Hypothyroidism    Iron deficiency anemia due to chronic blood loss    Class 3 severe obesity with body mass index (BMI) of 40 0 to 44 9 in adult [facility])    Peripheral arterial occlusive disease (HCC)    Transient cerebral ischemia    Acute respiratory failure with hypoxia and hypercapnia (HCC)    Acute metabolic encephalopathy    Hypernatremia         Subjective:   No acute events overnight    Objective:     Vitals: Blood pressure 144/65, pulse 58, temperature 99 3 °F (37 4 °C), temperature source Oral, resp  rate 21, height 5' 7" (1 702 m), weight 109 kg (240 lb), SpO2 96 %  ,Body mass index is 37 59 kg/m²  Weight (last 2 days)     Date/Time   Weight    07/27/21 0713   109 (240)    07/27/21 0600   108 (237 88)    07/26/21 0600   107 (236 99)    Weight: new bed at 07/26/21 0600    07/25/21 0600   114 (251 2)    07/25/21 0500   113 (250 22)                Intake/Output Summary (Last 24 hours) at 7/27/2021 0759  Last data filed at 7/27/2021 2696  Gross per 24 hour   Intake 2095 84 ml   Output 558 ml   Net 1537 84 ml     I/O last 3 completed shifts: In: 2469 6 [P O :840;  I V :1324 6; IV Piggyback:305]  Out: 558 [Urine:558]         Physical Exam: /65 (BP Location: Right arm)   Pulse 58   Temp 99 3 °F (37 4 °C) (Oral)   Resp 21   Ht 5' 7" (1 702 m)   Wt 109 kg (240 lb)   SpO2 96%   BMI 37 59 kg/m²     General Appearance:    Alert, cooperative, no distress, appears stated age   Head:    Normocephalic, without obvious abnormality, atraumatic   Eyes:    Conjunctiva/corneas clear   Ears:    Normal external ears   Nose:   Nares normal, septum midline, mucosa normal, no drainage    or sinus tenderness   Throat:   Lips, mucosa, and tongue normal; teeth and gums normal   Neck:   Supple   Back:     Symmetric, no curvature, ROM normal, no CVA tenderness   Lungs:     Reduced bilaterally   Chest wall:    No tenderness or deformity   Heart:    Regular rate and rhythm, S1 and S2 normal, no murmur, rub   or gallop   Abdomen:     Soft, non-tender, bowel sounds active   Extremities:   Extremities normal, atraumatic, no cyanosis, mild bilateral lower extremity edema   Skin:   Skin color, texture, turgor normal, no rashes or lesions   Lymph nodes:   Cervical normal   Neurologic:   CNII-XII intact            Lab, Imaging and other studies: I have personally reviewed pertinent labs  CBC:   Lab Results   Component Value Date    WBC 6 66 07/27/2021    HGB 7 8 (L) 07/27/2021    HCT 27 6 (L) 07/27/2021    MCV 92 07/27/2021     07/27/2021    MCH 26 1 (L) 07/27/2021    MCHC 28 3 (L) 07/27/2021    RDW 16 9 (H) 07/27/2021    MPV 10 4 07/27/2021    NRBC 0 07/27/2021     CMP:   Lab Results   Component Value Date    K 3 6 07/27/2021     07/27/2021    CO2 38 (H) 07/27/2021    BUN 36 (H) 07/27/2021    CREATININE 2 24 (H) 07/27/2021    CALCIUM 8 6 07/27/2021    AST 13 07/27/2021    ALT 15 07/27/2021    ALKPHOS 82 07/27/2021    EGFR 27 07/27/2021           Results from last 7 days   Lab Units 07/27/21  0455 07/26/21  0606 07/26/21  0013 07/25/21  1613 07/23/21  0535   POTASSIUM mmol/L 3 6 3 5 3 9 3 5 3 5   CHLORIDE mmol/L 106 107 108 106 105   CO2 mmol/L 38* 36* 40* 39* 31   BUN mg/dL 36* 31* 32* 27* 28*   CREATININE mg/dL 2 24* 2 05* 1 96* 2 01* 2 11*   CALCIUM mg/dL 8 6 8 8 8 6 8 6 7 9*   ALK PHOS U/L 82  --   --  83 93   ALT U/L 15  --   --  18 18   AST U/L 13  --   --  11 14         Phosphorus: No results found for: PHOS  Magnesium:   Lab Results   Component Value Date    MG 2 4 07/27/2021     Urinalysis: No results found for: COLORU, CLARITYU, SPECGRAV, PHUR, LEUKOCYTESUR, NITRITE, CHAVA Knight, BILIRUBINUR, BLOODU  Ionized Calcium: No results found for: CAION  Coagulation: No results found for: PT, INR, APTT  Troponin: No results found for: TROPONINI  ABG: No results found for: PHART, WJC9HOY, PO2ART, WZF6QIN, X3TFKUTM, BEART, SOURCE  Radiology review:     IMAGING  Procedure: XR chest portable    Result Date: 7/25/2021  Narrative: CHEST INDICATION:   hypoxia  COMPARISON:  Chest radiograph from 7/18/2021  EXAM PERFORMED/VIEWS:  XR CHEST PORTABLE FINDINGS: Cardiomediastinal silhouette appears unremarkable  Mild pulmonary edema  Trace right effusion  No pneumothorax  Persistent mild elevation of the right hemidiaphragm  Osseous structures appear within normal limits for patient age  Impression: Mild pulmonary edema with trace right effusion   Workstation performed: [id number]       Current Facility-Administered Medications   Medication Dose Route Frequency    acetaminophen (TYLENOL) tablet 650 mg  650 mg Oral Q4H PRN    amLODIPine (NORVASC) tablet 2 5 mg  2 5 mg Oral Daily    atorvastatin (LIPITOR) tablet 80 mg  80 mg Oral Daily With Dinner    clopidogrel (PLAVIX) tablet 75 mg  75 mg Oral Daily    donepezil (ARICEPT) tablet 5 mg  5 mg Oral HS    insulin lispro (HumaLOG) 100 units/mL subcutaneous injection 1-6 Units  1-6 Units Subcutaneous TID AC    Labetalol HCl (NORMODYNE) injection 10 mg  10 mg Intravenous Q6H PRN    levalbuterol (XOPENEX) inhalation solution 1 25 mg  1 25 mg Nebulization Q8H PRN    levothyroxine tablet 112 mcg  112 mcg Oral Early Morning    metoprolol succinate (TOPROL-XL) 24 hr tablet 50 mg  50 mg Oral Daily    pantoprazole (PROTONIX) EC tablet 40 mg  40 mg Oral Early Morning    QUEtiapine (SEROquel) tablet 25 mg  25 mg Oral HS     Medications Discontinued During This Encounter   Medication Reason    potassium chloride 40 mEq IVPB (premix) Availability    furosemide (LASIX) tablet 40 mg     Ferrous Gluconate TABS 246 mg     amLODIPine (NORVASC) tablet 2 5 mg     furosemide (LASIX) injection 40 mg     furosemide (LASIX) injection 20 mg     insulin lispro (HumaLOG) 100 units/mL subcutaneous injection 2-12 Units     insulin lispro (HumaLOG) 100 units/mL subcutaneous injection 2-12 Units     fentaNYL (SUBLIMAZE) injection 12 5 mcg Patient Transfer    ondansetron (ZOFRAN) injection 4 mg Patient Transfer    dextrose 5 % infusion     furosemide (LASIX) injection 60 mg     torsemide (DEMADEX) tablet 40 mg     torsemide (DEMADEX) tablet 40 mg     levothyroxine tablet 100 mcg     furosemide (LASIX) injection 80 mg     ferrous gluconate (FERGON) tablet 324 mg     bumetanide (BUMEX) 12 5 mg infusion 50 mL     albumin human (FLEXBUMIN) 25 % injection 25 g     insulin lispro (HumaLOG) 100 units/mL subcutaneous injection 1-6 Units     dextrose 5 % infusion        Asia Mora,       This progress note was produced in part using a dictation device which may document imprecise wording from author's original intent

## 2021-07-27 NOTE — ASSESSMENT & PLAN NOTE
· BP suboptimal increase Norvasc to 5 mg daily but hold of less than 130s secondary to acute kidney injury that is persisting losartan on hold secondary to acute kidney injury continue metoprolol

## 2021-07-27 NOTE — ASSESSMENT & PLAN NOTE
· Secondary to CHF which is clinically has resolved DC lost almost 30 lb    Discontinue BiPAP at night recheck an ABG tomorrow continue 3 L recheck a chest x-ray PA and lateral ABG looks compensated as patient is having metabolic alkalosis secondary to contraction alkalosis and could be compensating with respiratory acidosis never had hypercapnia prior

## 2021-07-28 LAB
ABO GROUP BLD BPU: NORMAL
ALBUMIN SERPL BCP-MCNC: 3.5 G/DL (ref 3.5–5)
ALP SERPL-CCNC: 94 U/L (ref 46–116)
ALT SERPL W P-5'-P-CCNC: 19 U/L (ref 12–78)
ANION GAP SERPL CALCULATED.3IONS-SCNC: 6 MMOL/L (ref 4–13)
AST SERPL W P-5'-P-CCNC: 19 U/L (ref 5–45)
BASOPHILS # BLD AUTO: 0.04 THOUSANDS/ΜL (ref 0–0.1)
BASOPHILS NFR BLD AUTO: 1 % (ref 0–1)
BILIRUB SERPL-MCNC: 1.35 MG/DL (ref 0.2–1)
BPU ID: NORMAL
BUN SERPL-MCNC: 37 MG/DL (ref 5–25)
CALCIUM SERPL-MCNC: 8.5 MG/DL (ref 8.3–10.1)
CHLORIDE SERPL-SCNC: 106 MMOL/L (ref 100–108)
CO2 SERPL-SCNC: 37 MMOL/L (ref 21–32)
CREAT SERPL-MCNC: 2.14 MG/DL (ref 0.6–1.3)
CROSSMATCH: NORMAL
EOSINOPHIL # BLD AUTO: 0.24 THOUSAND/ΜL (ref 0–0.61)
EOSINOPHIL NFR BLD AUTO: 4 % (ref 0–6)
ERYTHROCYTE [DISTWIDTH] IN BLOOD BY AUTOMATED COUNT: 17.4 % (ref 11.6–15.1)
GFR SERPL CREATININE-BSD FRML MDRD: 29 ML/MIN/1.73SQ M
GLUCOSE SERPL-MCNC: 125 MG/DL (ref 65–140)
GLUCOSE SERPL-MCNC: 170 MG/DL (ref 65–140)
GLUCOSE SERPL-MCNC: 203 MG/DL (ref 65–140)
GLUCOSE SERPL-MCNC: 229 MG/DL (ref 65–140)
GLUCOSE SERPL-MCNC: 268 MG/DL (ref 65–140)
HCT VFR BLD AUTO: 31.6 % (ref 36.5–49.3)
HGB BLD-MCNC: 9.1 G/DL (ref 12–17)
IMM GRANULOCYTES # BLD AUTO: 0.02 THOUSAND/UL (ref 0–0.2)
IMM GRANULOCYTES NFR BLD AUTO: 0 % (ref 0–2)
LYMPHOCYTES # BLD AUTO: 0.69 THOUSANDS/ΜL (ref 0.6–4.47)
LYMPHOCYTES NFR BLD AUTO: 11 % (ref 14–44)
MCH RBC QN AUTO: 26.5 PG (ref 26.8–34.3)
MCHC RBC AUTO-ENTMCNC: 28.8 G/DL (ref 31.4–37.4)
MCV RBC AUTO: 92 FL (ref 82–98)
MONOCYTES # BLD AUTO: 0.68 THOUSAND/ΜL (ref 0.17–1.22)
MONOCYTES NFR BLD AUTO: 11 % (ref 4–12)
NEUTROPHILS # BLD AUTO: 4.54 THOUSANDS/ΜL (ref 1.85–7.62)
NEUTS SEG NFR BLD AUTO: 73 % (ref 43–75)
NRBC BLD AUTO-RTO: 0 /100 WBCS
PLATELET # BLD AUTO: 274 THOUSANDS/UL (ref 149–390)
PMV BLD AUTO: 10.6 FL (ref 8.9–12.7)
POTASSIUM SERPL-SCNC: 3.7 MMOL/L (ref 3.5–5.3)
PROT SERPL-MCNC: 7.3 G/DL (ref 6.4–8.2)
RBC # BLD AUTO: 3.44 MILLION/UL (ref 3.88–5.62)
SODIUM SERPL-SCNC: 149 MMOL/L (ref 136–145)
UNIT DISPENSE STATUS: NORMAL
UNIT PRODUCT CODE: NORMAL
UNIT RH: NORMAL
WBC # BLD AUTO: 6.21 THOUSAND/UL (ref 4.31–10.16)

## 2021-07-28 PROCEDURE — 99232 SBSQ HOSP IP/OBS MODERATE 35: CPT | Performed by: INTERNAL MEDICINE

## 2021-07-28 PROCEDURE — 94760 N-INVAS EAR/PLS OXIMETRY 1: CPT

## 2021-07-28 PROCEDURE — 99232 SBSQ HOSP IP/OBS MODERATE 35: CPT | Performed by: FAMILY MEDICINE

## 2021-07-28 PROCEDURE — 80053 COMPREHEN METABOLIC PANEL: CPT | Performed by: FAMILY MEDICINE

## 2021-07-28 PROCEDURE — 82948 REAGENT STRIP/BLOOD GLUCOSE: CPT

## 2021-07-28 PROCEDURE — 85025 COMPLETE CBC W/AUTO DIFF WBC: CPT | Performed by: FAMILY MEDICINE

## 2021-07-28 RX ORDER — DEXTROSE MONOHYDRATE 50 MG/ML
100 INJECTION, SOLUTION INTRAVENOUS CONTINUOUS
Status: DISCONTINUED | OUTPATIENT
Start: 2021-07-28 | End: 2021-07-29

## 2021-07-28 RX ADMIN — AMLODIPINE BESYLATE 5 MG: 5 TABLET ORAL at 08:37

## 2021-07-28 RX ADMIN — CLOPIDOGREL BISULFATE 75 MG: 75 TABLET ORAL at 08:36

## 2021-07-28 RX ADMIN — FUROSEMIDE 60 MG: 40 TABLET ORAL at 08:36

## 2021-07-28 RX ADMIN — METOPROLOL SUCCINATE 50 MG: 50 TABLET, EXTENDED RELEASE ORAL at 08:37

## 2021-07-28 RX ADMIN — DEXTROSE 100 ML/HR: 5 SOLUTION INTRAVENOUS at 07:59

## 2021-07-28 RX ADMIN — LEVOTHYROXINE SODIUM 112 MCG: 112 TABLET ORAL at 05:05

## 2021-07-28 RX ADMIN — ATORVASTATIN CALCIUM 80 MG: 40 TABLET, FILM COATED ORAL at 16:24

## 2021-07-28 RX ADMIN — FERROUS SULFATE TAB 325 MG (65 MG ELEMENTAL FE) 325 MG: 325 (65 FE) TAB at 08:36

## 2021-07-28 RX ADMIN — QUETIAPINE FUMARATE 25 MG: 25 TABLET ORAL at 21:13

## 2021-07-28 RX ADMIN — INSULIN GLARGINE 7 UNITS: 100 INJECTION, SOLUTION SUBCUTANEOUS at 08:37

## 2021-07-28 RX ADMIN — PANTOPRAZOLE SODIUM 40 MG: 40 TABLET, DELAYED RELEASE ORAL at 05:05

## 2021-07-28 RX ADMIN — DONEPEZIL HYDROCHLORIDE 5 MG: 5 TABLET ORAL at 21:13

## 2021-07-28 RX ADMIN — DEXTROSE 100 ML/HR: 5 SOLUTION INTRAVENOUS at 18:06

## 2021-07-28 NOTE — PLAN OF CARE
Problem: Prexisting or High Potential for Compromised Skin Integrity  Goal: Skin integrity is maintained or improved  Description: INTERVENTIONS:  - Identify patients at risk for skin breakdown  - Assess and monitor skin integrity  - Assess and monitor nutrition and hydration status  - Monitor labs   - Assess for incontinence   - Turn and reposition patient  - Assist with mobility/ambulation  - Relieve pressure over bony prominences  - Avoid friction and shearing  - Provide appropriate hygiene as needed including keeping skin clean and dry  - Evaluate need for skin moisturizer/barrier cream  - Collaborate with interdisciplinary team   - Patient/family teaching  - Consider wound care consult   Outcome: Progressing     Problem: CARDIOVASCULAR - ADULT  Goal: Maintains optimal cardiac output and hemodynamic stability  Description: INTERVENTIONS:  - Monitor I/O, vital signs and rhythm  - Monitor for S/S and trends of decreased cardiac output  - Administer and titrate ordered vasoactive medications to optimize hemodynamic stability  - Assess quality of pulses, skin color and temperature  - Assess for signs of decreased coronary artery perfusion  - Instruct patient to report change in severity of symptoms  Outcome: Progressing     Problem: CARDIOVASCULAR - ADULT  Goal: Absence of cardiac dysrhythmias or at baseline rhythm  Description: INTERVENTIONS:  - Continuous cardiac monitoring, vital signs, obtain 12 lead EKG if ordered  - Administer antiarrhythmic and heart rate control medications as ordered  - Monitor electrolytes and administer replacement therapy as ordered  Outcome: Progressing     Problem: RESPIRATORY - ADULT  Goal: Achieves optimal ventilation and oxygenation  Description: INTERVENTIONS:  - Assess for changes in respiratory status  - Assess for changes in mentation and behavior  - Position to facilitate oxygenation and minimize respiratory effort  - Oxygen administered by appropriate delivery if ordered  - Initiate smoking cessation education as indicated  - Encourage broncho-pulmonary hygiene including cough, deep breathe, Incentive Spirometry  - Assess the need for suctioning and aspirate as needed  - Assess and instruct to report SOB or any respiratory difficulty  - Respiratory Therapy support as indicated  Outcome: Progressing     Problem: GENITOURINARY - ADULT  Goal: Maintains or returns to baseline urinary function  Description: INTERVENTIONS:  - Assess urinary function  - Encourage oral fluids to ensure adequate hydration if ordered  - Administer IV fluids as ordered to ensure adequate hydration  - Administer ordered medications as needed  - Offer frequent toileting  - Follow urinary retention protocol if ordered  Outcome: Progressing     Problem: METABOLIC, FLUID AND ELECTROLYTES - ADULT  Goal: Electrolytes maintained within normal limits  Description: INTERVENTIONS:  - Monitor labs and assess patient for signs and symptoms of electrolyte imbalances  - Administer electrolyte replacement as ordered  - Monitor response to electrolyte replacements, including repeat lab results as appropriate  - Instruct patient on fluid and nutrition as appropriate  Outcome: Progressing     Problem: METABOLIC, FLUID AND ELECTROLYTES - ADULT  Goal: Fluid balance maintained  Description: INTERVENTIONS:  - Monitor labs   - Monitor I/O and WT  - Instruct patient on fluid and nutrition as appropriate  - Assess for signs & symptoms of volume excess or deficit  Outcome: Progressing     Problem: METABOLIC, FLUID AND ELECTROLYTES - ADULT  Goal: Glucose maintained within target range  Description: INTERVENTIONS:  - Monitor Blood Glucose as ordered  - Assess for signs and symptoms of hyperglycemia and hypoglycemia  - Administer ordered medications to maintain glucose within target range  - Assess nutritional intake and initiate nutrition service referral as needed  Outcome: Progressing     Problem: SKIN/TISSUE INTEGRITY - ADULT  Goal: Incision(s), wounds(s) or drain site(s) healing without S/S of infection  Description: INTERVENTIONS  - Assess and document dressing, incision, wound bed, drain sites and surrounding tissue  - Provide patient and family education  - Perform skin care  Problem: Nutrition/Hydration-ADULT  Goal: Nutrient/Hydration intake appropriate for improving, restoring or maintaining nutritional needs  Description: Monitor and assess patient's nutrition/hydration status for malnutrition  Collaborate with interdisciplinary team and initiate plan and interventions as ordered  Monitor patient's weight and dietary intake as ordered or per policy  Utilize nutrition screening tool and intervene as necessary  Determine patient's food preferences and provide high-protein, high-caloric foods as appropriate       INTERVENTIONS:  - Monitor oral intake, urinary output, labs, and treatment plans  - Assess nutrition and hydration status and recommend course of action  - Evaluate amount of meals eaten  - Assist patient with eating if necessary   - Allow adequate time for meals  - Recommend/ encourage appropriate diets, oral nutritional supplements, and vitamin/mineral supplements  - Order, calculate, and assess calorie counts as needed  - Recommend, monitor, and adjust tube feedings and TPN/PPN based on assessed needs  - Assess need for intravenous fluids  - Provide specific nutrition/hydration education as appropriate  - Include patient/family/caregiver in decisions related to nutrition  Outcome: Progressing     Problem: PAIN - ADULT  Goal: Verbalizes/displays adequate comfort level or baseline comfort level  Description: Interventions:  - Encourage patient to monitor pain and request assistance  - Assess pain using appropriate pain scale  - Administer analgesics based on type and severity of pain and evaluate response  - Implement non-pharmacological measures as appropriate and evaluate response  - Consider cultural and social influences on pain and pain management  - Notify physician/advanced practitioner if interventions unsuccessful or patient reports new pain  Outcome: Progressing     Problem: INFECTION - ADULT  Goal: Absence or prevention of progression during hospitalization  Description: INTERVENTIONS:  - Assess and monitor for signs and symptoms of infection  - Monitor lab/diagnostic results  - Monitor all insertion sites, i e  indwelling lines, tubes, and drains  - Monitor endotracheal if appropriate and nasal secretions for changes in amount and color  - [place] appropriate cooling/warming therapies per order  - Administer medications as ordered  - Instruct and encourage patient and family to use good hand hygiene technique  - Identify and instruct in appropriate isolation precautions for identified infection/condition  Outcome: Progressing     Problem: DISCHARGE PLANNING  Goal: Discharge to home or other facility with appropriate resources  Description: INTERVENTIONS:  - Identify barriers to discharge w/patient and caregiver  - Arrange for needed discharge resources and transportation as appropriate  - Identify discharge learning needs (meds, wound care, etc )  - Arrange for interpretive services to assist at discharge as needed  - Refer to Case Management Department for coordinating discharge planning if the patient needs post-hospital services based on physician/advanced practitioner order or complex needs related to functional status, cognitive ability, or social support system  Outcome: Progressing     Problem: Knowledge Deficit  Goal: Patient/family/caregiver demonstrates understanding of disease process, treatment plan, medications, and discharge instructions  Description: Complete learning assessment and assess knowledge base    Interventions:  - Provide teaching at level of understanding  - Provide teaching via preferred learning methods  Outcome: Progressing     Outcome: Progressing     Problem: HEMATOLOGIC - ADULT  Goal: Maintains hematologic stability  Description: INTERVENTIONS  - Assess for signs and symptoms of bleeding or hemorrhage  - Monitor labs  - Administer supportive blood products/factors as ordered and appropriate  Outcome: Progressing     Problem: MUSCULOSKELETAL - ADULT  Goal: Maintain or return mobility to safest level of function  Description: INTERVENTIONS:  - Assess patient's ability to carry out ADLs; assess patient's baseline for ADL function and identify physical deficits which impact ability to perform ADLs (bathing, care of mouth/teeth, toileting, grooming, dressing, etc )  - Assess/evaluate cause of self-care deficits   - Assess range of motion  - Assess patient's mobility  - Assess patient's need for assistive devices and provide as appropriate  - Encourage maximum independence but intervene and supervise when necessary  - Involve family in performance of ADLs  - Assess for home care needs following discharge   - Consider OT consult to assist with ADL evaluation and planning for discharge  - Provide patient education as appropriate  Outcome: Progressing

## 2021-07-28 NOTE — PLAN OF CARE
Problem: Potential for Falls  Goal: Patient will remain free of falls  Description: INTERVENTIONS:  - Educate patient/family on patient safety including physical limitations  - Instruct patient to call for assistance with activity   - Consult OT/PT to assist with strengthening/mobility   - Keep Call bell within reach  - Keep bed low and locked with side rails adjusted as appropriate  - Keep care items and personal belongings within reach  - Initiate and maintain comfort rounds  - Make Fall Risk Sign visible to staff  - Offer Toileting every  Hours, in advance of need  - Initiate/Maintain alarm  - Obtain necessary fall risk management equipment:   - Apply yellow socks and bracelet for high fall risk patients  - Consider moving patient to room near nurses station  Outcome: Progressing     Problem: MOBILITY - ADULT  Goal: Maintain or return to baseline ADL function  Description: INTERVENTIONS:  -  Assess patient's ability to carry out ADLs; assess patient's baseline for ADL function and identify physical deficits which impact ability to perform ADLs (bathing, care of mouth/teeth, toileting, grooming, dressing, etc )  - Assess/evaluate cause of self-care deficits   - Assess range of motion  - Assess patient's mobility; develop plan if impaired  - Assess patient's need for assistive devices and provide as appropriate  - Encourage maximum independence but intervene and supervise when necessary  - Involve family in performance of ADLs  - Assess for home care needs following discharge   - Consider OT consult to assist with ADL evaluation and planning for discharge  - Provide patient education as appropriate  Outcome: Progressing  Goal: Maintains/Returns to pre admission functional level  Description: INTERVENTIONS:  - Perform BMAT or MOVE assessment daily    - Set and communicate daily mobility goal to care team and patient/family/caregiver     - Collaborate with rehabilitation services on mobility goals if consulted  - Perform Range of Motion  times a day  - Reposition patient every  hours    - Dangle patient  times a day  - Stand patient  times a day  - Ambulate patient  times a day  - Out of bed to chair  times a day   - Out of bed for meals  times a day  - Out of bed for toileting  - Record patient progress and toleration of activity level   Outcome: Progressing     Problem: Prexisting or High Potential for Compromised Skin Integrity  Goal: Skin integrity is maintained or improved  Description: INTERVENTIONS:  - Identify patients at risk for skin breakdown  - Assess and monitor skin integrity  - Assess and monitor nutrition and hydration status  - Monitor labs   - Assess for incontinence   - Turn and reposition patient  - Assist with mobility/ambulation  - Relieve pressure over bony prominences  - Avoid friction and shearing  - Provide appropriate hygiene as needed including keeping skin clean and dry  - Evaluate need for skin moisturizer/barrier cream  - Collaborate with interdisciplinary team   - Patient/family teaching  - Consider wound care consult   Outcome: Progressing     Problem: CARDIOVASCULAR - ADULT  Goal: Maintains optimal cardiac output and hemodynamic stability  Description: INTERVENTIONS:  - Monitor I/O, vital signs and rhythm  - Monitor for S/S and trends of decreased cardiac output  - Administer and titrate ordered vasoactive medications to optimize hemodynamic stability  - Assess quality of pulses, skin color and temperature  - Assess for signs of decreased coronary artery perfusion  - Instruct patient to report change in severity of symptoms  Outcome: Progressing  Goal: Absence of cardiac dysrhythmias or at baseline rhythm  Description: INTERVENTIONS:  - Continuous cardiac monitoring, vital signs, obtain 12 lead EKG if ordered  - Administer antiarrhythmic and heart rate control medications as ordered  - Monitor electrolytes and administer replacement therapy as ordered  Outcome: Progressing     Problem: RESPIRATORY - ADULT  Goal: Achieves optimal ventilation and oxygenation  Description: INTERVENTIONS:  - Assess for changes in respiratory status  - Assess for changes in mentation and behavior  - Position to facilitate oxygenation and minimize respiratory effort  - Oxygen administered by appropriate delivery if ordered  - Initiate smoking cessation education as indicated  - Encourage broncho-pulmonary hygiene including cough, deep breathe, Incentive Spirometry  - Assess the need for suctioning and aspirate as needed  - Assess and instruct to report SOB or any respiratory difficulty  - Respiratory Therapy support as indicated  Outcome: Progressing     Problem: GENITOURINARY - ADULT  Goal: Maintains or returns to baseline urinary function  Description: INTERVENTIONS:  - Assess urinary function  - Encourage oral fluids to ensure adequate hydration if ordered  - Administer IV fluids as ordered to ensure adequate hydration  - Administer ordered medications as needed  - Offer frequent toileting  - Follow urinary retention protocol if ordered  Outcome: Progressing     Problem: METABOLIC, FLUID AND ELECTROLYTES - ADULT  Goal: Electrolytes maintained within normal limits  Description: INTERVENTIONS:  - Monitor labs and assess patient for signs and symptoms of electrolyte imbalances  - Administer electrolyte replacement as ordered  - Monitor response to electrolyte replacements, including repeat lab results as appropriate  - Instruct patient on fluid and nutrition as appropriate  Outcome: Progressing  Goal: Fluid balance maintained  Description: INTERVENTIONS:  - Monitor labs   - Monitor I/O and WT  - Instruct patient on fluid and nutrition as appropriate  - Assess for signs & symptoms of volume excess or deficit  Outcome: Progressing  Goal: Glucose maintained within target range  Description: INTERVENTIONS:  - Monitor Blood Glucose as ordered  - Assess for signs and symptoms of hyperglycemia and hypoglycemia  - Administer ordered medications to maintain glucose within target range  - Assess nutritional intake and initiate nutrition service referral as needed  Outcome: Progressing     Problem: SKIN/TISSUE INTEGRITY - ADULT  Goal: Incision(s), wounds(s) or drain site(s) healing without S/S of infection  Description: INTERVENTIONS  - Assess and document dressing, incision, wound bed, drain sites and surrounding tissue  - Provide patient and family education  - Perform skin care/dressing changes every   Outcome: Progressing     Problem: HEMATOLOGIC - ADULT  Goal: Maintains hematologic stability  Description: INTERVENTIONS  - Assess for signs and symptoms of bleeding or hemorrhage  - Monitor labs  - Administer supportive blood products/factors as ordered and appropriate  Outcome: Progressing     Problem: MUSCULOSKELETAL - ADULT  Goal: Maintain or return mobility to safest level of function  Description: INTERVENTIONS:  - Assess patient's ability to carry out ADLs; assess patient's baseline for ADL function and identify physical deficits which impact ability to perform ADLs (bathing, care of mouth/teeth, toileting, grooming, dressing, etc )  - Assess/evaluate cause of self-care deficits   - Assess range of motion  - Assess patient's mobility  - Assess patient's need for assistive devices and provide as appropriate  - Encourage maximum independence but intervene and supervise when necessary  - Involve family in performance of ADLs  - Assess for home care needs following discharge   - Consider OT consult to assist with ADL evaluation and planning for discharge  - Provide patient education as appropriate  Outcome: Progressing     Problem: Nutrition/Hydration-ADULT  Goal: Nutrient/Hydration intake appropriate for improving, restoring or maintaining nutritional needs  Description: Monitor and assess patient's nutrition/hydration status for malnutrition  Collaborate with interdisciplinary team and initiate plan and interventions as ordered  Monitor patient's weight and dietary intake as ordered or per policy  Utilize nutrition screening tool and intervene as necessary  Determine patient's food preferences and provide high-protein, high-caloric foods as appropriate       INTERVENTIONS:  - Monitor oral intake, urinary output, labs, and treatment plans  - Assess nutrition and hydration status and recommend course of action  - Evaluate amount of meals eaten  - Assist patient with eating if necessary   - Allow adequate time for meals  - Recommend/ encourage appropriate diets, oral nutritional supplements, and vitamin/mineral supplements  - Order, calculate, and assess calorie counts as needed  - Recommend, monitor, and adjust tube feedings and TPN/PPN based on assessed needs  - Assess need for intravenous fluids  - Provide specific nutrition/hydration education as appropriate  - Include patient/family/caregiver in decisions related to nutrition  Outcome: Progressing     Problem: PAIN - ADULT  Goal: Verbalizes/displays adequate comfort level or baseline comfort level  Description: Interventions:  - Encourage patient to monitor pain and request assistance  - Assess pain using appropriate pain scale  - Administer analgesics based on type and severity of pain and evaluate response  - Implement non-pharmacological measures as appropriate and evaluate response  - Consider cultural and social influences on pain and pain management  - Notify physician/advanced practitioner if interventions unsuccessful or patient reports new pain  Outcome: Progressing     Problem: INFECTION - ADULT  Goal: Absence or prevention of progression during hospitalization  Description: INTERVENTIONS:  - Assess and monitor for signs and symptoms of infection  - Monitor lab/diagnostic results  - Monitor all insertion sites, i e  indwelling lines, tubes, and drains  - Monitor endotracheal if appropriate and nasal secretions for changes in amount and color  - [place] appropriate cooling/warming therapies per order  - Administer medications as ordered  - Instruct and encourage patient and family to use good hand hygiene technique  - Identify and instruct in appropriate isolation precautions for identified infection/condition  Outcome: Progressing     Problem: SAFETY ADULT  Goal: Patient will remain free of falls  Description: INTERVENTIONS:  - Educate patient/family on patient safety including physical limitations  - Instruct patient to call for assistance with activity   - Consult OT/PT to assist with strengthening/mobility   - Keep Call bell within reach  - Keep bed low and locked with side rails adjusted as appropriate  - Keep care items and personal belongings within reach  - Initiate and maintain comfort rounds  - Make Fall Risk Sign visible to staff  - Offer Toileting every  Hours, in advance of need  - Initiate/Maintain alarm  - Obtain necessary fall risk management equipment:   - Apply yellow socks and bracelet for high fall risk patients  - Consider moving patient to room near nurses station  Outcome: Progressing  Goal: Maintain or return to baseline ADL function  Description: INTERVENTIONS:  -  Assess patient's ability to carry out ADLs; assess patient's baseline for ADL function and identify physical deficits which impact ability to perform ADLs (bathing, care of mouth/teeth, toileting, grooming, dressing, etc )  - Assess/evaluate cause of self-care deficits   - Assess range of motion  - Assess patient's mobility; develop plan if impaired  - Assess patient's need for assistive devices and provide as appropriate  - Encourage maximum independence but intervene and supervise when necessary  - Involve family in performance of ADLs  - Assess for home care needs following discharge   - Consider OT consult to assist with ADL evaluation and planning for discharge  - Provide patient education as appropriate  Outcome: Progressing  Goal: Maintains/Returns to pre admission functional level  Description: INTERVENTIONS:  - Perform BMAT or MOVE assessment daily    - Set and communicate daily mobility goal to care team and patient/family/caregiver  - Collaborate with rehabilitation services on mobility goals if consulted  - Perform Range of Motion  times a day  - Reposition patient every  hours  - Dangle patient  times a day  - Stand patient  times a day  - Ambulate patient  times a day  - Out of bed to chair  times a day   - Out of bed for meals times a day  - Out of bed for toileting  - Record patient progress and toleration of activity level   Outcome: Progressing     Problem: DISCHARGE PLANNING  Goal: Discharge to home or other facility with appropriate resources  Description: INTERVENTIONS:  - Identify barriers to discharge w/patient and caregiver  - Arrange for needed discharge resources and transportation as appropriate  - Identify discharge learning needs (meds, wound care, etc )  - Arrange for interpretive services to assist at discharge as needed  - Refer to Case Management Department for coordinating discharge planning if the patient needs post-hospital services based on physician/advanced practitioner order or complex needs related to functional status, cognitive ability, or social support system  Outcome: Progressing     Problem: Knowledge Deficit  Goal: Patient/family/caregiver demonstrates understanding of disease process, treatment plan, medications, and discharge instructions  Description: Complete learning assessment and assess knowledge base    Interventions:  - Provide teaching at level of understanding  - Provide teaching via preferred learning methods  Outcome: Progressing

## 2021-07-28 NOTE — PROGRESS NOTES
Progress Note - Nephrology   Jessicaanameñoalayna Saucedo 64 y o  male MRN: [id number]  Unit/Bed#: -01 Encounter: [id number]    A/P:  1  Acute kidney injury on top of chronic kidney disease                creatinine stable, continue to monitor and provide optimization of hemodynamics in general avoidance of nephrotoxins  2  Chronic kidney disease stage 3 with baseline creatinine between 1 4-1 7 mg/dL  3  Diabetic nephropathy likely              Continue monitor kidney function in reinitiate angiotensin receptor blocker when the patient is at his baseline creatinine  As mentioned yesterday, an SGLT -2 inhibitor may be a better choice for this patient going forward, will defer at this time  4  Hypernatremia/dehydration                serum sodium worse over last 24 hours  Will provide the patient has 2 L of D5W at this time, I encouraged him to drink to thirst, which she is experiencing, but simply not doing  He may require prompting from staff members due to the patient's underlying dementia  5  Volume overload                continue diurese according to our Cardiology colleagues  6  Acute encephalopathy                patient with new encephalomalacia in the bilateral frontal lobes, from the mental status standpoint, he appears to be at baseline at this time  7  Dementia potentially due to vascular processes  8   Acute on chronic diastolic congestive heart failure                continue diurese according to Cardiology recommendations      Follow up reason for today's visit:  Acute kidney injury/chronic kidney disease/diabetic nephropathy/hypernatremia    Acute on chronic diastolic CHF (congestive heart failure) ([facility] [address] )    Patient Active Problem List   Diagnosis    Dementia without behavioral disturbance ([facility] [address] )    Acute on chronic anemia    Acute kidney injury superimposed on chronic kidney disease ([facility] [address] )    Frequent falls    [facility]oxysmal [facility] fibrillation) ([facility])    Type 2 diabetes mellitus with kidney complication, without long-term current use of insulin (HCC)    Moderate aortic stenosis by prior echocardiogram    Ambulatory dysfunction    Essential hypertension    Acute on chronic diastolic CHF (congestive heart failure) (HCC)    Hypothyroidism    Iron deficiency anemia due to chronic blood loss    Class 3 severe obesity with body mass index (BMI) of 40 0 to 44 9 in adult [facility])    Peripheral arterial occlusive disease (HCC)    Transient cerebral ischemia    Acute respiratory failure with hypoxia and hypercapnia (HCC)    Acute metabolic encephalopathy    Hypernatremia         Subjective:   No Acute events overnight, patient feels thirsty but is not drinking spontaneously on his own  Objective:     Vitals: Blood pressure 140/64, pulse (!) 53, temperature 97 9 °F (36 6 °C), resp  rate 18, height 5' 7" (1 702 m), weight 112 kg (246 lb 14 6 oz), SpO2 (!) 84 %  ,Body mass index is 38 67 kg/m²  Weight (last 2 days)     Date/Time   Weight    07/28/21 0600   112 (246 92)    Weight: pt refused to get oob at 07/28/21 0600    07/27/21 0713   109 (240)    07/27/21 0600   108 (237 88)    07/26/21 0600   107 (236 99)    Weight: new bed at 07/26/21 0600                Intake/Output Summary (Last 24 hours) at 7/28/2021 0722  Last data filed at 7/27/2021 1701  Gross per 24 hour   Intake 810 ml   Output 168 ml   Net 642 ml     I/O last 3 completed shifts: In: 1050 [P O :720;  I V :30; Blood:300]  Out: 56 [Urine:488]         Physical Exam: /64   Pulse (!) 53   Temp 97 9 °F (36 6 °C)   Resp 18   Ht 5' 7" (1 702 m)   Wt 112 kg (246 lb 14 6 oz) Comment: pt refused to get oob  SpO2 (!) 84%   BMI 38 67 kg/m²     General Appearance:    Alert, cooperative, no distress, appears stated age   Head:    Normocephalic, without obvious abnormality, atraumatic   Eyes:    Conjunctiva/corneas clear   Ears:    Normal external ears   Nose:   Nares normal, septum midline, mucosa normal, no drainage    or sinus tenderness   Throat:   Lips, mucosa, and tongue normal; teeth and gums normal   Neck:   Supple   Back:     Symmetric, no curvature, ROM normal, no CVA tenderness   Lungs:     Reduced bilaterally with rare wheeze   Chest wall:    No tenderness or deformity   Heart:    Regular rate and rhythm, S1 and S2 normal, no murmur, rub   or gallop   Abdomen:     Soft, non-tender, bowel sounds active   Extremities:   Extremities normal, atraumatic, no cyanosis, +2 presacral edema   Skin:   Skin color, texture, turgor normal, no rashes or lesions   Lymph nodes:   Cervical normal   Neurologic:   CNII-XII intact            Lab, Imaging and other studies: I have personally reviewed pertinent labs  CBC:   Lab Results   Component Value Date    WBC 6 21 07/28/2021    HGB 9 1 (L) 07/28/2021    HCT 31 6 (L) 07/28/2021    MCV 92 07/28/2021     07/28/2021    MCH 26 5 (L) 07/28/2021    MCHC 28 8 (L) 07/28/2021    RDW 17 4 (H) 07/28/2021    MPV 10 6 07/28/2021    NRBC 0 07/28/2021     CMP:   Lab Results   Component Value Date    K 3 7 07/28/2021     07/28/2021    CO2 37 (H) 07/28/2021    BUN 37 (H) 07/28/2021    CREATININE 2 14 (H) 07/28/2021    CALCIUM 8 5 07/28/2021    AST 19 07/28/2021    ALT 19 07/28/2021    ALKPHOS 94 07/28/2021    EGFR 29 07/28/2021           Results from last 7 days   Lab Units 07/28/21  0509 07/27/21  0455 07/26/21  0606 07/25/21  1613   POTASSIUM mmol/L 3 7 3 6 3 5 3 5   CHLORIDE mmol/L 106 106 107 106   CO2 mmol/L 37* 38* 36* 39*   BUN mg/dL 37* 36* 31* 27*   CREATININE mg/dL 2 14* 2 24* 2 05* 2 01*   CALCIUM mg/dL 8 5 8 6 8 8 8 6   ALK PHOS U/L 94 82  --  83   ALT U/L 19 15  --  18   AST U/L 19 13  --  11         Phosphorus: No results found for: PHOS  Magnesium: No results found for: MG  Urinalysis: No results found for: COLORU, CLARITYU, SPECGRAV, PHUR, LEUKOCYTESUR, NITRITE, PROTEINUA, GLUCOSEU, KETONESU, BILIRUBINUR, BLOODU  Ionized Calcium: No results found for: CAION  Coagulation: No results found for: PT, INR, APTT  Troponin: No results found for: TROPONINI  ABG:   Lab Results   Component Value Date    PHART 7 424 07/27/2021    KPC2ZZE 53 0 (H) 07/27/2021    PO2ART 91 5 07/27/2021    UKN9GAF 33 9 (H) 07/27/2021    BEART 8 4 07/27/2021    SOURCE Radial, Right 07/27/2021     Radiology review:     IMAGING  Procedure: XR chest portable    Result Date: 7/25/2021  Narrative: CHEST INDICATION:   hypoxia  COMPARISON:  Chest radiograph from 7/18/2021  EXAM PERFORMED/VIEWS:  XR CHEST PORTABLE FINDINGS: Cardiomediastinal silhouette appears unremarkable  Mild pulmonary edema  Trace right effusion  No pneumothorax  Persistent mild elevation of the right hemidiaphragm  Osseous structures appear within normal limits for patient age  Impression: Mild pulmonary edema with trace right effusion  Workstation performed: [id number]     Procedure: XR chest pa & lateral    Result Date: 7/27/2021  Narrative: CHEST INDICATION:   sob  COMPARISON:  Chest radiograph from 7/25/2021  EXAM PERFORMED/VIEWS:  XR CHEST AP & LATERAL FINDINGS: Cardiomediastinal silhouette appears unremarkable  Mild pulmonary venous congestion  Small left effusion  No pneumothorax  Osseous structures appear within normal limits for patient age  Impression: Mild pulmonary venous congestion with small left effusion   Workstation performed: [id number]       Current Facility-Administered Medications   Medication Dose Route Frequency    acetaminophen (TYLENOL) tablet 650 mg  650 mg Oral Q4H PRN    amLODIPine (NORVASC) tablet 5 mg  5 mg Oral Daily    atorvastatin (LIPITOR) tablet 80 mg  80 mg Oral Daily With Dinner    clopidogrel (PLAVIX) tablet 75 mg  75 mg Oral Daily    dextrose 5 % infusion  100 mL/hr Intravenous Continuous    donepezil (ARICEPT) tablet 5 mg  5 mg Oral HS    ferrous sulfate tablet 325 mg  325 mg Oral Daily With Breakfast    furosemide (LASIX) tablet 60 mg  60 mg Oral Daily    insulin glargine (LANTUS) subcutaneous injection 7 Units 0 07 mL  7 Units Subcutaneous QAM    insulin lispro (HumaLOG) 100 units/mL subcutaneous injection 1-6 Units  1-6 Units Subcutaneous TID AC    levalbuterol (XOPENEX) inhalation solution 1 25 mg  1 25 mg Nebulization Q8H PRN    levothyroxine tablet 112 mcg  112 mcg Oral Early Morning    metoprolol succinate (TOPROL-XL) 24 hr tablet 50 mg  50 mg Oral Daily    pantoprazole (PROTONIX) EC tablet 40 mg  40 mg Oral Early Morning    QUEtiapine (SEROquel) tablet 25 mg  25 mg Oral HS     Medications Discontinued During This Encounter   Medication Reason    potassium chloride 40 mEq IVPB (premix) Availability    furosemide (LASIX) tablet 40 mg     Ferrous Gluconate TABS 246 mg     amLODIPine (NORVASC) tablet 2 5 mg     furosemide (LASIX) injection 40 mg     furosemide (LASIX) injection 20 mg     insulin lispro (HumaLOG) 100 units/mL subcutaneous injection 2-12 Units     insulin lispro (HumaLOG) 100 units/mL subcutaneous injection 2-12 Units     fentaNYL (SUBLIMAZE) injection 12 5 mcg Patient Transfer    ondansetron (ZOFRAN) injection 4 mg Patient Transfer    dextrose 5 % infusion     furosemide (LASIX) injection 60 mg     torsemide (DEMADEX) tablet 40 mg     torsemide (DEMADEX) tablet 40 mg     levothyroxine tablet 100 mcg     furosemide (LASIX) injection 80 mg     ferrous gluconate (FERGON) tablet 324 mg     bumetanide (BUMEX) 12 5 mg infusion 50 mL     albumin human (FLEXBUMIN) 25 % injection 25 g     insulin lispro (HumaLOG) 100 units/mL subcutaneous injection 1-6 Units     dextrose 5 % infusion     Labetalol HCl (NORMODYNE) injection 10 mg     amLODIPine (NORVASC) tablet 2 5 mg        Juan Pablo Kimberlyn, DO      This progress note was produced in part using a dictation device which may document imprecise wording from author's original intent

## 2021-07-28 NOTE — ASSESSMENT & PLAN NOTE
Lab Results   Component Value Date    EGFR 29 07/28/2021    EGFR 27 07/27/2021    EGFR 30 07/26/2021    CREATININE 2 14 (H) 07/28/2021    CREATININE 2 24 (H) 07/27/2021    CREATININE 2 05 (H) 07/26/2021     · CKD stage 3 with creatinine baseline 1 4-1 7  He is only in taking 20-40% he is on nectar thick liquid secondary to dysphagia    Improved today Ultrasound of the kidney and bladder done on 07/20 2-for acute finding

## 2021-07-28 NOTE — PROGRESS NOTES
[address]  Progress Note Gonzalez Quinteros 1945, 64 y o  male MRN: [id number]  Unit/Bed#: -01 Encounter: [id number]  Primary Care Provider: Beena Villafana MD   Date and time admitted to hospital: 7/18/2021  9:06 PM    Hypernatremia  Assessment & Plan  Patient is a nectar thick he is not in taking much  Sodium has worsened D5 water has been given of 2 L to the patient I discussed with nurses need to encourage to drink and nectar thick    Acute metabolic encephalopathy  Assessment & Plan  Patient's mental status waxes and wanes at baseline given his Alzheimer's Dementia however on 07/25, patient appeared more somnolent on exam  Multifactorial, possibly due to metabolic derangements, delirium given change in environment   MRI a couple of days ago showed encephalomalacia but no acute strokes  Patient is at baseline he is outpatient urology evaluation with encephalomalacia and dementia as a reviewed his PCPs notes back in 2020 he has cognitive decline  He is oriented to self and place  Continue Seroquel at night  Today the patient is oriented to self no focal his sodium is slightly worse could be contributing    Acute respiratory failure with hypoxia and hypercapnia (HCC)  Assessment & Plan  · Secondary to CHF which is clinically has resolved DC lost almost 30 lb I have repeated the ABG ABG looks like a respiratory acidosis compensating for contraction alkalosis as he never had any hypercapnia before  BiPAP has been discontinued since 07/27 patient is actually now 92% on room air will continue diuresis with Lasix 60 mg daily monitor electrolytes as well might need p r n   Oxygen especially at night r    Hypothyroidism  Assessment & Plan  Patient with a history of hypothyroidism and on levothyroxine 100 mcg at home  TSH done on admission elevated to 14 with ft4 at 1 2  Increased levothyroxine dose to 112 mcg   Patient will need to obtain repeat TSH levels in 4-6 weeks    Essential hypertension  Assessment & Plan  · BP improved continue Norvasc and metoprolol    Ambulatory dysfunction  Assessment & Plan  · Patient has been having frequent falls, and today he could not get up from the floor  EMS was concerned that patient may be in an unsafe situation as patient's wife is having difficulty caring for him  · Once medically cleared will need to go to rehab    Moderate aortic stenosis by prior echocardiogram  Assessment & Plan  · Echocardiogram May 2021 at [facility]:  EF 55-60%  Normal diastolic function  Consistent with moderate AS, moderate tricuspid regurgitation  Moderately elevated estimated PA systolic pressure  · Repeat echocardiogram done on July 19 shows a mild AS      Type 2 diabetes mellitus with kidney complication, without long-term current use of insulin [facility])  Assessment & Plan  Lab Results   Component Value Date    HGBA1C 6 6 (H) 07/19/2021       Recent Labs     07/27/21  1608 07/27/21  2153 07/28/21  0659 07/28/21  1104   POCGLU 171* 121 170* 229*       Blood Sugar Average: Last 72 hrs:  (P) 172 [id number]   · Diabetic diet  · Fingerstick blood sugar sliding scale coverage  · Hold home oral agents  ·  hemoglobin A1c 6 6    His fasting is controlled towards lunch time and throughout the day it has uncontrolled he still not eating 100% will place him on Lantus 7 units in the morning is and monitor closely it has been started today will reassess in 24 hours    PAF (paroxysmal atrial fibrillation) ([facility])  Assessment & Plan  · EKG: normal sinus rhythm  · He is not on anticoagulation for suspected GI bleed  · Continue metoprolol he is on Plavix    Acute kidney injury superimposed on chronic kidney disease [facility])  Assessment & Plan  Lab Results   Component Value Date    EGFR 29 07/28/2021    EGFR 27 07/27/2021    EGFR 30 07/26/2021    CREATININE 2 14 (H) 07/28/2021    CREATININE 2 24 (H) 07/27/2021    CREATININE 2 05 (H) 07/26/2021     · CKD stage 3 with creatinine baseline 1  4-1 7  He is only in taking 20-40% he is on nectar thick liquid secondary to dysphagia  Improved today Ultrasound of the kidney and bladder done on 07/20 2-for acute finding            Acute on chronic anemia  Assessment & Plan  · Hemoglobin is 8 5 with baseline around 13 admission now trended down to 7 8 this morning  · Stool Hemoccult is positive  · No evidence of gross bleeding noted  · Continue iron I just started on 07/27 ferrous sulfate 325 p o  Daily  · S/p EGD and colonoscopy on 7/20  "C1 M3 Og's esophagus-biopsies taken to rule out dysplasia  Small hiatal hernia otherwise normal stomach on direct and retroflexed views  Random gastric biopsies taken to rule out H pylori  Normal visualized portion of duodenum from duodenal bulb to D3  Random biopsies taken to rule out celiac disease "    "No evidence of active bleeding  Terminal ileum normal  Scattered diverticula throughout the colon left greater than right without evidence of bleeding  Area of erythema/ulceration/hemorrhage likely from trauma from enema versus old diverticular bleed  Large internal/external hemorrhoids"    Okay to resume plavix from GI standpoint  T improved to 9 5 status post 1 unit of PRBC transfusion on 07/27  Dementia without behavioral disturbance ([facility] [address] )  Assessment & Plan  · CT head showing encephalomalacia  · MRI showing the same  · Patient's mental status waxes and wanes   · Continue Aricept, today the patient initially ignores that he only knows his name not oriented to further the name  He will need outpatient follow-up with Neurology and also delirium could be worsened with the hypernatremia      * Acute on chronic diastolic CHF (congestive heart failure) ([facility])  Assessment & Plan  · Edema legs have significantly improved he does have some trace up he still has some rales at the bibasilar region I did repeat a chest x-ray 727 there was still mild vascular congestion patient also takes poor p o   Intake and requires D5 water intermittently but I have restarted his Lasix on  as 60 mg daily will monitor closely his electrolytes  · Chest x-ray:  Atelectasis  · BNP:  4500  · Daily weights  · I&Os  · TTE: LV function showing 55% EF  · Low-salt diet, fluid restriction    · Initially diuresed with Lasix and Bumex drip  · On admission was 276 lb went down to 237 at the weight right now is inaccurate          VTE Pharmacologic Prophylaxis: VTE Score: 7 High Risk (Score >/= 5) - Pharmacological DVT Prophylaxis Contraindicated  Sequential Compression Devices Ordered  Had acute on chronic anemia with history of GI bleed  No evidence of gross bleed if hemoglobin remains stable will add DVT prophylaxis 24-48 hours    Patient Centered Rounds: I performed bedside rounds with nursing staff today  Discussions with Specialists or Other Care Team Provider:  Discussed with case management    Education and Discussions with Family / Patient: Updated  (wife) via phone  Time Spent for Care: 30 minutes  More than 50% of total time spent on counseling and coordination of care as described above  Current Length of Stay: 10 day(s)  Current Patient Status: Inpatient   Certification Statement: The patient will continue to require additional inpatient hospital stay due to CHF hypernatremia  Discharge Plan: Anticipate discharge in 48 hrs to rehab facility  Code Status: Level 3 - DNAR and DNI    Subjective:   Patient seen and examined slept overnight initially refused to talk then talking not oriented    Objective:     Vitals:   Temp (24hrs), Av 9 °F (36 6 °C), Min:97 5 °F (36 4 °C), Max:98 5 °F (36 9 °C)    Temp:  [97 5 °F (36 4 °C)-98 5 °F (36 9 °C)] 97 9 °F (36 6 °C)  HR:  [53-88] 53  Resp:  [17-20] 18  BP: (123-155)/(63-88) 140/64  SpO2:  [84 %-98 %] 92 %  Body mass index is 38 67 kg/m²  Input and Output Summary (last 24 hours):      Intake/Output Summary (Last 24 hours) at 2021 1350  Last data filed at 7/28/2021 1243  Gross per 24 hour   Intake 510 ml   Output --   Net 510 ml       Physical Exam:   Physical Exam  Vitals and nursing note reviewed  Constitutional:       Appearance: He is well-developed  HENT:      Head: Normocephalic and atraumatic  Eyes:      Conjunctiva/sclera: Conjunctivae normal    Cardiovascular:      Rate and Rhythm: Normal rate and regular rhythm  Heart sounds: No murmur heard  Pulmonary:      Effort: Pulmonary effort is normal  No respiratory distress  Breath sounds: Rales (Rales at bases) present  Abdominal:      Palpations: Abdomen is soft  Tenderness: There is no abdominal tenderness  Musculoskeletal:         General: No swelling  Cervical back: Neck supple  Skin:     General: Skin is warm and dry  Neurological:      General: No focal deficit present  Mental Status: He is alert        Comments: Patient refused to talk then when talking he refuses to answer certain questions knows name not oriented to further          Additional Data:     Labs:  Results from last 7 days   Lab Units 07/28/21  0509   WBC Thousand/uL 6 21   HEMOGLOBIN g/dL 9 1*   HEMATOCRIT % 31 6*   PLATELETS Thousands/uL 274   NEUTROS PCT % 73   LYMPHS PCT % 11*   MONOS PCT % 11   EOS PCT % 4     Results from last 7 days   Lab Units 07/28/21  0509   SODIUM mmol/L 149*   POTASSIUM mmol/L 3 7   CHLORIDE mmol/L 106   CO2 mmol/L 37*   BUN mg/dL 37*   CREATININE mg/dL 2 14*   ANION GAP mmol/L 6   CALCIUM mg/dL 8 5   ALBUMIN g/dL 3 5   TOTAL BILIRUBIN mg/dL 1 35*   ALK PHOS U/L 94   ALT U/L 19   AST U/L 19   GLUCOSE RANDOM mg/dL 125         Results from last 7 days   Lab Units 07/28/21  1104 07/28/21  0659 07/27/21  2153 07/27/21  1608 07/27/21  1111 07/27/21  0716 07/26/21  2051 07/26/21  1706 07/26/21  1116 07/26/21  0713 07/25/21  2124 07/25/21  1602   POC GLUCOSE mg/dl 229* 170* 121 171* 267* 135 129 190* 224* 164* 148* 170*         Results from last 7 days   Lab Units 07/26/21  0606 07/25/21  1613   PROCALCITONIN ng/ml 0 15 0 16       Lines/Drains:  Invasive Devices     Peripheral Intravenous Line            Peripheral IV 07/27/21 Proximal;Right;Ventral (anterior) Forearm 1 day                      Imaging: Reviewed radiology reports from this admission including: chest xray    Recent Cultures (last 7 days):         Last 24 Hours Medication List:   Current Facility-Administered Medications   Medication Dose Route Frequency Provider Last Rate    acetaminophen  650 mg Oral Q4H PRN Víctor Stephenson PA-C      amLODIPine  5 mg Oral Daily Arlen Harding MD      atorvastatin  80 mg Oral Daily With Cesar Lujan PA-C      clopidogrel  75 mg Oral Daily Víctor Stephenson PA-C      dextrose  100 mL/hr Intravenous Continuous Teo Varvarelis,  mL/hr (07/28/21 9689)    donepezil  5 mg Oral HS Víctor Stephenson PA-C      ferrous sulfate  325 mg Oral Daily With Breakfast Arlen Harding MD      furosemide  60 mg Oral Daily Arlen Harding MD      insulin glargine  7 Units Subcutaneous QAM Arlen Harding MD      insulin lispro  1-6 Units Subcutaneous TID [facility] Arlen Harding MD      levalbuterol  1 25 mg Nebulization Q8H PRN Arlen Harding MD      levothyroxine  112 mcg Oral Early Morning Víctor Stephenson PA-C      metoprolol succinate  50 mg Oral Daily [place]iah [place], [place]      pantoprazole  40 mg Oral Early Morning Víctor Stephenson PA-C      QUEtiapine  25 mg Oral HS Víctor Stephenson PA-C          Today, Patient Was Seen By: Arlen Harding MD    **Please Note: This note may have been constructed using a voice recognition system  **

## 2021-07-28 NOTE — ASSESSMENT & PLAN NOTE
Patient's mental status waxes and wanes at baseline given his Alzheimer's Dementia however on 07/25, patient appeared more somnolent on exam  Multifactorial, possibly due to metabolic derangements, delirium given change in environment   MRI a couple of days ago showed encephalomalacia but no acute strokes  Patient is at baseline he is outpatient urology evaluation with encephalomalacia and dementia as a reviewed his PCPs notes back in 2020 he has cognitive decline  He is oriented to self and place    Continue Seroquel at night  Today the patient is oriented to self no focal his sodium is slightly worse could be contributing

## 2021-07-28 NOTE — ASSESSMENT & PLAN NOTE
Lab Results   Component Value Date    HGBA1C 6 6 (H) 07/19/2021       Recent Labs     07/27/21  1608 07/27/21  2153 07/28/21  0659 07/28/21  1104   POCGLU 171* 121 170* 229*       Blood Sugar Average: Last 72 hrs:  (P) 172 [id number]   · Diabetic diet  · Fingerstick blood sugar sliding scale coverage  · Hold home oral agents  ·  hemoglobin A1c 6 6    His fasting is controlled towards lunch time and throughout the day it has uncontrolled he still not eating 100% will place him on Lantus 7 units in the morning is and monitor closely it has been started today will reassess in 24 hours

## 2021-07-28 NOTE — RESPIRATORY THERAPY NOTE
RT Protocol Note  Dany Ballesteros 64 y o  male MRN: [id number]  Unit/Bed#: -01 Encounter: [id number]    Assessment    Principal Problem:    Acute on chronic diastolic CHF (congestive heart failure) ([facility])  Active Problems:    Dementia without behavioral disturbance (HCC)    Acute on chronic anemia    Acute kidney injury superimposed on chronic kidney disease (HCC)    PAF (paroxysmal atrial fibrillation) ([facility])    Type 2 diabetes mellitus with kidney complication, without long-term current use of insulin ([facility])    Moderate aortic stenosis by prior echocardiogram    Ambulatory dysfunction    Essential hypertension    Hypothyroidism    Acute respiratory failure with hypoxia and hypercapnia ([facility])    Acute metabolic encephalopathy    Hypernatremia      Home Pulmonary Medications:         Past Medical History:   Diagnosis Date    [facility]     Diabetes mellitus ([facility] )     Hypertension      Social History     Socioeconomic History    Marital status:      Spouse name: None    Number of children: None    Years of education: None    Highest education level: None   Occupational History    None   Tobacco Use    Smoking status: Never Smoker    Smokeless tobacco: Never Used   Vaping Use    Vaping Use: Never used   Substance and Sexual Activity    Alcohol use: Never    Drug use: Never    Sexual activity: None     Comment: defer   Other Topics Concern    None   Social History Narrative    None     Social Determinants of Health     Financial Resource Strain:     Difficulty of Paying Living Expenses:    Food Insecurity:     Worried About Running Out of Food in the Last Year:     [address] in the Last Year:    Transportation Needs:     Lack of Transportation (Medical):      Lack of Transportation (Non-Medical):    Physical Activity:     Days of Exercise per Week:     Minutes of Exercise per Session:    Stress:     Feeling of Stress :    Social Connections:     Frequency of Communication with Friends and Family:     Frequency of Social Gatherings with Friends and Family:     Attends Judaism Services:     Active Member of Clubs or Organizations:     Attends Club or Organization Meetings:     Marital Status:    Intimate Partner Violence:     Fear of Current or Ex-Partner:     Emotionally Abused:     Physically Abused:     Sexually Abused:        Subjective    Subjective Data: off bipap after abg     Objective    Physical Exam:   Assessment Type: Assess only  General Appearance: Drowsy  Respiratory Pattern: Dyspnea with exertion, Shallow  Chest Assessment: Chest expansion symmetrical  Bilateral Breath Sounds: Diminished  O2 Device: room air    Vitals:  Blood pressure 140/64, pulse (!) 53, temperature 97 9 °F (36 6 °C), resp  rate 18, height 5' 7" (1 702 m), weight 112 kg (246 lb 14 6 oz), SpO2 90 %  Results from last 7 days   Lab Units 07/27/21  1211   PH ART  7 424   PCO2 ART mm Hg 53 0*   PO2 ART mm Hg 91 5   HCO3 ART mmol/L 33 9*   BASE EXC ART mmol/L 8 4   O2 CONTENT ART mL/dL 12 6*   O2 HGB, ARTERIAL % 96 2   ABG SOURCE  Radial, Right   ZAFAR TEST  Yes       Imaging and other studies: I have personally reviewed pertinent reports  O2 Device: room air     Plan    Respiratory Plan: (P) No distress/Pulmonary history  Airway Clearance Plan: (P) Incentive Spirometer     Resp Comments: Pt on room air , shallow breathing   IS order for lung expansion per airway clearance protocol

## 2021-07-28 NOTE — ASSESSMENT & PLAN NOTE
· Echocardiogram May 2021 at [facility]:  EF 55-60%  Normal diastolic function  Consistent with moderate AS, moderate tricuspid regurgitation  Moderately elevated estimated PA systolic pressure    · Repeat echocardiogram done on July 19 shows a mild AS

## 2021-07-28 NOTE — ASSESSMENT & PLAN NOTE
· CT head showing encephalomalacia  · MRI showing the same  · Patient's mental status waxes and wanes   · Continue Aricept, today the patient initially ignores that he only knows his name not oriented to further the name    He will need outpatient follow-up with Neurology and also delirium could be worsened with the hypernatremia

## 2021-07-28 NOTE — PLAN OF CARE
Problem: MOBILITY - ADULT  Goal: Maintain or return to baseline ADL function  Description: INTERVENTIONS:  -  Assess patient's ability to carry out ADLs; assess patient's baseline for ADL function and identify physical deficits which impact ability to perform ADLs (bathing, care of mouth/teeth, toileting, grooming, dressing, etc )  - Assess/evaluate cause of self-care deficits   - Assess range of motion  - Assess patient's mobility; develop plan if impaired  - Assess patient's need for assistive devices and provide as appropriate  - Encourage maximum independence but intervene and supervise when necessary  - Involve family in performance of ADLs  - Assess for home care needs following discharge   - Consider OT consult to assist with ADL evaluation and planning for discharge  - Provide patient education as appropriate  Outcome: Progressing     Problem: CARDIOVASCULAR - ADULT  Goal: Maintains optimal cardiac output and hemodynamic stability  Description: INTERVENTIONS:  - Monitor I/O, vital signs and rhythm  - Monitor for S/S and trends of decreased cardiac output  - Administer and titrate ordered vasoactive medications to optimize hemodynamic stability  - Assess quality of pulses, skin color and temperature  - Assess for signs of decreased coronary artery perfusion  - Instruct patient to report change in severity of symptoms  Outcome: Progressing     Problem: HEMATOLOGIC - ADULT  Goal: Maintains hematologic stability  Description: INTERVENTIONS  - Assess for signs and symptoms of bleeding or hemorrhage  - Monitor labs  - Administer supportive blood products/factors as ordered and appropriate  Outcome: Progressing     Problem: DISCHARGE PLANNING  Goal: Discharge to home or other facility with appropriate resources  Description: INTERVENTIONS:  - Identify barriers to discharge w/patient and caregiver  - Arrange for needed discharge resources and transportation as appropriate  - Identify discharge learning needs (meds, wound care, etc )  - Arrange for interpretive services to assist at discharge as needed  - Refer to Case Management Department for coordinating discharge planning if the patient needs post-hospital services based on physician/advanced practitioner order or complex needs related to functional status, cognitive ability, or social support system  Outcome: Progressing

## 2021-07-28 NOTE — ASSESSMENT & PLAN NOTE
· Hemoglobin is 8 5 with baseline around 13 admission now trended down to 7 8 this morning  · Stool Hemoccult is positive  · No evidence of gross bleeding noted  · Continue iron I just started on 07/27 ferrous sulfate 325 p o  Daily  · S/p EGD and colonoscopy on 7/20  "C1 M3 Og's esophagus-biopsies taken to rule out dysplasia  Small hiatal hernia otherwise normal stomach on direct and retroflexed views  Random gastric biopsies taken to rule out H pylori  Normal visualized portion of duodenum from duodenal bulb to D3  Random biopsies taken to rule out celiac disease "    "No evidence of active bleeding  Terminal ileum normal  Scattered diverticula throughout the colon left greater than right without evidence of bleeding  Area of erythema/ulceration/hemorrhage likely from trauma from enema versus old diverticular bleed  Large internal/external hemorrhoids"    Okay to resume plavix from GI standpoint  T improved to 9 5 status post 1 unit of PRBC transfusion on 07/27

## 2021-07-28 NOTE — ASSESSMENT & PLAN NOTE
· Secondary to CHF which is clinically has resolved DC lost almost 30 lb I have repeated the ABG ABG looks like a respiratory acidosis compensating for contraction alkalosis as he never had any hypercapnia before  BiPAP has been discontinued since 07/27 patient is actually now 92% on room air will continue diuresis with Lasix 60 mg daily monitor electrolytes as well might need p r n   Oxygen especially at night r

## 2021-07-28 NOTE — ASSESSMENT & PLAN NOTE
· Edema legs have significantly improved he does have some trace up he still has some rales at the bibasilar region I did repeat a chest x-ray 727 there was still mild vascular congestion patient also takes poor p o   Intake and requires D5 water intermittently but I have restarted his Lasix on 07/27 as 60 mg daily will monitor closely his electrolytes  · Chest x-ray:  Atelectasis  · BNP:  4500  · Daily weights  · I&Os  · TTE: LV function showing 55% EF  · Low-salt diet, fluid restriction    · Initially diuresed with Lasix and Bumex drip  · On admission was 276 lb went down to 237 at the weight right now is inaccurate

## 2021-07-28 NOTE — ASSESSMENT & PLAN NOTE
Patient is a nectar thick he is not in taking much    Sodium has worsened D5 water has been given of 2 L to the patient I discussed with nurses need to encourage to drink and nectar thick

## 2021-07-29 LAB
ANION GAP SERPL CALCULATED.3IONS-SCNC: 7 MMOL/L (ref 4–13)
BASOPHILS # BLD AUTO: 0.04 THOUSANDS/ΜL (ref 0–0.1)
BASOPHILS NFR BLD AUTO: 1 % (ref 0–1)
BUN SERPL-MCNC: 35 MG/DL (ref 5–25)
CALCIUM SERPL-MCNC: 8.4 MG/DL (ref 8.3–10.1)
CHLORIDE SERPL-SCNC: 104 MMOL/L (ref 100–108)
CO2 SERPL-SCNC: 34 MMOL/L (ref 21–32)
CREAT SERPL-MCNC: 1.96 MG/DL (ref 0.6–1.3)
EOSINOPHIL # BLD AUTO: 0.23 THOUSAND/ΜL (ref 0–0.61)
EOSINOPHIL NFR BLD AUTO: 4 % (ref 0–6)
ERYTHROCYTE [DISTWIDTH] IN BLOOD BY AUTOMATED COUNT: 17.2 % (ref 11.6–15.1)
GFR SERPL CREATININE-BSD FRML MDRD: 32 ML/MIN/1.73SQ M
GLUCOSE SERPL-MCNC: 172 MG/DL (ref 65–140)
GLUCOSE SERPL-MCNC: 178 MG/DL (ref 65–140)
GLUCOSE SERPL-MCNC: 195 MG/DL (ref 65–140)
GLUCOSE SERPL-MCNC: 208 MG/DL (ref 65–140)
GLUCOSE SERPL-MCNC: 226 MG/DL (ref 65–140)
HCT VFR BLD AUTO: 30 % (ref 36.5–49.3)
HGB BLD-MCNC: 8.6 G/DL (ref 12–17)
IMM GRANULOCYTES # BLD AUTO: 0.02 THOUSAND/UL (ref 0–0.2)
IMM GRANULOCYTES NFR BLD AUTO: 0 % (ref 0–2)
LYMPHOCYTES # BLD AUTO: 0.68 THOUSANDS/ΜL (ref 0.6–4.47)
LYMPHOCYTES NFR BLD AUTO: 11 % (ref 14–44)
MCH RBC QN AUTO: 25.8 PG (ref 26.8–34.3)
MCHC RBC AUTO-ENTMCNC: 28.7 G/DL (ref 31.4–37.4)
MCV RBC AUTO: 90 FL (ref 82–98)
MONOCYTES # BLD AUTO: 0.64 THOUSAND/ΜL (ref 0.17–1.22)
MONOCYTES NFR BLD AUTO: 11 % (ref 4–12)
NEUTROPHILS # BLD AUTO: 4.38 THOUSANDS/ΜL (ref 1.85–7.62)
NEUTS SEG NFR BLD AUTO: 73 % (ref 43–75)
NRBC BLD AUTO-RTO: 0 /100 WBCS
PLATELET # BLD AUTO: 267 THOUSANDS/UL (ref 149–390)
PMV BLD AUTO: 10.7 FL (ref 8.9–12.7)
POTASSIUM SERPL-SCNC: 3.4 MMOL/L (ref 3.5–5.3)
RBC # BLD AUTO: 3.33 MILLION/UL (ref 3.88–5.62)
SODIUM SERPL-SCNC: 145 MMOL/L (ref 136–145)
WBC # BLD AUTO: 5.99 THOUSAND/UL (ref 4.31–10.16)

## 2021-07-29 PROCEDURE — 99232 SBSQ HOSP IP/OBS MODERATE 35: CPT | Performed by: FAMILY MEDICINE

## 2021-07-29 PROCEDURE — 97530 THERAPEUTIC ACTIVITIES: CPT

## 2021-07-29 PROCEDURE — 80048 BASIC METABOLIC PNL TOTAL CA: CPT | Performed by: FAMILY MEDICINE

## 2021-07-29 PROCEDURE — 97535 SELF CARE MNGMENT TRAINING: CPT

## 2021-07-29 PROCEDURE — 92610 EVALUATE SWALLOWING FUNCTION: CPT

## 2021-07-29 PROCEDURE — 99232 SBSQ HOSP IP/OBS MODERATE 35: CPT | Performed by: NURSE PRACTITIONER

## 2021-07-29 PROCEDURE — 97116 GAIT TRAINING THERAPY: CPT

## 2021-07-29 PROCEDURE — 85025 COMPLETE CBC W/AUTO DIFF WBC: CPT | Performed by: FAMILY MEDICINE

## 2021-07-29 PROCEDURE — 82948 REAGENT STRIP/BLOOD GLUCOSE: CPT

## 2021-07-29 RX ORDER — POTASSIUM CHLORIDE 20 MEQ/1
20 TABLET, EXTENDED RELEASE ORAL 2 TIMES DAILY
Status: DISCONTINUED | OUTPATIENT
Start: 2021-07-29 | End: 2021-07-30

## 2021-07-29 RX ORDER — INSULIN GLARGINE 100 [IU]/ML
10 INJECTION, SOLUTION SUBCUTANEOUS EVERY MORNING
Status: DISCONTINUED | OUTPATIENT
Start: 2021-07-30 | End: 2021-08-03 | Stop reason: HOSPADM

## 2021-07-29 RX ADMIN — POTASSIUM CHLORIDE 20 MEQ: 1500 TABLET, EXTENDED RELEASE ORAL at 09:30

## 2021-07-29 RX ADMIN — PANTOPRAZOLE SODIUM 40 MG: 40 TABLET, DELAYED RELEASE ORAL at 05:02

## 2021-07-29 RX ADMIN — AMLODIPINE BESYLATE 5 MG: 5 TABLET ORAL at 09:29

## 2021-07-29 RX ADMIN — INSULIN GLARGINE 7 UNITS: 100 INJECTION, SOLUTION SUBCUTANEOUS at 09:31

## 2021-07-29 RX ADMIN — ATORVASTATIN CALCIUM 80 MG: 40 TABLET, FILM COATED ORAL at 17:18

## 2021-07-29 RX ADMIN — CLOPIDOGREL BISULFATE 75 MG: 75 TABLET ORAL at 09:28

## 2021-07-29 RX ADMIN — POTASSIUM CHLORIDE 20 MEQ: 1500 TABLET, EXTENDED RELEASE ORAL at 17:18

## 2021-07-29 RX ADMIN — METOPROLOL SUCCINATE 50 MG: 50 TABLET, EXTENDED RELEASE ORAL at 09:29

## 2021-07-29 RX ADMIN — LEVOTHYROXINE SODIUM 112 MCG: 112 TABLET ORAL at 05:02

## 2021-07-29 RX ADMIN — QUETIAPINE FUMARATE 25 MG: 25 TABLET ORAL at 20:39

## 2021-07-29 RX ADMIN — FUROSEMIDE 60 MG: 40 TABLET ORAL at 09:30

## 2021-07-29 RX ADMIN — IRON SUCROSE 200 MG: 20 INJECTION, SOLUTION INTRAVENOUS at 09:32

## 2021-07-29 RX ADMIN — DONEPEZIL HYDROCHLORIDE 5 MG: 5 TABLET ORAL at 21:22

## 2021-07-29 NOTE — OCCUPATIONAL THERAPY NOTE
[address] Progress Note     Patient Name: Oren Frausto  [id number] Date: 7/29/2021  Problem List  Principal Problem:    Acute on chronic diastolic CHF (congestive heart failure) ([facility] )  Active Problems:    Dementia without behavioral disturbance (HCC)    Acute on chronic anemia    Acute kidney injury superimposed on chronic kidney disease (HCC)    PAF (paroxysmal atrial fibrillation) ([facility] )    Type 2 diabetes mellitus with kidney complication, without long-term current use of insulin ([facility])    Moderate aortic stenosis by prior echocardiogram    Ambulatory dysfunction    Essential hypertension    Hypothyroidism    Acute respiratory failure with hypoxia and hypercapnia ([facility])    Acute metabolic encephalopathy    Hypernatremia          07/29/21 0733   Note Type   Note Type Treatment for insurance authorization   Restrictions/Precautions   Other Precautions Cognitive; Chair Alarm; Bed Alarm; Fall Risk;O2  (2L O2)   Pain Assessment   Pain Assessment Tool 0-10   Pain Score No Pain   ADL   Where Assessed Edge of bed   Eating Assistance 5  Supervision/Setup   Eating Deficit Verbal cueing;Supervision/safety; Increased time to complete   Eating Comments Assistance for opening containers   Grooming Assistance 4  Minimal Assistance   Grooming Deficit Verbal cueing;Supervision/safety; Increased time to complete   Grooming Comments Min A for HOHA, thoroughness and Max vc'ing to sequence through task   UB Bathing Assistance 4  Minimal Assistance   UB Bathing Deficit Verbal cueing;Supervision/safety; Increased time to complete   UB Bathing Comments HOHA for thoroughness and initiation   LB Bathing Assistance 3  Moderate Assistance   LB Bathing Deficit Steadying;Verbal cueing;Supervision/safety; Increased time to complete;Perineal area; Buttocks;Right lower leg including foot; Left lower leg including foot   LB Bathing Comments Pt completing pericare hygiene and upper leg bathing with vc'ing for sequencing and initiation   MOd A for buttocks and lower legs  UB Dressing Assistance 5  Supervision/Setup   UB Dressing Deficit Verbal cueing;Supervision/safety; Increased time to complete   UB Dressing Comments vc'ing for sequencing and initiation   LB Dressing Assistance 2  Maximal Assistance   LB Dressing Deficit Steadying; Requires assistive device for steadying;Verbal cueing;Supervision/safety; Increased time to complete; Don/doff R sock; Don/doff L sock; Thread RLE into pants; Thread LLE into pants;Pull up over hips   LB Dressing Comments Pt attempted to kvng R sock, although unable to reach to feet  Pt then requiring Max A to vkng sock and pants around feet  Mod A for CM around wasit while standing at RW   Bed Mobility   Supine to Sit 3  Moderate assistance   Additional items Assist x 1;HOB elevated; Increased time required;Verbal cues;LE management; Bedrails   Additional Comments HOB elevated to 15*, use of bedrails PRN   Transfers   Sit to Stand 3  Moderate assistance   Additional items Assist x 1; Increased time required;Verbal cues   Stand to Sit 4  Minimal assistance   Additional items Assist x 1; Increased time required;Verbal cues   Stand pivot 3  Moderate assistance   Additional items Assist x 1; Increased time required;Verbal cues   Additional Comments Pt completing functional transfers with use of RW for UB support  Mod A for STS and SPT with increased time and vc'ing for sequencing and RW management   ROM- Right Upper Extremities   RUE ROM Comment Attempted to complete BUE HEP with Pt, however Pt unable to consistantly follow commands in order to appropariately complete exercises  Will continue to review exercises with Pt as able   ROM - Left Upper Extremities    LUE ROM Comment Attempted to complete BUE HEP with Pt, however Pt unable to consistantly follow commands in order to appropariately complete exercises   Will continue to review exercises with Pt as able   Cognition   Overall Cognitive Status Impaired   Arousal/Participation Alert; Responsive; Cooperative;Lethargic   Attention Difficulty attending to directions   Orientation Level Oriented to person;Disoriented to place; Disoriented to time;Disoriented to situation  (not oriented to birthdate)   Memory Decreased long term memory   Following Commands Follows one step commands inconsistently   Comments Pt cooperative and participatory this date although continues to require Max cues for sequencign and participation   Activity Tolerance   Activity Tolerance Patient limited by fatigue   Medical Staff Made Aware Spoke with PT, Leonela Peralta  Spoke with RNMarlen   Assessment   Assessment Pt seen for treatment session #2 this date  Pt alert and agreeable to participate at this time  Pt with increased performance during functional transfers and ambulation with use of RW  Pt with increased participation during ADL tasks although continues to require Max vc'ing for [facility] to sequence through task  Pt maintaining O2 >88% on 2L at rest and with activity  Pt at this time demonstrates Good ability to meet goals although his limited by  decrease activity tolerance, decrease standing balance, decrease sitting balance, decrease performance during ADL tasks, decrease cognition, decrease safety awareness , decrease UB MS, decrease generalized strength, decrease activity engagement, decrease performance during functional transfers and frequent falls  Pt's goals have been re-assessed and continue to be appropriate at this time  Goal date to be extended to 8/8/2021  Plan   Treatment Interventions ADL retraining;Functional transfer training;UE strengthening/ROM; Endurance training;Cognitive reorientation;Patient/family training;Equipment evaluation/education; Neuromuscular reeducation; Compensatory technique education;Continued evaluation; Energy conservation; Activityengagement   Goal Expiration Date 08/08/21   OT Treatment Day 2   OT Frequency 5x/wk   Recommendation   OT Discharge Recommendation Post acute rehabilitation services   AM-PAC Daily Activity Inpatient   Lower Body Dressing 2   Bathing 2   Toileting 2   Upper Body Dressing 3   Grooming 3   Eating 3   Daily Activity Raw Score 15   Daily Activity Standardized Score (Calc for Raw Score >=11) 34 69   AM-PAC Applied Cognition Inpatient   Following a Speech/Presentation 2   Understanding Ordinary Conversation 2   Taking Medications 1   Remembering Where Things Are Placed or Put Away 1   Remembering List of 4-5 Errands 1   Taking Care of Complicated Tasks 1   Applied Cognition Raw Score 8   Applied Cognition Standardized Score 19 32       Pt goals to be met by 8/8/2021     1  Pt will demonstrate ability to complete grooming/hygiene tasks @ Mod I after set-up  2  Pt will demonstrate ability to complete supine<>sit @ Mod I in order to increase safety and independence during ADL tasks  3  Pt will demonstrate ability to complete UB ADLs including washing/dressing @ Mod I in order to increase performance and participation during meaningful tasks  4  Pt will demonstrate ability to complete LB dressing @ Min A in order to increase safety and independence during meaningful tasks  5  Pt will demonstrate ability to complete toileting tasks including CM and pericare @ Min A in order to increase safety and independence during meaningful tasks  6  Pt will demonstrate ability to complete EOB, chair, toilet/commode transfers @ S in order to increase performance and participation during functional tasks  7  Pt will demonstrate ability to stand for 2-3 minutes while maintaining F+ balance with use of RW for UB support PRN  8  Pt will demonstrate ability to tolerate 30-35 minute OT session with no vc'ing for deep breathing or use of energy conservation techniques in order to increase activity tolerance during functional tasks     9  Pt will demonstrate Good carryover of use of energy conservation/compensatory strategies during ADLs and functional tasks in order to increase safety and reduce risk for falls  10  Pt will demonstrate Good attention and participation in continued evaluation of functional ambulation house hold distances in order to assist with safe d/c planning  11  Pt will attend to continued cognitive assessments 100% of the time in order to provide most appropriate d/c recommendations  12  Pt will follow 100% simple 1-step commands and be A&O x2 consistently with environmental cues to increase participation in functional activities  13  Pt will identify 3 areas of interest/hobbies and 1 intervention on how to incorporate into daily life in order to increase interaction with environment and peers as well as increase participation in meaningful tasks     14  Pt will demonstrate 100% carryover of BUE HEP in order to increase BUE MS and increase performance during functional tasks upon d/c home      Manav Oropeza OTR/L

## 2021-07-29 NOTE — ASSESSMENT & PLAN NOTE
Lab Results   Component Value Date    EGFR 32 07/29/2021    EGFR 29 07/28/2021    EGFR 27 07/27/2021    CREATININE 1 96 (H) 07/29/2021    CREATININE 2 14 (H) 07/28/2021    CREATININE 2 24 (H) 07/27/2021     · CKD stage 3 with creatinine baseline 1 4-1 7  He is only in taking 20-40% he is on nectar thick liquid secondary to dysphagia    Improved today Ultrasound of the kidney and bladder done on 07/20 2-for acute finding continues to improve he ate better today

## 2021-07-29 NOTE — PLAN OF CARE
Problem: Potential for Falls  Goal: Patient will remain free of falls  Description: INTERVENTIONS:  - Educate patient/family on patient safety including physical limitations  - Instruct patient to call for assistance with activity   - Consult OT/PT to assist with strengthening/mobility   - Keep Call bell within reach  - Keep bed low and locked with side rails adjusted as appropriate  - Keep care items and personal belongings within reach  - Initiate and maintain comfort rounds  - Make Fall Risk Sign visible to staff  - Offer Toileting every 2 Hours, in advance of need  - Initiate/Maintain alarm  - Obtain necessary fall risk management equipment  - Apply yellow socks and bracelet for high fall risk patients  - Consider moving patient to room near nurses station  Outcome: Progressing     Problem: MOBILITY - ADULT  Goal: Maintain or return to baseline ADL function  Description: INTERVENTIONS:  -  Assess patient's ability to carry out ADLs; assess patient's baseline for ADL function and identify physical deficits which impact ability to perform ADLs (bathing, care of mouth/teeth, toileting, grooming, dressing, etc )  - Assess/evaluate cause of self-care deficits   - Assess range of motion  - Assess patient's mobility; develop plan if impaired  - Assess patient's need for assistive devices and provide as appropriate  - Encourage maximum independence but intervene and supervise when necessary  - Involve family in performance of ADLs  - Assess for home care needs following discharge   - Consider OT consult to assist with ADL evaluation and planning for discharge  - Provide patient education as appropriate  Outcome: Progressing  Goal: Maintains/Returns to pre admission functional level  Description: INTERVENTIONS:  - Perform BMAT or MOVE assessment daily    - Set and communicate daily mobility goal to care team and patient/family/caregiver     - Collaborate with rehabilitation services on mobility goals if consulted  - Out of bed for toileting  - Record patient progress and toleration of activity level   Outcome: Progressing     Problem: Prexisting or High Potential for Compromised Skin Integrity  Goal: Skin integrity is maintained or improved  Description: INTERVENTIONS:  - Identify patients at risk for skin breakdown  - Assess and monitor skin integrity  - Assess and monitor nutrition and hydration status  - Monitor labs   - Assess for incontinence   - Turn and reposition patient  - Assist with mobility/ambulation  - Relieve pressure over bony prominences  - Avoid friction and shearing  - Provide appropriate hygiene as needed including keeping skin clean and dry  - Evaluate need for skin moisturizer/barrier cream  - Collaborate with interdisciplinary team   - Patient/family teaching  - Consider wound care consult   Outcome: Progressing     Problem: CARDIOVASCULAR - ADULT  Goal: Maintains optimal cardiac output and hemodynamic stability  Description: INTERVENTIONS:  - Monitor I/O, vital signs and rhythm  - Monitor for S/S and trends of decreased cardiac output  - Administer and titrate ordered vasoactive medications to optimize hemodynamic stability  - Assess quality of pulses, skin color and temperature  - Assess for signs of decreased coronary artery perfusion  - Instruct patient to report change in severity of symptoms  Outcome: Progressing  Goal: Absence of cardiac dysrhythmias or at baseline rhythm  Description: INTERVENTIONS:  - Continuous cardiac monitoring, vital signs, obtain 12 lead EKG if ordered  - Administer antiarrhythmic and heart rate control medications as ordered  - Monitor electrolytes and administer replacement therapy as ordered  Outcome: Progressing     Problem: RESPIRATORY - ADULT  Goal: Achieves optimal ventilation and oxygenation  Description: INTERVENTIONS:  - Assess for changes in respiratory status  - Assess for changes in mentation and behavior  - Position to facilitate oxygenation and minimize respiratory effort  - Oxygen administered by appropriate delivery if ordered  - Initiate smoking cessation education as indicated  - Encourage broncho-pulmonary hygiene including cough, deep breathe, Incentive Spirometry  - Assess the need for suctioning and aspirate as needed  - Assess and instruct to report SOB or any respiratory difficulty  - Respiratory Therapy support as indicated  Outcome: Progressing     Problem: GENITOURINARY - ADULT  Goal: Maintains or returns to baseline urinary function  Description: INTERVENTIONS:  - Assess urinary function  - Encourage oral fluids to ensure adequate hydration if ordered  - Administer IV fluids as ordered to ensure adequate hydration  - Administer ordered medications as needed  - Offer frequent toileting  - Follow urinary retention protocol if ordered  Outcome: Progressing     Problem: METABOLIC, FLUID AND ELECTROLYTES - ADULT  Goal: Electrolytes maintained within normal limits  Description: INTERVENTIONS:  - Monitor labs and assess patient for signs and symptoms of electrolyte imbalances  - Administer electrolyte replacement as ordered  - Monitor response to electrolyte replacements, including repeat lab results as appropriate  - Instruct patient on fluid and nutrition as appropriate  Outcome: Progressing  Goal: Fluid balance maintained  Description: INTERVENTIONS:  - Monitor labs   - Monitor I/O and WT  - Instruct patient on fluid and nutrition as appropriate  - Assess for signs & symptoms of volume excess or deficit  Outcome: Progressing  Goal: Glucose maintained within target range  Description: INTERVENTIONS:  - Monitor Blood Glucose as ordered  - Assess for signs and symptoms of hyperglycemia and hypoglycemia  - Administer ordered medications to maintain glucose within target range  - Assess nutritional intake and initiate nutrition service referral as needed  Outcome: Progressing     Problem: SKIN/TISSUE INTEGRITY - ADULT  Goal: Incision(s), wounds(s) or drain site(s) healing without S/S of infection  Description: INTERVENTIONS  - Assess and document dressing, incision, wound bed, drain sites and surrounding tissue  - Provide patient and family education  Outcome: Progressing     Problem: HEMATOLOGIC - ADULT  Goal: Maintains hematologic stability  Description: INTERVENTIONS  - Assess for signs and symptoms of bleeding or hemorrhage  - Monitor labs  - Administer supportive blood products/factors as ordered and appropriate  Outcome: Progressing     Problem: MUSCULOSKELETAL - ADULT  Goal: Maintain or return mobility to safest level of function  Description: INTERVENTIONS:  - Assess patient's ability to carry out ADLs; assess patient's baseline for ADL function and identify physical deficits which impact ability to perform ADLs (bathing, care of mouth/teeth, toileting, grooming, dressing, etc )  - Assess/evaluate cause of self-care deficits   - Assess range of motion  - Assess patient's mobility  - Assess patient's need for assistive devices and provide as appropriate  - Encourage maximum independence but intervene and supervise when necessary  - Involve family in performance of ADLs  - Assess for home care needs following discharge   - Consider OT consult to assist with ADL evaluation and planning for discharge  - Provide patient education as appropriate  Outcome: Progressing     Problem: Nutrition/Hydration-ADULT  Goal: Nutrient/Hydration intake appropriate for improving, restoring or maintaining nutritional needs  Description: Monitor and assess patient's nutrition/hydration status for malnutrition  Collaborate with interdisciplinary team and initiate plan and interventions as ordered  Monitor patient's weight and dietary intake as ordered or per policy  Utilize nutrition screening tool and intervene as necessary  Determine patient's food preferences and provide high-protein, high-caloric foods as appropriate       INTERVENTIONS:  - Monitor oral intake, urinary output, labs, and treatment plans  - Assess nutrition and hydration status and recommend course of action  - Evaluate amount of meals eaten  - Assist patient with eating if necessary   - Allow adequate time for meals  - Recommend/ encourage appropriate diets, oral nutritional supplements, and vitamin/mineral supplements  - Order, calculate, and assess calorie counts as needed  - Recommend, monitor, and adjust tube feedings and TPN/PPN based on assessed needs  - Assess need for intravenous fluids  - Provide specific nutrition/hydration education as appropriate  - Include patient/family/caregiver in decisions related to nutrition  Outcome: Progressing     Problem: PAIN - ADULT  Goal: Verbalizes/displays adequate comfort level or baseline comfort level  Description: Interventions:  - Encourage patient to monitor pain and request assistance  - Assess pain using appropriate pain scale  - Administer analgesics based on type and severity of pain and evaluate response  - Implement non-pharmacological measures as appropriate and evaluate response  - Consider cultural and social influences on pain and pain management  - Notify physician/advanced practitioner if interventions unsuccessful or patient reports new pain  Outcome: Progressing     Problem: INFECTION - ADULT  Goal: Absence or prevention of progression during hospitalization  Description: INTERVENTIONS:  - Assess and monitor for signs and symptoms of infection  - Monitor lab/diagnostic results  - Monitor all insertion sites, i e  indwelling lines, tubes, and drains  - Monitor endotracheal if appropriate and nasal secretions for changes in amount and color  - [place] appropriate cooling/warming therapies per order  - Administer medications as ordered  - Instruct and encourage patient and family to use good hand hygiene technique  - Identify and instruct in appropriate isolation precautions for identified infection/condition  Outcome: Progressing     Problem: SAFETY ADULT  Goal: Patient will remain free of falls  Description: INTERVENTIONS:  - Educate patient/family on patient safety including physical limitations  - Instruct patient to call for assistance with activity   - Consult OT/PT to assist with strengthening/mobility   - Keep Call bell within reach  - Keep bed low and locked with side rails adjusted as appropriate  - Keep care items and personal belongings within reach  - Initiate and maintain comfort rounds  - Make Fall Risk Sign visible to staff  - Offer Toileting every 2 Hours, in advance of need  - Initiate/Maintain alarm    - Apply yellow socks and bracelet for high fall risk patients  - Consider moving patient to room near nurses station  Outcome: Progressing  Goal: Maintain or return to baseline ADL function  Description: INTERVENTIONS:  -  Assess patient's ability to carry out ADLs; assess patient's baseline for ADL function and identify physical deficits which impact ability to perform ADLs (bathing, care of mouth/teeth, toileting, grooming, dressing, etc )  - Assess/evaluate cause of self-care deficits   - Assess range of motion  - Assess patient's mobility; develop plan if impaired  - Assess patient's need for assistive devices and provide as appropriate  - Encourage maximum independence but intervene and supervise when necessary  - Involve family in performance of ADLs  - Assess for home care needs following discharge   - Consider OT consult to assist with ADL evaluation and planning for discharge  - Provide patient education as appropriate  Outcome: Progressing  Goal: Maintains/Returns to pre admission functional level  Description: INTERVENTIONS:  - Perform BMAT or MOVE assessment daily    - Set and communicate daily mobility goal to care team and patient/family/caregiver     - Collaborate with rehabilitation services on mobility goals if consulted  - Out of bed for toileting  - Record patient progress and toleration of activity level   Outcome: Progressing     Problem: DISCHARGE PLANNING  Goal: Discharge to home or other facility with appropriate resources  Description: INTERVENTIONS:  - Identify barriers to discharge w/patient and caregiver  - Arrange for needed discharge resources and transportation as appropriate  - Identify discharge learning needs (meds, wound care, etc )  - Arrange for interpretive services to assist at discharge as needed  - Refer to Case Management Department for coordinating discharge planning if the patient needs post-hospital services based on physician/advanced practitioner order or complex needs related to functional status, cognitive ability, or social support system  Outcome: Progressing     Problem: Knowledge Deficit  Goal: Patient/family/caregiver demonstrates understanding of disease process, treatment plan, medications, and discharge instructions  Description: Complete learning assessment and assess knowledge base    Interventions:  - Provide teaching at level of understanding  - Provide teaching via preferred learning methods  Outcome: Progressing

## 2021-07-29 NOTE — ASSESSMENT & PLAN NOTE
· Echocardiogram May 2021 at [facility]:  EF 55-60%  Normal diastolic function  Consistent with moderate AS, moderate tricuspid regurgitation  Moderately elevated estimated PA systolic pressure    · Repeat echocardiogram done on July 19 shows a mild AS

## 2021-07-29 NOTE — PLAN OF CARE
Speech-Language Pathology Bedside Swallow Evaluation      Patient Name: Fadumo MAYER Date: 7/29/2021     Problem List  Principal Problem:    Acute on chronic diastolic CHF (congestive heart failure) ([address] )  Active Problems:    Dementia without behavioral disturbance (HCC)    Acute on chronic anemia    Acute kidney injury superimposed on chronic kidney disease (HCC)    PAF (paroxysmal atrial fibrillation) (HCC)    Type 2 diabetes mellitus with kidney complication, without long-term current use of insulin (HCC)    Moderate aortic stenosis by prior echocardiogram    Ambulatory dysfunction    Essential hypertension    Hypothyroidism    Acute respiratory failure with hypoxia and hypercapnia (HCC)    Acute metabolic encephalopathy    Hypernatremia      Past Medical History  Past Medical History:   Diagnosis Date    Alzheimers disease ([address] )     Diabetes mellitus [facility] )     Hypertension        Past Surgical History  Past Surgical History:   Procedure Laterality Date    APPENDECTOMY      KNEE ARTHROPLASTY Bilateral        Summary   Bedside dysphagia evaluation completed on 7/26/21 and recommended diet level 2 [facility] soft and NTLs  MD requested re-evaluation for upgrade if appropriate  Re-evaluation completed and pt presented with mild pharyngeal dysphagia characterized by suspected delayed swallow initiation per palpation but no overt s/s of aspiration with stable SpO2 on RA  Significant other did report an intermittent cough and per prior MD secondary to hernia  Oral function was adequate, edentulous  Recommended diet advancement to regular and thin  Will complete x1 f/u on tolerance following upgrade      Risk/s for Aspiration: mental status     Recommended Diet: regular diet and thin liquids   Recommended Form of Meds: whole with puree and crushed with puree   Aspiration precautions and swallowing strategies: small bites/sips and alternating bites and sips  Other Recommendations: Continue frequent oral care        Current Medical Status  Soundra Notice E 8 y  o  year old male who presents for EGD/colonoscopy for evaluation of acute on chronic anemia with Hemoccult-positive stool in setting of iron deficiency anemia   Last EGD/colonoscopy performed 2018 which revealed probable Og's esophagus and erosive duodenitis and diminutive polyp, mild diverticulosis and small internal hemorrhoids on the colonoscopy  Current Precautions:  Fall  Aspiration    Allergies:  No known food allergies    Past medical history:  Please see H&P for details    Special Studies:  7/27 CXR:Mild pulmonary venous congestion with small left effusion  Social/Education/Vocational Hx:  Pt lives with significant other  Swallow Information   Current Risks for Dysphagia & Aspiration: AMS  Current Symptoms/Concerns: currently ordered a modified diet  requesting reeval due to poor intake and dislike of modified consistency  Current Diet: soft/level 3 diet and nectar thick liquids   Baseline Diet: regular diet and thin liquids      Baseline Assessment   Behavior/Cognition: alert  Speech/Language Status: limited verbal output  Able to follow 1 step commands  Patient Positioning: upright in bed  Pain Status/Interventions/Response to Interventions:  No report of or nonverbal indications of pain  Swallow Mechanism Exam  Facial: symmetrical  Labial: WFL  Lingual: WFL  Velum: symmetrical  Mandible: adequate ROM  Dentition: edentulous  Vocal quality:clear/adequate   Volitional Cough: weak   Respiratory Status: on RA      Consistencies Assessed and Performance   Consistencies Administered: thin liquids, nectar thick, puree, mechanical soft solids and hard solids  Materials administered included applesauce, cracker, cracker with PB, turkey sandwich, water via cup, NT juice     Oral Stage: minimal  Mastication was prolonged but adequate with the materials administered today    Bolus formation and transfer were functional with no significant oral residue noted  No overt s/s reduced oral control  Pharyngeal Stage: suspected and mild  Swallow Mechanics:  Swallowing initiation appeared slightly delayed  Laryngeal rise was palpated and judged to be within functional limits  No coughing, throat clearing, change in vocal quality or respiratory status noted today  Significant other does report intermittent coughing @ times and prior MD reported presence to be secondary to hernia  Esophageal Concerns: none reported    Strategies and Efficacy: small bites/sips, slow rate, liquids by cup     Summary and Recommendations (see above)    Results Reviewed with: patient, RN and MD  Significant other present for evaluation  Treatment Recommended: dysphagia evaluation and x1 treat       Dysphagia LTG  -Patient will demonstrate safe and effective oral intake (without overt s/s significant oral/pharyngeal dysphagia including s/s penetration or aspiration) for the highest appropriate diet level  Short Term Goals:  -Pt will tolerate regular diet andthin liquids with no significant s/s oral or pharyngeal dysphagia across 1-3 diagnostic session/s        [address], [facility]

## 2021-07-29 NOTE — ASSESSMENT & PLAN NOTE
Patient is a nectar thick he is not in taking much  Resolved status post 2 L of D5 water given on 07/28 he is eating better today as reviewed  Ask speech to re-evaluate to see if the diet and be adjusted and nectar thickened BMs in if he does better to allow more nutrition and free water will use D5 water p r n   Will re-evaluate BMP tomorrow

## 2021-07-29 NOTE — PLAN OF CARE
Problem: OCCUPATIONAL THERAPY ADULT  Goal: Performs self-care activities at highest level of function for planned discharge setting  See evaluation for individualized goals  Description: Treatment Interventions: ADL retraining, Functional transfer training, UE strengthening/ROM, Endurance training, Cognitive reorientation, Patient/family training, Equipment evaluation/education, Neuromuscular reeducation, Compensatory technique education, Fine motor coordination activities, Continued evaluation, Energy conservation, Activityengagement          See flowsheet documentation for full assessment, interventions and recommendations  7/29/2021 0828 by Pauly Gudino OT  Outcome: Progressing  Note: Limitation: Decreased ADL status, Decreased UE strength, Decreased Safe judgement during ADL, Decreased cognition, Decreased endurance, Decreased fine motor control, Decreased high-level ADLs, Decreased self-care trans  Prognosis: Good  Assessment: Pt seen for treatment session #2 this date  Pt alert and agreeable to participate at this time  Pt with increased performance during functional transfers and ambulation with use of RW  Pt with increased participation during ADL tasks although continues to require Max vc'ing for [facility] to sequence through task  Pt maintaining O2 >88% on 2L at rest and with activity  Pt at this time demonstrates Good ability to meet goals although his limited by  decrease activity tolerance, decrease standing balance, decrease sitting balance, decrease performance during ADL tasks, decrease cognition, decrease safety awareness , decrease UB MS, decrease generalized strength, decrease activity engagement, decrease performance during functional transfers and frequent falls  Pt's goals have been re-assessed and continue to be appropriate at this time  Goal date to be extended to 8/8/2021       OT Discharge Recommendation: Post acute rehabilitation services       7/29/2021 [id number] by Manav Llanes LUC Wright  Note: Limitation: Decreased ADL status, Decreased UE strength, Decreased Safe judgement during ADL, Decreased cognition, Decreased endurance, Decreased fine motor control, Decreased high-level ADLs, Decreased self-care trans  Prognosis: Good  Assessment: Pt seen for treatment session #2 this date  Pt alert and agreeable to participate at this time  Pt with increased performance during functional transfers and ambulation with use of RW  Pt with increased participation during ADL tasks although continues to require Max vc'ing for [facility] to sequence through task  Pt maintaining O2 >88% on 2L at rest and with activity  Pt at this time demonstrates Good ability to meet goals although his limited by  decrease activity tolerance, decrease standing balance, decrease sitting balance, decrease performance during ADL tasks, decrease cognition, decrease safety awareness , decrease UB MS, decrease generalized strength, decrease activity engagement, decrease performance during functional transfers and frequent falls  Pt's goals have been re-assessed and continue to be appropriate at this time  Goal date to be extended to 8/8/2021       OT Discharge Recommendation: Post acute rehabilitation services

## 2021-07-29 NOTE — RESPIRATORY THERAPY NOTE
Resp Care      07/29/21 0727   Respiratory Assessment   Assessment Type Assess only   General Appearance Alert; Awake   Respiratory Pattern Dyspnea with exertion   Bilateral Breath Sounds Diminished   Cough None   Resp Comments Pt has no resp distress dry non productive cough Will d/c protocol at this time      O2 Device RA

## 2021-07-29 NOTE — ASSESSMENT & PLAN NOTE
· CT head showing encephalomalacia  · MRI showing the same  · Patient's mental status waxes and wanes   · Continue Aricept, today the patient initially ignores that he only knows his name not oriented to further the name    He will need outpatient follow-up with Neurology and also delirium could be worsened with the hypernatremia patient is better today he is back to baseline he with prefers to talk to some people not to others but he is oriented to self he knows his birthday and he also knows his wife's name as well

## 2021-07-29 NOTE — PLAN OF CARE
Problem: PHYSICAL THERAPY ADULT  Goal: Performs mobility at highest level of function for planned discharge setting  See evaluation for individualized goals  Description: Treatment/Interventions: Functional transfer training, LE strengthening/ROM, Elevations, Therapeutic exercise, Endurance training, Cognitive reorientation, Patient/family training, Equipment eval/education, Bed mobility, Gait training, Compensatory technique education, Spoke to nursing, Spoke to case management, OT  Equipment Recommended:  (TBD by rehab)       See flowsheet documentation for full assessment, interventions and recommendations  Outcome: Progressing  Note: Prognosis: Fair  Problem List: Decreased strength, Decreased endurance, Impaired balance, Decreased mobility, Decreased cognition, Impaired judgement, Decreased safety awareness, Obesity, Decreased skin integrity  Assessment: Pt seen this date for reassessment  He has not met goals due to complex medical course with fluctuating medical status with transfer to critical care services 7/25/2021 with hypoxia  He has new encephalomalacia in B frontal lobes  Pt required increased O2 supplementation and pt was found to be hypernatremic  He is now transferred back to [facility] floor and requiring 2 lpm O2 with mobility  He required increasing assistance of 2 people for mobility during recent treatment sessions  He continues to require increased assistance with all mobility, increased time to respond and follows 1 step commands inconsistently requiring repeated verbal and tactile cues  He was able to complete mobility with moderate assistance this date  He was able to ambulate increasing distances and transfer consistently with moderate assistance  His LE strength is 3-/5 except hip flexion 2+/5  He continues to be limited by decreased strength, balance, endurance and impaired cognition  He will continue to benefit from PT services to maximize LOF   Continue to progress toward STGs established at evaluation  Barriers to Discharge: Decreased caregiver support, Inaccessible home environment  Barriers to Discharge Comments: requires increased assistance to complete all mobility     PT Discharge Recommendation: Post acute rehabilitation services     PT - OK to Discharge:  (when medically cleared to rehab)    See flowsheet documentation for full assessment

## 2021-07-29 NOTE — PROGRESS NOTES
NEPHROLOGY PROGRESS NOTE   Nicole Ceballos 64 y o  male MRN: [id number]  Unit/Bed#: -01 Encounter: [id number]    Assessment/Plan:    Nicole Ceballos is a 64 y o  male with Alzheimer's dementia, CKD 3B, type 2 diabetes mellitus, mild-to-moderate AS, CHF admitted 7/18 with chief complaint of fall requiring lift assist by EMS noted to have significant lower extremity edema being treated for acute on chronic diastolic congestive heart failure, status post evaluation by GI for anemia  Renal following along for NORAH on CKD, diabetic nephropathy, hypernatremia  Plan outlined below  1  Acute kidney injury (POA) atop chronic kidney disease  · Presented with a creatinine of 2 21 mg/dL with fluctuation but now improved to 1 96 mg/dL today  Etiology likely ATN  Continue supportive care, maximization hemodynamics, diuretics  2  Stage 3 chronic kidney disease with baseline creatinine around 1 4-1 7 mg/dL  3  Acute on chronic diastolic congestive heart failure  · Continue current diuretics, appreciate cardiology's recommendations  Will maximize potassium  Continue daily weights, strict I&O, salt avoidance  4  Hypernatremia, dehydration  · Status post 2 L of D5W with improvement serum sodium  Encourage oral intake today  Appears to be eating and drinking well this morning  Treat with D5W if necessary  5  Hypokalemia  · Will initiate patient on standing potassium supplementation  6  Acute on chronic anemia, GI bleed, Og's esophagus  · FOBT positive  Status post EGD and colonoscopy 07/19/2021  Received 1 unit PRBCs during this hospital stay  On PPI  Management per Gastroenterology  Transfuse for hemoglobin less than 7 0 grams/deciliter  7  Iron deficiency anemia  · Iron saturation of 7%  Initiate patient on Venofer for a total of 1 g and hold oral iron  Discontinue Venofer if he becomes infected  8   Type 2 diabetes mellitus with possible diabetic nephropathy  · Once renal function stable consider RAASi +/- SGLT-2 inhibitor  9  Possible vascular dementia    ROS  No physical complaints  A complete 10 point review of systems have been performed and are otherwise negative         Historical Information   Past Medical History:   Diagnosis Date    Alzheimers disease ([facility] [address] )     Diabetes mellitus ([facility] [address] )     Hypertension      Past Surgical History:   Procedure Laterality Date    APPENDECTOMY      KNEE ARTHROPLASTY Bilateral      Social History   Social History     Substance and Sexual Activity   Alcohol Use Never     Social History     Substance and Sexual Activity   Drug Use Never     Social History     Tobacco Use   Smoking Status Never Smoker   Smokeless Tobacco Never Used       Family History:   Family History   Problem Relation Age of Onset    Heart disease Mother     Stroke Mother        Medications:  Pertinent medications were reviewed  Current Facility-Administered Medications   Medication Dose Route Frequency Provider Last Rate    acetaminophen  650 mg Oral Q4H PRN Kathi Santos PA-C      amLODIPine  5 mg Oral Daily Lynda Beckman MD      atorvastatin  80 mg Oral Daily With Cesar Lujan PA-C      clopidogrel  75 mg Oral Daily Kathi Santos PA-C      donepezil  5 mg Oral HS Kathi Santos PA-C      ferrous sulfate  325 mg Oral Daily With Breakfast Lynda Beckman MD      furosemide  60 mg Oral Daily Lynda Beckman MD      insulin glargine  7 Units Subcutaneous QAM Lynda Beckman MD      insulin lispro  1-6 Units Subcutaneous TID [facility] Lynda Beckman MD      levalbuterol  1 25 mg Nebulization Q8H PRN Lynda Beckman MD      levothyroxine  112 mcg Oral Early Morning Kathi Santos PA-C      metoprolol succinate  50 mg Oral Daily Helio Denton      pantoprazole  40 mg Oral Early Morning Kathi Santos PA-C      QUEtiapine  25 mg Oral HS Kathi Santos PA-C           Allergies   Allergen Reactions    Contrast [Iodinated Diagnostic Agents] Anaphylaxis    Ace Inhibitors Other (See Comments) Unknown reaction    Hydralazine Other (See Comments)     Unknown response    Penicillins Other (See Comments)     Patient denies pcn allergy           Vitals:   /69   Pulse 59   Temp 98 3 °F (36 8 °C)   Resp 21   Ht 5' 7" (1 702 m)   Wt 113 kg (248 lb 3 8 oz)   SpO2 96%   BMI 38 88 kg/m²   Body mass index is 38 88 kg/m²  SpO2: 96 %,   SpO2 Activity: At Rest,   O2 Device: None (Room air)      Intake/Output Summary (Last 24 hours) at 7/29/2021 0837  Last data filed at 7/28/2021 1810  Gross per 24 hour   Intake 60 ml   Output --   Net 60 ml     Invasive Devices     Peripheral Intravenous Line            Peripheral IV 07/28/21 Dorsal (posterior); Right Hand <1 day                Physical Exam  General: conscious, cooperative, in no acute distress on nasal cannula  Eyes: conjunctivae pink, anicteric sclerae  ENT: lips and mucous membranes moist  Neck: supple, no JVD, no masses  Chest: clear breath sounds bilaterally, no crackles, ronchus or wheezings  CVS: S1 & S2, normal rate, regular rhythm  Abdomen: soft, non-tender, non-distended, normoactive bowel sounds  Extremities:  Mild-to-moderate edema of both legs  Skin: no rash  Neuro: awake, alert, oriented      Diagnostic Data:  Lab: I have personally reviewed pertinent lab results  ,   CBC:  Results from last 7 days   Lab Units 07/29/21  0457   WBC Thousand/uL 5 99   HEMOGLOBIN g/dL 8 6*   HEMATOCRIT % 30 0*   PLATELETS Thousands/uL 267      CMP:   Lab Results   Component Value Date    SODIUM 145 07/29/2021    K 3 4 (L) 07/29/2021     07/29/2021    CO2 34 (H) 07/29/2021    BUN 35 (H) 07/29/2021    CREATININE 1 96 (H) 07/29/2021    CALCIUM 8 4 07/29/2021    EGFR 32 07/29/2021   ,   PT/INR: No results found for: PT, INR,   Magnesium: No components found for: MAG,  Phosphorous: No results found for: PHOS    Microbiology:  @LABRCNTIP,(urinecx:7)@        [facility] [place]    Portions of the record may have been created with voice recognition software  Occasional wrong word or "sound a like" substitutions may have occurred due to the inherent limitations of voice recognition software  Read the chart carefully and recognize, using context, where substitutions have occurred

## 2021-07-29 NOTE — PROGRESS NOTES
[address]ley  Progress Note Colona [place]reau 1945, 64 y o  male MRN: [id number]  Unit/Bed#: -Birgit Encounter: [id number]  Primary Care Provider: Phyllis Dunham MD   Date and time admitted to hospital: 7/18/2021  9:06 PM    Hypernatremia  Assessment & Plan  Patient is a nectar thick he is not in taking much  Resolved status post 2 L of D5 water given on 07/28 he is eating better today as reviewed  Ask speech to re-evaluate to see if the diet and be adjusted and nectar thickened BMs in if he does better to allow more nutrition and free water will use D5 water p r n  Will re-evaluate BMP tomorrow    Acute metabolic encephalopathy  Assessment & Plan  Patient's mental status waxes and wanes at baseline given his Alzheimer's Dementia however on 07/25, patient appeared more somnolent on exam  Multifactorial, possibly due to metabolic derangements, delirium given change in environment   MRI a couple of days ago showed encephalomalacia but no acute strokes  Patient is at baseline he is outpatient urology evaluation with encephalomalacia and dementia as a reviewed his PCPs notes back in 2020 he has cognitive decline  He is oriented to self and place  Continue Seroquel at night  Today the patient is oriented to self no focal his sodium is slightly worse could be contributing he actually is very stubborn in he chooses to talk to some people and not to others today he is oriented to self he is able to talk to me and answer questions and he knows his birth in his wife's name    Acute respiratory failure with hypoxia and hypercapnia ([address] )  Assessment & Plan  · Secondary to CHF which is clinically has resolved DC lost almost 30 lb I have repeated the ABG ABG looks like a respiratory acidosis compensating for contraction alkalosis as he never had any hypercapnia before    BiPAP has been discontinued since 07/27 patient is actually now 92% on room air will continue diuresis with Lasix 60 mg daily monitor electrolytes as well might need p r n  Oxygen especially at night r satting 96% on room air    Hypothyroidism  Assessment & Plan  Patient with a history of hypothyroidism and on levothyroxine 100 mcg at home  TSH done on admission elevated to 14 with ft4 at 1 2  Increased levothyroxine dose to 112 mcg   Patient will need to obtain repeat TSH levels in 4-6 weeks    Essential hypertension  Assessment & Plan  · BP improved continue Norvasc and metoprolol    Ambulatory dysfunction  Assessment & Plan  · Patient has been having frequent falls, and today he could not get up from the floor  EMS was concerned that patient may be in an unsafe situation as patient's wife is having difficulty caring for him  · Once medically cleared will need to go to rehab    Moderate aortic stenosis by prior echocardiogram  Assessment & Plan  · Echocardiogram May 2021 at [facility]:  EF 55-60%  Normal diastolic function  Consistent with moderate AS, moderate tricuspid regurgitation  Moderately elevated estimated PA systolic pressure  · Repeat echocardiogram done on July 19 shows a mild AS      Type 2 diabetes mellitus with kidney complication, without long-term current use of insulin [facility])  Assessment & Plan  Lab Results   Component Value Date    HGBA1C 6 6 (H) 07/19/2021       Recent Labs     07/28/21  1600 07/28/21  2115 07/29/21  0704 07/29/21  1106   POCGLU 268* 203* 172* 226*       Blood Sugar Average: Last 72 hrs:  (P) 190 [id number]   · Diabetic diet  · Fingerstick blood sugar sliding scale coverage  · Hold home oral agents  ·  hemoglobin A1c 6 6    His fasting is controlled towards lunch time and throughout the day it has uncontrolled he still not eating 100% will place him on Lantus 10 units in the morning     PAF (paroxysmal atrial fibrillation) ([facility])  Assessment & Plan  · EKG: normal sinus rhythm  · He is not on anticoagulation for suspected GI bleed  · Continue metoprolol he is on Plavix    Acute kidney injury superimposed on chronic kidney disease [facility])  Assessment & Plan  Lab Results   Component Value Date    EGFR 32 07/29/2021    EGFR 29 07/28/2021    EGFR 27 07/27/2021    CREATININE 1 96 (H) 07/29/2021    CREATININE 2 14 (H) 07/28/2021    CREATININE 2 24 (H) 07/27/2021     · CKD stage 3 with creatinine baseline 1 4-1 7  He is only in taking 20-40% he is on nectar thick liquid secondary to dysphagia  Improved today Ultrasound of the kidney and bladder done on 07/20 2-for acute finding continues to improve he ate better today            Acute on chronic anemia  Assessment & Plan  · Hemoglobin is 8 5 with baseline around 13 this admission has been fluctuating out in 8 6 no gross bleed  Venofer 5 doses infusions have been started by Nephrology  · Stool Hemoccult is positive  · No evidence of gross bleeding noted  · Will hold p o  Iron  · S/p EGD and colonoscopy on 7/20  "C1 M3 Og's esophagus-biopsies taken to rule out dysplasia  Small hiatal hernia otherwise normal stomach on direct and retroflexed views  Random gastric biopsies taken to rule out H pylori  Normal visualized portion of duodenum from duodenal bulb to D3  Random biopsies taken to rule out celiac disease "    "No evidence of active bleeding  Terminal ileum normal  Scattered diverticula throughout the colon left greater than right without evidence of bleeding  Area of erythema/ulceration/hemorrhage likely from trauma from enema versus old diverticular bleed  Large internal/external hemorrhoids"    · Okay to resume plavix from GI standpoint  · Is status post 1 unit of PRBC transfusion on 07/27 will keep hemoglobin greater than 8 with the kidney injury        Dementia without behavioral disturbance ([facility] [address] )  Assessment & Plan  · CT head showing encephalomalacia  · MRI showing the same  · Patient's mental status waxes and wanes   · Continue Aricept, today the patient initially ignores that he only knows his name not oriented to further the name    He will need outpatient follow-up with Neurology and also delirium could be worsened with the hypernatremia patient is better today he is back to baseline he with prefers to talk to some people not to others but he is oriented to self he knows his birthday and he also knows his wife's name as well      * Acute on chronic diastolic CHF (congestive heart failure) ([facility] [address] )  Assessment & Plan  · Edema legs have significantly improved he does have some trace up he still has some rales at the bibasilar region I did repeat a chest x-ray 727 there was still mild vascular congestion patient also takes poor p o satting well a chest x-ray just has some bibasilar crackles otherwise no leg edema  Continue Lasix 60 mg p o  Daily as his intake is still poor  · BNP:  4500  · Daily weights  · I&Os  · TTE: LV function showing 55% EF  · Low-salt diet, fluid restriction    · Initially diuresed with Lasix and Bumex drip  · On admission was 276 lb went down to 237 at the weight right now is inaccurate          VTE Pharmacologic Prophylaxis: VTE Score: 7 High Risk (Score >/= 5) - Pharmacological DVT Prophylaxis Contraindicated  Sequential Compression Devices Ordered  Patient is having fluctuating anemia with history of GI bleed and also being on Plavix    Patient Centered Rounds: I performed bedside rounds with nursing staff today  Discussions with Specialists or Other Care Team Provider:  I have discussed with Nephrology    Education and Discussions with Family / Patient: Updated  (wife) via phone  Time Spent for Care: 30 minutes  More than 50% of total time spent on counseling and coordination of care as described above  Current Length of Stay: 11 day(s)  Current Patient Status: Inpatient   Certification Statement: The patient will continue to require additional inpatient hospital stay due to Secondary to hyponatremia poor p o  Intake  Discharge Plan: Anticipate discharge in 24-48 hrs to rehab facility      Code Status: Level 3 - DNAR and DNI    Subjective:   Patient seen and examined reviewed eating better  He denies any complaints    Objective:     Vitals:   Temp (24hrs), Av 2 °F (36 8 °C), Min:98 1 °F (36 7 °C), Max:98 3 °F (36 8 °C)    Temp:  [98 1 °F (36 7 °C)-98 3 °F (36 8 °C)] 98 3 °F (36 8 °C)  HR:  [56-59] 59  Resp:  [20-21] 21  BP: (140-147)/(69) 147/69  SpO2:  [90 %-96 %] 96 %  Body mass index is 38 88 kg/m²  Input and Output Summary (last 24 hours): Intake/Output Summary (Last 24 hours) at 2021 1343  Last data filed at 2021 1213  Gross per 24 hour   Intake 360 ml   Output --   Net 360 ml       Physical Exam:   Physical Exam  Vitals and nursing note reviewed  Constitutional:       Appearance: He is well-developed  HENT:      Head: Normocephalic and atraumatic  Eyes:      Conjunctiva/sclera: Conjunctivae normal    Cardiovascular:      Rate and Rhythm: Normal rate and regular rhythm  Heart sounds: No murmur heard  Pulmonary:      Effort: Pulmonary effort is normal  No respiratory distress  Breath sounds: Rales (Bibasilar) present  Abdominal:      General: There is no distension  Palpations: Abdomen is soft  Tenderness: There is no abdominal tenderness  Musculoskeletal:         General: No swelling  Cervical back: Neck supple  Skin:     General: Skin is warm and dry  Neurological:      General: No focal deficit present  Mental Status: He is alert        Comments: Oriented to self able to answer me wife's name and birthday of his   Psychiatric:         Mood and Affect: Mood normal           Additional Data:     Labs:  Results from last 7 days   Lab Units 21  0457   WBC Thousand/uL 5 99   HEMOGLOBIN g/dL 8 6*   HEMATOCRIT % 30 0*   PLATELETS Thousands/uL 267   NEUTROS PCT % 73   LYMPHS PCT % 11*   MONOS PCT % 11   EOS PCT % 4     Results from last 7 days   Lab Units 21  0457 21  0509   SODIUM mmol/L 145 149*   POTASSIUM mmol/L 3 4* 3 7 CHLORIDE mmol/L 104 106   CO2 mmol/L 34* 37*   BUN mg/dL 35* 37*   CREATININE mg/dL 1 96* 2 14*   ANION GAP mmol/L 7 6   CALCIUM mg/dL 8 4 8 5   ALBUMIN g/dL  --  3 5   TOTAL BILIRUBIN mg/dL  --  1 35*   ALK PHOS U/L  --  94   ALT U/L  --  19   AST U/L  --  19   GLUCOSE RANDOM mg/dL 178* 125         Results from last 7 days   Lab Units 07/29/21  1106 07/29/21  0704 07/28/21  2115 07/28/21  1600 07/28/21  1104 07/28/21  0659 07/27/21  2153 07/27/21  1608 07/27/21  1111 07/27/21  0716 07/26/21  2051 07/26/21  1706   POC GLUCOSE mg/dl 226* 172* 203* 268* 229* 170* 121 171* 267* 135 129 190*         Results from last 7 days   Lab Units 07/26/21  0606 07/25/21  1613   PROCALCITONIN ng/ml 0 15 0 16       Lines/Drains:  Invasive Devices     Peripheral Intravenous Line            Peripheral IV 07/28/21 Dorsal (posterior); Right Hand <1 day                      Imaging: Reviewed radiology reports from this admission including: CT head    Recent Cultures (last 7 days):         Last 24 Hours Medication List:   Current Facility-Administered Medications   Medication Dose Route Frequency Provider Last Rate    acetaminophen  650 mg Oral Q4H PRN Palak Luna PA-C      amLODIPine  5 mg Oral Daily Akash Talley MD      atorvastatin  80 mg Oral Daily With Cesar Lujan PA-C      clopidogrel  75 mg Oral Daily Palak Luna PA-C      donepezil  5 mg Oral HS Palak Luna PA-C      furosemide  60 mg Oral Daily MD Niru Hillman ON 7/30/2021] insulin glargine  10 Units Subcutaneous QAM Akash Talley MD      insulin lispro  1-6 Units Subcutaneous TID [facility] Akash Talley MD      iron sucrose  200 mg Intravenous Daily Larwence Naval, CRNP 200 mg (07/29/21 0932)    levalbuterol  1 25 mg Nebulization Q8H PRN Akash Talley MD      levothyroxine  112 mcg Oral Early Morning Palak Luna PA-C      metoprolol succinate  50 mg Oral Daily Helio Mendoza      pantoprazole  40 mg Oral Early Morning Kristen Chun NIRMALA Pichardo      potassium chloride  20 mEq Oral BID [facility] [place]      QUEtiapine  25 mg Oral HS Kathi Santos PA-C          Today, Patient Was Seen By: Lynda Beckman MD    **Please Note: This note may have been constructed using a voice recognition system  **

## 2021-07-29 NOTE — ASSESSMENT & PLAN NOTE
Lab Results   Component Value Date    HGBA1C 6 6 (H) 07/19/2021       Recent Labs     07/28/21  1600 07/28/21  2115 07/29/21  0704 07/29/21  1106   POCGLU 268* 203* 172* 226*       Blood Sugar Average: Last 72 hrs:  (P) 190 [id number]   · Diabetic diet  · Fingerstick blood sugar sliding scale coverage  · Hold home oral agents  ·  hemoglobin A1c 6 6    His fasting is controlled towards lunch time and throughout the day it has uncontrolled he still not eating 100% will place him on Lantus 10 units in the morning

## 2021-07-29 NOTE — ASSESSMENT & PLAN NOTE
Patient's mental status waxes and wanes at baseline given his Alzheimer's Dementia however on 07/25, patient appeared more somnolent on exam  Multifactorial, possibly due to metabolic derangements, delirium given change in environment   MRI a couple of days ago showed encephalomalacia but no acute strokes  Patient is at baseline he is outpatient urology evaluation with encephalomalacia and dementia as a reviewed his PCPs notes back in 2020 he has cognitive decline  He is oriented to self and place    Continue Seroquel at night  Today the patient is oriented to self no focal his sodium is slightly worse could be contributing he actually is very stubborn in he chooses to talk to some people and not to others today he is oriented to self he is able to talk to me and answer questions and he knows his birth in his wife's name

## 2021-07-29 NOTE — ASSESSMENT & PLAN NOTE
· Edema legs have significantly improved he does have some trace up he still has some rales at the bibasilar region I did repeat a chest x-ray 727 there was still mild vascular congestion patient also takes poor p o satting well a chest x-ray just has some bibasilar crackles otherwise no leg edema  Continue Lasix 60 mg p o   Daily as his intake is still poor  · BNP:  4500  · Daily weights  · I&Os  · TTE: LV function showing 55% EF  · Low-salt diet, fluid restriction    · Initially diuresed with Lasix and Bumex drip  · On admission was 276 lb went down to 237 at the weight right now is inaccurate

## 2021-07-29 NOTE — PHYSICAL THERAPY NOTE
PHYSICAL THERAPY TREATMENT NOTE  NAME:  Fadumo Rhodes  DATE: 07/29/21    Length Of Stay: 11  Performed at least 2 patient identifiers during session: Name and ID bracelet    TREATMENT FLOW SHEET:      07/29/21 0732   PT Last Visit   PT Visit Date 07/29/21   Note Type   Note Type Treatment for insurance authorization   Pain Assessment   Pain Assessment Tool FLACC   Pain Score No Pain   Pain Rating: FLACC (Rest) - Face 0   Pain Rating: FLACC (Rest) - Legs 0   Pain Rating: FLACC (Rest) - Activity 0   Pain Rating: FLACC (Rest) - Cry 0   Pain Rating: FLACC (Rest) - Consolability 0   Score: FLACC (Rest) 0   Pain Rating: FLACC (Activity) - Face 0   Pain Rating: FLACC (Activity) - Legs 0   Pain Rating: FLACC (Activity) - Activity 0   Pain Rating: FLACC (Activity) - Cry 0   Pain Rating: FLACC (Activity) - Consolability 0   Score: FLACC (Activity) 0   Restrictions/Precautions   Other Precautions Cognitive; Chair Alarm; Bed Alarm; Fall Risk;O2  (2 lpm)   General   Chart Reviewed Yes   Response to Previous Treatment Patient unable to report, no changes reported from family or staff   Cognition   Orientation Level Oriented to person;Disoriented to place; Disoriented to time;Disoriented to situation  (name only, not birthday, reported "[facility]" only)   Following Commands Follows one step commands inconsistently   Subjective   Subjective "I have any animals that can walk on 4 legs "   Bed Mobility   Supine to Sit 3  Moderate assistance   Additional items Assist x 1; Increased time required;Verbal cues  (trunk management)   Additional Comments HOB elevated 15 degrees  completed bed mobility with min-->modAx1 with increased time and effort with verbal cues for technique and sequence  Transfers   Sit to Stand 3  Moderate assistance   Additional items Assist x 1; Increased time required;Verbal cues   Stand to Sit 4  Minimal assistance   Additional items Assist x 1; Increased time required;Verbal cues   Stand pivot 3  Moderate assistance   Additional items Assist x 1; Increased time required;Verbal cues   Additional Comments use of RW  sit to stand with RW with modAx1 with verbal cues for hand placement and technique  pt requires max manaul cues for scooting to EOB to facilitate transfers  static standing with B UE support on RW steadying to minAx1  dynamic standing with 1 UE support on RW modAx1 with inc posterior lean  spt with RW and modAx1 with increased right lateral lean with NBOS, verbal cues for LE adbvancement and increased MIRLANDE  manual cues for RW management and cues for turning completely prior to sitting  stand to sit with minAx1 with manual cues for hand placement and controlled descent  Ambulation/Elevation   Gait pattern Narrow MIRLANDE; Excessively slow; Short stride;Decreased foot clearance; Improper Weight shift   Gait Assistance 3  Moderate assist  (min/modAx1)   Additional items Assist x 1;Verbal cues   Assistive Device Rolling walker   Distance ambulated 7'x1 with chair follow with min/modAx1 with NBOS and verbal cues for increased step length and foot clearance  2nd ambulation trial ambulated 36'x1 with RW without chair follow with NBOS, decreased step length  min-->modAx1 toward end of ambulation due to fatigue with slowing ana laura and decreased step length  mod manaul cues for wt shifting and RW management and verbal cues for increased step length and foot clearance   Balance   Static Sitting Fair   Dynamic Sitting Fair -   Static Standing Poor +   Dynamic Standing Poor   Ambulatory Poor   Endurance Deficit   Endurance Deficit Yes   Endurance Deficit Description SpO2 on 2 lpm  93-95% at rest  decreased to low 90s with mobility  Activity Tolerance   Activity Tolerance Patient limited by fatigue   Medical Staff Made Aware Boubacar CALLE   Nurse Made Aware Marlen SAINZ   Assessment   Prognosis Fair   Problem List Decreased strength;Decreased endurance; Impaired balance;Decreased mobility; Decreased cognition; Impaired judgement;Decreased safety awareness; Obesity; Decreased skin integrity   Barriers to Discharge Decreased caregiver support; Inaccessible home environment   Barriers to Discharge Comments requires increased assistance to complete all mobility   Goals   Patient Goals none stated   STG Expiration Date 08/08/21   Short Term Goal #1 Pt will: Perform bed mobility tasks with Supervision to reposition in bed and prepare for transfers  Pt will perform transfers with Supervision to decrease burden of care, decrease risk for falls and improve activity tolerance and prepare for ambulation  Pt will ambulate with RW for >/= 76' with  Supervision  to decrease burden of care, decrease risk for falls, improve activity tolerance and improve gait quality and to access home environment  Pt will complete >/= 4 steps with with bilateral handrails with steadying assistance to decrease burden of care, decrease risk for falls and improve activity tolerance  Pt will participate in objective balance assessment to determine baseline fall risk  Pt will increase B LE strength >/= 1/2 MMT grade to facilitate functional mobility  (Continue STGs established at evaluation 7/19/2021)   PT Treatment Day 1   Plan   Treatment/Interventions Functional transfer training;LE strengthening/ROM; Elevations; Therapeutic exercise; Endurance training;Cognitive reorientation;Patient/family training;Equipment eval/education; Bed mobility;Gait training; Compensatory technique education;Spoke to nursing;OT   Progress Slow progress, cognitive deficits   PT Frequency Other (Comment)  (3-5x/week)   Recommendation   PT Discharge Recommendation Post acute rehabilitation services   Equipment Recommended   (TBD by rehab)   PT - OK to Discharge   (when medically cleared to rehab)   Additional Comments pt sitting in recliner chair at end of session with all needs within reach and posey alarm on   AM-PAC Basic Mobility Inpatient   Turning in Bed Without Bedrails 2   Lying on Back to Sitting on Edge of Flat Bed 2   Moving Bed to Chair 2   Standing Up From Chair 2   Walk in Room 2   Climb 3-5 Stairs 1   Basic Mobility Inpatient Raw Score 11   Basic Mobility Standardized Score 30 25       The patient's AM-PAC Basic Mobility Inpatient Short Form Raw Score is 11, Standardized Score is 30 25  A standardized score less than 42 9 suggests the patient may benefit from discharge to post-acute rehabilitation services  Please also refer to the recommendation of the Physical Therapist for safe discharge planning  Pt seen this date for reassessment  He has not met goals due to complex medical course with fluctuating medical status with transfer to critical care services 7/25/2021 with hypoxia  He has new encephalomalacia in B frontal lobes  Pt required increased O2 supplementation and pt was found to be hypernatremic  He is now transferred back to [facility] floor and requiring 2 lpm O2 with mobility  He required increasing assistance of 2 people for mobility during recent treatment sessions  He continues to require increased assistance with all mobility, increased time to respond and follows 1 step commands inconsistently requiring repeated verbal and tactile cues  He was able to complete mobility with moderate assistance this date  He was able to ambulate increasing distances and transfer consistently with moderate assistance  His LE strength is 3-/5 except hip flexion 2+/5  He continues to be limited by decreased strength, balance, endurance and impaired cognition  He will continue to benefit from PT services to maximize LOF  Continue to progress toward STGs established at evaluation       Ronel Mcdaniel, PT,DPT

## 2021-07-29 NOTE — ASSESSMENT & PLAN NOTE
· Secondary to CHF which is clinically has resolved DC lost almost 30 lb I have repeated the ABG ABG looks like a respiratory acidosis compensating for contraction alkalosis as he never had any hypercapnia before  BiPAP has been discontinued since 07/27 patient is actually now 92% on room air will continue diuresis with Lasix 60 mg daily monitor electrolytes as well might need p r n   Oxygen especially at night r satting 96% on room air

## 2021-07-29 NOTE — ASSESSMENT & PLAN NOTE
· Hemoglobin is 8 5 with baseline around 13 this admission has been fluctuating out in 8 6 no gross bleed  Venofer 5 doses infusions have been started by Nephrology  · Stool Hemoccult is positive  · No evidence of gross bleeding noted  · Will hold p o   Iron  · S/p EGD and colonoscopy on 7/20  "C1 M3 Og's esophagus-biopsies taken to rule out dysplasia  Small hiatal hernia otherwise normal stomach on direct and retroflexed views  Random gastric biopsies taken to rule out H pylori  Normal visualized portion of duodenum from duodenal bulb to D3  Random biopsies taken to rule out celiac disease "    "No evidence of active bleeding  Terminal ileum normal  Scattered diverticula throughout the colon left greater than right without evidence of bleeding  Area of erythema/ulceration/hemorrhage likely from trauma from enema versus old diverticular bleed  Large internal/external hemorrhoids"    · Okay to resume plavix from GI standpoint  · Is status post 1 unit of PRBC transfusion on 07/27 will keep hemoglobin greater than 8 with the kidney injury

## 2021-07-30 LAB
ANION GAP SERPL CALCULATED.3IONS-SCNC: 5 MMOL/L (ref 4–13)
BASOPHILS # BLD AUTO: 0.05 THOUSANDS/ΜL (ref 0–0.1)
BASOPHILS NFR BLD AUTO: 1 % (ref 0–1)
BUN SERPL-MCNC: 33 MG/DL (ref 5–25)
CALCIUM SERPL-MCNC: 8.5 MG/DL (ref 8.3–10.1)
CHLORIDE SERPL-SCNC: 107 MMOL/L (ref 100–108)
CO2 SERPL-SCNC: 35 MMOL/L (ref 21–32)
CREAT SERPL-MCNC: 1.94 MG/DL (ref 0.6–1.3)
EOSINOPHIL # BLD AUTO: 0.22 THOUSAND/ΜL (ref 0–0.61)
EOSINOPHIL NFR BLD AUTO: 4 % (ref 0–6)
ERYTHROCYTE [DISTWIDTH] IN BLOOD BY AUTOMATED COUNT: 16.6 % (ref 11.6–15.1)
GFR SERPL CREATININE-BSD FRML MDRD: 32 ML/MIN/1.73SQ M
GLUCOSE SERPL-MCNC: 127 MG/DL (ref 65–140)
GLUCOSE SERPL-MCNC: 154 MG/DL (ref 65–140)
GLUCOSE SERPL-MCNC: 157 MG/DL (ref 65–140)
GLUCOSE SERPL-MCNC: 157 MG/DL (ref 65–140)
GLUCOSE SERPL-MCNC: 181 MG/DL (ref 65–140)
HCT VFR BLD AUTO: 31.5 % (ref 36.5–49.3)
HGB BLD-MCNC: 9.1 G/DL (ref 12–17)
IMM GRANULOCYTES # BLD AUTO: 0.03 THOUSAND/UL (ref 0–0.2)
IMM GRANULOCYTES NFR BLD AUTO: 1 % (ref 0–2)
LYMPHOCYTES # BLD AUTO: 0.66 THOUSANDS/ΜL (ref 0.6–4.47)
LYMPHOCYTES NFR BLD AUTO: 11 % (ref 14–44)
MAGNESIUM SERPL-MCNC: 2.5 MG/DL (ref 1.6–2.6)
MCH RBC QN AUTO: 26.2 PG (ref 26.8–34.3)
MCHC RBC AUTO-ENTMCNC: 28.9 G/DL (ref 31.4–37.4)
MCV RBC AUTO: 91 FL (ref 82–98)
MONOCYTES # BLD AUTO: 0.61 THOUSAND/ΜL (ref 0.17–1.22)
MONOCYTES NFR BLD AUTO: 10 % (ref 4–12)
NEUTROPHILS # BLD AUTO: 4.62 THOUSANDS/ΜL (ref 1.85–7.62)
NEUTS SEG NFR BLD AUTO: 73 % (ref 43–75)
NRBC BLD AUTO-RTO: 0 /100 WBCS
PHOSPHATE SERPL-MCNC: 2.6 MG/DL (ref 2.3–4.1)
PLATELET # BLD AUTO: 264 THOUSANDS/UL (ref 149–390)
PMV BLD AUTO: 10.9 FL (ref 8.9–12.7)
POTASSIUM SERPL-SCNC: 4.3 MMOL/L (ref 3.5–5.3)
RBC # BLD AUTO: 3.47 MILLION/UL (ref 3.88–5.62)
SODIUM SERPL-SCNC: 147 MMOL/L (ref 136–145)
WBC # BLD AUTO: 6.19 THOUSAND/UL (ref 4.31–10.16)

## 2021-07-30 PROCEDURE — 83735 ASSAY OF MAGNESIUM: CPT | Performed by: NURSE PRACTITIONER

## 2021-07-30 PROCEDURE — 80048 BASIC METABOLIC PNL TOTAL CA: CPT | Performed by: NURSE PRACTITIONER

## 2021-07-30 PROCEDURE — 82948 REAGENT STRIP/BLOOD GLUCOSE: CPT

## 2021-07-30 PROCEDURE — 99232 SBSQ HOSP IP/OBS MODERATE 35: CPT | Performed by: FAMILY MEDICINE

## 2021-07-30 PROCEDURE — 99232 SBSQ HOSP IP/OBS MODERATE 35: CPT | Performed by: INTERNAL MEDICINE

## 2021-07-30 PROCEDURE — 97530 THERAPEUTIC ACTIVITIES: CPT

## 2021-07-30 PROCEDURE — 97535 SELF CARE MNGMENT TRAINING: CPT

## 2021-07-30 PROCEDURE — 97110 THERAPEUTIC EXERCISES: CPT

## 2021-07-30 PROCEDURE — 84100 ASSAY OF PHOSPHORUS: CPT | Performed by: NURSE PRACTITIONER

## 2021-07-30 PROCEDURE — 85025 COMPLETE CBC W/AUTO DIFF WBC: CPT | Performed by: FAMILY MEDICINE

## 2021-07-30 RX ORDER — DEXTROSE MONOHYDRATE 50 MG/ML
75 INJECTION, SOLUTION INTRAVENOUS CONTINUOUS
Status: DISPENSED | OUTPATIENT
Start: 2021-07-30 | End: 2021-07-30

## 2021-07-30 RX ORDER — POTASSIUM CHLORIDE 20 MEQ/1
20 TABLET, EXTENDED RELEASE ORAL DAILY
Status: DISCONTINUED | OUTPATIENT
Start: 2021-07-31 | End: 2021-08-03 | Stop reason: HOSPADM

## 2021-07-30 RX ADMIN — PANTOPRAZOLE SODIUM 40 MG: 40 TABLET, DELAYED RELEASE ORAL at 05:32

## 2021-07-30 RX ADMIN — QUETIAPINE FUMARATE 25 MG: 25 TABLET ORAL at 21:07

## 2021-07-30 RX ADMIN — IRON SUCROSE 200 MG: 20 INJECTION, SOLUTION INTRAVENOUS at 08:53

## 2021-07-30 RX ADMIN — INSULIN GLARGINE 10 UNITS: 100 INJECTION, SOLUTION SUBCUTANEOUS at 08:53

## 2021-07-30 RX ADMIN — CLOPIDOGREL BISULFATE 75 MG: 75 TABLET ORAL at 08:54

## 2021-07-30 RX ADMIN — AMLODIPINE BESYLATE 5 MG: 5 TABLET ORAL at 08:54

## 2021-07-30 RX ADMIN — METOPROLOL SUCCINATE 50 MG: 50 TABLET, EXTENDED RELEASE ORAL at 08:54

## 2021-07-30 RX ADMIN — POTASSIUM CHLORIDE 20 MEQ: 1500 TABLET, EXTENDED RELEASE ORAL at 08:54

## 2021-07-30 RX ADMIN — LEVOTHYROXINE SODIUM 112 MCG: 112 TABLET ORAL at 05:32

## 2021-07-30 RX ADMIN — DONEPEZIL HYDROCHLORIDE 5 MG: 5 TABLET ORAL at 21:07

## 2021-07-30 RX ADMIN — DEXTROSE 75 ML/HR: 5 SOLUTION INTRAVENOUS at 10:28

## 2021-07-30 RX ADMIN — FUROSEMIDE 60 MG: 40 TABLET ORAL at 08:54

## 2021-07-30 NOTE — PROGRESS NOTES
[address]  Progress Note Gonzalez Quinteros 1945, 64 y o  male MRN: [id number]  Unit/Bed#: -Birgit Encounter: [id number]  Primary Care Provider: Beena Villafana MD   Date and time admitted to hospital: 7/18/2021  9:06 PM    Hypernatremia  Assessment & Plan  Patient is a nectar thick he is not in taking much  Resolved status post 2 L of D5 water given on 07/28 he is eating better today as reviewed  Ask speech to re-evaluate to see if the diet and be adjusted and nectar thickened BMs in if he does better to allow more nutrition and free water will use D5 water p r n  Patient's diet has been upgraded and he is not eating or drinking recurrent hypernatremia  I discussed with wife in detail regarding further options although I do not recommended in advanced dementia patient's which urea shake at risk of pulling it out as a PEG tube but the wife stated they do not want a PEG tube and that was his wishes in his will  We discussed the best course of action for him is hospice care which she preferred to be done at the facility  She is in agreement to transfer him to hospice    Acute metabolic encephalopathy  Assessment & Plan  Patient's mental status waxes and wanes at baseline given his Alzheimer's Dementia however on 07/25, patient appeared more somnolent on exam  Multifactorial, possibly due to metabolic derangements, delirium given change in environment   MRI a couple of days ago showed encephalomalacia but no acute strokes  Patient is at baseline he is outpatient urology evaluation with encephalomalacia and dementia as a reviewed his PCPs notes back in 2020 he has cognitive decline    Patient has no insight no judgment is not eating or drinking he has a very advanced dementia surgeon to hospice cares discussed    Acute respiratory failure with hypoxia and hypercapnia ([facility] [address] )  Assessment & Plan  · Secondary to CHF which is clinically has resolved DC lost almost 30 lb I have repeated the ABG ABG looks like a respiratory acidosis compensating for contraction alkalosis as he never had any hypercapnia before  BiPAP has been discontinued since 07/27 patient is actually now 92% on room air will continue diuresis with Lasix 60 mg daily monitor electrolytes as well might need p r n  Oxygen especially at night r satting 96% on room air    Hypothyroidism  Assessment & Plan  Patient with a history of hypothyroidism and on levothyroxine 100 mcg at home  TSH done on admission elevated to 14 with ft4 at 1 2  Increased levothyroxine dose to 112 mcg   Patient will need to obtain repeat TSH levels in 4-6 weeks    Essential hypertension  Assessment & Plan  · BP improved continue Norvasc and metoprolol    Ambulatory dysfunction  Assessment & Plan  · Patient has been having frequent falls, and today he could not get up from the floor  EMS was concerned that patient may be in an unsafe situation as patient's wife is having difficulty caring for him  · Once medically cleared will need to go to rehab patient will be transitioned to hospice care will be discharged to nursing home when available    Moderate aortic stenosis by prior echocardiogram  Assessment & Plan  · Echocardiogram May 2021 at [facility]:  EF 55-60%  Normal diastolic function  Consistent with moderate AS, moderate tricuspid regurgitation  Moderately elevated estimated PA systolic pressure  · Repeat echocardiogram done on July 19 shows a mild AS      Type 2 diabetes mellitus with kidney complication, without long-term current use of insulin [facility])  Assessment & Plan  Lab Results   Component Value Date    HGBA1C 6 6 (H) 07/19/2021       Recent Labs     07/29/21  1621 07/29/21  2107 07/30/21  0706 07/30/21  1111   POCGLU 195* 208* 157* 154*       Blood Sugar Average: Last 72 hrs:  (P) 191 [id number]   · Diabetic diet  · Fingerstick blood sugar sliding scale coverage  · Hold home oral agents  ·  hemoglobin A1c 6 6    His fasting is controlled towards lunch time and throughout the day it has uncontrolled he still not eating 100% will place him on Lantus 10 units in the morning     PAF (paroxysmal atrial fibrillation) ([facility])  Assessment & Plan  · EKG: normal sinus rhythm  · He is not on anticoagulation for suspected GI bleed  · Continue metoprolol he is on Plavix    Acute kidney injury superimposed on chronic kidney disease [facility])  Assessment & Plan  Lab Results   Component Value Date    EGFR 32 07/30/2021    EGFR 32 07/29/2021    EGFR 29 07/28/2021    CREATININE 1 94 (H) 07/30/2021    CREATININE 1 96 (H) 07/29/2021    CREATININE 2 14 (H) 07/28/2021     · CKD stage 3 with creatinine baseline 1 4-1 7  He is only in taking 20-40% he is on nectar thick liquid secondary to dysphagia  Improved today Ultrasound of the kidney and bladder done on 07/20 2-for acute finding continues to improve he ate better today            Acute on chronic anemia  Assessment & Plan  · Hemoglobin is 8 5 with baseline around 13 this admission has been fluctuating out in 8 6 no gross bleed  Venofer 5 doses infusions have been started by Nephrology  · Stool Hemoccult is positive  · No evidence of gross bleeding noted  · Will hold p o   Iron  · S/p EGD and colonoscopy on 7/20  "C1 M3 Og's esophagus-biopsies taken to rule out dysplasia  Small hiatal hernia otherwise normal stomach on direct and retroflexed views  Random gastric biopsies taken to rule out H pylori  Normal visualized portion of duodenum from duodenal bulb to D3  Random biopsies taken to rule out celiac disease "    "No evidence of active bleeding  Terminal ileum normal  Scattered diverticula throughout the colon left greater than right without evidence of bleeding  Area of erythema/ulceration/hemorrhage likely from trauma from enema versus old diverticular bleed  Large internal/external hemorrhoids"    · Okay to resume plavix from GI standpoint  · Is status post 1 unit of PRBC transfusion on 07/27 will keep hemoglobin greater than 8 with the kidney injury        Dementia without behavioral disturbance ([facility])  Assessment & Plan  · CT head showing encephalomalacia  · MRI showing the same  · Patient's mental status waxes and wanes   · Continue Aricept, patient has very advanced dementia with encephalomalacia he most of the time does not stand situation he is declining to eat or drink I discussed with wife the best course of action with advanced dementia him not taking enough water I do not recommend a feeding to with dementia and advanced age with him ripping it out the wife stated that in his will he did not want a feeding tube we discussed hospice care and she is in agreement she is unable to take him home on hospice - so will transfer him on comfort care to a nursing home with conversion to hospice  We discussed the POLST form- patient is a DNR DNI no further hospitalizations  * Acute on chronic diastolic CHF (congestive heart failure) ([facility])  Assessment & Plan  · Edema legs have significantly improved he does have some trace up he still has some rales at the bibasilar region I did repeat a chest x-ray 727 there was still mild vascular congestion patient also takes poor p o satting well a chest x-ray just has some bibasilar crackles otherwise no leg edema  Continue Lasix 60 mg p o  Daily as his intake is still poor  · BNP:  4500  · Daily weights  · I&Os  · TTE: LV function showing 55% EF  · Low-salt diet, fluid restriction    · Initially diuresed with Lasix and Bumex drip  · On admission was 276 lb went down to 237 at the weight right now is inaccurate patient is not eating or drinking adequately with advanced dementia no PEG tube as per wishes  As discussed with wife will be transition to hospice care see above          VTE Pharmacologic Prophylaxis: VTE Score: 7 High Risk (Score >/= 5) - Pharmacological DVT Prophylaxis Contraindicated  Sequential Compression Devices Ordered  Patient Centered Rounds:  I performed bedside rounds with nursing staff today  Discussions with Specialists or Other Care Team Provider:  I have discussed with Nephrology today    Education and Discussions with Family / Patient: Updated  (wife) at bedside  Time Spent for Care: 30 minutes  More than 50% of total time spent on counseling and coordination of care as described above  Current Length of Stay: 12 day(s)  Current Patient Status: Inpatient   Certification Statement: The patient will continue to require additional inpatient hospital stay due to Awaiting acceptance on comfort cares with resistant to hospice to a nursing home  Discharge Plan: Anticipate discharge later today or tomorrow to rehab facility  Code Status: Level 3 - DNAR and DNI    Subjective:   Patient seen and examined no insight no judgment he is telling me he is playing with himself does not eat or drink even with diet upgrade  Objective:     Vitals:   Temp (24hrs), Av 6 °F (36 4 °C), Min:97 3 °F (36 3 °C), Max:97 7 °F (36 5 °C)    Temp:  [97 3 °F (36 3 °C)-97 7 °F (36 5 °C)] 97 7 °F (36 5 °C)  HR:  [57-58] 58  Resp:  [19-21] 19  BP: (136-163)/(63-76) 163/76  SpO2:  [89 %-98 %] 89 %  Body mass index is 37 68 kg/m²  Input and Output Summary (last 24 hours): Intake/Output Summary (Last 24 hours) at 2021 1333  Last data filed at 2021 0901  Gross per 24 hour   Intake 240 ml   Output 375 ml   Net -135 ml       Physical Exam:   Physical Exam  Vitals and nursing note reviewed  Constitutional:       Appearance: He is well-developed  HENT:      Head: Normocephalic and atraumatic  Eyes:      Conjunctiva/sclera: Conjunctivae normal    Cardiovascular:      Rate and Rhythm: Normal rate and regular rhythm  Heart sounds: No murmur heard  Pulmonary:      Effort: Pulmonary effort is normal  No respiratory distress  Breath sounds: Normal breath sounds  No wheezing or rales     Abdominal:      General: There is no distension  Palpations: Abdomen is soft  Tenderness: There is no abdominal tenderness  Musculoskeletal:         General: No swelling  Cervical back: Neck supple  Skin:     General: Skin is warm and dry  Neurological:      Mental Status: He is alert  Comments: Disoriented   Psychiatric:         Mood and Affect: Mood normal           Additional Data:     Labs:  Results from last 7 days   Lab Units 07/30/21  0536   WBC Thousand/uL 6 19   HEMOGLOBIN g/dL 9 1*   HEMATOCRIT % 31 5*   PLATELETS Thousands/uL 264   NEUTROS PCT % 73   LYMPHS PCT % 11*   MONOS PCT % 10   EOS PCT % 4     Results from last 7 days   Lab Units 07/30/21  0536 07/28/21  0509   SODIUM mmol/L 147* 149*   POTASSIUM mmol/L 4 3 3 7   CHLORIDE mmol/L 107 106   CO2 mmol/L 35* 37*   BUN mg/dL 33* 37*   CREATININE mg/dL 1 94* 2 14*   ANION GAP mmol/L 5 6   CALCIUM mg/dL 8 5 8 5   ALBUMIN g/dL  --  3 5   TOTAL BILIRUBIN mg/dL  --  1 35*   ALK PHOS U/L  --  94   ALT U/L  --  19   AST U/L  --  19   GLUCOSE RANDOM mg/dL 157* 125         Results from last 7 days   Lab Units 07/30/21  1111 07/30/21  0706 07/29/21  2107 07/29/21  1621 07/29/21  1106 07/29/21  0704 07/28/21  2115 07/28/21  1600 07/28/21  1104 07/28/21  0659 07/27/21  2153 07/27/21  1608   POC GLUCOSE mg/dl 154* 157* 208* 195* 226* 172* 203* 268* 229* 170* 121 171*         Results from last 7 days   Lab Units 07/26/21  0606 07/25/21  1613   PROCALCITONIN ng/ml 0 15 0 16       Lines/Drains:  Invasive Devices     Peripheral Intravenous Line            Peripheral IV 07/28/21 Dorsal (posterior); Right Hand 1 day                      Imaging: Reviewed radiology reports from this admission including: xray(s)    Recent Cultures (last 7 days):         Last 24 Hours Medication List:   Current Facility-Administered Medications   Medication Dose Route Frequency Provider Last Rate    acetaminophen  650 mg Oral Q4H PRN Urbano Siddiqui PA-C      amLODIPine  5 mg Oral Daily Odessa Chaney MD      atorvastatin  80 mg Oral Daily With Cesar Lujan PA-C      clopidogrel  75 mg Oral Daily Sarah Hooper PA-C      dextrose  75 mL/hr Intravenous Continuous Willie Arambula MD 75 mL/hr (07/30/21 1028)    donepezil  5 mg Oral HS Sarah Hooper PA-C      furosemide  60 mg Oral Daily Willie Arambula MD      insulin glargine  10 Units Subcutaneous QAM Willie Arambula MD      insulin lispro  1-6 Units Subcutaneous TID [facility] Willie Arambula MD      iron sucrose  200 mg Intravenous Daily SEYMOUR Ceballos 200 mg (07/29/21 0932)    levothyroxine  112 mcg Oral Early Morning Sarah Hooper PA-C      metoprolol succinate  50 mg Oral Daily [facility] [place], [place]      pantoprazole  40 mg Oral Early Morning [place], [place]      [START ON 7/31/2021] potassium chloride  20 mEq Oral Daily Willie Arambula MD      QUEtiapine  25 mg Oral HS Sarah Hooper PA-C          Today, Patient Was Seen By: Willie Arambula MD    **Please Note: This note may have been constructed using a voice recognition system  **

## 2021-07-30 NOTE — ASSESSMENT & PLAN NOTE
Patient's mental status waxes and wanes at baseline given his Alzheimer's Dementia however on 07/25, patient appeared more somnolent on exam  Multifactorial, possibly due to metabolic derangements, delirium given change in environment   MRI a couple of days ago showed encephalomalacia but no acute strokes  Patient is at baseline he is outpatient urology evaluation with encephalomalacia and dementia as a reviewed his PCPs notes back in 2020 he has cognitive decline    Patient has no insight no judgment is not eating or drinking he has a very advanced dementia surgeon to hospice cares discussed

## 2021-07-30 NOTE — CASE MANAGEMENT
Case Management Progress Note    Patient name Brannon Silva  Location [address] [id number] MRN [id number]  : 1945 Date 2021       LOS (days): 12  Geometric Mean LOS (GMLOS) (days): 4 00  Days to GMLOS:-7 7          PROGRESS NOTE:  CM placed call to pts Lupe ORELLANA, to discuss placement options for Hospice/Comfort Care  CM left message requesting return call  [phone] CM spoke with SOAvi, at pts bedside, who sts she wishes for pt to go to [facility] for comfort care/hospice  SO, sts she CAN NOT care for pt at home and does not have any help at home for the patient  CM placing SEVERAL calls out to [facility]  to discuss case, no answer over phone or [employer]    CM to follow

## 2021-07-30 NOTE — ASSESSMENT & PLAN NOTE
Lab Results   Component Value Date    HGBA1C 6 6 (H) 07/19/2021       Recent Labs     07/29/21  1621 07/29/21  2107 07/30/21  0706 07/30/21  1111   POCGLU 195* 208* 157* 154*       Blood Sugar Average: Last 72 hrs:  (P) 191 [id number]   · Diabetic diet  · Fingerstick blood sugar sliding scale coverage  · Hold home oral agents  ·  hemoglobin A1c 6 6    His fasting is controlled towards lunch time and throughout the day it has uncontrolled he still not eating 100% will place him on Lantus 10 units in the morning

## 2021-07-30 NOTE — PLAN OF CARE
Problem: RESPIRATORY - ADULT  Goal: Achieves optimal ventilation and oxygenation  Description: INTERVENTIONS:  - Assess for changes in respiratory status  - Assess for changes in mentation and behavior  - Position to facilitate oxygenation and minimize respiratory effort  - Oxygen administered by appropriate delivery if ordered  - Initiate smoking cessation education as indicated  - Encourage broncho-pulmonary hygiene including cough, deep breathe, Incentive Spirometry  - Assess the need for suctioning and aspirate as needed  - Assess and instruct to report SOB or any respiratory difficulty  - Respiratory Therapy support as indicated  Outcome: Not Progressing     Problem: CARDIOVASCULAR - ADULT  Goal: Absence of cardiac dysrhythmias or at baseline rhythm  Description: INTERVENTIONS:  - Continuous cardiac monitoring, vital signs, obtain 12 lead EKG if ordered  - Administer antiarrhythmic and heart rate control medications as ordered  - Monitor electrolytes and administer replacement therapy as ordered  Outcome: Not Progressing     Problem: CARDIOVASCULAR - ADULT  Goal: Maintains optimal cardiac output and hemodynamic stability  Description: INTERVENTIONS:  - Monitor I/O, vital signs and rhythm  - Monitor for S/S and trends of decreased cardiac output  - Administer and titrate ordered vasoactive medications to optimize hemodynamic stability  - Assess quality of pulses, skin color and temperature  - Assess for signs of decreased coronary artery perfusion  - Instruct patient to report change in severity of symptoms  Outcome: Not Progressing     Problem: Nutrition/Hydration-ADULT  Goal: Nutrient/Hydration intake appropriate for improving, restoring or maintaining nutritional needs  Description: Monitor and assess patient's nutrition/hydration status for malnutrition  Collaborate with interdisciplinary team and initiate plan and interventions as ordered    Monitor patient's weight and dietary intake as ordered or per policy  Utilize nutrition screening tool and intervene as necessary  Determine patient's food preferences and provide high-protein, high-caloric foods as appropriate       INTERVENTIONS:  - Monitor oral intake, urinary output, labs, and treatment plans  - Assess nutrition and hydration status and recommend course of action  - Evaluate amount of meals eaten  - Assist patient with eating if necessary   - Allow adequate time for meals  - Recommend/ encourage appropriate diets, oral nutritional supplements, and vitamin/mineral supplements  - Order, calculate, and assess calorie counts as needed  - Recommend, monitor, and adjust tube feedings and TPN/PPN based on assessed needs  - Assess need for intravenous fluids  - Provide specific nutrition/hydration education as appropriate  - Include patient/family/caregiver in decisions related to nutrition  Outcome: Not Progressing

## 2021-07-30 NOTE — OCCUPATIONAL THERAPY NOTE
[address]  Treatment Note     Patient Name: Dany BARKLEY Date: 7/30/2021  Problem List  Principal Problem:    Acute on chronic diastolic CHF (congestive heart failure) ([facility] )  Active Problems:    Dementia without behavioral disturbance (HCC)    Acute on chronic anemia    Acute kidney injury superimposed on chronic kidney disease (HCC)    PAF (paroxysmal atrial fibrillation) ([facility] )    Type 2 diabetes mellitus with kidney complication, without long-term current use of insulin ([facility])    Moderate aortic stenosis by prior echocardiogram    Ambulatory dysfunction    Essential hypertension    Hypothyroidism    Acute respiratory failure with hypoxia and hypercapnia (HCC)    Acute metabolic encephalopathy    Hypernatremia       07/30/21 0747   Note Type   Note Type Treatment   Restrictions/Precautions   Other Precautions Cognitive; Chair Alarm; Bed Alarm; Fall Risk   Pain Assessment   Pain Assessment Tool FLACC   Pain Score No Pain   Pain Rating: FLACC (Rest) - Face 0   Pain Rating: FLACC (Rest) - Legs 0   Pain Rating: FLACC (Rest) - Activity 0   Pain Rating: FLACC (Rest) - Cry 0   Pain Rating: FLACC (Rest) - Consolability 0   Score: FLACC (Rest) 0   Pain Rating: FLACC (Activity) - Face 0   Pain Rating: FLACC (Activity) - Legs 0   Pain Rating: FLACC (Activity) - Activity 0   Pain Rating: FLACC (Activity) - Cry 0   Pain Rating: FLACC (Activity) - Consolability 0   Score: FLACC (Activity) 0   ADL   Where Assessed Chair   UB Bathing Assistance 4  Minimal Assistance   UB Bathing Deficit Setup;Verbal cueing;Supervision/safety; Increased time to complete   UB Bathing Comments When handed wash cloth and told to wash face, pt was able to wash face  When handed wash cloth and told to wash arms, pt again attempted to wash face  Pt required tactile cues to wash arms   When pt was handed wash cloth and cued to wash chest, pt again attempted to wash face and claimed he already washed his chest     LB Bathing Assistance Unable to assess   LB Bathing Comments Pt declined completing LB bathing or dressing   Cognition   Overall Cognitive Status Impaired   Attention Difficulty attending to directions   Orientation Level Oriented to person;Disoriented to place; Disoriented to situation;Disoriented to time   Memory Decreased long term memory;Decreased recall of biographical information;Decreased short term memory;Decreased recall of recent events;Decreased recall of precautions   Following Commands Unable to follow multistep commands   Comments Pt with significantly increased confusion this session  Upon entering room, pt presented talking to himself pointing at the ceiling  Pt was able to report name but unable to recall   When asked current location, pt reported "[facility] " Pt disoriented to time of day and believed it was night  Pt continually talked in nonsense words throughout session and barked at therapist when she came close to him to button up hospital gown  At end of session, pt was trying to lift arm of chair and asked "why is this bread so hard to ?"   Activity Tolerance   Activity Tolerance Other (Comment)  (Treatment limited secondary to cognitive status)   Assessment   Assessment Pt completed OT tx session #3 on this date focused on ADL performance  Pt alert and agreeable to participate in bathing but was significantly confused  Throughout session, pt was seen talking to himself and innatimate objects, using nonsense language, and barking at therapist  See cognition section in flowsheet for greater detail  UB bathing @ Min A as pt was unable to follow directions on which body part to wash and needed tactile cues  Pt declined LB bathing/dressing  Continue to recommend post acute rehabilitation services d/t decreased cognition and safety concerns     Plan   Treatment Interventions ADL retraining;Cognitive reorientation;Continued evaluation   Goal Expiration Date 21   OT Treatment Day 1   OT Frequency 3-5x/wk Recommendation   OT Discharge Recommendation Post acute rehabilitation services   AM-PAC Daily Activity Inpatient   Lower Body Dressing 2   Bathing 2   Toileting 2   Upper Body Dressing 3   Grooming 3   Eating 3   Daily Activity Raw Score 15   Daily Activity Standardized Score (Calc for Raw Score >=11) 34 69   AM-PAC Applied Cognition Inpatient   Following a Speech/Presentation 2   Understanding Ordinary Conversation 2   Taking Medications 2   Remembering Where Things Are Placed or Put Away 2   Remembering List of 4-5 Errands 2   Taking Care of Complicated Tasks 2   Applied Cognition Raw Score 12   Applied Cognition Standardized Score 28 82     [facility] Height, OTR/L

## 2021-07-30 NOTE — PLAN OF CARE
Problem: OCCUPATIONAL THERAPY ADULT  Goal: Performs self-care activities at highest level of function for planned discharge setting  See evaluation for individualized goals  Description: Treatment Interventions: ADL retraining, Functional transfer training, UE strengthening/ROM, Endurance training, Cognitive reorientation, Patient/family training, Equipment evaluation/education, Neuromuscular reeducation, Compensatory technique education, Fine motor coordination activities, Continued evaluation, Energy conservation, Activityengagement          See flowsheet documentation for full assessment, interventions and recommendations  Outcome: Not Progressing  Note: Limitation: Decreased ADL status, Decreased UE strength, Decreased Safe judgement during ADL, Decreased cognition, Decreased endurance, Decreased fine motor control, Decreased high-level ADLs, Decreased self-care trans  Prognosis: Good  Assessment: Pt completed OT tx session #3 on this date focused on ADL performance  Pt alert and agreeable to participate in bathing but was significantly confused  Throughout session, pt was seen talking to himself and innatimate objects, using nonsense language, and barking at therapist  See cognition section in flowsheet for greater detail  UB bathing @ Min A as pt was unable to follow directions on which body part to wash and needed tactile cues  Pt declined LB bathing/dressing  Continue to recommend post acute rehabilitation services d/t decreased cognition and safety concerns       OT Discharge Recommendation: Post acute rehabilitation services

## 2021-07-30 NOTE — ASSESSMENT & PLAN NOTE
· CT head showing encephalomalacia  · MRI showing the same  · Patient's mental status waxes and wanes   · Continue Aricept, patient has very advanced dementia with encephalomalacia he most of the time does not stand situation he is declining to eat or drink I discussed with wife the best course of action with advanced dementia him not taking enough water I do not recommend a feeding to with dementia and advanced age with him ripping it out the wife stated that in his will he did not want a feeding tube we discussed hospice care and she is in agreement she is unable to take him home on hospice - so will transfer him on comfort care to a nursing home with conversion to hospice  We discussed the POLST form- patient is a DNR DNI no further hospitalizations

## 2021-07-30 NOTE — ASSESSMENT & PLAN NOTE
· Edema legs have significantly improved he does have some trace up he still has some rales at the bibasilar region I did repeat a chest x-ray 727 there was still mild vascular congestion patient also takes poor p o satting well a chest x-ray just has some bibasilar crackles otherwise no leg edema  Continue Lasix 60 mg p o  Daily as his intake is still poor  · BNP:  4500  · Daily weights  · I&Os  · TTE: LV function showing 55% EF  · Low-salt diet, fluid restriction    · Initially diuresed with Lasix and Bumex drip  · On admission was 276 lb went down to 237 at the weight right now is inaccurate patient is not eating or drinking adequately with advanced dementia no PEG tube as per wishes    As discussed with wife will be transition to hospice care see above

## 2021-07-30 NOTE — ASSESSMENT & PLAN NOTE
Patient is a nectar thick he is not in taking much  Resolved status post 2 L of D5 water given on 07/28 he is eating better today as reviewed  Ask speech to re-evaluate to see if the diet and be adjusted and nectar thickened BMs in if he does better to allow more nutrition and free water will use D5 water p r n  Patient's diet has been upgraded and he is not eating or drinking recurrent hypernatremia  I discussed with wife in detail regarding further options although I do not recommended in advanced dementia patient's which urea shake at risk of pulling it out as a PEG tube but the wife stated they do not want a PEG tube and that was his wishes in his will  We discussed the best course of action for him is hospice care which she preferred to be done at the facility    She is in agreement to transfer him to hospice

## 2021-07-30 NOTE — SPEECH THERAPY NOTE
Speech/Language Pathology Progress Note    Patient Name: Mary Ellen Diaz  LAZ'I Date: 7/30/2021      Pt transitioning to hospice care  D/c ST services

## 2021-07-30 NOTE — PROGRESS NOTES
Progress Note - Nephrology   Claudette Siu 64 y o  male MRN: [id number]  Unit/Bed#: -01 Encounter: [id number]    A/P:  1  Acute kidney injury on top of chronic kidney disease                 creatinine about 1 9 mg/dL, continue avoid nephrotoxins optimize overall hemodynamics  May continue diurese due to clinical necessity  2  Chronic kidney disease stage 3 with baseline creatinine between 1 4-1 7 mg/dL  3  Diabetic nephropathy likely              Consider the use of an angiotensin receptor blocker versus initiation of an SGLT -2 inhibitor going forward, initiation of 1 or both these medications will likely be of benefit  4  Hypernatremia/dehydration               DUVNGIR placed on D5W at 75 mL/hour times 8 total hours  Patient may require additional current oral hydration, patient does not appear to be motivated to drink liquids on his own  Of note his diet was changed to allow for thin liquids which may improve his oral intake  Otherwise, a more definitive measure to ensure hydration may be required, such as a PEG tube placement  Will defer to hospitalist at this is appropriate and the patient is a candidate for an forward  5  Volume overload                 patient is on oral diuretics, appears to be stable  6  Acute encephalopathy                 appears to be due to vascular dementia, new encephalomalacia the bilateral frontal lobes  7  Dementia potentially due to vascular processes  8  Acute on chronic diastolic congestive heart failure               Patient appears to be improving, continue diuretics      Follow up reason for today's visit:  Acute kidney injury/chronic kidney disease/diabetic nephropathy/hypernatremia    Acute on chronic diastolic CHF (congestive heart failure) ([facility] )    Patient Active Problem List   Diagnosis    Dementia without behavioral disturbance ([facility] )    Acute on chronic anemia    Acute kidney injury superimposed on chronic kidney disease ([facility] )    Frequent falls    PAF (paroxysmal atrial fibrillation) (HCC)    Type 2 diabetes mellitus with kidney complication, without long-term current use of insulin (HCC)    Moderate aortic stenosis by prior echocardiogram    Ambulatory dysfunction    Essential hypertension    Acute on chronic diastolic CHF (congestive heart failure) (HCC)    Hypothyroidism    Iron deficiency anemia due to chronic blood loss    Class 3 severe obesity with body mass index (BMI) of 40 0 to 44 9 in adult [facility])    Peripheral arterial occlusive disease (HCC)    Transient cerebral ischemia    Acute respiratory failure with hypoxia and hypercapnia (HCC)    Acute metabolic encephalopathy    Hypernatremia         Subjective:   No acute events overnight, patient with less ability to converse this morning compared to previous conversations  Objective:     Vitals: Blood pressure 163/76, pulse 58, temperature 97 7 °F (36 5 °C), resp  rate 19, height 5' 7" (1 702 m), weight 109 kg (240 lb 9 6 oz), SpO2 (!) 89 %  ,Body mass index is 37 68 kg/m²  Weight (last 2 days)     Date/Time   Weight    07/30/21 0727   109 (240 6)    07/29/21 0600   113 (248 24)    07/28/21 0600   112 (246 92)    Weight: pt refused to get oob at 07/28/21 0600                Intake/Output Summary (Last 24 hours) at 7/30/2021 0923  Last data filed at 7/30/2021 0901  Gross per 24 hour   Intake 600 ml   Output 375 ml   Net 225 ml     I/O last 3 completed shifts:   In: 600 [P O :600]  Out: 125 [Urine:125]         Physical Exam: /76   Pulse 58   Temp 97 7 °F (36 5 °C)   Resp 19   Ht 5' 7" (1 702 m)   Wt 109 kg (240 lb 9 6 oz)   SpO2 (!) 89%   BMI 37 68 kg/m²     General Appearance:    Alert, cooperative, no distress, appears stated age   Head:    Normocephalic, without obvious abnormality, atraumatic   Eyes:    Conjunctiva/corneas clear   Ears:    Normal external ears   Nose:   Nares normal, septum midline, mucosa normal, no drainage    or sinus tenderness   Throat:   Lips, mucosa, and tongue normal; teeth and gums normal   Neck:   Supple   Back:     Symmetric, no curvature, ROM normal, no CVA tenderness   Lungs:     Reduced but otherwise clear   Chest wall:    No tenderness or deformity   Heart:    Regular rate and rhythm, S1 and S2 normal, no murmur, rub   or gallop   Abdomen:     Soft, non-tender, bowel sounds active   Extremities:   Extremities normal, atraumatic, no cyanosis, +2 bilateral lower extremity edema   Skin:   Skin color, texture, turgor normal, no rashes or lesions   Lymph nodes:   Cervical normal   Neurologic:   CNII-XII intact            Lab, Imaging and other studies: I have personally reviewed pertinent labs  CBC:   Lab Results   Component Value Date    WBC 6 19 07/30/2021    HGB 9 1 (L) 07/30/2021    HCT 31 5 (L) 07/30/2021    MCV 91 07/30/2021     07/30/2021    MCH 26 2 (L) 07/30/2021    MCHC 28 9 (L) 07/30/2021    RDW 16 6 (H) 07/30/2021    MPV 10 9 07/30/2021    NRBC 0 07/30/2021     CMP:   Lab Results   Component Value Date    K 4 3 07/30/2021     07/30/2021    CO2 35 (H) 07/30/2021    BUN 33 (H) 07/30/2021    CREATININE 1 94 (H) 07/30/2021    CALCIUM 8 5 07/30/2021    EGFR 32 07/30/2021           Results from last 7 days   Lab Units 07/30/21  0536 07/29/21  0457 07/28/21  0509 07/27/21  0455 07/25/21  1613   POTASSIUM mmol/L 4 3 3 4* 3 7 3 6 3 5   CHLORIDE mmol/L 107 104 106 106 106   CO2 mmol/L 35* 34* 37* 38* 39*   BUN mg/dL 33* 35* 37* 36* 27*   CREATININE mg/dL 1 94* 1 96* 2 14* 2 24* 2 01*   CALCIUM mg/dL 8 5 8 4 8 5 8 6 8 6   ALK PHOS U/L  --   --  94 82 83   ALT U/L  --   --  19 15 18   AST U/L  --   --  19 13 11         Phosphorus:   Lab Results   Component Value Date    PHOS 2 6 07/30/2021     Magnesium:   Lab Results   Component Value Date    MG 2 5 07/30/2021     Urinalysis: No results found for: COLORU, CLARITYU, SPECGRAV, PHUR, LEUKOCYTESUR, NITRITE, PROTEINUA, GLUCOSEU, KETONESU, BILIRUBINUR, BLOODU  Ionized Calcium: No results found for: CAION  Coagulation: No results found for: PT, INR, APTT  Troponin: No results found for: TROPONINI  ABG: No results found for: PHART, EUE9QOL, PO2ART, ARZ2DOO, I8OBFLGV, BEART, SOURCE  Radiology review:     IMAGING  Procedure: XR chest pa & lateral    Result Date: 7/27/2021  Narrative: CHEST INDICATION:   sob  COMPARISON:  Chest radiograph from 7/25/2021  EXAM PERFORMED/VIEWS:  XR CHEST AP & LATERAL FINDINGS: Cardiomediastinal silhouette appears unremarkable  Mild pulmonary venous congestion  Small left effusion  No pneumothorax  Osseous structures appear within normal limits for patient age  Impression: Mild pulmonary venous congestion with small left effusion   Workstation performed: [id number]       Current Facility-Administered Medications   Medication Dose Route Frequency    acetaminophen (TYLENOL) tablet 650 mg  650 mg Oral Q4H PRN    amLODIPine (NORVASC) tablet 5 mg  5 mg Oral Daily    atorvastatin (LIPITOR) tablet 80 mg  80 mg Oral Daily With Dinner    clopidogrel (PLAVIX) tablet 75 mg  75 mg Oral Daily    dextrose 5 % infusion  75 mL/hr Intravenous Continuous    donepezil (ARICEPT) tablet 5 mg  5 mg Oral HS    furosemide (LASIX) tablet 60 mg  60 mg Oral Daily    insulin glargine (LANTUS) subcutaneous injection 10 Units 0 1 mL  10 Units Subcutaneous QAM    insulin lispro (HumaLOG) 100 units/mL subcutaneous injection 1-6 Units  1-6 Units Subcutaneous TID AC    iron sucrose (VENOFER) 200 mg in sodium chloride 0 9 % 100 mL IVPB  200 mg Intravenous Daily    levothyroxine tablet 112 mcg  112 mcg Oral Early Morning    metoprolol succinate (TOPROL-XL) 24 hr tablet 50 mg  50 mg Oral Daily    pantoprazole (PROTONIX) EC tablet 40 mg  40 mg Oral Early Morning    potassium chloride (K-DUR,KLOR-CON) CR tablet 20 mEq  20 mEq Oral BID    QUEtiapine (SEROquel) tablet 25 mg  25 mg Oral HS     Medications Discontinued During This Encounter   Medication Reason    potassium chloride 40 mEq IVPB (premix) Availability    furosemide (LASIX) tablet 40 mg     Ferrous Gluconate TABS 246 mg     amLODIPine (NORVASC) tablet 2 5 mg     furosemide (LASIX) injection 40 mg     furosemide (LASIX) injection 20 mg     insulin lispro (HumaLOG) 100 units/mL subcutaneous injection 2-12 Units     insulin lispro (HumaLOG) 100 units/mL subcutaneous injection 2-12 Units     fentaNYL (SUBLIMAZE) injection 12 5 mcg Patient Transfer    ondansetron (ZOFRAN) injection 4 mg Patient Transfer    dextrose 5 % infusion     furosemide (LASIX) injection 60 mg     torsemide (DEMADEX) tablet 40 mg     torsemide (DEMADEX) tablet 40 mg     levothyroxine tablet 100 mcg     furosemide (LASIX) injection 80 mg     ferrous gluconate (FERGON) tablet 324 mg     bumetanide (BUMEX) 12 5 mg infusion 50 mL     albumin human (FLEXBUMIN) 25 % injection 25 g     insulin lispro (HumaLOG) 100 units/mL subcutaneous injection 1-6 Units     dextrose 5 % infusion     Labetalol HCl (NORMODYNE) injection 10 mg     amLODIPine (NORVASC) tablet 2 5 mg     ferrous sulfate tablet 325 mg     dextrose 5 % infusion     insulin glargine (LANTUS) subcutaneous injection 7 Units 0 07 mL     levalbuterol (XOPENEX) inhalation solution 1 25 mg        Karolee Cheadle, DO      This progress note was produced in part using a dictation device which may document imprecise wording from author's original intent

## 2021-07-30 NOTE — ASSESSMENT & PLAN NOTE
· Echocardiogram May 2021 at [facility]:  EF 55-60%  Normal diastolic function  Consistent with moderate AS, moderate tricuspid regurgitation  Moderately elevated estimated PA systolic pressure    · Repeat echocardiogram done on July 19 shows a mild AS

## 2021-07-30 NOTE — ASSESSMENT & PLAN NOTE
· Patient has been having frequent falls, and today he could not get up from the floor    EMS was concerned that patient may be in an unsafe situation as patient's wife is having difficulty caring for him  · Once medically cleared will need to go to rehab patient will be transitioned to hospice care will be discharged to nursing home when available

## 2021-07-30 NOTE — ASSESSMENT & PLAN NOTE
Lab Results   Component Value Date    EGFR 32 07/30/2021    EGFR 32 07/29/2021    EGFR 29 07/28/2021    CREATININE 1 94 (H) 07/30/2021    CREATININE 1 96 (H) 07/29/2021    CREATININE 2 14 (H) 07/28/2021     · CKD stage 3 with creatinine baseline 1 4-1 7  He is only in taking 20-40% he is on nectar thick liquid secondary to dysphagia    Improved today Ultrasound of the kidney and bladder done on 07/20 2-for acute finding continues to improve he ate better today

## 2021-07-30 NOTE — PLAN OF CARE
Problem: PHYSICAL THERAPY ADULT  Goal: Performs mobility at highest level of function for planned discharge setting  See evaluation for individualized goals  Description: Treatment/Interventions: Functional transfer training, LE strengthening/ROM, Elevations, Therapeutic exercise, Endurance training, Cognitive reorientation, Patient/family training, Equipment eval/education, Bed mobility, Gait training, Compensatory technique education, Spoke to nursing, Spoke to case management, OT  Equipment Recommended:  (TBD by rehab)       See flowsheet documentation for full assessment, interventions and recommendations  Outcome: Progressing  Note: Prognosis: Fair  Problem List: Decreased strength, Decreased endurance, Impaired balance, Decreased mobility, Decreased cognition, Impaired judgement, Decreased safety awareness, Obesity, Decreased skin integrity  Assessment: Pt seen for PT treatment session this date with interventions consisting of gait training w/ emphasis on improving pt's ability to ambulate level surfaces x 3'x1 with mod A provided by therapist with RW, Therapeutic exercise consisting of: AROM 5 reps and 10 reps B LE in sitting position and therapeutic activity consisting of training: bed mobility, supine<>sit transfers and sit<>stand transfers  Pt agreeable to PT treatment session upon arrival, pt found supine in bed w/ HOB elevated, in no apparent distress  In comparison to previous session, pt with no improvements as evidenced by Pt requiring assistance with all mobility  Pt with decreased ambulation and increased aggitation with education on importance of ambulation  Pt requires beth vc and manual cues for hand placement with transfers, weight shifting, and sequencing for ambulation  Pt requiring increased assistance with final STS transfer   Post session: pt returned back to recliner, chair alarm engaged and all needs in reach Continue to recommend STR at time of d/c in order to maximize pt's functional independence and safety w/ mobility  Pt continues to be functioning below baseline level, and remains limited 2* factors listed above   PT will continue to see pt while here in order to address the deficits listed above and provide interventions consistent w/ POC in effort to achieve STGs  Barriers to Discharge: Decreased caregiver support, Inaccessible home environment  Barriers to Discharge Comments: requires increased assistance to complete all mobility     PT Discharge Recommendation: Post acute rehabilitation services     PT - OK to Discharge: Yes    See flowsheet documentation for full assessment

## 2021-07-30 NOTE — PHYSICAL THERAPY NOTE
PHYSICAL THERAPY TREATMENT NOTE  NAME:  Tangela Paris  DATE: 07/30/21    Length Of Stay: 12  Performed at least 2 patient identifiers during session: Name, Birthday and ID bracelet    TREATMENT:    07/30/21 0719   PT Last Visit   PT Visit Date 07/30/21   Note Type   Note Type Treatment   Pain Assessment   Pain Assessment Tool FLACC   Pain Score No Pain   Pain Rating: FLACC (Rest) - Face 0   Pain Rating: FLACC (Rest) - Legs 0   Pain Rating: FLACC (Rest) - Activity 0   Pain Rating: FLACC (Rest) - Cry 0   Pain Rating: FLACC (Rest) - Consolability 0   Score: FLACC (Rest) 0   Pain Rating: FLACC (Activity) - Face 0   Pain Rating: FLACC (Activity) - Legs 0   Pain Rating: FLACC (Activity) - Activity 0   Pain Rating: FLACC (Activity) - Cry 0   Pain Rating: FLACC (Activity) - Consolability 0   Score: FLACC (Activity) 0   Restrictions/Precautions   Other Precautions Cognitive; Chair Alarm; Bed Alarm; Fall Risk   General   Chart Reviewed Yes   Response to Previous Treatment Patient unable to report, no changes reported from family or staff   Family/Caregiver Present No   Cognition   Overall Cognitive Status Impaired   Attention Difficulty attending to directions   Orientation Level Oriented to person;Disoriented to time;Disoriented to place; Disoriented to situation   Comments When asked the year, pt reported "37"   Subjective   Subjective "I will get up"   Bed Mobility   Supine to Sit 3  Moderate assistance   Additional items Assist x 1;HOB elevated; Increased time required;Verbal cues;LE management  (Trunk management)   Additional Comments Pt requiring ModAx1 for trunk and LE management with max vc for hand placement and to maintain upright posture  Transfers   Sit to Stand   (ModAx1 -> MaxAx1)   Additional items Assist x 1; Increased time required;Verbal cues   Stand to Sit 4  Minimal assistance   Additional items Increased time required;Verbal cues   Stand pivot 3  Moderate assistance   Additional items Increased time required;Verbal cues; Assist x 1   Additional Comments Use of RW with all transfers  Pt requiring modAx1 for initial STS and SPT for RW management and weight shifting  Max vc for sequencing and manual cues for hand placement  Last STS with MaxAx1 for maintaing standing balance and for upright posture with controlled decent back into recliner  Ambulation/Elevation   Gait pattern Narrow MIRLANDE; Excessively slow; Short stride;Decreased foot clearance; Improper Weight shift   Gait Assistance 3  Moderate assist   Additional items Assist x 1;Verbal cues   Assistive Device Rolling walker   Distance 3'x1 to scale with modax1 for RW management and max vc for sequencing  Pt denied further ambulation with increased aggitation stating he would pass out if he ambulated  Balance   Static Sitting Fair   Dynamic Sitting Fair -   Static Standing Poor +   Dynamic Standing Poor   Ambulatory Poor   Endurance Deficit   Endurance Deficit Yes   Endurance Deficit Description Fatigue, pt on RA   Activity Tolerance   Activity Tolerance Patient limited by fatigue   Medical Staff Made Aware OT, Abeba   Exercises   Hip Adduction Sitting;10 reps;AROM; Bilateral   Ankle Pumps Sitting;15 reps;AROM; Bilateral   Marching Sitting;15 reps;AROM; Bilateral   Balance training  1 STS with MaxAx1   Assessment   Prognosis Fair   Problem List Decreased strength;Decreased endurance; Impaired balance;Decreased mobility; Decreased cognition; Impaired judgement;Decreased safety awareness; Obesity; Decreased skin integrity   Barriers to Discharge Decreased caregiver support; Inaccessible home environment   Goals   Patient Goals None stated   PT Treatment Day 2   Plan   Treatment/Interventions Functional transfer training;LE strengthening/ROM; Therapeutic exercise; Endurance training;Cognitive reorientation;Patient/family training;Equipment eval/education; Bed mobility;Gait training   Progress Slow progress, cognitive deficits   PT Frequency   (3-5x/wk)   Recommendation   PT Discharge Recommendation Post acute rehabilitation services   Equipment Recommended   (TBD by rehab)   PT - OK to Discharge Yes   Additional Comments When medically cleared   Additional Comments 2 Pt seated in recliner with LE elevated, chair alarm on, and all needs within reach   Eliezer 8 in Bed Without Bedrails 2   Lying on Back to Sitting on Edge of Flat Bed 2   Moving Bed to Chair 3   Standing Up From Chair 2   Walk in Room 2   Climb 3-5 Stairs 1   Basic Mobility Inpatient Raw Score 12   Basic Mobility Standardized Score 32 23     The patient's AM-PAC Basic Mobility Inpatient Short Form Raw Score is 11, Standardized Score is 30 25  A standardized score less than 42 9 suggests the patient may benefit from discharge to post-acute rehabilitation services  Please also refer to the recommendation of the Physical Therapist for safe discharge planning  Pt seen for PT treatment session this date with interventions consisting of gait training w/ emphasis on improving pt's ability to ambulate level surfaces x 3'x1 with mod A provided by therapist with RW, Therapeutic exercise consisting of: AROM 5 reps and 10 reps B LE in sitting position and therapeutic activity consisting of training: bed mobility, supine<>sit transfers and sit<>stand transfers  Pt agreeable to PT treatment session upon arrival, pt found supine in bed w/ HOB elevated, in no apparent distress  In comparison to previous session, pt with no improvements as evidenced by Pt requiring assistance with all mobility  Pt with decreased ambulation and increased aggitation with education on importance of ambulation  Pt requires beth vc and manual cues for hand placement with transfers, weight shifting, and sequencing for ambulation  Pt requiring increased assistance with final STS transfer   Post session: pt returned back to recliner, chair alarm engaged and all needs in reach Continue to recommend STR at time of d/c in order to maximize pt's functional independence and safety w/ mobility  Pt continues to be functioning below baseline level, and remains limited 2* factors listed above   PT will continue to see pt while here in order to address the deficits listed above and provide interventions consistent w/ POC in effort to achieve STGs      Vasiliy Spence PTA

## 2021-07-30 NOTE — PLAN OF CARE
Problem: MOBILITY - ADULT  Goal: Maintain or return to baseline ADL function  Description: INTERVENTIONS:  -  Assess patient's ability to carry out ADLs; assess patient's baseline for ADL function and identify physical deficits which impact ability to perform ADLs (bathing, care of mouth/teeth, toileting, grooming, dressing, etc )  - Assess/evaluate cause of self-care deficits   - Assess range of motion  - Assess patient's mobility; develop plan if impaired  - Assess patient's need for assistive devices and provide as appropriate  - Encourage maximum independence but intervene and supervise when necessary  - Involve family in performance of ADLs  - Assess for home care needs following discharge   - Consider OT consult to assist with ADL evaluation and planning for discharge  - Provide patient education as appropriate  Outcome: Progressing     Problem: Prexisting or High Potential for Compromised Skin Integrity  Goal: Skin integrity is maintained or improved  Description: INTERVENTIONS:  - Identify patients at risk for skin breakdown  - Assess and monitor skin integrity  - Assess and monitor nutrition and hydration status  - Monitor labs   - Assess for incontinence   - Turn and reposition patient  - Assist with mobility/ambulation  - Relieve pressure over bony prominences  - Avoid friction and shearing  - Provide appropriate hygiene as needed including keeping skin clean and dry  - Evaluate need for skin moisturizer/barrier cream  - Collaborate with interdisciplinary team   - Patient/family teaching  - Consider wound care consult   Outcome: Progressing     Problem: CARDIOVASCULAR - ADULT  Goal: Maintains optimal cardiac output and hemodynamic stability  Description: INTERVENTIONS:  - Monitor I/O, vital signs and rhythm  - Monitor for S/S and trends of decreased cardiac output  - Administer and titrate ordered vasoactive medications to optimize hemodynamic stability  - Assess quality of pulses, skin color and temperature  - Assess for signs of decreased coronary artery perfusion  - Instruct patient to report change in severity of symptoms  Outcome: Progressing     Problem: RESPIRATORY - ADULT  Goal: Achieves optimal ventilation and oxygenation  Description: INTERVENTIONS:  - Assess for changes in respiratory status  - Assess for changes in mentation and behavior  - Position to facilitate oxygenation and minimize respiratory effort  - Oxygen administered by appropriate delivery if ordered  - Initiate smoking cessation education as indicated  - Encourage broncho-pulmonary hygiene including cough, deep breathe, Incentive Spirometry  - Assess the need for suctioning and aspirate as needed  - Assess and instruct to report SOB or any respiratory difficulty  - Respiratory Therapy support as indicated  Outcome: Progressing

## 2021-07-31 LAB
GLUCOSE SERPL-MCNC: 122 MG/DL (ref 65–140)
GLUCOSE SERPL-MCNC: 135 MG/DL (ref 65–140)
GLUCOSE SERPL-MCNC: 139 MG/DL (ref 65–140)
GLUCOSE SERPL-MCNC: 144 MG/DL (ref 65–140)

## 2021-07-31 PROCEDURE — 99232 SBSQ HOSP IP/OBS MODERATE 35: CPT | Performed by: FAMILY MEDICINE

## 2021-07-31 PROCEDURE — 82948 REAGENT STRIP/BLOOD GLUCOSE: CPT

## 2021-07-31 RX ADMIN — ATORVASTATIN CALCIUM 80 MG: 40 TABLET, FILM COATED ORAL at 16:13

## 2021-07-31 RX ADMIN — INSULIN GLARGINE 10 UNITS: 100 INJECTION, SOLUTION SUBCUTANEOUS at 10:00

## 2021-07-31 RX ADMIN — CLOPIDOGREL BISULFATE 75 MG: 75 TABLET ORAL at 10:00

## 2021-07-31 RX ADMIN — AMLODIPINE BESYLATE 5 MG: 5 TABLET ORAL at 10:00

## 2021-07-31 RX ADMIN — METOPROLOL SUCCINATE 50 MG: 50 TABLET, EXTENDED RELEASE ORAL at 10:00

## 2021-07-31 RX ADMIN — PANTOPRAZOLE SODIUM 40 MG: 40 TABLET, DELAYED RELEASE ORAL at 04:49

## 2021-07-31 RX ADMIN — LEVOTHYROXINE SODIUM 112 MCG: 112 TABLET ORAL at 04:49

## 2021-07-31 RX ADMIN — POTASSIUM CHLORIDE 20 MEQ: 1500 TABLET, EXTENDED RELEASE ORAL at 10:00

## 2021-07-31 RX ADMIN — FUROSEMIDE 60 MG: 40 TABLET ORAL at 10:00

## 2021-07-31 RX ADMIN — IRON SUCROSE 200 MG: 20 INJECTION, SOLUTION INTRAVENOUS at 10:00

## 2021-07-31 NOTE — ASSESSMENT & PLAN NOTE
Lab Results   Component Value Date    HGBA1C 6 6 (H) 07/19/2021       Recent Labs     07/30/21  1535 07/30/21  2047 07/31/21  0718 07/31/21  1103   POCGLU 181* 127 135 144*       Blood Sugar Average: Last 72 hrs:  (P) 183 5   · Diabetic diet  · Fingerstick blood sugar sliding scale coverage  · Hold home oral agents  ·  hemoglobin A1c 6 6    His fasting is controlled towards lunch time and throughout the day it has uncontrolled he still not eating 100% will place him on Lantus 10 units in the morning

## 2021-07-31 NOTE — CASE MANAGEMENT
Case Management Progress Note    Patient name Sharlee Krabbe  Location [facility] [address] 331/-[id number] MRN [id number]  : 1945 Date 2021       LOS (days): 13  Geometric Mean LOS (GMLOS) (days): 4 00  Days to GMLOS:-8 4          PROGRESS NOTE:  [facility] accept pt at time of discharge  Cm to initial prior auth through pts insurance

## 2021-07-31 NOTE — ASSESSMENT & PLAN NOTE
· CT head showing encephalomalacia  · MRI showing the same  · Patient's mental status waxes and wanes   · Continue Aricept, patient has very advanced dementia with encephalomalacia he most of the time does not stand situation he is declining to eat or drink I discussed with wife the best course of action with advanced dementia him not taking enough water I do not recommend a feeding to with dementia and advanced age with him ripping it out the wife stated that in his will he did not want a feeding tube we discussed hospice care and she is in agreement she is unable to take him home on hospice - so will transfer him on comfort care to a nursing home with conversion to hospice  We discussed the POLST form- patient is a DNR DNI no further hospitalizations  · [facility] at [facility]- comfort care/hospice awaiting insurance authorization

## 2021-07-31 NOTE — PLAN OF CARE
Problem: MOBILITY - ADULT  Goal: Maintain or return to baseline ADL function  Description: INTERVENTIONS:  -  Assess patient's ability to carry out ADLs; assess patient's baseline for ADL function and identify physical deficits which impact ability to perform ADLs (bathing, care of mouth/teeth, toileting, grooming, dressing, etc )  - Assess/evaluate cause of self-care deficits   - Assess range of motion  - Assess patient's mobility; develop plan if impaired  - Assess patient's need for assistive devices and provide as appropriate  - Encourage maximum independence but intervene and supervise when necessary  - Involve family in performance of ADLs  - Assess for home care needs following discharge   - Consider OT consult to assist with ADL evaluation and planning for discharge  - Provide patient education as appropriate  Outcome: Progressing     Problem: CARDIOVASCULAR - ADULT  Goal: Maintains optimal cardiac output and hemodynamic stability  Description: INTERVENTIONS:  - Monitor I/O, vital signs and rhythm  - Monitor for S/S and trends of decreased cardiac output  - Administer and titrate ordered vasoactive medications to optimize hemodynamic stability  - Assess quality of pulses, skin color and temperature  - Assess for signs of decreased coronary artery perfusion  - Instruct patient to report change in severity of symptoms  Outcome: Progressing     Problem: METABOLIC, FLUID AND ELECTROLYTES - ADULT  Goal: Fluid balance maintained  Description: INTERVENTIONS:  - Monitor labs   - Monitor I/O and WT  - Instruct patient on fluid and nutrition as appropriate  - Assess for signs & symptoms of volume excess or deficit  Outcome: Progressing     Problem: PAIN - ADULT  Goal: Verbalizes/displays adequate comfort level or baseline comfort level  Description: Interventions:  - Encourage patient to monitor pain and request assistance  - Assess pain using appropriate pain scale  - Administer analgesics based on type and severity of pain and evaluate response  - Implement non-pharmacological measures as appropriate and evaluate response  - Consider cultural and social influences on pain and pain management  - Notify physician/advanced practitioner if interventions unsuccessful or patient reports new pain  Outcome: Progressing     Problem: DISCHARGE PLANNING  Goal: Discharge to home or other facility with appropriate resources  Description: INTERVENTIONS:  - Identify barriers to discharge w/patient and caregiver  - Arrange for needed discharge resources and transportation as appropriate  - Identify discharge learning needs (meds, wound care, etc )  - Arrange for interpretive services to assist at discharge as needed  - Refer to Case Management Department for coordinating discharge planning if the patient needs post-hospital services based on physician/advanced practitioner order or complex needs related to functional status, cognitive ability, or social support system  Outcome: Progressing

## 2021-07-31 NOTE — PLAN OF CARE
Problem: Potential for Falls  Goal: Patient will remain free of falls  Description: INTERVENTIONS:  - Educate patient/family on patient safety including physical limitations  - Instruct patient to call for assistance with activity   - Consult OT/PT to assist with strengthening/mobility   - Keep Call bell within reach  - Keep bed low and locked with side rails adjusted as appropriate  - Keep care items and personal belongings within reach  - Initiate and maintain comfort rounds  - Make Fall Risk Sign visible to staff  - Offer Toileting every 2Hours, in advance of need  - Initiate/Maintain bedalarm  - Obtain necessary fall risk management equipment: roller walker  - Apply yellow socks and bracelet for high fall risk patients  - Consider moving patient to room near nurses station  Outcome: Progressing     Problem: MOBILITY - ADULT  Goal: Maintain or return to baseline ADL function  Description: INTERVENTIONS:  - Educate patient/family on patient safety including physical limitations  - Instruct patient to call for assistance with activity   - Consult OT/PT to assist with strengthening/mobility   - Keep Call bell within reach  - Keep bed low and locked with side rails adjusted as appropriate  - Keep care items and personal belongings within reach  - Initiate and maintain comfort rounds  - Make Fall Risk Sign visible to staff  - Offer Toileting every 2Hours, in advance of need  - Initiate/Maintain bedalarm  - Obtain necessary fall risk management equipment: roller walker  - Apply yellow socks and bracelet for high fall risk patients  - Consider moving patient to room near nurses station  Outcome: Progressing  Goal: Maintains/Returns to pre admission functional level  Description: INTERVENTIONS:  - Perform BMAT or MOVE assessment daily    - Set and communicate daily mobility goal to care team and patient/family/caregiver     - Collaborate with rehabilitation services on mobility goals if consulted  - Perform Range of ADULT  Goal: Achieves optimal ventilation and oxygenation  Description: INTERVENTIONS:  - Assess for changes in respiratory status  - Assess for changes in mentation and behavior  - Position to facilitate oxygenation and minimize respiratory effort  - Oxygen administered by appropriate delivery if ordered  - Initiate smoking cessation education as indicated  - Encourage broncho-pulmonary hygiene including cough, deep breathe, Incentive Spirometry  - Assess the need for suctioning and aspirate as needed  - Assess and instruct to report SOB or any respiratory difficulty  - Respiratory Therapy support as indicated  Outcome: Progressing     Problem: GENITOURINARY - ADULT  Goal: Maintains or returns to baseline urinary function  Description: INTERVENTIONS:  - Assess urinary function  - Encourage oral fluids to ensure adequate hydration if ordered  - Administer IV fluids as ordered to ensure adequate hydration  - Administer ordered medications as needed  - Offer frequent toileting  - Follow urinary retention protocol if ordered  Outcome: Progressing     Problem: METABOLIC, FLUID AND ELECTROLYTES - ADULT  Goal: Electrolytes maintained within normal limits  Description: INTERVENTIONS:  - Monitor labs and assess patient for signs and symptoms of electrolyte imbalances  - Administer electrolyte replacement as ordered  - Monitor response to electrolyte replacements, including repeat lab results as appropriate  - Instruct patient on fluid and nutrition as appropriate  Outcome: Progressing  Goal: Fluid balance maintained  Description: INTERVENTIONS:  - Monitor labs   - Monitor I/O and WT  - Instruct patient on fluid and nutrition as appropriate  - Assess for signs & symptoms of volume excess or deficit  Outcome: Progressing  Goal: Glucose maintained within target range  Description: INTERVENTIONS:  - Monitor Blood Glucose as ordered  - Assess for signs and symptoms of hyperglycemia and hypoglycemia  - Administer ordered medications to maintain glucose within target range  - Assess nutritional intake and initiate nutrition service referral as needed  Outcome: Progressing     Problem: SKIN/TISSUE INTEGRITY - ADULT  Goal: Incision(s), wounds(s) or drain site(s) healing without S/S of infection  Description: INTERVENTIONS  - Assess and document dressing, incision, wound bed, drain sites and surrounding tissue  - Provide patient and family education  - Perform skin care/dressing changes every day   Outcome: Progressing     Problem: HEMATOLOGIC - ADULT  Goal: Maintains hematologic stability  Description: INTERVENTIONS  - Assess for signs and symptoms of bleeding or hemorrhage  - Monitor labs  - Administer supportive blood products/factors as ordered and appropriate  Outcome: Progressing     Problem: MUSCULOSKELETAL - ADULT  Goal: Maintain or return mobility to safest level of function  Description: INTERVENTIONS:  - Assess patient's ability to carry out ADLs; assess patient's baseline for ADL function and identify physical deficits which impact ability to perform ADLs (bathing, care of mouth/teeth, toileting, grooming, dressing, etc )  - Assess/evaluate cause of self-care deficits   - Assess range of motion  - Assess patient's mobility  - Assess patient's need for assistive devices and provide as appropriate  - Encourage maximum independence but intervene and supervise when necessary  - Involve family in performance of ADLs  - Assess for home care needs following discharge   - Consider OT consult to assist with ADL evaluation and planning for discharge  - Provide patient education as appropriate  Outcome: Progressing     Problem: Nutrition/Hydration-ADULT  Goal: Nutrient/Hydration intake appropriate for improving, restoring or maintaining nutritional needs  Description: Monitor and assess patient's nutrition/hydration status for malnutrition  Collaborate with interdisciplinary team and initiate plan and interventions as ordered    Monitor patient's weight and dietary intake as ordered or per policy  Utilize nutrition screening tool and intervene as necessary  Determine patient's food preferences and provide high-protein, high-caloric foods as appropriate       INTERVENTIONS:  - Monitor oral intake, urinary output, labs, and treatment plans  - Assess nutrition and hydration status and recommend course of action  - Evaluate amount of meals eaten  - Assist patient with eating if necessary   - Allow adequate time for meals  - Recommend/ encourage appropriate diets, oral nutritional supplements, and vitamin/mineral supplements  - Order, calculate, and assess calorie counts as needed  -  - Assess need for intravenous fluids  - Provide specific nutrition/hydration education as appropriate  - Include patient/family/caregiver in decisions related to nutrition  Outcome: Progressing     Problem: PAIN - ADULT  Goal: Verbalizes/displays adequate comfort level or baseline comfort level  Description: Interventions:  - Encourage patient to monitor pain and request assistance  - Assess pain using appropriate pain scale  - Administer analgesics based on type and severity of pain and evaluate response  - Implement non-pharmacological measures as appropriate and evaluate response  - Consider cultural and social influences on pain and pain management  - Notify physician/advanced practitioner if interventions unsuccessful or patient reports new pain  Outcome: Progressing     Problem: INFECTION - ADULT  Goal: Absence or prevention of progression during hospitalization  Description: INTERVENTIONS:  - Assess and monitor for signs and symptoms of infection  - Monitor lab/diagnostic results  - Monitor all insertion sites, i e  indwelling lines, tubes, and drains  - Monitor endotracheal if appropriate and nasal secretions for changes in amount and color  - [place] appropriate cooling/warming therapies per order  - Administer medications as ordered  - Instruct and encourage patient and family to use good hand hygiene technique  - Identify and instruct in appropriate isolation precautions for identified infection/condition  Outcome: Progressing     Problem: SAFETY ADULT  Goal: Patient will remain free of falls  Description: INTERVENTIONS:  - Educate patient/family on patient safety including physical limitations  - Instruct patient to call for assistance with activity   - Consult OT/PT to assist with strengthening/mobility   - Keep Call bell within reach  - Keep bed low and locked with side rails adjusted as appropriate  - Keep care items and personal belongings within reach  - Initiate and maintain comfort rounds  - Make Fall Risk Sign visible to staff  - Offer Toileting every 2Hours, in advance of need  - Initiate/Maintain bedalarm  - Obtain necessary fall risk management equipment: roller walker  - Apply yellow socks and bracelet for high fall risk patients  - Consider moving patient to room near nurses station  Outcome: Progressing  Goal: Maintain or return to baseline ADL function  Description: INTERVENTIONS:  - Educate patient/family on patient safety including physical limitations  - Instruct patient to call for assistance with activity   - Consult OT/PT to assist with strengthening/mobility   - Keep Call bell within reach  - Keep bed low and locked with side rails adjusted as appropriate  - Keep care items and personal belongings within reach  - Initiate and maintain comfort rounds  - Make Fall Risk Sign visible to staff  - Offer Toileting every 2Hours, in advance of need  - Initiate/Maintain bedalarm  - Obtain necessary fall risk management equipment: roller walker  - Apply yellow socks and bracelet for high fall risk patients  - Consider moving patient to room near nurses station  Outcome: Progressing  Goal: Maintains/Returns to pre admission functional level  Description: INTERVENTIONS:  - Perform BMAT or MOVE assessment daily    - Set and communicate daily mobility goal to care team and patient/family/caregiver  - Collaborate with rehabilitation services on mobility goals if consulted  - Perform Range of Motion 3 times a day  - Reposition patient every 3 hours  - Dangle patient 3 times a day  - Stand patient 3 times a day  - Ambulate patient 3 times a day  - Out of bed to chair 3 times a day   - Out of bed for meals 3 times a day  - Out of bed for toileting  - Record patient progress and toleration of activity level   Outcome: Progressing     Problem: DISCHARGE PLANNING  Goal: Discharge to home or other facility with appropriate resources  Description: INTERVENTIONS:  - Identify barriers to discharge w/patient and caregiver  - Arrange for needed discharge resources and transportation as appropriate  - Identify discharge learning needs (meds, wound care, etc )  - Arrange for interpretive services to assist at discharge as needed  - Refer to Case Management Department for coordinating discharge planning if the patient needs post-hospital services based on physician/advanced practitioner order or complex needs related to functional status, cognitive ability, or social support system  Outcome: Progressing     Problem: Knowledge Deficit  Goal: Patient/family/caregiver demonstrates understanding of disease process, treatment plan, medications, and discharge instructions  Description: Complete learning assessment and assess knowledge base    Interventions:  - Provide teaching at level of understanding  - Provide teaching via preferred learning methods  Outcome: Progressing

## 2021-07-31 NOTE — PROGRESS NOTES
[address]  Progress Note Nasir Gut 1945, 64 y o  male MRN: [id number]  Unit/Bed#: -Birgit Encounter: [id number]  Primary Care Provider: Serena Lobo MD   Date and time admitted to hospital: 7/18/2021  9:06 PM    Hypernatremia  Assessment & Plan  Patient is a nectar thick he is not in taking much  Resolved status post 2 L of D5 water given on 07/28 he is eating better today as reviewed  Ask speech to re-evaluate to see if the diet and be adjusted and nectar thickened BMs in if he does better to allow more nutrition and free water will use D5 water p r n  Patient's diet has been upgraded and he is not eating or drinking recurrent hypernatremia  I discussed with wife in detail regarding further options although I do not recommended in advanced dementia patient's which urea shake at risk of pulling it out as a PEG tube but the wife stated they do not want a PEG tube and that was his wishes in his will  We discussed the best course of action for him is hospice care which she preferred to be done at the facility  She is in agreement to transfer him to hospice    Acute metabolic encephalopathy  Assessment & Plan  Patient's mental status waxes and wanes at baseline given his Alzheimer's Dementia however on 07/25, patient appeared more somnolent on exam  Multifactorial, possibly due to metabolic derangements, delirium given change in environment   MRI a couple of days ago showed encephalomalacia but no acute strokes  Patient is at baseline he is outpatient urology evaluation with encephalomalacia and dementia as a reviewed his PCPs notes back in 2020 he has cognitive decline    Patient has no insight no judgment is not eating or drinking he has a very advanced dementia surgeon to hospice cares discussed    Acute respiratory failure with hypoxia and hypercapnia ([facility] [address] )  Assessment & Plan  · Secondary to CHF which is clinically has resolved DC lost almost 30 lb I have repeated the ABG ABG looks like a respiratory acidosis compensating for contraction alkalosis as he never had any hypercapnia before  BiPAP has been discontinued since 07/27 patient is actually now 92% on room air will continue diuresis with Lasix 60 mg daily monitor electrolytes as well might need p r n  Oxygen especially at night r satting 96% on room air    Hypothyroidism  Assessment & Plan  Patient with a history of hypothyroidism and on levothyroxine 100 mcg at home  TSH done on admission elevated to 14 with ft4 at 1 2  Increased levothyroxine dose to 112 mcg   Patient will need to obtain repeat TSH levels in 4-6 weeks    Essential hypertension  Assessment & Plan  · BP improved continue Norvasc and metoprolol    Ambulatory dysfunction  Assessment & Plan  · Patient has been having frequent falls, and today he could not get up from the floor  EMS was concerned that patient may be in an unsafe situation as patient's wife is having difficulty caring for him  · Once medically cleared will need to go to rehab patient will be transitioned to hospice care will be discharged to nursing home when available    Moderate aortic stenosis by prior echocardiogram  Assessment & Plan  · Echocardiogram May 2021 at [facility]:  EF 55-60%  Normal diastolic function  Consistent with moderate AS, moderate tricuspid regurgitation  Moderately elevated estimated PA systolic pressure  · Repeat echocardiogram done on July 19 shows a mild AS      Type 2 diabetes mellitus with kidney complication, without long-term current use of insulin [facility])  Assessment & Plan  Lab Results   Component Value Date    HGBA1C 6 6 (H) 07/19/2021       Recent Labs     07/30/21  1535 07/30/21  2047 07/31/21  0718 07/31/21  1103   POCGLU 181* 127 135 144*       Blood Sugar Average: Last 72 hrs:  (P) 183 5   · Diabetic diet  · Fingerstick blood sugar sliding scale coverage  · Hold home oral agents  ·  hemoglobin A1c 6 6    His fasting is controlled towards lunch time and throughout the day it has uncontrolled he still not eating 100% will place him on Lantus 10 units in the morning     PAF (paroxysmal atrial fibrillation) ([facility])  Assessment & Plan  · EKG: normal sinus rhythm  · He is not on anticoagulation for suspected GI bleed  · Continue metoprolol he is on Plavix    Acute kidney injury superimposed on chronic kidney disease [facility])  Assessment & Plan  Lab Results   Component Value Date    EGFR 32 07/30/2021    EGFR 32 07/29/2021    EGFR 29 07/28/2021    CREATININE 1 94 (H) 07/30/2021    CREATININE 1 96 (H) 07/29/2021    CREATININE 2 14 (H) 07/28/2021     · CKD stage 3 with creatinine baseline 1 4-1 7  He is only in taking 20-40% he is on nectar thick liquid secondary to dysphagia  Improved today Ultrasound of the kidney and bladder done on 07/20 2-for acute finding continues to imrove- no further blood work the patient continues to have poor p o  Intake and is being transitioned at discharge on comfort care/hospice awaiting insurance authorization            Acute on chronic anemia  Assessment & Plan  · Hemoglobin is 8 5 with baseline around 13 this admission has been fluctuating out in 8 6 no gross bleed  Venofer 5 doses infusions have been started by Nephrology  · Stool Hemoccult is positive  · No evidence of gross bleeding noted  · Will hold p o   Iron  · S/p EGD and colonoscopy on 7/20  "C1 M3 Og's esophagus-biopsies taken to rule out dysplasia  Small hiatal hernia otherwise normal stomach on direct and retroflexed views  Random gastric biopsies taken to rule out H pylori  Normal visualized portion of duodenum from duodenal bulb to D3  Random biopsies taken to rule out celiac disease "    "No evidence of active bleeding  Terminal ileum normal  Scattered diverticula throughout the colon left greater than right without evidence of bleeding  Area of erythema/ulceration/hemorrhage likely from trauma from enema versus old diverticular bleed  Large internal/external hemorrhoids"    · Okay to resume plavix from GI standpoint  · Is status post 1 unit of PRBC transfusion on 07/27 will keep hemoglobin greater than 8 with the kidney injury        Dementia without behavioral disturbance ([facility])  Assessment & Plan  · CT head showing encephalomalacia  · MRI showing the same  · Patient's mental status waxes and wanes   · Continue Aricept, patient has very advanced dementia with encephalomalacia he most of the time does not stand situation he is declining to eat or drink I discussed with wife the best course of action with advanced dementia him not taking enough water I do not recommend a feeding to with dementia and advanced age with him ripping it out the wife stated that in his will he did not want a feeding tube we discussed hospice care and she is in agreement she is unable to take him home on hospice - so will transfer him on comfort care to a nursing home with conversion to hospice  We discussed the POLST form- patient is a DNR DNI no further hospitalizations  · Accepted at [facility]- comfort care/hospice awaiting insurance authorization  * Acute on chronic diastolic CHF (congestive heart failure) ([facility])  Assessment & Plan  · Edema legs have significantly improved he does have some trace up he still has some rales at the bibasilar region I did repeat a chest x-ray 727 there was still mild vascular congestion patient also takes poor p o satting well a chest x-ray just has some bibasilar crackles otherwise no leg edema  Continue Lasix 60 mg p o  Daily as his intake is still poor  · BNP:  4500  · Daily weights  · I&Os  · TTE: LV function showing 55% EF  · Low-salt diet, fluid restriction    · Initially diuresed with Lasix and Bumex drip  · On admission was 276 lb went down to 237 at the weight right now is inaccurate patient is not eating or drinking adequately with advanced dementia no PEG tube as per wishes    As discussed with wife will be transition to hospice care see above          VTE Pharmacologic Prophylaxis: VTE Score: 7 Patient's pain transition is comfort care hospice    Patient Centered Rounds: I performed bedside rounds with nursing staff today  Discussions with Specialists or Other Care Team Provider:  Case management    Education and Discussions with Family / Patient: Updated  (wife) at bedside  Yesterday the plan has not changed to proceed with nursing home hospice    Time Spent for Care: 30 minutes  More than 50% of total time spent on counseling and coordination of care as described above  Current Length of Stay: 13 day(s)  Current Patient Status: Inpatient   Certification Statement: The patient will continue to require additional inpatient hospital stay due to Awaiting insurance authorization  Discharge Plan: Anticipate discharge later today or tomorrow to rehab facility  Code Status: Level 3 - DNAR and DNI    Subjective:   Patient seen and examined pleasantly confused still poor p o  Intake    Objective:     Vitals:   Temp (24hrs), Av 9 °F (36 6 °C), Min:97 8 °F (36 6 °C), Max:97 9 °F (36 6 °C)    Temp:  [97 8 °F (36 6 °C)-97 9 °F (36 6 °C)] 97 9 °F (36 6 °C)  HR:  [55] 55  Resp:  [17-18] 18  BP: (173)/(76) 173/76  SpO2:  [89 %] 89 %  Body mass index is 37 98 kg/m²  Input and Output Summary (last 24 hours): Intake/Output Summary (Last 24 hours) at 2021 1202  Last data filed at 2021 0900  Gross per 24 hour   Intake 666 25 ml   Output 1200 ml   Net -533 75 ml       Physical Exam:   Physical Exam  Vitals and nursing note reviewed  Constitutional:       Appearance: He is well-developed  HENT:      Head: Normocephalic and atraumatic  Eyes:      Conjunctiva/sclera: Conjunctivae normal    Cardiovascular:      Rate and Rhythm: Normal rate and regular rhythm  Heart sounds: No murmur heard  Pulmonary:      Effort: Pulmonary effort is normal  No respiratory distress  Breath sounds: Normal breath sounds   No wheezing or rales  Abdominal:      General: There is no distension  Palpations: Abdomen is soft  Tenderness: There is no abdominal tenderness  Musculoskeletal:         General: No swelling  Cervical back: Neck supple  Skin:     General: Skin is warm and dry  Neurological:      General: No focal deficit present  Mental Status: He is alert and oriented to person, place, and time  Mental status is at baseline  Comments: Disoriented          Additional Data:     Labs:  Results from last 7 days   Lab Units 07/30/21  0536   WBC Thousand/uL 6 19   HEMOGLOBIN g/dL 9 1*   HEMATOCRIT % 31 5*   PLATELETS Thousands/uL 264   NEUTROS PCT % 73   LYMPHS PCT % 11*   MONOS PCT % 10   EOS PCT % 4     Results from last 7 days   Lab Units 07/30/21  0536 07/28/21  0509   SODIUM mmol/L 147* 149*   POTASSIUM mmol/L 4 3 3 7   CHLORIDE mmol/L 107 106   CO2 mmol/L 35* 37*   BUN mg/dL 33* 37*   CREATININE mg/dL 1 94* 2 14*   ANION GAP mmol/L 5 6   CALCIUM mg/dL 8 5 8 5   ALBUMIN g/dL  --  3 5   TOTAL BILIRUBIN mg/dL  --  1 35*   ALK PHOS U/L  --  94   ALT U/L  --  19   AST U/L  --  19   GLUCOSE RANDOM mg/dL 157* 125         Results from last 7 days   Lab Units 07/31/21  1103 07/31/21  0718 07/30/21  2047 07/30/21  1535 07/30/21  1111 07/30/21  0706 07/29/21  2107 07/29/21  1621 07/29/21  1106 07/29/21  0704 07/28/21  2115 07/28/21  1600   POC GLUCOSE mg/dl 144* 135 127 181* 154* 157* 208* 195* 226* 172* 203* 268*         Results from last 7 days   Lab Units 07/26/21  0606 07/25/21  1613   PROCALCITONIN ng/ml 0 15 0 16       Lines/Drains:  Invasive Devices     Peripheral Intravenous Line            Peripheral IV 07/28/21 Dorsal (posterior); Right Hand 2 days                      Imaging: Reviewed radiology reports from this admission including: CT head    Recent Cultures (last 7 days):         Last 24 Hours Medication List:   Current Facility-Administered Medications   Medication Dose Route Frequency Provider Last Rate    acetaminophen  650 mg Oral Q4H PRN Jayesh Kenyon PA-C      amLODIPine  5 mg Oral Daily Patti Garner MD      atorvastatin  80 mg Oral Daily With Cesar Lujan PA-C      clopidogrel  75 mg Oral Daily Jayseh Kenyon PA-C      donepezil  5 mg Oral HS Jayesh Kenyon PA-C      furosemide  60 mg Oral Daily Patti Garner MD      insulin glargine  10 Units Subcutaneous QAM Patti Garner MD      insulin lispro  1-6 Units Subcutaneous TID [facility] Patti Garner MD      iron sucrose  200 mg Intravenous Daily SEYMOUR Ray 200 mg (07/29/21 0932)    levothyroxine  112 mcg Oral Early Morning Jayesh Kenyon PA-C      metoprolol succinate  50 mg Oral Daily Jeremiah Pichardo [place]      pantoprazole  40 mg Oral Early Morning Jayesh Kenyon PA-C      potassium chloride  20 mEq Oral Daily Patti Garner MD      QUEtiapine  25 mg Oral HS Jayesh Kenyon PA-C          Today, Patient Was Seen By: Patti Garner MD    **Please Note: This note may have been constructed using a voice recognition system  **

## 2021-07-31 NOTE — ASSESSMENT & PLAN NOTE
· Echocardiogram May 2021 at [facility]:  EF 55-60%  Normal diastolic function  Consistent with moderate AS, moderate tricuspid regurgitation  Moderately elevated estimated PA systolic pressure    · Repeat echocardiogram done on July 19 shows a mild AS

## 2021-07-31 NOTE — ASSESSMENT & PLAN NOTE
Lab Results   Component Value Date    EGFR 32 07/30/2021    EGFR 32 07/29/2021    EGFR 29 07/28/2021    CREATININE 1 94 (H) 07/30/2021    CREATININE 1 96 (H) 07/29/2021    CREATININE 2 14 (H) 07/28/2021     · CKD stage 3 with creatinine baseline 1 4-1 7  He is only in taking 20-40% he is on nectar thick liquid secondary to dysphagia  Improved today Ultrasound of the kidney and bladder done on 07/20 2-for acute finding continues to imrove- no further blood work the patient continues to have poor p o   Intake and is being transitioned at discharge on comfort care/hospice awaiting insurance authorization

## 2021-07-31 NOTE — CASE MANAGEMENT
Case Management Progress Note    Patient name Ashley Denis  Location [address] 331/-01 MRN [id number]  : 1945 Date 2021       LOS (days): 13  Geometric Mean LOS (GMLOS) (days): 4 00  Days to GMLOS:-8 6        PROGRESS NOTE:    CM initiated prior auth through [address] [place] [address] ([phone]) for pt to go to [facility] B # [id number]  Awaiting clinician from [address] [place] [address] to call with fax number to send clinicals       [phone] as per Leslye at [address] [place] [address], [address]ozo instructed to fax clinical to [phone]  YOLANDA faxing clinical

## 2021-08-01 LAB
GLUCOSE SERPL-MCNC: 109 MG/DL (ref 65–140)
GLUCOSE SERPL-MCNC: 116 MG/DL (ref 65–140)
GLUCOSE SERPL-MCNC: 180 MG/DL (ref 65–140)
GLUCOSE SERPL-MCNC: 96 MG/DL (ref 65–140)

## 2021-08-01 PROCEDURE — 99232 SBSQ HOSP IP/OBS MODERATE 35: CPT | Performed by: FAMILY MEDICINE

## 2021-08-01 PROCEDURE — 82948 REAGENT STRIP/BLOOD GLUCOSE: CPT

## 2021-08-01 RX ADMIN — IRON SUCROSE 200 MG: 20 INJECTION, SOLUTION INTRAVENOUS at 08:54

## 2021-08-01 RX ADMIN — CLOPIDOGREL BISULFATE 75 MG: 75 TABLET ORAL at 08:42

## 2021-08-01 RX ADMIN — POTASSIUM CHLORIDE 20 MEQ: 1500 TABLET, EXTENDED RELEASE ORAL at 08:42

## 2021-08-01 RX ADMIN — AMLODIPINE BESYLATE 5 MG: 5 TABLET ORAL at 08:43

## 2021-08-01 RX ADMIN — DONEPEZIL HYDROCHLORIDE 5 MG: 5 TABLET ORAL at 21:13

## 2021-08-01 RX ADMIN — INSULIN GLARGINE 10 UNITS: 100 INJECTION, SOLUTION SUBCUTANEOUS at 11:05

## 2021-08-01 RX ADMIN — METOPROLOL SUCCINATE 50 MG: 50 TABLET, EXTENDED RELEASE ORAL at 08:42

## 2021-08-01 RX ADMIN — ATORVASTATIN CALCIUM 80 MG: 40 TABLET, FILM COATED ORAL at 16:23

## 2021-08-01 RX ADMIN — QUETIAPINE FUMARATE 25 MG: 25 TABLET ORAL at 21:13

## 2021-08-01 RX ADMIN — FUROSEMIDE 60 MG: 40 TABLET ORAL at 08:42

## 2021-08-01 NOTE — PLAN OF CARE
Problem: Potential for Falls  Goal: Patient will remain free of falls  Description: INTERVENTIONS:  - Educate patient/family on patient safety including physical limitations  - Instruct patient to call for assistance with activity   - Consult OT/PT to assist with strengthening/mobility   - Keep Call bell within reach  - Keep bed low and locked with side rails adjusted as appropriate  - Keep care items and personal belongings within reach  - Initiate and maintain comfort rounds  - Make Fall Risk Sign visible to staff  - Offer Toileting every 2Hours, in advance of need  - Initiate/Maintain bedalarm  - Obtain necessary fall risk management equipment: roller walker  - Apply yellow socks and bracelet for high fall risk patients  - Consider moving patient to room near nurses station  Outcome: Progressing     Problem: MOBILITY - ADULT  Goal: Maintains/Returns to pre admission functional level  Description: INTERVENTIONS:  - Perform BMAT or MOVE assessment daily    - Set and communicate daily mobility goal to care team and patient/family/caregiver  - Collaborate with rehabilitation services on mobility goals if consulted  - Perform Range of Motion 2 times a day  - Reposition patient every 2 hours    - Dangle patient 2 times a day  - Stand patient 2 times a day  - Ambulate patient 2 times a day  - Out of bed to chair 2 times a day   - Out of bed for meals 2 times a day  - Out of bed for toileting  - Record patient progress and toleration of activity level   Outcome: Progressing     Problem: Prexisting or High Potential for Compromised Skin Integrity  Goal: Skin integrity is maintained or improved  Description: INTERVENTIONS:  - Identify patients at risk for skin breakdown  - Assess and monitor skin integrity  - Assess and monitor nutrition and hydration status  - Monitor labs   - Assess for incontinence   - Turn and reposition patient  - Assist with mobility/ambulation  - Relieve pressure over bony prominences  - Avoid friction and shearing  - Provide appropriate hygiene as needed including keeping skin clean and dry  - Evaluate need for skin moisturizer/barrier cream  - Collaborate with interdisciplinary team   - Patient/family teaching  - Consider wound care consult   Outcome: Progressing     Problem: METABOLIC, FLUID AND ELECTROLYTES - ADULT  Goal: Electrolytes maintained within normal limits  Description: INTERVENTIONS:  - Monitor labs and assess patient for signs and symptoms of electrolyte imbalances  - Administer electrolyte replacement as ordered  - Monitor response to electrolyte replacements, including repeat lab results as appropriate  - Instruct patient on fluid and nutrition as appropriate  Outcome: Progressing  Goal: Fluid balance maintained  Description: INTERVENTIONS:  - Monitor labs   - Monitor I/O and WT  - Instruct patient on fluid and nutrition as appropriate  - Assess for signs & symptoms of volume excess or deficit  Outcome: Progressing  Goal: Glucose maintained within target range  Description: INTERVENTIONS:  - Monitor Blood Glucose as ordered  - Assess for signs and symptoms of hyperglycemia and hypoglycemia  - Administer ordered medications to maintain glucose within target range  - Assess nutritional intake and initiate nutrition service referral as needed  Outcome: Progressing     Problem: SKIN/TISSUE INTEGRITY - ADULT  Goal: Incision(s), wounds(s) or drain site(s) healing without S/S of infection  Description: INTERVENTIONS  - Assess and document dressing, incision, wound bed, drain sites and surrounding tissue  - Provide patient and family education  - Perform skin care/dressing changes every   Outcome: Progressing     Problem: HEMATOLOGIC - ADULT  Goal: Maintains hematologic stability  Description: INTERVENTIONS  - Assess for signs and symptoms of bleeding or hemorrhage  - Monitor labs  - Administer supportive blood products/factors as ordered and appropriate  Outcome: Progressing     Problem: INFECTION - ADULT  Goal: Absence or prevention of progression during hospitalization  Description: INTERVENTIONS:  - Assess and monitor for signs and symptoms of infection  - Monitor lab/diagnostic results  - Monitor all insertion sites, i e  indwelling lines, tubes, and drains  - Monitor endotracheal if appropriate and nasal secretions for changes in amount and color  - [place] appropriate cooling/warming therapies per order  - Administer medications as ordered  - Instruct and encourage patient and family to use good hand hygiene technique  - Identify and instruct in appropriate isolation precautions for identified infection/condition  Outcome: Progressing     Problem: SAFETY ADULT  Goal: Patient will remain free of falls  Description: INTERVENTIONS:  - Educate patient/family on patient safety including physical limitations  - Instruct patient to call for assistance with activity   - Consult OT/PT to assist with strengthening/mobility   - Keep Call bell within reach  - Keep bed low and locked with side rails adjusted as appropriate  - Keep care items and personal belongings within reach  - Initiate and maintain comfort rounds  - Make Fall Risk Sign visible to staff  - Offer Toileting every 2Hours, in advance of need  - Initiate/Maintain bedalarm  - Obtain necessary fall risk management equipment: roller walker  - Apply yellow socks and bracelet for high fall risk patients  - Consider moving patient to room near nurses station  Outcome: Progressing  Goal: Maintains/Returns to pre admission functional level  Description: INTERVENTIONS:  - Perform BMAT or MOVE assessment daily    - Set and communicate daily mobility goal to care team and patient/family/caregiver  - Collaborate with rehabilitation services on mobility goals if consulted  - Perform Range of Motion 2 times a day  - Reposition patient every 2 hours    - Dangle patient 2 times a day  - Stand patient 2 times a day  - Ambulate patient 2 times a day  - Out of bed to chair 2 times a day   - Out of bed for meals 2 times a day  - Out of bed for toileting  - Record patient progress and toleration of activity level   Outcome: Progressing     Problem: DISCHARGE PLANNING  Goal: Discharge to home or other facility with appropriate resources  Description: INTERVENTIONS:  - Identify barriers to discharge w/patient and caregiver  - Arrange for needed discharge resources and transportation as appropriate  - Identify discharge learning needs (meds, wound care, etc )  - Arrange for interpretive services to assist at discharge as needed  - Refer to Case Management Department for coordinating discharge planning if the patient needs post-hospital services based on physician/advanced practitioner order or complex needs related to functional status, cognitive ability, or social support system  Outcome: Progressing     Problem: Knowledge Deficit  Goal: Patient/family/caregiver demonstrates understanding of disease process, treatment plan, medications, and discharge instructions  Description: Complete learning assessment and assess knowledge base    Interventions:  - Provide teaching at level of understanding  - Provide teaching via preferred learning methods  Outcome: Progressing

## 2021-08-01 NOTE — ASSESSMENT & PLAN NOTE
· CT head showing encephalomalacia  · MRI showing the same  · Patient's mental status waxes and wanes   · Continue Aricept, patient has very advanced dementia with encephalomalacia he most of the time does not stand situation he is declining to eat or drink I discussed with wife the best course of action with advanced dementia him not taking enough water I do not recommend a feeding to with dementia and advanced age with him ripping it out the wife stated that in his will he did not want a feeding tube we discussed hospice care and she is in agreement she is unable to take him home on hospice - so will transfer him on comfort care to a nursing home with conversion to hospice  We discussed the POLST form- patient is a DNR DNI no further hospitalizations  · Accepted at [facility]- comfort care/hospice awaiting insurance authorization

## 2021-08-01 NOTE — ASSESSMENT & PLAN NOTE
Lab Results   Component Value Date    HGBA1C 6 6 (H) 07/19/2021       Recent Labs     07/31/21  1103 07/31/21  1549 07/31/21 2057 08/01/21  0717   POCGLU 144* 139 122 109       Blood Sugar Average: Last 72 hrs:  (P) 520 [id number]   · Diabetic diet  · Fingerstick blood sugar sliding scale coverage  · Hold home oral agents  ·  hemoglobin A1c 6 6    His fasting is controlled towards lunch time and throughout the day it has uncontrolled he still not eating 100% will place him on Lantus 10 units in the morning

## 2021-08-01 NOTE — PROGRESS NOTES
[address]  Progress Note Pricila Jay 1945, 64 y o  male MRN: [id number]  Unit/Bed#: -Birgit Encounter: [id number]  Primary Care Provider: Juan Wynne MD   Date and time admitted to hospital: 7/18/2021  9:06 PM    Hypernatremia  Assessment & Plan  Patient is a nectar thick he is not in taking much  Resolved status post 2 L of D5 water given on 07/28 he is eating better today as reviewed  Ask speech to re-evaluate to see if the diet and be adjusted and nectar thickened BMs in if he does better to allow more nutrition and free water will use D5 water p r n  Patient's diet has been upgraded and he is not eating or drinking recurrent hypernatremia  I discussed with wife in detail regarding further options although I do not recommended in advanced dementia patient's which urea shake at risk of pulling it out as a PEG tube but the wife stated they do not want a PEG tube and that was his wishes in his will  We discussed the best course of action for him is hospice care which she preferred to be done at the facility  She is in agreement to transfer him to hospice    Acute metabolic encephalopathy  Assessment & Plan  Patient's mental status waxes and wanes at baseline given his Alzheimer's Dementia however on 07/25, patient appeared more somnolent on exam  Multifactorial, possibly due to metabolic derangements, delirium given change in environment   MRI a couple of days ago showed encephalomalacia but no acute strokes  Patient is at baseline he is outpatient urology evaluation with encephalomalacia and dementia as a reviewed his PCPs notes back in 2020 he has cognitive decline    Patient has no insight no judgment is not eating or drinking he has a very advanced dementia surgeon to hospice cares discussed    Acute respiratory failure with hypoxia and hypercapnia ([facility] [address] )  Assessment & Plan  · Secondary to CHF which is clinically has resolved DC lost almost 30 lb I have repeated the ABG ABG looks like a respiratory acidosis compensating for contraction alkalosis as he never had any hypercapnia before  BiPAP has been discontinued since 07/27 patient is actually now 92% on room air will continue diuresis with Lasix 60 mg daily monitor electrolytes as well might need p r n  Oxygen especially at night r satting 96% on room air    Hypothyroidism  Assessment & Plan  Patient with a history of hypothyroidism and on levothyroxine 100 mcg at home  TSH done on admission elevated to 14 with ft4 at 1 2  Increased levothyroxine dose to 112 mcg   Patient will need to obtain repeat TSH levels in 4-6 weeks    Essential hypertension  Assessment & Plan  · BP improved continue Norvasc and metoprolol    Ambulatory dysfunction  Assessment & Plan  · Patient has been having frequent falls, and today he could not get up from the floor  EMS was concerned that patient may be in an unsafe situation as patient's wife is having difficulty caring for him  · Once medically cleared will need to go to rehab patient will be transitioned to hospice care will be discharged to nursing home when available    Moderate aortic stenosis by prior echocardiogram  Assessment & Plan  · Echocardiogram May 2021 at [facility]:  EF 55-60%  Normal diastolic function  Consistent with moderate AS, moderate tricuspid regurgitation  Moderately elevated estimated PA systolic pressure  · Repeat echocardiogram done on July 19 shows a mild AS      Type 2 diabetes mellitus with kidney complication, without long-term current use of insulin [facility])  Assessment & Plan  Lab Results   Component Value Date    HGBA1C 6 6 (H) 07/19/2021       Recent Labs     07/31/21  1103 07/31/21  1549 07/31/21  2057 08/01/21  0717   POCGLU 144* 139 122 109       Blood Sugar Average: Last 72 hrs:  (P) 448 [id number]   · Diabetic diet  · Fingerstick blood sugar sliding scale coverage  · Hold home oral agents  ·  hemoglobin A1c 6 6    His fasting is controlled towards lunch time and throughout the day it has uncontrolled he still not eating 100% will place him on Lantus 10 units in the morning     PAF (paroxysmal atrial fibrillation) ([facility])  Assessment & Plan  · EKG: normal sinus rhythm  · He is not on anticoagulation for suspected GI bleed  · Continue metoprolol he is on Plavix    Acute kidney injury superimposed on chronic kidney disease [facility])  Assessment & Plan  Lab Results   Component Value Date    EGFR 32 07/30/2021    EGFR 32 07/29/2021    EGFR 29 07/28/2021    CREATININE 1 94 (H) 07/30/2021    CREATININE 1 96 (H) 07/29/2021    CREATININE 2 14 (H) 07/28/2021     · CKD stage 3 with creatinine baseline 1 4-1 7  He is only in taking 20-40% he is on nectar thick liquid secondary to dysphagia  Improved today Ultrasound of the kidney and bladder done on 07/20 2-for acute finding continues to imrove- no further blood work the patient continues to have poor p o  Intake and is being transitioned at discharge on comfort care/hospice awaiting insurance authorization            Acute on chronic anemia  Assessment & Plan  · Hemoglobin is 8 5 with baseline around 13 this admission has been fluctuating out in 8 6 no gross bleed  Venofer 5 doses infusions have been started by Nephrology  · Stool Hemoccult is positive  · No evidence of gross bleeding noted  · Will hold p o   Iron  · S/p EGD and colonoscopy on 7/20  "C1 M3 Og's esophagus-biopsies taken to rule out dysplasia  Small hiatal hernia otherwise normal stomach on direct and retroflexed views  Random gastric biopsies taken to rule out H pylori  Normal visualized portion of duodenum from duodenal bulb to D3  Random biopsies taken to rule out celiac disease "    "No evidence of active bleeding  Terminal ileum normal  Scattered diverticula throughout the colon left greater than right without evidence of bleeding  Area of erythema/ulceration/hemorrhage likely from trauma from enema versus old diverticular bleed  Large internal/external hemorrhoids"    · Okay to resume plavix from GI standpoint  · Is status post 1 unit of PRBC transfusion on 07/27 will keep hemoglobin greater than 8 with the kidney injury        Dementia without behavioral disturbance ([facility])  Assessment & Plan  · CT head showing encephalomalacia  · MRI showing the same  · Patient's mental status waxes and wanes   · Continue Aricept, patient has very advanced dementia with encephalomalacia he most of the time does not stand situation he is declining to eat or drink I discussed with wife the best course of action with advanced dementia him not taking enough water I do not recommend a feeding to with dementia and advanced age with him ripping it out the wife stated that in his will he did not want a feeding tube we discussed hospice care and she is in agreement she is unable to take him home on hospice - so will transfer him on comfort care to a nursing home with conversion to hospice  We discussed the POLST form- patient is a DNR DNI no further hospitalizations  · Accepted at [facility]- comfort care/hospice awaiting insurance authorization  * Acute on chronic diastolic CHF (congestive heart failure) ([facility])  Assessment & Plan  · Edema legs have significantly improved he does have some trace up he still has some rales at the bibasilar region I did repeat a chest x-ray 727 there was still mild vascular congestion patient also takes poor p o satting well a chest x-ray just has some bibasilar crackles otherwise no leg edema  Continue Lasix 60 mg p o  Daily as his intake is still poor  · BNP:  4500  · Daily weights  · I&Os  · TTE: LV function showing 55% EF  · Low-salt diet, fluid restriction    · Initially diuresed with Lasix and Bumex drip  · On admission was 276 lb went down to 237 at the weight right now is inaccurate patient is not eating or drinking adequately with advanced dementia no PEG tube as per wishes    As discussed with wife will be transition to General: There is no distension  Palpations: Abdomen is soft  Tenderness: There is no abdominal tenderness  Musculoskeletal:         General: No swelling  Cervical back: Neck supple  Skin:     General: Skin is warm and dry  Neurological:      Mental Status: He is alert  Comments: Oriented to name only confused to situation and time and place   Psychiatric:         Mood and Affect: Mood normal          Additional Data:     Labs:  Results from last 7 days   Lab Units 07/30/21  0536   WBC Thousand/uL 6 19   HEMOGLOBIN g/dL 9 1*   HEMATOCRIT % 31 5*   PLATELETS Thousands/uL 264   NEUTROS PCT % 73   LYMPHS PCT % 11*   MONOS PCT % 10   EOS PCT % 4     Results from last 7 days   Lab Units 07/30/21  0536 07/28/21  0509   SODIUM mmol/L 147* 149*   POTASSIUM mmol/L 4 3 3 7   CHLORIDE mmol/L 107 106   CO2 mmol/L 35* 37*   BUN mg/dL 33* 37*   CREATININE mg/dL 1 94* 2 14*   ANION GAP mmol/L 5 6   CALCIUM mg/dL 8 5 8 5   ALBUMIN g/dL  --  3 5   TOTAL BILIRUBIN mg/dL  --  1 35*   ALK PHOS U/L  --  94   ALT U/L  --  19   AST U/L  --  19   GLUCOSE RANDOM mg/dL 157* 125         Results from last 7 days   Lab Units 08/01/21  0717 07/31/21  2057 07/31/21  1549 07/31/21  1103 07/31/21  0718 07/30/21  2047 07/30/21  1535 07/30/21  1111 07/30/21  0706 07/29/21  2107 07/29/21  1621 07/29/21  1106   POC GLUCOSE mg/dl 109 122 139 144* 135 127 181* 154* 157* 208* 195* 226*         Results from last 7 days   Lab Units 07/26/21  0606 07/25/21  1613   PROCALCITONIN ng/ml 0 15 0 16       Lines/Drains:  Invasive Devices     Peripheral Intravenous Line            Peripheral IV 08/01/21 Dorsal (posterior); Left Forearm <1 day                      Imaging: Reviewed radiology reports from this admission including: chest xray    Recent Cultures (last 7 days):         Last 24 Hours Medication List:   Current Facility-Administered Medications   Medication Dose Route Frequency Provider Last Rate    acetaminophen  650 mg Oral Q4H PRN Marvin Yeh PA-C      amLODIPine  5 mg Oral Daily Ayesha Donahue MD      atorvastatin  80 mg Oral Daily With Cesar Lujan PA-C      clopidogrel  75 mg Oral Daily Helio Russell      donepezil  5 mg Oral HS Marvin Yeh PA-C      furosemide  60 mg Oral Daily Ayesha Donahue MD      insulin glargine  10 Units Subcutaneous QAM Ayesha Donahue MD      insulin lispro  1-6 Units Subcutaneous TID [facility] Ayesha Donahue MD      iron sucrose  200 mg Intravenous Daily SEYMOUR Bush Stopped (07/31/21 1100)    levothyroxine  112 mcg Oral Early Morning Marvin Yeh PA-C      metoprolol succinate  50 mg Oral Daily Jeremiah Pichardo [place]      pantoprazole  40 mg Oral Early Morning Marvin Yeh PA-C      potassium chloride  20 mEq Oral Daily Ayesha Donahue MD      QUEtiapine  25 mg Oral HS Marvin Yeh PA-C          Today, Patient Was Seen By: Ayesha Donahue MD    **Please Note: This note may have been constructed using a voice recognition system  **

## 2021-08-02 LAB
GLUCOSE SERPL-MCNC: 110 MG/DL (ref 65–140)
GLUCOSE SERPL-MCNC: 175 MG/DL (ref 65–140)
GLUCOSE SERPL-MCNC: 186 MG/DL (ref 65–140)
GLUCOSE SERPL-MCNC: 76 MG/DL (ref 65–140)

## 2021-08-02 PROCEDURE — 99232 SBSQ HOSP IP/OBS MODERATE 35: CPT | Performed by: FAMILY MEDICINE

## 2021-08-02 PROCEDURE — 82948 REAGENT STRIP/BLOOD GLUCOSE: CPT

## 2021-08-02 RX ADMIN — LEVOTHYROXINE SODIUM 112 MCG: 112 TABLET ORAL at 06:11

## 2021-08-02 RX ADMIN — QUETIAPINE FUMARATE 25 MG: 25 TABLET ORAL at 21:55

## 2021-08-02 RX ADMIN — METOPROLOL SUCCINATE 50 MG: 50 TABLET, EXTENDED RELEASE ORAL at 09:03

## 2021-08-02 RX ADMIN — IRON SUCROSE 200 MG: 20 INJECTION, SOLUTION INTRAVENOUS at 09:11

## 2021-08-02 RX ADMIN — POTASSIUM CHLORIDE 20 MEQ: 1500 TABLET, EXTENDED RELEASE ORAL at 09:02

## 2021-08-02 RX ADMIN — PANTOPRAZOLE SODIUM 40 MG: 40 TABLET, DELAYED RELEASE ORAL at 06:11

## 2021-08-02 RX ADMIN — FUROSEMIDE 60 MG: 40 TABLET ORAL at 09:02

## 2021-08-02 RX ADMIN — DONEPEZIL HYDROCHLORIDE 5 MG: 5 TABLET ORAL at 21:55

## 2021-08-02 RX ADMIN — CLOPIDOGREL BISULFATE 75 MG: 75 TABLET ORAL at 09:03

## 2021-08-02 RX ADMIN — ATORVASTATIN CALCIUM 80 MG: 40 TABLET, FILM COATED ORAL at 17:06

## 2021-08-02 RX ADMIN — AMLODIPINE BESYLATE 5 MG: 5 TABLET ORAL at 09:03

## 2021-08-02 NOTE — NUTRITION
08/02/21 1720   Assessment   Timepoint Reassess  (MD consult for poor appetite)   Labs & Meds   Labs/Meds Review Lab values reviewed; Meds reviewed  (labs:  7/30 BUN/creat 33/1 94, GFR 32, H&H 9 1/31 5, POC glu 186)   Feeding Route   PO With assistance  (S O Everlena Dia is present at lunch and dinner meals)   Swallow Other (Comment)  (speech to re-evaluate to see if the diet can be adjusted and nectar thick if he does better to allow more nutrition and free water, speech allowing regular diet with thin liq)   Adequacy of Intake   Nutrition Modality PO  (regular)   Current Meal Intake 0-25%;25-50%  (eating a little better with upgrade in diet to regular)   Estimated Calorie Intake 25-50%   Estimated Protein Intake  25-50%   Estimated Fluid Intake 25-50%   Estimated calorie intake compared to estimated need Everlena Dia present today in PT's room,  (s o ) states poor po intake, usually PT refuses Enlive supplements, but agreeing to try the Glucerna, likes chocolate only, of note: Pt transitioning to hospice care   Nutrition Prognosis   Nutrition Concerns RN skin assessment reviewed  (nonpitting bilateral LE edema noted)   Nutrition Precautions & Barriers to Adequate Nutrition   (advanced dementia)   Nutrition Considerations Pt/Parents not appropriate for educ  at this time   PES Statement   Problem Continue previous diagnosis   Additional PES needed? Yes   PES Statement 2   Problem Intake   As evidenced by Per patient/family interview   Related to Decreased appetite   Oral or Nutritional Support Intake (2) Inadequate oral intake NI-2 1   Patient Nutrition Goals   Goal Increase kcal/PRO intake   Goal Status initiated   Timeframe to complete goal by next f/u   Recommendations/Interventions   Summary PT agreeing to try Glucerna TID/chocolate flavor, diet restrictions have been removed,Pt transitioning to hospice care   Interventions Diet: continued as ordered; Supplement initiate  (Glucerna TID/chocolate)   Nutrition Recommendations Other (Specify)  (Glucerna TID/chocolate)   Nutrition Complexity Risk   Nutrition complexity level Low risk  (Pt transitioning to hospice care)   Follow up date 08/09/21   MD: please order Glucerna TID/chocolate  Nutrition  Services does not have protocol   Thank you

## 2021-08-02 NOTE — ASSESSMENT & PLAN NOTE
· Secondary to CHF which is clinically has resolved DC lost almost 30 lb I have repeated the ABG ABG looks like a respiratory acidosis compensating for contraction alkalosis as he never had any hypercapnia before  BiPAP has been discontinued since 07/27 patient is actually now 92% on room air will continue diuresis with Lasix 60 mg daily monitor electrolytes as well might need p r n  During the day his on room air early in the morning he decides to 80 he may need p r n   Oxygen

## 2021-08-02 NOTE — ASSESSMENT & PLAN NOTE
Lab Results   Component Value Date    HGBA1C 6 6 (H) 07/19/2021       Recent Labs     08/01/21  1104 08/01/21  1612 08/01/21  2101 08/02/21  0700   POCGLU 180* 116 96 76       Blood Sugar Average: Last 72 hrs:  (P) 687 [id number]   · Diabetic diet  · Fingerstick blood sugar sliding scale coverage  · Hold home oral agents  ·  hemoglobin A1c 6 6    His fasting is controlled towards lunch time and throughout the day it has uncontrolled he still not eating 100% will place him on Lantus 10 units in the morning

## 2021-08-02 NOTE — CASE MANAGEMENT
Case Management Progress Note    Patient name Alaina Reed  Location /-01 MRN [id number]  : 1945 Date 2021       LOS (days): 15  Geometric Mean LOS (GMLOS) (days): 4 00  Days to GMLOS:-10 5        BUNDLE:      OBJECTIVE:  Pt is a 64y o  year old , white or  [1], male with Jehovah's witness preference of None admitted on 2021  9:06 PM  Pt is admitted to [facility] [id number] at [address] [place] [address] with complaints of Acute on chronic diastolic CHF (congestive heart failure) ([facility] [address] )   Current admission status: Inpatient  Preferred Pharmacy:   [facility], [address] [place] [address]  Phone: [phone] Fax: [phone]    Primary Care Provider: Zain Connelly MD    Primary Insurance: [facility]  Secondary Insurance: [facility]    PROGRESS NOTE:    Possible dc today pending auth  CM called [employer] and spoke to [facility]  Per Lopez Maza all clinical was obtained and waiting for review at this time  Called back to [facility] to get auth for transport to facility for dc-  Per Ed Leaver was needed, authorization obtained for 30 days_reference number_ [id number]  Await a call back with authorization

## 2021-08-02 NOTE — CASE MANAGEMENT
Case Management Progress Note    Patient name Saint Lively  Location /-01 MRN [id number]  : 1945 Date 2021       LOS (days): 15  Geometric Mean LOS (GMLOS) (days): 4 00  Days to GMLOS:-10 7        BUNDLE:      OBJECTIVE:  Pt is a 64y o  year old , white or  [1], male with Judaism preference of None admitted on 2021  9:06 PM  Pt is admitted to [facility] [id number] at [address] with complaints of Acute on chronic diastolic CHF (congestive heart failure) ([address] )   Current admission status: Inpatient  [facility], [address] [place] [address]  Phone: [phone] Fax: [phone]    Primary Care Provider: Juan Wynne MD    Primary Insurance: [facility]  Secondary Insurance: [facility]    PROGRESS NOTE:      Call was received from Jo [place] from [facility]F as MD " did not meet criteria"  Peer to Peer was offered  and this will need to be done within 72 hours  - Case ID is [phone]    Discussed this with MD and P2P will not be done as patient is on " comfort care"  YOLANDA spoke to Lupe/JAVI/ Significant other  at the bedside, she understood, she verbalized she knows she can not care for patient  and is still in agreement for [facility]- She is aware, she will need to apply for MA  CM contacted Admissions, Say Perez, [facility] to see if they can accommodate today  YOLANDA left a message for her to call me back to discuss and also left in [place]  Await determination of acceptance at [facility]

## 2021-08-02 NOTE — PLAN OF CARE
Problem: Potential for Falls  Goal: Patient will remain free of falls  Description: INTERVENTIONS:  - Educate patient/family on patient safety including physical limitations  - Instruct patient to call for assistance with activity   - Consult OT/PT to assist with strengthening/mobility   - Keep Call bell within reach  - Keep bed low and locked with side rails adjusted as appropriate  - Keep care items and personal belongings within reach  - Initiate and maintain comfort rounds  - Make Fall Risk Sign visible to staff  - Offer Toileting every 2Hours, in advance of need  - Initiate/Maintain bedalarm  - Obtain necessary fall risk management equipment: roller walker  - Apply yellow socks and bracelet for high fall risk patients  - Consider moving patient to room near nurses station  Outcome: Progressing     Problem: MOBILITY - ADULT  Goal: Maintain or return to baseline ADL function  Description: INTERVENTIONS:  - Educate patient/family on patient safety including physical limitations  - Instruct patient to call for assistance with activity   - Consult OT/PT to assist with strengthening/mobility   - Keep Call bell within reach  - Keep bed low and locked with side rails adjusted as appropriate  - Keep care items and personal belongings within reach  - Initiate and maintain comfort rounds  - Make Fall Risk Sign visible to staff  - Offer Toileting every 2Hours, in advance of need  - Initiate/Maintain bedalarm  - Obtain necessary fall risk management equipment: roller walker  - Apply yellow socks and bracelet for high fall risk patients  - Consider moving patient to room near nurses station  Outcome: Progressing  Goal: Maintains/Returns to pre admission functional level  Description: INTERVENTIONS:  - Perform BMAT or MOVE assessment daily    - Set and communicate daily mobility goal to care team and patient/family/caregiver     - Collaborate with rehabilitation services on mobility goals if consulted  - Out of bed for toileting  - Record patient progress and toleration of activity level   Outcome: Progressing     Problem: Prexisting or High Potential for Compromised Skin Integrity  Goal: Skin integrity is maintained or improved  Description: INTERVENTIONS:  - Identify patients at risk for skin breakdown  - Assess and monitor skin integrity  - Assess and monitor nutrition and hydration status  - Monitor labs   - Assess for incontinence   - Turn and reposition patient  - Assist with mobility/ambulation  - Relieve pressure over bony prominences  - Avoid friction and shearing  - Provide appropriate hygiene as needed including keeping skin clean and dry  - Evaluate need for skin moisturizer/barrier cream  - Collaborate with interdisciplinary team   - Patient/family teaching  - Consider wound care consult   Outcome: Progressing     Problem: CARDIOVASCULAR - ADULT  Goal: Maintains optimal cardiac output and hemodynamic stability  Description: INTERVENTIONS:  - Monitor I/O, vital signs and rhythm  - Monitor for S/S and trends of decreased cardiac output  - Administer and titrate ordered vasoactive medications to optimize hemodynamic stability  - Assess quality of pulses, skin color and temperature  - Assess for signs of decreased coronary artery perfusion  - Instruct patient to report change in severity of symptoms  Outcome: Progressing  Goal: Absence of cardiac dysrhythmias or at baseline rhythm  Description: INTERVENTIONS:  - Continuous cardiac monitoring, vital signs, obtain 12 lead EKG if ordered  - Administer antiarrhythmic and heart rate control medications as ordered  - Monitor electrolytes and administer replacement therapy as ordered  Outcome: Progressing     Problem: RESPIRATORY - ADULT  Goal: Achieves optimal ventilation and oxygenation  Description: INTERVENTIONS:  - Assess for changes in respiratory status  - Assess for changes in mentation and behavior  - Position to facilitate oxygenation and minimize respiratory effort  - Oxygen administered by appropriate delivery if ordered  - Initiate smoking cessation education as indicated  - Encourage broncho-pulmonary hygiene including cough, deep breathe, Incentive Spirometry  - Assess the need for suctioning and aspirate as needed  - Assess and instruct to report SOB or any respiratory difficulty  - Respiratory Therapy support as indicated  Outcome: Progressing     Problem: GENITOURINARY - ADULT  Goal: Maintains or returns to baseline urinary function  Description: INTERVENTIONS:  - Assess urinary function  - Encourage oral fluids to ensure adequate hydration if ordered  - Administer IV fluids as ordered to ensure adequate hydration  - Administer ordered medications as needed  - Offer frequent toileting  - Follow urinary retention protocol if ordered  Outcome: Progressing     Problem: METABOLIC, FLUID AND ELECTROLYTES - ADULT  Goal: Electrolytes maintained within normal limits  Description: INTERVENTIONS:  - Monitor labs and assess patient for signs and symptoms of electrolyte imbalances  - Administer electrolyte replacement as ordered  - Monitor response to electrolyte replacements, including repeat lab results as appropriate  - Instruct patient on fluid and nutrition as appropriate  Outcome: Progressing  Goal: Fluid balance maintained  Description: INTERVENTIONS:  - Monitor labs   - Monitor I/O and WT  - Instruct patient on fluid and nutrition as appropriate  - Assess for signs & symptoms of volume excess or deficit  Outcome: Progressing  Goal: Glucose maintained within target range  Description: INTERVENTIONS:  - Monitor Blood Glucose as ordered  - Assess for signs and symptoms of hyperglycemia and hypoglycemia  - Administer ordered medications to maintain glucose within target range  - Assess nutritional intake and initiate nutrition service referral as needed  Outcome: Progressing     Problem: HEMATOLOGIC - ADULT  Goal: Maintains hematologic stability  Description: INTERVENTIONS  - Assess for signs and symptoms of bleeding or hemorrhage  - Monitor labs  - Administer supportive blood products/factors as ordered and appropriate  Outcome: Progressing     Problem: MUSCULOSKELETAL - ADULT  Goal: Maintain or return mobility to safest level of function  Description: INTERVENTIONS:  - Assess patient's ability to carry out ADLs; assess patient's baseline for ADL function and identify physical deficits which impact ability to perform ADLs (bathing, care of mouth/teeth, toileting, grooming, dressing, etc )  - Assess/evaluate cause of self-care deficits   - Assess range of motion  - Assess patient's mobility  - Assess patient's need for assistive devices and provide as appropriate  - Encourage maximum independence but intervene and supervise when necessary  - Involve family in performance of ADLs  - Assess for home care needs following discharge   - Consider OT consult to assist with ADL evaluation and planning for discharge  - Provide patient education as appropriate  Outcome: Progressing     Problem: Nutrition/Hydration-ADULT  Goal: Nutrient/Hydration intake appropriate for improving, restoring or maintaining nutritional needs  Description: Monitor and assess patient's nutrition/hydration status for malnutrition  Collaborate with interdisciplinary team and initiate plan and interventions as ordered  Monitor patient's weight and dietary intake as ordered or per policy  Utilize nutrition screening tool and intervene as necessary  Determine patient's food preferences and provide high-protein, high-caloric foods as appropriate       INTERVENTIONS:  - Monitor oral intake, urinary output, labs, and treatment plans  - Assess nutrition and hydration status and recommend course of action  - Evaluate amount of meals eaten  - Assist patient with eating if necessary   - Allow adequate time for meals  - Recommend/ encourage appropriate diets, oral nutritional supplements, and vitamin/mineral supplements  - Order, calculate, and assess calorie counts as needed  -  - Assess need for intravenous fluids  - Provide specific nutrition/hydration education as appropriate  - Include patient/family/caregiver in decisions related to nutrition  Outcome: Progressing     Problem: PAIN - ADULT  Goal: Verbalizes/displays adequate comfort level or baseline comfort level  Description: Interventions:  - Encourage patient to monitor pain and request assistance  - Assess pain using appropriate pain scale  - Administer analgesics based on type and severity of pain and evaluate response  - Implement non-pharmacological measures as appropriate and evaluate response  - Consider cultural and social influences on pain and pain management  - Notify physician/advanced practitioner if interventions unsuccessful or patient reports new pain  Outcome: Progressing     Problem: INFECTION - ADULT  Goal: Absence or prevention of progression during hospitalization  Description: INTERVENTIONS:  - Assess and monitor for signs and symptoms of infection  - Monitor lab/diagnostic results  - Monitor all insertion sites, i e  indwelling lines, tubes, and drains  - Monitor endotracheal if appropriate and nasal secretions for changes in amount and color  - Baxter appropriate cooling/warming therapies per order  - Administer medications as ordered  - Instruct and encourage patient and family to use good hand hygiene technique  - Identify and instruct in appropriate isolation precautions for identified infection/condition  Outcome: Progressing     Problem: SAFETY ADULT  Goal: Patient will remain free of falls  Description: INTERVENTIONS:  - Educate patient/family on patient safety including physical limitations  - Instruct patient to call for assistance with activity   - Consult OT/PT to assist with strengthening/mobility   - Keep Call bell within reach  - Keep bed low and locked with side rails adjusted as appropriate  - Keep care items and personal belongings within reach  - Initiate and maintain comfort rounds  - Make Fall Risk Sign visible to staff  - Offer Toileting every 2Hours, in advance of need  - Initiate/Maintain bedalarm  - Obtain necessary fall risk management equipment: roller walker  - Apply yellow socks and bracelet for high fall risk patients  - Consider moving patient to room near nurses station  Outcome: Progressing  Goal: Maintain or return to baseline ADL function  Description: INTERVENTIONS:  - Educate patient/family on patient safety including physical limitations  - Instruct patient to call for assistance with activity   - Consult OT/PT to assist with strengthening/mobility   - Keep Call bell within reach  - Keep bed low and locked with side rails adjusted as appropriate  - Keep care items and personal belongings within reach  - Initiate and maintain comfort rounds  - Make Fall Risk Sign visible to staff  - Offer Toileting every 2Hours, in advance of need  - Initiate/Maintain bedalarm  - Obtain necessary fall risk management equipment: roller walker  - Apply yellow socks and bracelet for high fall risk patients  - Consider moving patient to room near nurses station  Outcome: Progressing  Goal: Maintains/Returns to pre admission functional level  Description: INTERVENTIONS:  - Perform BMAT or MOVE assessment daily    - Set and communicate daily mobility goal to care team and patient/family/caregiver     - Collaborate with rehabilitation services on mobility goals if consulted  - Out of bed for toileting  - Record patient progress and toleration of activity level   Outcome: Progressing     Problem: DISCHARGE PLANNING  Goal: Discharge to home or other facility with appropriate resources  Description: INTERVENTIONS:  - Identify barriers to discharge w/patient and caregiver  - Arrange for needed discharge resources and transportation as appropriate  - Identify discharge learning needs (meds, wound care, etc )  - Arrange for interpretive services to assist at discharge as needed  - Refer to Case Management Department for coordinating discharge planning if the patient needs post-hospital services based on physician/advanced practitioner order or complex needs related to functional status, cognitive ability, or social support system  Outcome: Progressing     Problem: Knowledge Deficit  Goal: Patient/family/caregiver demonstrates understanding of disease process, treatment plan, medications, and discharge instructions  Description: Complete learning assessment and assess knowledge base    Interventions:  - Provide teaching at level of understanding  - Provide teaching via preferred learning methods  Outcome: Progressing

## 2021-08-02 NOTE — ASSESSMENT & PLAN NOTE
· CT head showing encephalomalacia  · MRI showing the same  · Patient's mental status waxes and wanes   · Continue Aricept, patient has very advanced dementia with encephalomalacia he most of the time does not stand situation he is declining to eat or drink I discussed with wife the best course of action with advanced dementia him not taking enough water I do not recommend a feeding to with dementia and advanced age with him ripping it out the wife stated that in his will he did not want a feeding tube we discussed hospice care and she is in agreement she is unable to take him home on hospice - so will transfer him on comfort care to a nursing home with conversion to hospice  We discussed the POLST form- patient is a DNR DNI no further hospitalizations  · Accepted at [facility]- comfort care/hospice awaiting insurance authorization    · Today patient ate better drink better but he is still confused to situation and time

## 2021-08-02 NOTE — PLAN OF CARE
Problem: Nutrition/Hydration-ADULT  Goal: Nutrient/Hydration intake appropriate for improving, restoring or maintaining nutritional needs  Description: Monitor and assess patient's nutrition/hydration status for malnutrition  Collaborate with interdisciplinary team and initiate plan and interventions as ordered  Monitor patient's weight and dietary intake as ordered or per policy  Utilize nutrition screening tool and intervene as necessary  Determine patient's food preferences and provide high-protein, high-caloric foods as appropriate       INTERVENTIONS:  - Monitor oral intake, urinary output, labs, and treatment plans  - Assess nutrition and hydration status and recommend course of action  - Evaluate amount of meals eaten  - Assist patient with eating if necessary   - Allow adequate time for meals  - Recommend/ encourage appropriate diets, oral nutritional supplements, and vitamin/mineral supplements  - Order, calculate, and assess calorie counts as needed  -  - Assess need for intravenous fluids  - Provide specific nutrition/hydration education as appropriate  - Include patient/family/caregiver in decisions related to nutrition  Outcome: Not Progressing

## 2021-08-02 NOTE — PROGRESS NOTES
[address]  Progress Note Arash Sands 1945, 64 y o  male MRN: [id number]  Unit/Bed#: -Birgit Encounter: [id number]  Primary Care Provider: Shawn Small MD   Date and time admitted to hospital: 7/18/2021  9:06 PM    Hypernatremia  Assessment & Plan  Patient is a nectar thick he is not in taking much  Resolved status post 2 L of D5 water given on 07/28 he is eating better today as reviewed  Ask speech to re-evaluate to see if the diet and be adjusted and nectar thickened BMs in if he does better to allow more nutrition and free water will use D5 water p r n  Patient's diet has been upgraded and he is not eating or drinking recurrent hypernatremia  I discussed with wife in detail regarding further options although I do not recommended in advanced dementia patient's which urea shake at risk of pulling it out as a PEG tube but the wife stated they do not want a PEG tube and that was his wishes in his will  We discussed the best course of action for him is hospice care which she preferred to be done at the facility  She is in agreement to transfer him to hospice    Acute metabolic encephalopathy  Assessment & Plan  Patient's mental status waxes and wanes at baseline given his Alzheimer's Dementia however on 07/25, patient appeared more somnolent on exam  Multifactorial, possibly due to metabolic derangements, delirium given change in environment   MRI a couple of days ago showed encephalomalacia but no acute strokes  Patient is at baseline he is outpatient urology evaluation with encephalomalacia and dementia as a reviewed his PCPs notes back in 2020 he has cognitive decline    Patient has no insight no judgment is not eating or drinking he has a very advanced dementia surgeon to hospice cares discussed    Acute respiratory failure with hypoxia and hypercapnia ([facility] )  Assessment & Plan  · Secondary to CHF which is clinically has resolved DC lost almost 30 lb I have repeated the ABG ABG looks like a respiratory acidosis compensating for contraction alkalosis as he never had any hypercapnia before  BiPAP has been discontinued since 07/27 patient is actually now 92% on room air will continue diuresis with Lasix 60 mg daily monitor electrolytes as well might need p r n  During the day his on room air early in the morning he decides to 80 he may need p r n  Oxygen    Hypothyroidism  Assessment & Plan  Patient with a history of hypothyroidism and on levothyroxine 100 mcg at home  TSH done on admission elevated to 14 with ft4 at 1 2  Increased levothyroxine dose to 112 mcg   Patient will need to obtain repeat TSH levels in 4-6 weeks    Essential hypertension  Assessment & Plan  · BP improved continue Norvasc and metoprolol    Ambulatory dysfunction  Assessment & Plan  · Patient has been having frequent falls, and today he could not get up from the floor  EMS was concerned that patient may be in an unsafe situation as patient's wife is having difficulty caring for him  · Once medically cleared will need to go to rehab patient will be transitioned to hospice care will be discharged to nursing home when available    Moderate aortic stenosis by prior echocardiogram  Assessment & Plan  · Echocardiogram May 2021 at [facility]:  EF 55-60%  Normal diastolic function  Consistent with moderate AS, moderate tricuspid regurgitation  Moderately elevated estimated PA systolic pressure    · Repeat echocardiogram done on July 19 shows a mild AS      Type 2 diabetes mellitus with kidney complication, without long-term current use of insulin [facility])  Assessment & Plan  Lab Results   Component Value Date    HGBA1C 6 6 (H) 07/19/2021       Recent Labs     08/01/21  1104 08/01/21  1612 08/01/21  2101 08/02/21  0700   POCGLU 180* 116 96 76       Blood Sugar Average: Last 72 hrs:  (P) 045 [id number]   · Diabetic diet  · Fingerstick blood sugar sliding scale coverage  · Hold home oral agents  ·  hemoglobin A1c 6 6  His fasting is controlled towards lunch time and throughout the day it has uncontrolled he still not eating 100% will place him on Lantus 10 units in the morning     PAF (paroxysmal atrial fibrillation) ([facility])  Assessment & Plan  · EKG: normal sinus rhythm  · He is not on anticoagulation for suspected GI bleed  · Continue metoprolol he is on Plavix    Acute kidney injury superimposed on chronic kidney disease [facility])  Assessment & Plan  Lab Results   Component Value Date    EGFR 32 07/30/2021    EGFR 32 07/29/2021    EGFR 29 07/28/2021    CREATININE 1 94 (H) 07/30/2021    CREATININE 1 96 (H) 07/29/2021    CREATININE 2 14 (H) 07/28/2021     · CKD stage 3 with creatinine baseline 1 4-1 7  He is only in taking 20-40% he is on nectar thick liquid secondary to dysphagia  Improved today Ultrasound of the kidney and bladder done on 07/20 2-for acute finding continues to imrove- no further blood work the patient continues to have poor p o  Intake and is being transitioned at discharge on comfort care/hospice awaiting insurance authorization            Acute on chronic anemia  Assessment & Plan  · Hemoglobin is 8 5 with baseline around 13 this admission has been fluctuating out in 8 6 no gross bleed  Venofer 5 doses infusions have been started by Nephrology  · Stool Hemoccult is positive  · No evidence of gross bleeding noted  · Will hold p o   Iron  · S/p EGD and colonoscopy on 7/20  "C1 M3 Og's esophagus-biopsies taken to rule out dysplasia  Small hiatal hernia otherwise normal stomach on direct and retroflexed views  Random gastric biopsies taken to rule out H pylori  Normal visualized portion of duodenum from duodenal bulb to D3  Random biopsies taken to rule out celiac disease "    "No evidence of active bleeding  Terminal ileum normal  Scattered diverticula throughout the colon left greater than right without evidence of bleeding  Area of erythema/ulceration/hemorrhage likely from trauma from enema versus old diverticular bleed  Large internal/external hemorrhoids"    · Okay to resume plavix from GI standpoint  · Is status post 1 unit of PRBC transfusion on 07/27 will keep hemoglobin greater than 8 with the kidney injury        Dementia without behavioral disturbance (HCC)  Assessment & Plan  · CT head showing encephalomalacia  · MRI showing the same  · Patient's mental status waxes and wanes   · Continue Aricept, patient has very advanced dementia with encephalomalacia he most of the time does not stand situation he is declining to eat or drink I discussed with wife the best course of action with advanced dementia him not taking enough water I do not recommend a feeding to with dementia and advanced age with him ripping it out the wife stated that in his will he did not want a feeding tube we discussed hospice care and she is in agreement she is unable to take him home on hospice - so will transfer him on comfort care to a nursing home with conversion to hospice  We discussed the POLST form- patient is a DNR DNI no further hospitalizations  · Accepted at [facility]- comfort care/hospice awaiting insurance authorization  · Today patient ate better drink better but he is still confused to situation and time      * Acute on chronic diastolic CHF (congestive heart failure) ([facility] )  Assessment & Plan  · Edema legs have significantly improved he does have some trace up he still has some rales at the bibasilar region I did repeat a chest x-ray 727 there was still mild vascular congestion patient also takes poor p o satting well a chest x-ray just has some bibasilar crackles otherwise no leg edema  Continue Lasix 60 mg p o   Daily as his intake is still poor  · BNP:  4500  · Daily weights  · I&Os  · TTE: LV function showing 55% EF  · Low-salt diet, fluid restriction    · Initially diuresed with Lasix and Bumex drip  · On admission was 276 lb went down to 237 at the weight right now is inaccurate patient is not eating or drinking adequately with advanced dementia no PEG tube as per wishes  As discussed with wife will be transition to hospice care see above        VTE Pharmacologic Prophylaxis: VTE Score: 7 High Risk (Score >/= 5) - Pharmacological DVT Prophylaxis Contraindicated  Sequential Compression Devices Ordered  Patient Centered Rounds: I performed bedside rounds with nursing staff today  Discussions with Specialists or Other Care Team Provider:  I discussed with case management    Education and Discussions with Family / Patient: Will update wife    Time Spent for Care: 30 minutes  More than 50% of total time spent on counseling and coordination of care as described above  Current Length of Stay: 15 day(s)  Current Patient Status: Inpatient   Certification Statement: The patient will continue to require additional inpatient hospital stay due to Awaiting authorization for rehab for comfort care hospice  Discharge Plan: Anticipate discharge later today or tomorrow to rehab facility  Code Status: Level 3 - DNAR and DNI    Subjective:   Patient seen and examined pleasantly confused discussed with nursing aide , today ate almost all     Objective:     Vitals:   Temp (24hrs), Av 9 °F (36 1 °C), Min:96 2 °F (35 7 °C), Max:97 7 °F (36 5 °C)    Temp:  [96 2 °F (35 7 °C)-97 7 °F (36 5 °C)] 97 7 °F (36 5 °C)  HR:  [60] 60  Resp:  [18-19] 18  BP: (118-157)/(55-89) 155/71  SpO2:  [87 %-96 %] 87 %  Body mass index is 37 22 kg/m²  Input and Output Summary (last 24 hours): Intake/Output Summary (Last 24 hours) at 2021 1028  Last data filed at 2021 0803  Gross per 24 hour   Intake 0 ml   Output 350 ml   Net -350 ml       Physical Exam:   Physical Exam  Vitals and nursing note reviewed  Constitutional:       Appearance: He is well-developed  HENT:      Head: Normocephalic and atraumatic     Eyes:      Conjunctiva/sclera: Conjunctivae normal    Cardiovascular:      Rate and Rhythm: Normal rate and regular rhythm  Heart sounds: No murmur heard  Pulmonary:      Effort: Pulmonary effort is normal  No respiratory distress  Breath sounds: Normal breath sounds  No wheezing or rales  Abdominal:      General: There is no distension  Palpations: Abdomen is soft  Tenderness: There is no abdominal tenderness  Musculoskeletal:         General: No swelling  Cervical back: Neck supple  Skin:     General: Skin is warm and dry  Neurological:      Mental Status: He is alert  He is disoriented  Additional Data:     Labs:  Results from last 7 days   Lab Units 07/30/21  0536   WBC Thousand/uL 6 19   HEMOGLOBIN g/dL 9 1*   HEMATOCRIT % 31 5*   PLATELETS Thousands/uL 264   NEUTROS PCT % 73   LYMPHS PCT % 11*   MONOS PCT % 10   EOS PCT % 4     Results from last 7 days   Lab Units 07/30/21  0536 07/28/21  0509   SODIUM mmol/L 147* 149*   POTASSIUM mmol/L 4 3 3 7   CHLORIDE mmol/L 107 106   CO2 mmol/L 35* 37*   BUN mg/dL 33* 37*   CREATININE mg/dL 1 94* 2 14*   ANION GAP mmol/L 5 6   CALCIUM mg/dL 8 5 8 5   ALBUMIN g/dL  --  3 5   TOTAL BILIRUBIN mg/dL  --  1 35*   ALK PHOS U/L  --  94   ALT U/L  --  19   AST U/L  --  19   GLUCOSE RANDOM mg/dL 157* 125         Results from last 7 days   Lab Units 08/02/21  0700 08/01/21  2101 08/01/21  1612 08/01/21  1104 08/01/21  0717 07/31/21  2057 07/31/21  1549 07/31/21  1103 07/31/21  0718 07/30/21  2047 07/30/21  1535 07/30/21  1111   POC GLUCOSE mg/dl 76 96 116 180* 109 122 139 144* 135 127 181* 154*               Lines/Drains:  Invasive Devices     Peripheral Intravenous Line            Peripheral IV 08/01/21 Dorsal (posterior); Left Forearm 1 day                      Imaging: Reviewed radiology reports from this admission including: chest xray    Recent Cultures (last 7 days):         Last 24 Hours Medication List:   Current Facility-Administered Medications   Medication Dose Route Frequency Provider Last Rate    acetaminophen  650 mg Oral Q4H PRN Day Baptiste PA-C      amLODIPine  5 mg Oral Daily Jose Angel Green MD      atorvastatin  80 mg Oral Daily With Cesar Lujan PA-C      clopidogrel  75 mg Oral Daily [facility], [place]      donepezil  5 mg Oral HS Day Baptiste PA-C      furosemide  60 mg Oral Daily Jose Angel Green MD      insulin glargine  10 Units Subcutaneous QAM Jose Angel Green MD      insulin lispro  1-6 Units Subcutaneous TID [facility] Jose Angel Green MD      levothyroxine  112 mcg Oral Early Morning [place], [place]      metoprolol succinate  50 mg Oral Daily [place], [place]      pantoprazole  40 mg Oral Early Morning Day Baptiste PA-C      potassium chloride  20 mEq Oral Daily Jose Angel Green MD      QUEtiapine  25 mg Oral HS Day Baptiste PA-C          Today, Patient Was Seen By: Jose Angel Green MD    **Please Note: This note may have been constructed using a voice recognition system  **

## 2021-08-02 NOTE — UTILIZATION REVIEW
Continued Stay Review    Date: 8/1                      Current Patient Class: Inpatient  Current Level of Care: Med/surg    HPI:76 y o  male initially admitted on 7/18     Assessment/Plan: Recurrent hypernatremia  Not taking in a lot of PO  Nectar thickened diets  High risk for PEG tube due to alzheimers and pulling it out  Hospice consult  Per wife, no PEG tube  Hgb has been fluctuating with acute on chronic anemia  Continue to monitor  Hemoccult +, no gross bleeding  S/P 1 unti PRBCs 7/27  Okay to resume Plavix  PROCEDURE NOTE:  7/20  Colonoscopy  INDICATION:  Anemia, Gastrointestinal hemorrhage, unspecified gastrointestinal hemorrhage type  Colonoscopy performed for a diagnostic indication      POST-OP DIAGNOSIS:  ANESTHESIA INFORMATION:  ASA: III  Anesthesia Type: IV Sedation with Anesthesia  FINDINGS:  · The terminal ileum appeared normal   · The ileocecal valve, cecum, ascending colon, hepatic flexure, transverse colon, splenic flexure, descending colon and sigmoid colon appeared normal   · Mild erythematous and hemorrhagic mucosa with erosion in the rectosigmoid and rectum  Areas of erosive hemorrhagic lesions that were not actively bleeding possibly secondary to trauma from enemas; no identified diverticula in the area however this could also be secondary to diverticula that may have bled  · Moderate scattered diverticula  · Protruding, external, internal hemorrhoids   7/20  EGD  FINDINGS:  · C1M3 Og's esophagus observed where the top of gastric folds are 38 cm from the incisors with no associated nodule; performed cold forceps biopsy  · Small sliding hiatal hernia (type I hiatal hernia) without [place] lesions present - GE junction 38 cm from the incisors, diaphragmatic impression 40 cm from the incisors   Otherwise normal stomach on direct and retroflexed views  · Performed biopsies to rule out celiac disease and H  pylori in the body of the stomach, incisura, antrum, duodenal bulb and 2nd part of the duodenum  · The duodenal bulb, 1st part of the duodenum, 2nd part of the duodenum and 3rd part of the duodenum appeared normal      Vital Signs:   Date/Time  Temp  Pulse  Resp  BP  MAP (mmHg)  SpO2  O2 Device   08/02/21 0905  --  --  --  --  --  --  None (Room air)   08/02/21 06:58:23  97 7 °F (36 5 °C)  --  18  155/71  99  --  --   08/01/21 22:56:27  96 9 °F (36 1 °C)Abnormal   60  18  118/55  76  87 %Abnormal   --   08/01/21 2113  --  --  --  --  --  --  None (Room air)   08/01/21 14:57:46  96 2 °F (35 7 °C)Abnormal   60  19  157/89  112  96 %  --   08/01/21 0740  --  --  --  --  --  91 %  None (Room air)   08/01/21 06:42:01  97 2 °F (36 2 °C)Abnormal   --  20  168/93  118  --  --   07/31/21 22:04:01  98 9 °F (37 2 °C)  63  17  150/68  95  92 %  --   07/31/21 1931  --  --  --  --  --  96 %  None (Room air)   07/31/21 15:04:53  97 9 °F (36 6 °C)  61  17  162/72  102  95 %  --   07/31/21 13:45:28  --  57  --  164/72  103  87 %Abnormal   --   07/31/21 13:45:01  --  63  --  164/72  103  87 %Abnormal   --   07/31/21 1000  --  --  --  --  --  --  None (Room air)   07/31/21 07:13:08  97 9 °F (36 6 °C)  --  18               Pertinent Labs/Diagnostic Results:  7/27 CXR: Mild pulmonary venous congestion with small left effusion   7/25 CXR: Mild pulmonary edema with trace right effusion  7/22 MRI brain:   No acute infarct seen     Severe periventricular and white matter T2 hyperintensity, may be due to chronic small vessel ischemic changes was also described on the MRI performed at [facility] on October 30, 2017     Area of encephalomalacia in the inferior left frontal region     7/22 US kidney/bladder: Unremarkable exam    7/22 CT head:   New acute intracranial CT abnormality     Encephalomalacia involving left gyrus rectus and inferior left frontal lobe as well as right frontal cortical/subcortical white matter      Nonspecific extensive white matter change suggesting advanced microangiopathy  Results from last 7 days   Lab Units 07/30/21  0536 07/29/21  0457 07/28/21  0509 07/27/21  0455   WBC Thousand/uL 6 19 5 99 6 21 6 66   HEMOGLOBIN g/dL 9 1* 8 6* 9 1* 7 8*   HEMATOCRIT % 31 5* 30 0* 31 6* 27 6*   PLATELETS Thousands/uL 264 267 274 272   NEUTROS ABS Thousands/µL 4 62 4 38 4 54 4 79         Results from last 7 days   Lab Units 07/30/21  0536 07/29/21  0457 07/28/21  0509 07/27/21  0455   SODIUM mmol/L 147* 145 149* 147*   POTASSIUM mmol/L 4 3 3 4* 3 7 3 6   CHLORIDE mmol/L 107 104 106 106   CO2 mmol/L 35* 34* 37* 38*   ANION GAP mmol/L 5 7 6 3*   BUN mg/dL 33* 35* 37* 36*   CREATININE mg/dL 1 94* 1 96* 2 14* 2 24*   EGFR ml/min/1 73sq m 32 32 29 27   CALCIUM mg/dL 8 5 8 4 8 5 8 6   MAGNESIUM mg/dL 2 5  --   --  2 4   PHOSPHORUS mg/dL 2 6  --   --   --      Results from last 7 days   Lab Units 07/28/21  0509 07/27/21  0455   AST U/L 19 13   ALT U/L 19 15   ALK PHOS U/L 94 82   TOTAL PROTEIN g/dL 7 3 7 1   ALBUMIN g/dL 3 5 3 4*   TOTAL BILIRUBIN mg/dL 1 35* 1 13*     Results from last 7 days   Lab Units 08/01/21  2101 08/01/21  1612 08/01/21  1104 08/01/21  0717 07/31/21  2057 07/31/21  1549 07/31/21  1103 07/31/21  0718 07/30/21  2047 07/30/21  1535 07/30/21  1111   POC GLUCOSE mg/dl 96 116 180* 109 122 139 144* 135 127 181* 154*     Results from last 7 days   Lab Units 07/30/21  0536 07/29/21  0457 07/28/21  0509 07/27/21  0455   GLUCOSE RANDOM mg/dL 157* 178* 125 131     No results found for: BETA-HYDROXYBUTYRATE   Results from last 7 days   Lab Units 07/27/21  1211   PH ART  7 424   PCO2 ART mm Hg 53 0*   PO2 ART mm Hg 91 5   HCO3 ART mmol/L 33 9*   BASE EXC ART mmol/L 8 4   O2 CONTENT ART mL/dL 12 6*   O2 HGB, ARTERIAL % 96 2   ABG SOURCE  Radial, Right       Medications:   Scheduled Medications:  amLODIPine, 5 mg, Oral, Daily  atorvastatin, 80 mg, Oral, Daily With Dinner  clopidogrel, 75 mg, Oral, Daily  donepezil, 5 mg, Oral, HS  furosemide, 60 mg, Oral, Daily  insulin glargine, 10 Units, Subcutaneous, QAM  insulin lispro, 1-6 Units, Subcutaneous, TID AC  iron sucrose, 200 mg, Intravenous, Daily  levothyroxine, 112 mcg, Oral, Early Morning  metoprolol succinate, 50 mg, Oral, Daily  pantoprazole, 40 mg, Oral, Early Morning  potassium chloride, 20 mEq, Oral, Daily  QUEtiapine, 25 mg, Oral, HS      Continuous IV Infusions:     PRN Meds:  acetaminophen, 650 mg, Oral, Q4H PRN        Discharge Plan: TBD  Network Utilization Review Department  ATTENTION: Please call with any questions or concerns to [phone] and carefully listen to the prompts so that you are directed to the right person  All voicemails are confidential   Emile Stephen all requests for admission clinical reviews, approved or denied determinations and any other requests to dedicated fax number below belonging to the campus where the patient is receiving treatment   List of dedicated fax numbers for the Facilities:  [address] DENIALS (Administrative/Medical Necessity) [phone]   [address] (Maternity/NICU/Pediatrics) [phone]   [address] [place] [address] [address] [address] [place] [address] [address] Re[address] [facility] [phone]

## 2021-08-02 NOTE — PLAN OF CARE
Problem: Potential for Falls  Goal: Patient will remain free of falls  Description: INTERVENTIONS:  - Educate patient/family on patient safety including physical limitations  - Instruct patient to call for assistance with activity   - Consult OT/PT to assist with strengthening/mobility   - Keep Call bell within reach  - Keep bed low and locked with side rails adjusted as appropriate  - Keep care items and personal belongings within reach  - Initiate and maintain comfort rounds  - Make Fall Risk Sign visible to staff  - Offer Toileting every 2Hours, in advance of need  - Initiate/Maintain bedalarm  - Obtain necessary fall risk management equipment: roller walker  - Apply yellow socks and bracelet for high fall risk patients  - Consider moving patient to room near nurses station  Outcome: Progressing     Problem: MOBILITY - ADULT  Goal: Maintain or return to baseline ADL function  Description: INTERVENTIONS:  - Educate patient/family on patient safety including physical limitations  - Instruct patient to call for assistance with activity   - Consult OT/PT to assist with strengthening/mobility   - Keep Call bell within reach  - Keep bed low and locked with side rails adjusted as appropriate  - Keep care items and personal belongings within reach  - Initiate and maintain comfort rounds  - Make Fall Risk Sign visible to staff  - Offer Toileting every 2Hours, in advance of need  - Initiate/Maintain bedalarm  - Obtain necessary fall risk management equipment: roller walker  - Apply yellow socks and bracelet for high fall risk patients  - Consider moving patient to room near nurses station  Outcome: Progressing  Goal: Maintains/Returns to pre admission functional level  Description: INTERVENTIONS:  - Perform BMAT or MOVE assessment daily    - Set and communicate daily mobility goal to care team and patient/family/caregiver     - Collaborate with rehabilitation services on mobility goals if consulted  - Out of bed for toileting  - Record patient progress and toleration of activity level   Outcome: Progressing     Problem: Prexisting or High Potential for Compromised Skin Integrity  Goal: Skin integrity is maintained or improved  Description: INTERVENTIONS:  - Identify patients at risk for skin breakdown  - Assess and monitor skin integrity  - Assess and monitor nutrition and hydration status  - Monitor labs   - Assess for incontinence   - Turn and reposition patient  - Assist with mobility/ambulation  - Relieve pressure over bony prominences  - Avoid friction and shearing  - Provide appropriate hygiene as needed including keeping skin clean and dry  - Evaluate need for skin moisturizer/barrier cream  - Collaborate with interdisciplinary team   - Patient/family teaching  - Consider wound care consult   Outcome: Progressing     Problem: CARDIOVASCULAR - ADULT  Goal: Maintains optimal cardiac output and hemodynamic stability  Description: INTERVENTIONS:  - Monitor I/O, vital signs and rhythm  - Monitor for S/S and trends of decreased cardiac output  - Administer and titrate ordered vasoactive medications to optimize hemodynamic stability  - Assess quality of pulses, skin color and temperature  - Assess for signs of decreased coronary artery perfusion  - Instruct patient to report change in severity of symptoms  Outcome: Progressing  Goal: Absence of cardiac dysrhythmias or at baseline rhythm  Description: INTERVENTIONS:  - Continuous cardiac monitoring, vital signs, obtain 12 lead EKG if ordered  - Administer antiarrhythmic and heart rate control medications as ordered  - Monitor electrolytes and administer replacement therapy as ordered  Outcome: Progressing     Problem: RESPIRATORY - ADULT  Goal: Achieves optimal ventilation and oxygenation  Description: INTERVENTIONS:  - Assess for changes in respiratory status  - Assess for changes in mentation and behavior  - Position to facilitate oxygenation and minimize respiratory effort  - Oxygen administered by appropriate delivery if ordered  - Initiate smoking cessation education as indicated  - Encourage broncho-pulmonary hygiene including cough, deep breathe, Incentive Spirometry  - Assess the need for suctioning and aspirate as needed  - Assess and instruct to report SOB or any respiratory difficulty  - Respiratory Therapy support as indicated  Outcome: Progressing     Problem: GENITOURINARY - ADULT  Goal: Maintains or returns to baseline urinary function  Description: INTERVENTIONS:  - Assess urinary function  - Encourage oral fluids to ensure adequate hydration if ordered  - Administer IV fluids as ordered to ensure adequate hydration  - Administer ordered medications as needed  - Offer frequent toileting  - Follow urinary retention protocol if ordered  Outcome: Progressing     Problem: METABOLIC, FLUID AND ELECTROLYTES - ADULT  Goal: Electrolytes maintained within normal limits  Description: INTERVENTIONS:  - Monitor labs and assess patient for signs and symptoms of electrolyte imbalances  - Administer electrolyte replacement as ordered  - Monitor response to electrolyte replacements, including repeat lab results as appropriate  - Instruct patient on fluid and nutrition as appropriate  Outcome: Progressing  Goal: Fluid balance maintained  Description: INTERVENTIONS:  - Monitor labs   - Monitor I/O and WT  - Instruct patient on fluid and nutrition as appropriate  - Assess for signs & symptoms of volume excess or deficit  Outcome: Progressing  Goal: Glucose maintained within target range  Description: INTERVENTIONS:  - Monitor Blood Glucose as ordered  - Assess for signs and symptoms of hyperglycemia and hypoglycemia  - Administer ordered medications to maintain glucose within target range  - Assess nutritional intake and initiate nutrition service referral as needed  Outcome: Progressing     Problem: SKIN/TISSUE INTEGRITY - ADULT  Goal: Incision(s), wounds(s) or drain site(s) healing without S/S of infection  Description: INTERVENTIONS  - Assess and document dressing, incision, wound bed, drain sites and surrounding tissue  - Provide patient and family education  Outcome: Progressing     Problem: HEMATOLOGIC - ADULT  Goal: Maintains hematologic stability  Description: INTERVENTIONS  - Assess for signs and symptoms of bleeding or hemorrhage  - Monitor labs  - Administer supportive blood products/factors as ordered and appropriate  Outcome: Progressing     Problem: MUSCULOSKELETAL - ADULT  Goal: Maintain or return mobility to safest level of function  Description: INTERVENTIONS:  - Assess patient's ability to carry out ADLs; assess patient's baseline for ADL function and identify physical deficits which impact ability to perform ADLs (bathing, care of mouth/teeth, toileting, grooming, dressing, etc )  - Assess/evaluate cause of self-care deficits   - Assess range of motion  - Assess patient's mobility  - Assess patient's need for assistive devices and provide as appropriate  - Encourage maximum independence but intervene and supervise when necessary  - Involve family in performance of ADLs  - Assess for home care needs following discharge   - Consider OT consult to assist with ADL evaluation and planning for discharge  - Provide patient education as appropriate  Outcome: Progressing     Problem: Nutrition/Hydration-ADULT  Goal: Nutrient/Hydration intake appropriate for improving, restoring or maintaining nutritional needs  Description: Monitor and assess patient's nutrition/hydration status for malnutrition  Collaborate with interdisciplinary team and initiate plan and interventions as ordered  Monitor patient's weight and dietary intake as ordered or per policy  Utilize nutrition screening tool and intervene as necessary  Determine patient's food preferences and provide high-protein, high-caloric foods as appropriate       INTERVENTIONS:  - Monitor oral intake, urinary output, labs, and treatment plans  - Assess nutrition and hydration status and recommend course of action  - Evaluate amount of meals eaten  - Assist patient with eating if necessary   - Allow adequate time for meals  - Recommend/ encourage appropriate diets, oral nutritional supplements, and vitamin/mineral supplements  - Order, calculate, and assess calorie counts as needed  -  - Assess need for intravenous fluids  - Provide specific nutrition/hydration education as appropriate  - Include patient/family/caregiver in decisions related to nutrition  Outcome: Progressing     Problem: PAIN - ADULT  Goal: Verbalizes/displays adequate comfort level or baseline comfort level  Description: Interventions:  - Encourage patient to monitor pain and request assistance  - Assess pain using appropriate pain scale  - Administer analgesics based on type and severity of pain and evaluate response  - Implement non-pharmacological measures as appropriate and evaluate response  - Consider cultural and social influences on pain and pain management  - Notify physician/advanced practitioner if interventions unsuccessful or patient reports new pain  Outcome: Progressing     Problem: INFECTION - ADULT  Goal: Absence or prevention of progression during hospitalization  Description: INTERVENTIONS:  - Assess and monitor for signs and symptoms of infection  - Monitor lab/diagnostic results  - Monitor all insertion sites, i e  indwelling lines, tubes, and drains  - Monitor endotracheal if appropriate and nasal secretions for changes in amount and color  - [place] appropriate cooling/warming therapies per order  - Administer medications as ordered  - Instruct and encourage patient and family to use good hand hygiene technique  - Identify and instruct in appropriate isolation precautions for identified infection/condition  Outcome: Progressing     Problem: SAFETY ADULT  Goal: Patient will remain free of falls  Description: INTERVENTIONS:  - Educate patient/family on patient safety including physical limitations  - Instruct patient to call for assistance with activity   - Consult OT/PT to assist with strengthening/mobility   - Keep Call bell within reach  - Keep bed low and locked with side rails adjusted as appropriate  - Keep care items and personal belongings within reach  - Initiate and maintain comfort rounds  - Make Fall Risk Sign visible to staff  - Offer Toileting every 2Hours, in advance of need  - Initiate/Maintain bedalarm  - Obtain necessary fall risk management equipment: roller walker  - Apply yellow socks and bracelet for high fall risk patients  - Consider moving patient to room near nurses station  Outcome: Progressing  Goal: Maintain or return to baseline ADL function  Description: INTERVENTIONS:  - Educate patient/family on patient safety including physical limitations  - Instruct patient to call for assistance with activity   - Consult OT/PT to assist with strengthening/mobility   - Keep Call bell within reach  - Keep bed low and locked with side rails adjusted as appropriate  - Keep care items and personal belongings within reach  - Initiate and maintain comfort rounds  - Make Fall Risk Sign visible to staff  - Offer Toileting every 2Hours, in advance of need  - Initiate/Maintain bedalarm  - Obtain necessary fall risk management equipment: roller walker  - Apply yellow socks and bracelet for high fall risk patients  - Consider moving patient to room near nurses station  Outcome: Progressing  Goal: Maintains/Returns to pre admission functional level  Description: INTERVENTIONS:  - Perform BMAT or MOVE assessment daily    - Set and communicate daily mobility goal to care team and patient/family/caregiver     - Collaborate with rehabilitation services on mobility goals if consulted  - Out of bed for toileting  - Record patient progress and toleration of activity level   Outcome: Progressing     Problem: DISCHARGE PLANNING  Goal: Discharge to home or other facility with appropriate resources  Description: INTERVENTIONS:  - Identify barriers to discharge w/patient and caregiver  - Arrange for needed discharge resources and transportation as appropriate  - Identify discharge learning needs (meds, wound care, etc )  - Arrange for interpretive services to assist at discharge as needed  - Refer to Case Management Department for coordinating discharge planning if the patient needs post-hospital services based on physician/advanced practitioner order or complex needs related to functional status, cognitive ability, or social support system  Outcome: Progressing     Problem: Knowledge Deficit  Goal: Patient/family/caregiver demonstrates understanding of disease process, treatment plan, medications, and discharge instructions  Description: Complete learning assessment and assess knowledge base    Interventions:  - Provide teaching at level of understanding  - Provide teaching via preferred learning methods  Outcome: Progressing

## 2021-08-03 VITALS
SYSTOLIC BLOOD PRESSURE: 151 MMHG | RESPIRATION RATE: 20 BRPM | WEIGHT: 222.88 LBS | OXYGEN SATURATION: 93 % | HEIGHT: 67 IN | HEART RATE: 62 BPM | TEMPERATURE: 97.4 F | DIASTOLIC BLOOD PRESSURE: 61 MMHG | BODY MASS INDEX: 34.98 KG/M2

## 2021-08-03 LAB
GLUCOSE SERPL-MCNC: 140 MG/DL (ref 65–140)
GLUCOSE SERPL-MCNC: 203 MG/DL (ref 65–140)
SARS-COV-2 RNA RESP QL NAA+PROBE: NEGATIVE

## 2021-08-03 PROCEDURE — 82948 REAGENT STRIP/BLOOD GLUCOSE: CPT

## 2021-08-03 PROCEDURE — U0003 INFECTIOUS AGENT DETECTION BY NUCLEIC ACID (DNA OR RNA); SEVERE ACUTE RESPIRATORY SYNDROME CORONAVIRUS 2 (SARS-COV-2) (CORONAVIRUS DISEASE [COVID-19]), AMPLIFIED PROBE TECHNIQUE, MAKING USE OF HIGH THROUGHPUT TECHNOLOGIES AS DESCRIBED BY CMS-2020-01-R: HCPCS | Performed by: FAMILY MEDICINE

## 2021-08-03 PROCEDURE — U0005 INFEC AGEN DETEC AMPLI PROBE: HCPCS | Performed by: FAMILY MEDICINE

## 2021-08-03 PROCEDURE — 97110 THERAPEUTIC EXERCISES: CPT

## 2021-08-03 PROCEDURE — 97535 SELF CARE MNGMENT TRAINING: CPT

## 2021-08-03 PROCEDURE — 99239 HOSP IP/OBS DSCHRG MGMT >30: CPT | Performed by: FAMILY MEDICINE

## 2021-08-03 PROCEDURE — 97116 GAIT TRAINING THERAPY: CPT

## 2021-08-03 RX ORDER — FUROSEMIDE 20 MG/1
60 TABLET ORAL DAILY
Refills: 0
Start: 2021-08-04 | End: 2021-11-08 | Stop reason: HOSPADM

## 2021-08-03 RX ORDER — QUETIAPINE FUMARATE 25 MG/1
25 TABLET, FILM COATED ORAL
Refills: 0
Start: 2021-08-03

## 2021-08-03 RX ORDER — ACETAMINOPHEN 325 MG/1
650 TABLET ORAL EVERY 4 HOURS PRN
Refills: 0
Start: 2021-08-03

## 2021-08-03 RX ORDER — INSULIN GLARGINE 100 [IU]/ML
10 INJECTION, SOLUTION SUBCUTANEOUS EVERY MORNING
Qty: 10 ML | Refills: 0 | Status: ON HOLD
Start: 2021-08-04 | End: 2021-11-08 | Stop reason: SDUPTHER

## 2021-08-03 RX ORDER — LEVOTHYROXINE SODIUM 112 UG/1
112 TABLET ORAL
Refills: 0
Start: 2021-08-04

## 2021-08-03 RX ADMIN — CLOPIDOGREL BISULFATE 75 MG: 75 TABLET ORAL at 08:25

## 2021-08-03 RX ADMIN — LEVOTHYROXINE SODIUM 112 MCG: 112 TABLET ORAL at 06:20

## 2021-08-03 RX ADMIN — METOPROLOL SUCCINATE 50 MG: 50 TABLET, EXTENDED RELEASE ORAL at 08:25

## 2021-08-03 RX ADMIN — INSULIN GLARGINE 10 UNITS: 100 INJECTION, SOLUTION SUBCUTANEOUS at 08:24

## 2021-08-03 RX ADMIN — FUROSEMIDE 60 MG: 40 TABLET ORAL at 08:25

## 2021-08-03 RX ADMIN — PANTOPRAZOLE SODIUM 40 MG: 40 TABLET, DELAYED RELEASE ORAL at 06:20

## 2021-08-03 RX ADMIN — AMLODIPINE BESYLATE 5 MG: 5 TABLET ORAL at 08:25

## 2021-08-03 RX ADMIN — POTASSIUM CHLORIDE 20 MEQ: 1500 TABLET, EXTENDED RELEASE ORAL at 08:25

## 2021-08-03 NOTE — ASSESSMENT & PLAN NOTE
· Patient has been having frequent falls, and today he could not get up from the floor    EMS was concerned that patient may be in an unsafe situation as patient's wife is having difficulty caring for him  · Patient is being discharged on comfort care to SNF

## 2021-08-03 NOTE — NURSING NOTE
No peripheral IV to remove  AVS and Summary of Care printed and faxed to  center  Report called to [place] and all questions answered  No belongings at bedside and confirmed with girlfriend at bedside  Waiting on [address] for transport  No scripts sent  Original POLST send with transport

## 2021-08-03 NOTE — PLAN OF CARE
Problem: Potential for Falls  Goal: Patient will remain free of falls  Description: INTERVENTIONS:  - Educate patient/family on patient safety including physical limitations  - Instruct patient to call for assistance with activity   - Consult OT/PT to assist with strengthening/mobility   - Keep Call bell within reach  - Keep bed low and locked with side rails adjusted as appropriate  - Keep care items and personal belongings within reach  - Initiate and maintain comfort rounds  - Make Fall Risk Sign visible to staff  - Offer Toileting every 2Hours, in advance of need  - Initiate/Maintain bedalarm  - Obtain necessary fall risk management equipment: roller walker  - Apply yellow socks and bracelet for high fall risk patients  - Consider moving patient to room near nurses station  Outcome: Progressing     Problem: MOBILITY - ADULT  Goal: Maintain or return to baseline ADL function  Description: INTERVENTIONS:  - Educate patient/family on patient safety including physical limitations  - Instruct patient to call for assistance with activity   - Consult OT/PT to assist with strengthening/mobility   - Keep Call bell within reach  - Keep bed low and locked with side rails adjusted as appropriate  - Keep care items and personal belongings within reach  - Initiate and maintain comfort rounds  - Make Fall Risk Sign visible to staff  - Offer Toileting every 2Hours, in advance of need  - Initiate/Maintain bedalarm  - Obtain necessary fall risk management equipment: roller walker  - Apply yellow socks and bracelet for high fall risk patients  - Consider moving patient to room near nurses station  Outcome: Progressing  Goal: Maintains/Returns to pre admission functional level  Description: INTERVENTIONS:  - Perform BMAT or MOVE assessment daily    - Set and communicate daily mobility goal to care team and patient/family/caregiver     - Collaborate with rehabilitation services on mobility goals if consulted  - Out of bed for toileting  - Record patient progress and toleration of activity level   Outcome: Progressing     Problem: Prexisting or High Potential for Compromised Skin Integrity  Goal: Skin integrity is maintained or improved  Description: INTERVENTIONS:  - Identify patients at risk for skin breakdown  - Assess and monitor skin integrity  - Assess and monitor nutrition and hydration status  - Monitor labs   - Assess for incontinence   - Turn and reposition patient  - Assist with mobility/ambulation  - Relieve pressure over bony prominences  - Avoid friction and shearing  - Provide appropriate hygiene as needed including keeping skin clean and dry  - Evaluate need for skin moisturizer/barrier cream  - Collaborate with interdisciplinary team   - Patient/family teaching  - Consider wound care consult   Outcome: Progressing     Problem: CARDIOVASCULAR - ADULT  Goal: Maintains optimal cardiac output and hemodynamic stability  Description: INTERVENTIONS:  - Monitor I/O, vital signs and rhythm  - Monitor for S/S and trends of decreased cardiac output  - Administer and titrate ordered vasoactive medications to optimize hemodynamic stability  - Assess quality of pulses, skin color and temperature  - Assess for signs of decreased coronary artery perfusion  - Instruct patient to report change in severity of symptoms  Outcome: Progressing  Goal: Absence of cardiac dysrhythmias or at baseline rhythm  Description: INTERVENTIONS:  - Continuous cardiac monitoring, vital signs, obtain 12 lead EKG if ordered  - Administer antiarrhythmic and heart rate control medications as ordered  - Monitor electrolytes and administer replacement therapy as ordered  Outcome: Progressing     Problem: RESPIRATORY - ADULT  Goal: Achieves optimal ventilation and oxygenation  Description: INTERVENTIONS:  - Assess for changes in respiratory status  - Assess for changes in mentation and behavior  - Position to facilitate oxygenation and minimize respiratory effort  - Oxygen administered by appropriate delivery if ordered  - Initiate smoking cessation education as indicated  - Encourage broncho-pulmonary hygiene including cough, deep breathe, Incentive Spirometry  - Assess the need for suctioning and aspirate as needed  - Assess and instruct to report SOB or any respiratory difficulty  - Respiratory Therapy support as indicated  Outcome: Progressing     Problem: GENITOURINARY - ADULT  Goal: Maintains or returns to baseline urinary function  Description: INTERVENTIONS:  - Assess urinary function  - Encourage oral fluids to ensure adequate hydration if ordered  - Administer IV fluids as ordered to ensure adequate hydration  - Administer ordered medications as needed  - Offer frequent toileting  - Follow urinary retention protocol if ordered  Outcome: Progressing     Problem: METABOLIC, FLUID AND ELECTROLYTES - ADULT  Goal: Electrolytes maintained within normal limits  Description: INTERVENTIONS:  - Monitor labs and assess patient for signs and symptoms of electrolyte imbalances  - Administer electrolyte replacement as ordered  - Monitor response to electrolyte replacements, including repeat lab results as appropriate  - Instruct patient on fluid and nutrition as appropriate  Outcome: Progressing  Goal: Fluid balance maintained  Description: INTERVENTIONS:  - Monitor labs   - Monitor I/O and WT  - Instruct patient on fluid and nutrition as appropriate  - Assess for signs & symptoms of volume excess or deficit  Outcome: Progressing  Goal: Glucose maintained within target range  Description: INTERVENTIONS:  - Monitor Blood Glucose as ordered  - Assess for signs and symptoms of hyperglycemia and hypoglycemia  - Administer ordered medications to maintain glucose within target range  - Assess nutritional intake and initiate nutrition service referral as needed  Outcome: Progressing     Problem: SKIN/TISSUE INTEGRITY - ADULT  Goal: Incision(s), wounds(s) or drain site(s) healing without S/S of infection  Description: INTERVENTIONS  - Assess and document dressing, incision, wound bed, drain sites and surrounding tissue  - Provide patient and family education  Outcome: Progressing     Problem: HEMATOLOGIC - ADULT  Goal: Maintains hematologic stability  Description: INTERVENTIONS  - Assess for signs and symptoms of bleeding or hemorrhage  - Monitor labs  - Administer supportive blood products/factors as ordered and appropriate  Outcome: Progressing     Problem: MUSCULOSKELETAL - ADULT  Goal: Maintain or return mobility to safest level of function  Description: INTERVENTIONS:  - Assess patient's ability to carry out ADLs; assess patient's baseline for ADL function and identify physical deficits which impact ability to perform ADLs (bathing, care of mouth/teeth, toileting, grooming, dressing, etc )  - Assess/evaluate cause of self-care deficits   - Assess range of motion  - Assess patient's mobility  - Assess patient's need for assistive devices and provide as appropriate  - Encourage maximum independence but intervene and supervise when necessary  - Involve family in performance of ADLs  - Assess for home care needs following discharge   - Consider OT consult to assist with ADL evaluation and planning for discharge  - Provide patient education as appropriate  Outcome: Progressing     Problem: Nutrition/Hydration-ADULT  Goal: Nutrient/Hydration intake appropriate for improving, restoring or maintaining nutritional needs  Description: Monitor and assess patient's nutrition/hydration status for malnutrition  Collaborate with interdisciplinary team and initiate plan and interventions as ordered  Monitor patient's weight and dietary intake as ordered or per policy  Utilize nutrition screening tool and intervene as necessary  Determine patient's food preferences and provide high-protein, high-caloric foods as appropriate       INTERVENTIONS:  - Monitor oral intake, urinary output, labs, and treatment plans  - Assess nutrition and hydration status and recommend course of action  - Evaluate amount of meals eaten  - Assist patient with eating if necessary   - Allow adequate time for meals  - Recommend/ encourage appropriate diets, oral nutritional supplements, and vitamin/mineral supplements  - Order, calculate, and assess calorie counts as needed  -  - Assess need for intravenous fluids  - Provide specific nutrition/hydration education as appropriate  - Include patient/family/caregiver in decisions related to nutrition  Outcome: Progressing     Problem: PAIN - ADULT  Goal: Verbalizes/displays adequate comfort level or baseline comfort level  Description: Interventions:  - Encourage patient to monitor pain and request assistance  - Assess pain using appropriate pain scale  - Administer analgesics based on type and severity of pain and evaluate response  - Implement non-pharmacological measures as appropriate and evaluate response  - Consider cultural and social influences on pain and pain management  - Notify physician/advanced practitioner if interventions unsuccessful or patient reports new pain  Outcome: Progressing     Problem: INFECTION - ADULT  Goal: Absence or prevention of progression during hospitalization  Description: INTERVENTIONS:  - Assess and monitor for signs and symptoms of infection  - Monitor lab/diagnostic results  - Monitor all insertion sites, i e  indwelling lines, tubes, and drains  - Monitor endotracheal if appropriate and nasal secretions for changes in amount and color  - [place] appropriate cooling/warming therapies per order  - Administer medications as ordered  - Instruct and encourage patient and family to use good hand hygiene technique  - Identify and instruct in appropriate isolation precautions for identified infection/condition  Outcome: Progressing     Problem: SAFETY ADULT  Goal: Patient will remain free of falls  Description: INTERVENTIONS:  - Educate patient/family on patient safety including physical limitations  - Instruct patient to call for assistance with activity   - Consult OT/PT to assist with strengthening/mobility   - Keep Call bell within reach  - Keep bed low and locked with side rails adjusted as appropriate  - Keep care items and personal belongings within reach  - Initiate and maintain comfort rounds  - Make Fall Risk Sign visible to staff  - Offer Toileting every 2Hours, in advance of need  - Initiate/Maintain bedalarm  - Obtain necessary fall risk management equipment: roller walker  - Apply yellow socks and bracelet for high fall risk patients  - Consider moving patient to room near nurses station  Outcome: Progressing  Goal: Maintain or return to baseline ADL function  Description: INTERVENTIONS:  - Educate patient/family on patient safety including physical limitations  - Instruct patient to call for assistance with activity   - Consult OT/PT to assist with strengthening/mobility   - Keep Call bell within reach  - Keep bed low and locked with side rails adjusted as appropriate  - Keep care items and personal belongings within reach  - Initiate and maintain comfort rounds  - Make Fall Risk Sign visible to staff  - Offer Toileting every 2Hours, in advance of need  - Initiate/Maintain bedalarm  - Obtain necessary fall risk management equipment: roller walker  - Apply yellow socks and bracelet for high fall risk patients  - Consider moving patient to room near nurses station  Outcome: Progressing  Goal: Maintains/Returns to pre admission functional level  Description: INTERVENTIONS:  - Perform BMAT or MOVE assessment daily    - Set and communicate daily mobility goal to care team and patient/family/caregiver     - Collaborate with rehabilitation services on mobility goals if consulted  - Out of bed for toileting  - Record patient progress and toleration of activity level   Outcome: Progressing     Problem: DISCHARGE PLANNING  Goal: Discharge to home or other facility with appropriate resources  Description: INTERVENTIONS:  - Identify barriers to discharge w/patient and caregiver  - Arrange for needed discharge resources and transportation as appropriate  - Identify discharge learning needs (meds, wound care, etc )  - Arrange for interpretive services to assist at discharge as needed  - Refer to Case Management Department for coordinating discharge planning if the patient needs post-hospital services based on physician/advanced practitioner order or complex needs related to functional status, cognitive ability, or social support system  Outcome: Progressing     Problem: Knowledge Deficit  Goal: Patient/family/caregiver demonstrates understanding of disease process, treatment plan, medications, and discharge instructions  Description: Complete learning assessment and assess knowledge base    Interventions:  - Provide teaching at level of understanding  - Provide teaching via preferred learning methods  Outcome: Progressing

## 2021-08-03 NOTE — PLAN OF CARE
Problem: OCCUPATIONAL THERAPY ADULT  Goal: Performs self-care activities at highest level of function for planned discharge setting  See evaluation for individualized goals  Description: Treatment Interventions: ADL retraining, Functional transfer training, UE strengthening/ROM, Endurance training, Cognitive reorientation, Patient/family training, Equipment evaluation/education, Neuromuscular reeducation, Compensatory technique education, Fine motor coordination activities, Continued evaluation, Energy conservation, Activityengagement          See flowsheet documentation for full assessment, interventions and recommendations  Outcome: Progressing  Note: Limitation: Decreased ADL status, Decreased UE strength, Decreased Safe judgement during ADL, Decreased cognition, Decreased endurance, Decreased fine motor control, Decreased high-level ADLs, Decreased self-care trans  Prognosis: Good  Assessment: Pt seen for treatment session #3 this date  Pt alert and agreeable to participate at this time  Pt completing functional transfers and standing balance tasks with increased performance compared to previous treatment sessions  Pt continues to required Max A for LB ADLs and Max cues to sequence/initiate tasks  Pt more alert and agreeable this date, more talkative and interactive  Pt at this time demonstrates Good potential to make progress towards goals, although continues to be limited by decrease activity tolerance, decrease standing balance, decrease sitting balance, decrease performance during ADL tasks, decrease cognition, decrease safety awareness , decrease UB MS, decrease generalized strength, decrease activity engagement, decrease performance during functional transfers and frequent falls       OT Discharge Recommendation: Post acute rehabilitation services

## 2021-08-03 NOTE — DISCHARGE INSTRUCTIONS
POLST has been completed with discussed with wife no further hospitalizations admitted as comfort care

## 2021-08-03 NOTE — PHYSICAL THERAPY NOTE
PHYSICAL THERAPY TREATMENT NOTE  NAME:  Isiah Banks  DATE: 08/03/21    Length Of Stay: 16  Performed at least 2 patient identifiers during session: Name, Birthday and ID bracelet    TREATMENT:      08/03/21 0738   PT Last Visit   PT Visit Date 08/03/21   Note Type   Note Type Treatment   Pain Assessment   Pain Assessment Tool Pain Assessment not indicated - pt denies pain   Pain Score No Pain   Restrictions/Precautions   Other Precautions Cognitive; Chair Alarm; Bed Alarm; Fall Risk   General   Chart Reviewed Yes   Response to Previous Treatment Patient unable to report, no changes reported from family or staff   Family/Caregiver Present No   Cognition   Arousal/Participation Alert; Responsive; Cooperative   Orientation Level Oriented to person;Disoriented to place; Disoriented to time   Subjective   Subjective "I want to watch TV"   Bed Mobility   Supine to Sit 3  Moderate assistance   Additional items Assist x 1;HOB elevated; Increased time required;Verbal cues  (trunbk management)   Additional Comments Pt requiring modAx1 for trunk management with transfer, vc for bedrail used with no carryover   Transfers   Sit to Stand   (steadying->minAx1)   Additional items Assist x 1; Increased time required;Verbal cues   Stand to Sit   (Angela->steadying)   Additional items Increased time required;Verbal cues   Stand pivot 4  Minimal assistance   Additional items Assist x 1; Increased time required;Verbal cues   Additional Comments Use of RW for all transfers  Intially requiring steady assistance for first STS with LOB and Angela for controlled decent back onto EOB  Second trial requiring Angela to achiave standing postion  Steadying assist for remainder of STS  Vc for hand placement for safety concerns with little carryover  Ambulation/Elevation   Gait pattern Narrow MIRLANDE; Decreased foot clearance; Foward flexed; Excessively slow; Improper Weight shift   Gait Assistance 4  Minimal assist   Additional items Assist x 1;Verbal cues Assistive Device Rolling walker   Distance 6'x2 with RW and minAx1  Sitting break required due to increased fatigue after first ambulation trial  Pt requiring RW management and slight LOB that pt is able to correct himself  Balance   Static Sitting Fair   Dynamic Sitting Fair   Static Standing Fair -   Dynamic Standing Poor +   Ambulatory Poor +   Endurance Deficit   Endurance Deficit Yes   Endurance Deficit Description Fatigue   Activity Tolerance   Activity Tolerance Patient limited by fatigue   Medical Staff Made Aware PCA, Kwadwo   Exercises   Glute Sets Sitting;15 reps;AROM   Hip Flexion Sitting;15 reps;AROM; Bilateral   Hip Adduction Sitting;15 reps;AROM; Bilateral   Knee AROM Long Arc Quad 15 reps;AROM; Bilateral   Ankle Pumps 25 reps;AROM; Bilateral   Assessment   Prognosis Fair   Problem List Decreased strength;Decreased endurance; Impaired balance;Decreased mobility; Decreased cognition; Impaired judgement;Decreased safety awareness; Obesity; Decreased skin integrity   Goals   Patient Goals Go home   PT Treatment Day 3   Plan   Treatment/Interventions Functional transfer training;LE strengthening/ROM; Therapeutic exercise; Endurance training;Patient/family training;Equipment eval/education; Bed mobility;Gait training   Progress Slow progress, cognitive deficits   PT Frequency   (3-5x/wk)   Recommendation   PT Discharge Recommendation Post acute rehabilitation services   PT - OK to Discharge Yes   Additional Comments When medically cleared   Additional Comments 2 Pt seated in recliner with chair alarm on and all needs within reach   [facility] 8 in Bed Without Bedrails 2   Lying on Back to Sitting on Edge of Flat Bed 2   Moving Bed to Chair 3   Standing Up From Chair 3   Walk in Room 3   Climb 3-5 Stairs 1   Basic Mobility Inpatient Raw Score 14   Basic Mobility Standardized Score 35 55     The patient's AM-PAC Basic Mobility Inpatient Short Form Raw Score is 12, Standardized Score is 32  23  A standardized score less than 42 9 suggests the patient may benefit from discharge to post-acute rehabilitation services  Please also refer to the recommendation of the Physical Therapist for safe discharge planning  Pt seen for PT treatment session this date with interventions consisting of gait training w/ emphasis on improving pt's ability to ambulate level surfaces x 6'x2 with min A provided by therapist with RW, Therapeutic exercise consisting of: AROM 15 reps B LE in sitting position and therapeutic activity consisting of training: bed mobility, supine<>sit transfers, sit<>stand transfers and static sitting tolerance at EOB for ~5' minutes w/ no UE support  Pt agreeable to PT treatment session upon arrival, pt found supine in bed w/ HOB elevated, in no apparent distress  In comparison to previous session, pt with improvements in ambulation distance with decreased assistance levels  Pt is able to follow directions with therex better vs last treatment session but continues to require mod vc for technique and hand placement with transfers andbed mobility  Post session: pt returned back to recliner, chair alarm engaged and all needs in reach Continue to recommend STR at time of d/c in order to maximize pt's functional independence and safety w/ mobility  Pt continues to be functioning below baseline level, and remains limited 2* factors listed above  PT will continue to see pt while here in order to address the deficits listed above and provide interventions consistent w/ POC in effort to achieve STGs      Maris Lyons PTA

## 2021-08-03 NOTE — PLAN OF CARE
Problem: PHYSICAL THERAPY ADULT  Goal: Performs mobility at highest level of function for planned discharge setting  See evaluation for individualized goals  Description: Treatment/Interventions: Functional transfer training, LE strengthening/ROM, Elevations, Therapeutic exercise, Endurance training, Cognitive reorientation, Patient/family training, Equipment eval/education, Bed mobility, Gait training, Compensatory technique education, Spoke to nursing, Spoke to case management, OT  Equipment Recommended:  (TBD by rehab)       See flowsheet documentation for full assessment, interventions and recommendations  Outcome: Progressing  Note: Prognosis: Fair  Problem List: Decreased strength, Decreased endurance, Impaired balance, Decreased mobility, Decreased cognition, Impaired judgement, Decreased safety awareness, Obesity, Decreased skin integrity  Assessment: Pt seen for PT treatment session this date with interventions consisting of gait training w/ emphasis on improving pt's ability to ambulate level surfaces x 6'x2 with min A provided by therapist with RW, Therapeutic exercise consisting of: AROM 15 reps B LE in sitting position and therapeutic activity consisting of training: bed mobility, supine<>sit transfers, sit<>stand transfers and static sitting tolerance at EOB for ~5' minutes w/ no UE support  Pt agreeable to PT treatment session upon arrival, pt found supine in bed w/ HOB elevated, in no apparent distress  In comparison to previous session, pt with improvements in ambulation distance with decreased assistance levels  Pt is able to follow directions with therex better vs last treatment session but continues to require mod vc for technique and hand placement with transfers andbed mobility  Post session: pt returned back to recliner, chair alarm engaged and all needs in reach Continue to recommend STR at time of d/c in order to maximize pt's functional independence and safety w/ mobility   Pt continues to be functioning below baseline level, and remains limited 2* factors listed above  PT will continue to see pt while here in order to address the deficits listed above and provide interventions consistent w/ POC in effort to achieve STGs  Barriers to Discharge: Decreased caregiver support, Inaccessible home environment  Barriers to Discharge Comments: requires increased assistance to complete all mobility     PT Discharge Recommendation: Post acute rehabilitation services     PT - OK to Discharge: Yes    See flowsheet documentation for full assessment

## 2021-08-03 NOTE — ASSESSMENT & PLAN NOTE
· Echocardiogram May 2021 at [facility]:  EF 55-60%  Normal diastolic function  Consistent with moderate AS, moderate tricuspid regurgitation  Moderately elevated estimated PA systolic pressure    · Repeat echocardiogram done on July 19 shows a mild AS

## 2021-08-03 NOTE — ASSESSMENT & PLAN NOTE
Lab Results   Component Value Date    HGBA1C 6 6 (H) 07/19/2021       Recent Labs     08/02/21  1112 08/02/21  1558 08/02/21 2059 08/03/21  0659   POCGLU 110 186* 175* 140       Blood Sugar Average: Last 72 hrs:  (P) 132 [id number]   · Diabetic diet  · Fingerstick blood sugar sliding scale coverage  · Hold home oral agents  ·  hemoglobin A1c 6 6  · Sugars are stable    Continue Lantus and sliding scale

## 2021-08-03 NOTE — CASE MANAGEMENT
Case Management Progress Note    Patient name Hiral Marin  Location /-01 MRN [id number]  : 1945 Date 8/3/2021       LOS (days): 16  Geometric Mean LOS (GMLOS) (days): 4 00  Days to GMLOS:-11 5        BUNDLE:      OBJECTIVE:  Pt is a 64y o  year old , white or  [1], male with Religion preference of None admitted on 2021  9:06 PM  Pt is admitted to [facility] [id number] at [address] [facility] with complaints of Acute on chronic diastolic CHF (congestive heart failure) ([facility] [address] )   Current admission status: Inpatient  Preferred Pharmacy:   [facility] [address] [facility], [address] [address]  Phone: [phone] Fax: [phone]    Primary Care Provider: Verna Quinn MD    Primary Insurance: [facility]  Secondary Insurance: [facility] REP    PROGRESS NOTE:    CM spoke to provider and attempted a Peer to Peer with [facility]   Peer to Peer was done with Dr Richard Diaz from [facility]  Case reviewed was done and they upheld the denial- No skilled need with comfort care, patient can have PT/OT and speech under part B  CM reviewed this with Kirbybean Mead the S/O and notified Admissions at [facility]  Pt has been accepted there today, and per admissions person they are requesting a 2 PM time  Covid Screen is pending  Pt is sitting in a chair, will go WC transport  Kirby Mead is aware cost of WC transport is an OOP expense and he will be billed, she understood  Called transport and secured 2 PM  with [address] [facility]--  Confirmed this transport- Nursing is aware and facility updated via [address]      DC plan is [facility]

## 2021-08-03 NOTE — ASSESSMENT & PLAN NOTE
· CT head showing encephalomalacia  · MRI showing the same  · Patient's mental status waxes and wanes   · Continue Aricept, patient has very advanced dementia with encephalomalacia he most of the time does not stand situation he is declining to eat or drink I discussed with wife the best course of action with advanced dementia him not taking enough water I do not recommend a feeding to with dementia and advanced age with him ripping it out the wife stated that in his will he did not want a feeding tube we discussed hospice care and she is in agreement she is unable to take him home on hospice - so will transfer him on comfort care to a nursing home with conversion to hospice  We discussed the POLST form- patient is a DNR DNI no further hospitalizations  · Accepted at [facility]- comfort care/hospice awaiting insurance authorization    · Today patient ate better drink better but he is still confused to situation and time  · Being discharged to SNF on comfort care

## 2021-08-03 NOTE — DISCHARGE SUMMARY
[address]  Discharge- Farhad Segovia 1945, 64 y o  male MRN: [id number]  Unit/Bed#: -Birgit Encounter: [id number]  Primary Care Provider: Phyllis Dunham MD   Date and time admitted to hospital: 7/18/2021  9:06 PM    Hypernatremia  Assessment & Plan  Patient is a nectar thick he is not in taking much  Resolved status post 2 L of D5 water given on 07/28 he is eating better today as reviewed  Ask speech to re-evaluate to see if the diet and be adjusted and nectar thickened BMs in if he does better to allow more nutrition and free water will use D5 water p r n  Patient's diet has been upgraded and he is not eating or drinking recurrent hypernatremia  I discussed with wife in detail regarding further options although I do not recommended in advanced dementia patient's which urea shake at risk of pulling it out as a PEG tube but the wife stated they do not want a PEG tube and that was his wishes in his will  We discussed the best course of action for him is hospice care which she preferred to be done at the facility  She is in agreement to transfer him to hospice    Acute metabolic encephalopathy  Assessment & Plan  Patient's mental status waxes and wanes at baseline given his Alzheimer's Dementia however on 07/25, patient appeared more somnolent on exam  Multifactorial, possibly due to metabolic derangements, delirium given change in environment   MRI a couple of days ago showed encephalomalacia but no acute strokes  Patient is at baseline he is outpatient urology evaluation with encephalomalacia and dementia as a reviewed his PCPs notes back in 2020 he has cognitive decline    Patient has no insight no judgment is not eating or drinking he has a very advanced dementia surgeon to hospice cares discussed    Acute respiratory failure with hypoxia and hypercapnia (HCC)  Assessment & Plan  · Secondary to CHF which is clinically has resolved DC lost almost 30 lb I have repeated the ABG ABG looks like a respiratory acidosis compensating for contraction alkalosis as he never had any hypercapnia before  BiPAP has been discontinued since 07/27 patient is actually now 92% on room air will continue diuresis with Lasix 60 mg daily monitor electrolytes as well might need p r n  During the day his on room air early in the morning he decides to 80 he may need p r n  Oxygen    Hypothyroidism  Assessment & Plan  Patient with a history of hypothyroidism and on levothyroxine 100 mcg at home  TSH done on admission elevated to 14 with ft4 at 1 2  Increased levothyroxine dose to 112 mcg   Being discharged on comfort care    Essential hypertension  Assessment & Plan  · BP improved continue Norvasc and metoprolol    Ambulatory dysfunction  Assessment & Plan  · Patient has been having frequent falls, and today he could not get up from the floor  EMS was concerned that patient may be in an unsafe situation as patient's wife is having difficulty caring for him  · Patient is being discharged on comfort care to     Moderate aortic stenosis by prior echocardiogram  Assessment & Plan  · E[facility]ogram May 2021 at [facility]:  EF 55-60%  Normal diastolic function  Consistent with moderate AS, moderate tricuspid regurgitation  Moderately elevated estimated PA systolic pressure  · Repeat echocardiogram done on July 19 shows a mild AS      Type 2 diabetes mellitus with kidney complication, without long-term current use of insulin [facility])  Assessment & Plan  Lab Results   Component Value Date    HGBA1C 6 6 (H) 07/19/2021       Recent Labs     08/02/21  1112 08/02/21  1558 08/02/21  2059 08/03/21  0659   POCGLU 110 186* 175* 140       Blood Sugar Average: Last 72 hrs:  (P) 132 [id number]   · Diabetic diet  · Fingerstick blood sugar sliding scale coverage  · Hold home oral agents  ·  hemoglobin A1c 6 6  · Sugars are stable    Continue Lantus and sliding scale    PAF (paroxysmal atrial fibrillation) (HCC)  Assessment & Plan  · EKG: normal sinus rhythm  · He is not on anticoagulation for suspected GI bleed  · Continue metoprolol he is on Plavix    Acute kidney injury superimposed on chronic kidney disease [facility])  Assessment & Plan  Lab Results   Component Value Date    EGFR 32 07/30/2021    EGFR 32 07/29/2021    EGFR 29 07/28/2021    CREATININE 1 94 (H) 07/30/2021    CREATININE 1 96 (H) 07/29/2021    CREATININE 2 14 (H) 07/28/2021     · CKD stage 3 with creatinine baseline 1 4-1 7  He is only in taking 20-40% he is on nectar thick liquid secondary to dysphagia  Improved today Ultrasound of the kidney and bladder done on 07/20 2-for acute finding continues to imrove- no further blood work the patient continues to have poor p o  Intake and is being transitioned at discharge on comfort care/hospice awaiting insurance authorization            Acute on chronic anemia  Assessment & Plan  · Hemoglobin is 8 5 with baseline around 13 this admission has been fluctuating out in 8 6 no gross bleed  Venofer 5 doses infusions have been started by Nephrology  · Stool Hemoccult is positive  · No evidence of gross bleeding noted  · Will hold p o   Iron  · S/p EGD and colonoscopy on 7/20  "C1 M3 Og's esophagus-biopsies taken to rule out dysplasia  Small hiatal hernia otherwise normal stomach on direct and retroflexed views  Random gastric biopsies taken to rule out H pylori  Normal visualized portion of duodenum from duodenal bulb to D3  Random biopsies taken to rule out celiac disease "    "No evidence of active bleeding  Terminal ileum normal  Scattered diverticula throughout the colon left greater than right without evidence of bleeding  Area of erythema/ulceration/hemorrhage likely from trauma from enema versus old diverticular bleed  Large internal/external hemorrhoids"    · Okay to resume plavix from GI standpoint  · Is status post 1 unit of PRBC transfusion on 07/27 will keep hemoglobin greater than 8 with the kidney injury        Dementia without behavioral disturbance ([facility] )  Assessment & Plan  · CT head showing encephalomalacia  · MRI showing the same  · Patient's mental status waxes and wanes   · Continue Aricept, patient has very advanced dementia with encephalomalacia he most of the time does not stand situation he is declining to eat or drink I discussed with wife the best course of action with advanced dementia him not taking enough water I do not recommend a feeding to with dementia and advanced age with him ripping it out the wife stated that in his will he did not want a feeding tube we discussed hospice care and she is in agreement she is unable to take him home on hospice - so will transfer him on comfort care to a nursing home with conversion to hospice  We discussed the POLST form- patient is a DNR DNI no further hospitalizations  · Accepted at [facility]- comfort care/hospice awaiting insurance authorization  · Today patient ate better drink better but he is still confused to situation and time  · Being discharged to SNF on comfort care      * Acute on chronic diastolic CHF (congestive heart failure) ([facility] )  Assessment & Plan  · Edema legs have significantly improved he does have some trace up he still has some rales at the bibasilar region I did repeat a chest x-ray 727 there was still mild vascular congestion patient also takes poor p o satting well a chest x-ray just has some bibasilar crackles otherwise no leg edema  Continue Lasix 60 mg p o  Daily as his intake is still poor  · BNP:  4500  · Daily weights  · I&Os  · TTE: LV function showing 55% EF  · Low-salt diet, fluid restriction    · Initially diuresed with Lasix and Bumex drip  · On admission was 276 lb went down to 237 at the weight right now is inaccurate patient is not eating or drinking adequately with advanced dementia no PEG tube as per wishes    As discussed with wife will be transition to hospice care see above        Medical Problems Resolved Problems  Date Reviewed: 8/3/2021        Resolved    Hypokalemia 7/25/2021     Resolved by  Joseph Diallo PA-C    GI bleeding 7/25/2021     Resolved by  Joseph Diallo PA-C    History of TIA (transient ischemic attack) 7/25/2021     Resolved by  Joseph Diallo PA-C    Morbid obesity with BMI of 40 0-44 9, adult ([facility] [address] ) 7/25/2021     Resolved by  Joseph Diallo PA-C    Acute encephalopathy 7/26/2021     Resolved by  Ilan West MD    Metabolic alkalosis 7/75/3480     Resolved by  Ilan West MD              Discharging Physician / Practitioner: Jane Diggs MD  PCP: Taylor Morelos MD  Admission Date:   Admission Orders (From admission, onward)     Ordered        07/18/21 2210  Inpatient Admission  Once                   Discharge Date: 08/03/21    Consultations During Hospital Stay:  · Nephrology  · Cardiology  · Critical care  · Gastroenterology    Procedures Performed:   · 7/19-egd-C1 M3 Og's esophagus-biopsies taken to rule out dysplasia  Small hiatal hernia otherwise normal stomach on direct and retroflexed views  Random gastric biopsies taken to rule out H pylori  Normal visualized portion of duodenum from duodenal bulb to D3  Random biopsies taken to rule out celiac disease  · Colonoscopy- Repeat colonoscopy not recommended due to age (age = 77 or greater)  Return to floor for ongoing medical care as per primary team  May resume regular diet as tolerated  Okay to resume Plavix, however risks versus benefits should be assessed for dual anti-platelet therapy  Continue to monitor hemoglobin daily and signs of active GI bleeding  Defer management of Hemoglobin and iron levels 2 PCP as an outpatient  GI follow-up on as-needed basis      Significant Findings / Test Results:   · EGD and colonoscopy see above  · CT head- 1    New acute intracranial CT abnormality      2   Encephalomalacia involving left gyrus rectus and inferior left frontal lobe as well as right frontal cortical/subcortical white matter      3  Nonspecific extensive white matter change suggesting advanced microangiopathy  · Ultrasound of the kidney and bladder is negative  · MRI of the brain without contrast-    Severe periventricular and white matter T2 hyperintensity, may be due to chronic small vessel ischemic changes was also described on the MRI performed at [facility] facility on October 30, 2017     Area of encephalomalacia in the inferior left frontal region  · cxr- 7/25-mild pulmonary edema with trace right effusion  · Chest x-ray 7/27-mild pulmonary venous congestion with small left effusion  · 2D echo-EF is 55%  Mild aortic stenosis    Incidental Findings:   · See above     Test Results Pending at Discharge (will require follow up): · None     Outpatient Tests Requested:  · None    Complications:  None    Reason for Admission:  CHF JON    Hospital Course:   Claudette Siu is a 64 y o  male patient who originally presented to the hospital on 7/18/2021 due to ambulatory dysfunction and edema  Patient was found to be in acute heart failure overload with acute on chronic CKD  Anemia  Admitted diuresed when he became hypernatremic  Audelia Vasquez has been declining mentally as he does have dementia and also workup with the CT and brain and MRI a significant encephalomalacia  Patient is only oriented to 1 a poor p o  Intake at times fails to comply not in taking much free fluid had recurrent hyponatremia  Although Jon I has improved he was evaluated by Cardiology he has been scoped no acute bleed hemoglobin remained stable secondary to recurrent hyper natremia, poor p o  Intake, poor insight advanced dementia it was discussed with wife regarding goals of care in terms of the long-term solution although not recommended with dementia and Geriatric is a feeding tube for which the wife declined and patient's wishes were to not place a feeding tube    We discussed the option of hospice as with no long-term solution to increasing his p o  Intake with free water as well there is no other way to fix it he is going to have recurrent electrolyte abnormality and recurrent admissions with no options available for long-term treatment the wife has decided to proceed with hospice care POLST was filled out with no further hospitalizations  Patient will be discharged to [facility] as comfort care  Please see above list of diagnoses and related plan for additional information  Condition at Discharge: stable    Discharge Day Visit / Exam:   Subjective:  Patient seen and examined pleasantly confused  Vitals: Blood Pressure: 151/61 (08/03/21 0657)  Pulse: 62 (08/03/21 0657)  Temperature: (!) 97 4 °F (36 3 °C) (08/03/21 0657)  Temp Source: Temporal (07/27/21 1700)  Respirations: 20 (08/03/21 0657)  Height: 5' 7" (170 2 cm) (07/19/21 1256)  Weight - Scale: 101 kg (222 lb 14 2 oz) (08/03/21 0600)  SpO2: 93 % (08/03/21 0657)  Exam:   Physical Exam  Vitals and nursing note reviewed  Constitutional:       Appearance: He is well-developed  HENT:      Head: Normocephalic and atraumatic  Eyes:      Conjunctiva/sclera: Conjunctivae normal    Cardiovascular:      Rate and Rhythm: Normal rate and regular rhythm  Heart sounds: No murmur heard  Pulmonary:      Effort: Pulmonary effort is normal  No respiratory distress  Breath sounds: Normal breath sounds  No wheezing or rales  Abdominal:      Palpations: Abdomen is soft  Tenderness: There is no abdominal tenderness  Musculoskeletal:         General: No swelling  Cervical back: Neck supple  Skin:     General: Skin is warm and dry  Neurological:      Mental Status: He is alert  He is disoriented  Discussion with Family: Updated  (wife) at bedside  Discharge instructions/Information to patient and family:   See after visit summary for information provided to patient and family        Provisions for Follow-Up Care:  See after visit summary for information related to follow-up care and any pertinent home health orders  Disposition:   Birgit Palmer Jose D at [facility]    Planned Readmission: no     Discharge Statement:  I spent >35 minutes discharging the patient  This time was spent on the day of discharge  I had direct contact with the patient on the day of discharge  Greater than 50% of the total time was spent examining patient, answering all patient questions, arranging and discussing plan of care with patient as well as directly providing post-discharge instructions  Additional time then spent on discharge activities  Discharge Medications:  See after visit summary for reconciled discharge medications provided to patient and/or family        **Please Note: This note may have been constructed using a voice recognition system**

## 2021-08-03 NOTE — OCCUPATIONAL THERAPY NOTE
[address] Progress Note     Patient Name: Saint Lively  LCCRY'CATERINA Date: 8/3/2021  Problem List  Principal Problem:    Acute on chronic diastolic CHF (congestive heart failure) ([facility] [address] )  Active Problems:    Dementia without behavioral disturbance (HCC)    Acute on chronic anemia    Acute kidney injury superimposed on chronic kidney disease (HCC)    PAF (paroxysmal atrial fibrillation) (HCC)    Type 2 diabetes mellitus with kidney complication, without long-term current use of insulin (HCC)    Moderate aortic stenosis by prior echocardiogram    Ambulatory dysfunction    Essential hypertension    Hypothyroidism    Acute respiratory failure with hypoxia and hypercapnia (HCC)    Acute metabolic encephalopathy    Hypernatremia            08/03/21 0850   Note Type   Note Type Treatment for insurance authorization   Restrictions/Precautions   Other Precautions Chair Alarm; Bed Alarm;Cognitive; Fall Risk   Pain Assessment   Pain Assessment Tool 0-10   Pain Score No Pain   ADL   Where Assessed Chair   Eating Assistance 5  Supervision/Setup   Eating Deficit Setup   Grooming Assistance 4  Minimal Assistance   Grooming Deficit Verbal cueing;Supervision/safety; Increased time to complete   Grooming Comments Mod vc'ing for initation and occasional Min A for HOHA and sequencing through task   UB Bathing Assistance 4  Minimal Assistance   UB Bathing Deficit Verbal cueing;Supervision/safety; Increased time to complete   UB Bathing Comments HOHA for initiation and vc'ing for sequencing  Pt easily distracted adn required consistant redirection to task   LB Bathing Assistance 3  Moderate Assistance   LB Bathing Deficit Steadying;Verbal cueing;Supervision/safety; Increased time to complete;Perineal area; Buttocks; Left lower leg including foot;Right lower leg including foot   LB Bathing Comments Pt participating with washing upper legs and groin area while seated   Pt then requirign Max A for posterior pericare hygiene adn lower legs washing  Pt able to participate in cleaning pericare area while standing    UB Dressing Assistance 4  Minimal Assistance   UB Dressing Deficit Verbal cueing;Supervision/safety; Increased time to complete   UB Dressing Comments vc'ing for sequencing and initiation of task  occasional HOHA required  LB Dressing Assistance 2  Maximal Assistance   LB Dressing Deficit Steadying; Requires assistive device for steadying;Verbal cueing;Supervision/safety; Increased time to complete; Don/doff R sock; Don/doff L sock; Thread RLE into pants; Thread LLE into pants;Pull up over hips   LB Dressing Comments Pt encouraged to kvng sock  Pt reports "I can't reach" with minimal effort to complete independently  Pt then kicking feet out for therapist to complete task  Pt then requiring Mod A for CM around waist while standing at RW  vc'ing for sequencing and initiation   Toileting Comments Pt reports "I don't need to use the bathroom"   Bed Mobility   Additional Comments Pt seated OOB at start and end of sessino wtih call bell inr each, chair alarm intact adn all needs met at thist rocky   Transfers   Sit to Stand   [facility] Assist)   Additional items Assist x 1; Increased time required;Verbal cues   Stand to Sit   [facility] Assist)   Additional items Assist x 1; Increased time required;Verbal cues   Stand pivot 4  Minimal assistance   Additional items Assist x 1; Increased time required;Verbal cues   Additional Comments Pt requiring Steadying Assist for STS and Min A for SPT with use of RW for UB support  Pt requiring Max cues for proper hand placement and Max encouragement to participate d/t Pt stating "I just did this, im tired"  Pt requiring vc'ing to stay driected to task and for motivation  Pt cooperating well during treatment session   Functional Mobility   Functional Mobility 4  Minimal assistance   Additional Comments Pt requirign Min A to complete short disatnce ambulation with use of RW for UB support   Pt requirign MAx cues for sequencing and safety  Additional items Rolling walker   ROM- Right Upper Extremities   R Shoulder Flexion;ABduction;AAROM   R Elbow Elbow flexion;Elbow extension;AAROM   R Position Seated   R Weight/Reps/Sets 2 sets of 10   RUE ROM Comment Pt requiring occasional HOHA to ensure proper movements and participation  pt easily distracted adn unable to appopraitely complete task without cues  Exercies completed to increase BUE MS to increase performance during ADLs nad functional transfers  ROM - Left Upper Extremities    L Shoulder Flexion;ABduction;AAROM   L Elbow Elbow flexion;Elbow extension;AAROM   L Position Seated   L Weight/Reps/Sets 2 sets of 10   LUE ROM Comment Pt requiring occasional HOHA to ensure proper movements and participation  pt easily distracted adn unable to appopraitely complete task without cues  Exercies completed to increase BUE MS to increase performance during ADLs nad functional transfers  Cognition   Overall Cognitive Status Impaired   Arousal/Participation Alert; Responsive; Cooperative   Attention Difficulty attending to directions   Orientation Level Oriented to person;Disoriented to time;Disoriented to situation;Disoriented to place  (Pt states "[facility]" for place)   Memory Unable to assess   Following Commands Follows one step commands inconsistently   Activity Tolerance   Activity Tolerance Patient limited by fatigue   Medical Staff Made Aware Spoke with PTA, Ninfa Vallejo  Spoke with RN   Assessment   Assessment Pt seen for treatment session #3 this date  Pt alert and agreeable to participate at this time  Pt completing functional transfers and standing balance tasks with increased performance compared to previous treatment sessions  Pt continues to required Max A for LB ADLs and Max cues to sequence/initiate tasks  Pt more alert and agreeable this date, more talkative and interactive   Pt at this time demonstrates Good potential to make progress towards goals, although continues to be limited by decrease activity tolerance, decrease standing balance, decrease sitting balance, decrease performance during ADL tasks, decrease cognition, decrease safety awareness , decrease UB MS, decrease generalized strength, decrease activity engagement, decrease performance during functional transfers and frequent falls  Plan   Treatment Interventions ADL retraining;UE strengthening/ROM; Functional transfer training; Endurance training;Cognitive reorientation;Patient/family training;Equipment evaluation/education; Neuromuscular reeducation; Compensatory technique education;Continued evaluation; Energy conservation; Activityengagement   Goal Expiration Date 08/08/21   OT Treatment Day 3   OT Frequency 3-5x/wk   Recommendation   OT Discharge Recommendation Post acute rehabilitation services   AM-PAC Daily Activity Inpatient   Lower Body Dressing 2   Bathing 2   Toileting 2   Upper Body Dressing 3   Grooming 3   Eating 3   Daily Activity Raw Score 15   Daily Activity Standardized Score (Calc for Raw Score >=11) 34 69   AM-PAC Applied Cognition Inpatient   Following a Speech/Presentation 2   Understanding Ordinary Conversation 2   Taking Medications 1   Remembering Where Things Are Placed or Put Away 2   Remembering List of 4-5 Errands 1   Taking Care of Complicated Tasks 1   Applied Cognition Raw Score 9   Applied Cognition Standardized Score 22 48       Pt goals to be met by 8/8/2021     1  Pt will demonstrate ability to complete grooming/hygiene tasks @ Mod I after set-up  2  Pt will demonstrate ability to complete supine<>sit @ Mod I in order to increase safety and independence during ADL tasks  3  Pt will demonstrate ability to complete UB ADLs including washing/dressing @ Mod I in order to increase performance and participation during meaningful tasks  4  Pt will demonstrate ability to complete LB dressing @ Min A in order to increase safety and independence during meaningful tasks     5  Pt will demonstrate ability to complete toileting tasks including CM and pericare @ Min A in order to increase safety and independence during meaningful tasks  6  Pt will demonstrate ability to complete EOB, chair, toilet/commode transfers @ S in order to increase performance and participation during functional tasks  7  Pt will demonstrate ability to stand for 2-3 minutes while maintaining F+ balance with use of RW for UB support PRN  8  Pt will demonstrate ability to tolerate 30-35 minute OT session with no vc'ing for deep breathing or use of energy conservation techniques in order to increase activity tolerance during functional tasks  9  Pt will demonstrate Good carryover of use of energy conservation/compensatory strategies during ADLs and functional tasks in order to increase safety and reduce risk for falls  10  Pt will demonstrate Good attention and participation in continued evaluation of functional ambulation house hold distances in order to assist with safe d/c planning  11  Pt will attend to continued cognitive assessments 100% of the time in order to provide most appropriate d/c recommendations  12  Pt will follow 100% simple 1-step commands and be A&O x2 consistently with environmental cues to increase participation in functional activities  13  Pt will identify 3 areas of interest/hobbies and 1 intervention on how to incorporate into daily life in order to increase interaction with environment and peers as well as increase participation in meaningful tasks     14  Pt will demonstrate 100% carryover of BUE HEP in order to increase BUE MS and increase performance during functional tasks upon d/c home      Sugar Wright OTR/L

## 2021-08-03 NOTE — ASSESSMENT & PLAN NOTE
Patient with a history of hypothyroidism and on levothyroxine 100 mcg at home  TSH done on admission elevated to 14 with ft4 at 1 2  Increased levothyroxine dose to 112 mcg   Being discharged on comfort care

## 2021-08-04 NOTE — UTILIZATION REVIEW
Notification of Discharge   This is a Notification of Discharge from our facility [address]  Please be advised that this patient has been discharge from our facility  Below you will find the admission and discharge date and time including the patients disposition  UTILIZATION REVIEW CONTACT:  Cici Goff  Utilization   Network Utilization Review Department  Phone: [phone] x carefully listen to the prompts  All voicemails are confidential   Email: [email or web address]     PHYSICIAN ADVISORY SERVICES:  FOR PBLM-FZ-FTPH REVIEW - MEDICAL NECESSITY DENIAL  Phone: [phone]  Fax: [phone]  Email: [email or web address]     PRESENTATION DATE: 7/18/2021  9:06 PM  OBERVATION ADMISSION DATE:  INPATIENT ADMISSION DATE: 7/18/21 10:10 PM   DISCHARGE DATE: 8/3/2021  2:25 PM  DISPOSITION: Non [facility] SN[facility]/TCU/SNU Non [facility] SNF/TCU/SNU      IMPORTANT INFORMATION:  Send all requests for admission clinical reviews, approved or denied determinations and any other requests to dedicated fax number below belonging to the campus where the patient is receiving treatment   List of dedicated fax numbers:  [address] DENIALS (Administrative/Medical Necessity) [phone]   [address]  (Maternity/NICU/Pediatrics) [phone]   [facility] [phone]   [facility] [phone]   [facility] [phone]   [facility] [address] [facility] [phone]   [facility]  [phone]   [address] [facility] [address]  [address] [facility] [address] [facility] [phone]

## 2021-08-05 NOTE — CASE MANAGEMENT
CM received a call from [employer] with [facility] informing CM that the transport Linda Whitley is approved for transport on 8/2/21  Auth # [id number]  Per chart review, pt was transported to [facility] via wcv which is typically an OOP expense, however, CM took the [address] [facility] from [employer] anyway

## 2021-11-02 ENCOUNTER — APPOINTMENT (EMERGENCY)
Dept: RADIOLOGY | Facility: HOSPITAL | Age: 76
DRG: 291 | End: 2021-11-02
Payer: COMMERCIAL

## 2021-11-02 ENCOUNTER — APPOINTMENT (EMERGENCY)
Dept: CT IMAGING | Facility: HOSPITAL | Age: 76
DRG: 291 | End: 2021-11-02
Payer: COMMERCIAL

## 2021-11-02 ENCOUNTER — HOSPITAL ENCOUNTER (INPATIENT)
Facility: HOSPITAL | Age: 76
LOS: 6 days | Discharge: HOME HEALTH | DRG: 291 | End: 2021-11-08
Attending: EMERGENCY MEDICINE | Admitting: FAMILY MEDICINE
Payer: COMMERCIAL

## 2021-11-02 DIAGNOSIS — R79.89 ELEVATED BRAIN NATRIURETIC PEPTIDE (BNP) LEVEL: ICD-10-CM

## 2021-11-02 DIAGNOSIS — N30.00 ACUTE CYSTITIS WITHOUT HEMATURIA: ICD-10-CM

## 2021-11-02 DIAGNOSIS — E86.0 DEHYDRATION: ICD-10-CM

## 2021-11-02 DIAGNOSIS — N39.0 UTI (URINARY TRACT INFECTION): ICD-10-CM

## 2021-11-02 DIAGNOSIS — J96.02 ACUTE RESPIRATORY FAILURE WITH HYPOXIA AND HYPERCAPNIA (HCC): ICD-10-CM

## 2021-11-02 DIAGNOSIS — N17.9 AKI (ACUTE KIDNEY INJURY) (HCC): ICD-10-CM

## 2021-11-02 DIAGNOSIS — N18.32 TYPE 2 DIABETES MELLITUS WITH STAGE 3B CHRONIC KIDNEY DISEASE, WITHOUT LONG-TERM CURRENT USE OF INSULIN (HCC): ICD-10-CM

## 2021-11-02 DIAGNOSIS — E11.22 TYPE 2 DIABETES MELLITUS WITH STAGE 3B CHRONIC KIDNEY DISEASE, WITHOUT LONG-TERM CURRENT USE OF INSULIN (HCC): ICD-10-CM

## 2021-11-02 DIAGNOSIS — J96.01 ACUTE RESPIRATORY FAILURE WITH HYPOXIA AND HYPERCAPNIA (HCC): ICD-10-CM

## 2021-11-02 DIAGNOSIS — I50.33 ACUTE ON CHRONIC DIASTOLIC CHF (CONGESTIVE HEART FAILURE) (HCC): ICD-10-CM

## 2021-11-02 DIAGNOSIS — R26.2 AMBULATORY DYSFUNCTION: ICD-10-CM

## 2021-11-02 DIAGNOSIS — I50.9 CHF (CONGESTIVE HEART FAILURE) (HCC): ICD-10-CM

## 2021-11-02 DIAGNOSIS — I45.10 RBBB (RIGHT BUNDLE BRANCH BLOCK): ICD-10-CM

## 2021-11-02 DIAGNOSIS — R53.1 GENERALIZED WEAKNESS: Primary | ICD-10-CM

## 2021-11-02 DIAGNOSIS — R79.89 ELEVATED D-DIMER: ICD-10-CM

## 2021-11-02 DIAGNOSIS — R41.82 ALTERED MENTAL STATE: ICD-10-CM

## 2021-11-02 PROBLEM — I16.0 HYPERTENSIVE URGENCY: Status: ACTIVE | Noted: 2021-07-18

## 2021-11-02 LAB
ALBUMIN SERPL BCP-MCNC: 3 G/DL (ref 3.5–5)
ALP SERPL-CCNC: 114 U/L (ref 46–116)
ALT SERPL W P-5'-P-CCNC: 12 U/L (ref 12–78)
ANION GAP SERPL CALCULATED.3IONS-SCNC: 7 MMOL/L (ref 4–13)
AST SERPL W P-5'-P-CCNC: 10 U/L (ref 5–45)
BACTERIA UR QL AUTO: ABNORMAL /HPF
BASE EX.OXY STD BLDV CALC-SCNC: 37.3 % (ref 60–80)
BASE EXCESS BLDV CALC-SCNC: 2.1 MMOL/L
BASOPHILS # BLD AUTO: 0.04 THOUSANDS/ΜL (ref 0–0.1)
BASOPHILS NFR BLD AUTO: 0 % (ref 0–1)
BILIRUB SERPL-MCNC: 0.84 MG/DL (ref 0.2–1)
BILIRUB UR QL STRIP: ABNORMAL
BUN SERPL-MCNC: 41 MG/DL (ref 5–25)
CALCIUM ALBUM COR SERPL-MCNC: 9.5 MG/DL (ref 8.3–10.1)
CALCIUM SERPL-MCNC: 8.7 MG/DL (ref 8.3–10.1)
CHLORIDE SERPL-SCNC: 104 MMOL/L (ref 100–108)
CLARITY UR: ABNORMAL
CO2 SERPL-SCNC: 29 MMOL/L (ref 21–32)
COLOR UR: ABNORMAL
CREAT SERPL-MCNC: 2.22 MG/DL (ref 0.6–1.3)
D DIMER PPP FEU-MCNC: 1.95 UG/ML FEU
EOSINOPHIL # BLD AUTO: 0.04 THOUSAND/ΜL (ref 0–0.61)
EOSINOPHIL NFR BLD AUTO: 0 % (ref 0–6)
ERYTHROCYTE [DISTWIDTH] IN BLOOD BY AUTOMATED COUNT: 16.8 % (ref 11.6–15.1)
FLUAV RNA RESP QL NAA+PROBE: NEGATIVE
FLUBV RNA RESP QL NAA+PROBE: NEGATIVE
GFR SERPL CREATININE-BSD FRML MDRD: 27 ML/MIN/1.73SQ M
GLUCOSE SERPL-MCNC: 218 MG/DL (ref 65–140)
GLUCOSE SERPL-MCNC: 239 MG/DL (ref 65–140)
GLUCOSE UR STRIP-MCNC: NEGATIVE MG/DL
HCO3 BLDV-SCNC: 28.2 MMOL/L (ref 24–30)
HCT VFR BLD AUTO: 39.1 % (ref 36.5–49.3)
HGB BLD-MCNC: 11.7 G/DL (ref 12–17)
HGB UR QL STRIP.AUTO: ABNORMAL
HYALINE CASTS #/AREA URNS LPF: ABNORMAL /LPF
IMM GRANULOCYTES # BLD AUTO: 0.05 THOUSAND/UL (ref 0–0.2)
IMM GRANULOCYTES NFR BLD AUTO: 1 % (ref 0–2)
KETONES UR STRIP-MCNC: NEGATIVE MG/DL
LEUKOCYTE ESTERASE UR QL STRIP: ABNORMAL
LIPASE SERPL-CCNC: 57 U/L (ref 73–393)
LYMPHOCYTES # BLD AUTO: 0.73 THOUSANDS/ΜL (ref 0.6–4.47)
LYMPHOCYTES NFR BLD AUTO: 7 % (ref 14–44)
MAGNESIUM SERPL-MCNC: 2.2 MG/DL (ref 1.6–2.6)
MCH RBC QN AUTO: 27.5 PG (ref 26.8–34.3)
MCHC RBC AUTO-ENTMCNC: 29.9 G/DL (ref 31.4–37.4)
MCV RBC AUTO: 92 FL (ref 82–98)
MONOCYTES # BLD AUTO: 1.18 THOUSAND/ΜL (ref 0.17–1.22)
MONOCYTES NFR BLD AUTO: 11 % (ref 4–12)
NEUTROPHILS # BLD AUTO: 8.94 THOUSANDS/ΜL (ref 1.85–7.62)
NEUTS SEG NFR BLD AUTO: 81 % (ref 43–75)
NITRITE UR QL STRIP: NEGATIVE
NON-SQ EPI CELLS URNS QL MICRO: ABNORMAL /HPF
NRBC BLD AUTO-RTO: 0 /100 WBCS
NT-PROBNP SERPL-MCNC: 6086 PG/ML
O2 CT BLDV-SCNC: 6.8 ML/DL
PCO2 BLDV: 50.2 MM HG (ref 42–50)
PH BLDV: 7.37 [PH] (ref 7.3–7.4)
PH UR STRIP.AUTO: 7 [PH]
PLATELET # BLD AUTO: 330 THOUSANDS/UL (ref 149–390)
PMV BLD AUTO: 10.8 FL (ref 8.9–12.7)
PO2 BLDV: 24.4 MM HG (ref 35–45)
POTASSIUM SERPL-SCNC: 4.4 MMOL/L (ref 3.5–5.3)
PROT SERPL-MCNC: 7.6 G/DL (ref 6.4–8.2)
PROT UR STRIP-MCNC: ABNORMAL MG/DL
RBC # BLD AUTO: 4.25 MILLION/UL (ref 3.88–5.62)
RBC #/AREA URNS AUTO: ABNORMAL /HPF
RSV RNA RESP QL NAA+PROBE: NEGATIVE
SARS-COV-2 RNA RESP QL NAA+PROBE: NEGATIVE
SODIUM SERPL-SCNC: 140 MMOL/L (ref 136–145)
SP GR UR STRIP.AUTO: 1.02 (ref 1–1.03)
TROPONIN I SERPL-MCNC: <0.02 NG/ML
UROBILINOGEN UR QL STRIP.AUTO: 0.2 E.U./DL
WBC # BLD AUTO: 10.98 THOUSAND/UL (ref 4.31–10.16)
WBC #/AREA URNS AUTO: ABNORMAL /HPF
WBC CLUMPS # UR AUTO: PRESENT /UL

## 2021-11-02 PROCEDURE — 87077 CULTURE AEROBIC IDENTIFY: CPT | Performed by: EMERGENCY MEDICINE

## 2021-11-02 PROCEDURE — 96374 THER/PROPH/DIAG INJ IV PUSH: CPT

## 2021-11-02 PROCEDURE — 84484 ASSAY OF TROPONIN QUANT: CPT | Performed by: EMERGENCY MEDICINE

## 2021-11-02 PROCEDURE — 82948 REAGENT STRIP/BLOOD GLUCOSE: CPT

## 2021-11-02 PROCEDURE — G1004 CDSM NDSC: HCPCS

## 2021-11-02 PROCEDURE — 0241U HB NFCT DS VIR RESP RNA 4 TRGT: CPT | Performed by: EMERGENCY MEDICINE

## 2021-11-02 PROCEDURE — 96361 HYDRATE IV INFUSION ADD-ON: CPT

## 2021-11-02 PROCEDURE — 94760 N-INVAS EAR/PLS OXIMETRY 1: CPT

## 2021-11-02 PROCEDURE — 99223 1ST HOSP IP/OBS HIGH 75: CPT | Performed by: FAMILY MEDICINE

## 2021-11-02 PROCEDURE — 99291 CRITICAL CARE FIRST HOUR: CPT | Performed by: EMERGENCY MEDICINE

## 2021-11-02 PROCEDURE — 81001 URINALYSIS AUTO W/SCOPE: CPT | Performed by: EMERGENCY MEDICINE

## 2021-11-02 PROCEDURE — 99285 EMERGENCY DEPT VISIT HI MDM: CPT

## 2021-11-02 PROCEDURE — 71045 X-RAY EXAM CHEST 1 VIEW: CPT

## 2021-11-02 PROCEDURE — 85379 FIBRIN DEGRADATION QUANT: CPT | Performed by: EMERGENCY MEDICINE

## 2021-11-02 PROCEDURE — 36415 COLL VENOUS BLD VENIPUNCTURE: CPT | Performed by: EMERGENCY MEDICINE

## 2021-11-02 PROCEDURE — 80053 COMPREHEN METABOLIC PANEL: CPT | Performed by: EMERGENCY MEDICINE

## 2021-11-02 PROCEDURE — 96375 TX/PRO/DX INJ NEW DRUG ADDON: CPT

## 2021-11-02 PROCEDURE — 82805 BLOOD GASES W/O2 SATURATION: CPT | Performed by: EMERGENCY MEDICINE

## 2021-11-02 PROCEDURE — 83690 ASSAY OF LIPASE: CPT | Performed by: EMERGENCY MEDICINE

## 2021-11-02 PROCEDURE — 87186 SC STD MICRODIL/AGAR DIL: CPT | Performed by: EMERGENCY MEDICINE

## 2021-11-02 PROCEDURE — 70450 CT HEAD/BRAIN W/O DYE: CPT

## 2021-11-02 PROCEDURE — 87086 URINE CULTURE/COLONY COUNT: CPT | Performed by: EMERGENCY MEDICINE

## 2021-11-02 PROCEDURE — 83880 ASSAY OF NATRIURETIC PEPTIDE: CPT | Performed by: EMERGENCY MEDICINE

## 2021-11-02 PROCEDURE — 83735 ASSAY OF MAGNESIUM: CPT | Performed by: EMERGENCY MEDICINE

## 2021-11-02 PROCEDURE — 93005 ELECTROCARDIOGRAM TRACING: CPT

## 2021-11-02 PROCEDURE — 85025 COMPLETE CBC W/AUTO DIFF WBC: CPT | Performed by: EMERGENCY MEDICINE

## 2021-11-02 RX ORDER — QUETIAPINE FUMARATE 25 MG/1
25 TABLET, FILM COATED ORAL
Status: DISCONTINUED | OUTPATIENT
Start: 2021-11-02 | End: 2021-11-08 | Stop reason: HOSPADM

## 2021-11-02 RX ORDER — METOPROLOL SUCCINATE 50 MG/1
50 TABLET, EXTENDED RELEASE ORAL DAILY
Status: DISCONTINUED | OUTPATIENT
Start: 2021-11-03 | End: 2021-11-08 | Stop reason: HOSPADM

## 2021-11-02 RX ORDER — FUROSEMIDE 10 MG/ML
40 INJECTION INTRAMUSCULAR; INTRAVENOUS ONCE
Status: COMPLETED | OUTPATIENT
Start: 2021-11-02 | End: 2021-11-02

## 2021-11-02 RX ORDER — CLOPIDOGREL BISULFATE 75 MG/1
75 TABLET ORAL DAILY
Status: DISCONTINUED | OUTPATIENT
Start: 2021-11-03 | End: 2021-11-08 | Stop reason: HOSPADM

## 2021-11-02 RX ORDER — CALCIUM CARBONATE 200(500)MG
1000 TABLET,CHEWABLE ORAL DAILY PRN
Status: DISCONTINUED | OUTPATIENT
Start: 2021-11-02 | End: 2021-11-08 | Stop reason: HOSPADM

## 2021-11-02 RX ORDER — PANTOPRAZOLE SODIUM 40 MG/1
40 TABLET, DELAYED RELEASE ORAL
Status: DISCONTINUED | OUTPATIENT
Start: 2021-11-03 | End: 2021-11-08 | Stop reason: HOSPADM

## 2021-11-02 RX ORDER — FUROSEMIDE 10 MG/ML
80 INJECTION INTRAMUSCULAR; INTRAVENOUS
Status: DISCONTINUED | OUTPATIENT
Start: 2021-11-03 | End: 2021-11-03

## 2021-11-02 RX ORDER — ATORVASTATIN CALCIUM 40 MG/1
80 TABLET, FILM COATED ORAL
Status: DISCONTINUED | OUTPATIENT
Start: 2021-11-03 | End: 2021-11-08 | Stop reason: HOSPADM

## 2021-11-02 RX ORDER — SODIUM CHLORIDE 9 MG/ML
3 INJECTION INTRAVENOUS
Status: DISCONTINUED | OUTPATIENT
Start: 2021-11-02 | End: 2021-11-08 | Stop reason: HOSPADM

## 2021-11-02 RX ORDER — INSULIN GLARGINE 100 [IU]/ML
10 INJECTION, SOLUTION SUBCUTANEOUS EVERY MORNING
Status: DISCONTINUED | OUTPATIENT
Start: 2021-11-03 | End: 2021-11-08 | Stop reason: HOSPADM

## 2021-11-02 RX ORDER — CEFTRIAXONE 1 G/50ML
1000 INJECTION, SOLUTION INTRAVENOUS EVERY 24 HOURS
Status: DISCONTINUED | OUTPATIENT
Start: 2021-11-03 | End: 2021-11-05

## 2021-11-02 RX ORDER — ACETAMINOPHEN 325 MG/1
650 TABLET ORAL EVERY 4 HOURS PRN
Status: DISCONTINUED | OUTPATIENT
Start: 2021-11-02 | End: 2021-11-08 | Stop reason: HOSPADM

## 2021-11-02 RX ORDER — CEFTRIAXONE 1 G/50ML
1000 INJECTION, SOLUTION INTRAVENOUS ONCE
Status: COMPLETED | OUTPATIENT
Start: 2021-11-02 | End: 2021-11-02

## 2021-11-02 RX ORDER — HEPARIN SODIUM 5000 [USP'U]/ML
5000 INJECTION, SOLUTION INTRAVENOUS; SUBCUTANEOUS EVERY 8 HOURS SCHEDULED
Status: DISCONTINUED | OUTPATIENT
Start: 2021-11-02 | End: 2021-11-08 | Stop reason: HOSPADM

## 2021-11-02 RX ORDER — AMLODIPINE BESYLATE 5 MG/1
5 TABLET ORAL DAILY
Status: DISCONTINUED | OUTPATIENT
Start: 2021-11-03 | End: 2021-11-06

## 2021-11-02 RX ORDER — METOPROLOL TARTRATE 5 MG/5ML
5 INJECTION INTRAVENOUS ONCE
Status: COMPLETED | OUTPATIENT
Start: 2021-11-02 | End: 2021-11-02

## 2021-11-02 RX ORDER — LEVOTHYROXINE SODIUM 112 UG/1
112 TABLET ORAL
Status: DISCONTINUED | OUTPATIENT
Start: 2021-11-03 | End: 2021-11-08 | Stop reason: HOSPADM

## 2021-11-02 RX ORDER — DONEPEZIL HYDROCHLORIDE 5 MG/1
5 TABLET, FILM COATED ORAL
Status: DISCONTINUED | OUTPATIENT
Start: 2021-11-02 | End: 2021-11-08 | Stop reason: HOSPADM

## 2021-11-02 RX ADMIN — DONEPEZIL HYDROCHLORIDE 5 MG: 5 TABLET, FILM COATED ORAL at 21:42

## 2021-11-02 RX ADMIN — SODIUM CHLORIDE 250 ML: 0.9 INJECTION, SOLUTION INTRAVENOUS at 17:13

## 2021-11-02 RX ADMIN — METOROPROLOL TARTRATE 5 MG: 5 INJECTION, SOLUTION INTRAVENOUS at 17:52

## 2021-11-02 RX ADMIN — CEFTRIAXONE 1000 MG: 1 INJECTION, SOLUTION INTRAVENOUS at 18:02

## 2021-11-02 RX ADMIN — HEPARIN SODIUM 5000 UNITS: 5000 INJECTION INTRAVENOUS; SUBCUTANEOUS at 21:39

## 2021-11-02 RX ADMIN — FUROSEMIDE 40 MG: 10 INJECTION, SOLUTION INTRAMUSCULAR; INTRAVENOUS at 18:33

## 2021-11-02 RX ADMIN — QUETIAPINE FUMARATE 25 MG: 25 TABLET ORAL at 21:42

## 2021-11-02 RX ADMIN — FUROSEMIDE 40 MG: 10 INJECTION, SOLUTION INTRAMUSCULAR; INTRAVENOUS at 19:48

## 2021-11-03 ENCOUNTER — APPOINTMENT (INPATIENT)
Dept: NON INVASIVE DIAGNOSTICS | Facility: HOSPITAL | Age: 76
DRG: 291 | End: 2021-11-03
Payer: COMMERCIAL

## 2021-11-03 PROBLEM — N30.00 ACUTE CYSTITIS WITHOUT HEMATURIA: Status: ACTIVE | Noted: 2021-11-03

## 2021-11-03 LAB
ALBUMIN SERPL BCP-MCNC: 2.8 G/DL (ref 3.5–5)
ALP SERPL-CCNC: 108 U/L (ref 46–116)
ALT SERPL W P-5'-P-CCNC: 9 U/L (ref 12–78)
ANION GAP SERPL CALCULATED.3IONS-SCNC: 6 MMOL/L (ref 4–13)
AST SERPL W P-5'-P-CCNC: 14 U/L (ref 5–45)
BILIRUB SERPL-MCNC: 0.76 MG/DL (ref 0.2–1)
BUN SERPL-MCNC: 41 MG/DL (ref 5–25)
CALCIUM ALBUM COR SERPL-MCNC: 9.3 MG/DL (ref 8.3–10.1)
CALCIUM SERPL-MCNC: 8.3 MG/DL (ref 8.3–10.1)
CHLORIDE SERPL-SCNC: 105 MMOL/L (ref 100–108)
CO2 SERPL-SCNC: 29 MMOL/L (ref 21–32)
CREAT SERPL-MCNC: 2.2 MG/DL (ref 0.6–1.3)
ERYTHROCYTE [DISTWIDTH] IN BLOOD BY AUTOMATED COUNT: 16.7 % (ref 11.6–15.1)
GFR SERPL CREATININE-BSD FRML MDRD: 28 ML/MIN/1.73SQ M
GLUCOSE SERPL-MCNC: 169 MG/DL (ref 65–140)
GLUCOSE SERPL-MCNC: 186 MG/DL (ref 65–140)
GLUCOSE SERPL-MCNC: 196 MG/DL (ref 65–140)
GLUCOSE SERPL-MCNC: 199 MG/DL (ref 65–140)
GLUCOSE SERPL-MCNC: 230 MG/DL (ref 65–140)
HCT VFR BLD AUTO: 38.4 % (ref 36.5–49.3)
HGB BLD-MCNC: 11.3 G/DL (ref 12–17)
MCH RBC QN AUTO: 27.5 PG (ref 26.8–34.3)
MCHC RBC AUTO-ENTMCNC: 29.4 G/DL (ref 31.4–37.4)
MCV RBC AUTO: 93 FL (ref 82–98)
PLATELET # BLD AUTO: 331 THOUSANDS/UL (ref 149–390)
PMV BLD AUTO: 11 FL (ref 8.9–12.7)
POTASSIUM SERPL-SCNC: 3.7 MMOL/L (ref 3.5–5.3)
PROT SERPL-MCNC: 7.3 G/DL (ref 6.4–8.2)
QRS AXIS: -16 DEGREES
QRSD INTERVAL: 172 MS
QT INTERVAL: 442 MS
QTC INTERVAL: 552 MS
RBC # BLD AUTO: 4.11 MILLION/UL (ref 3.88–5.62)
SODIUM SERPL-SCNC: 140 MMOL/L (ref 136–145)
T WAVE AXIS: -23 DEGREES
T4 FREE SERPL-MCNC: 1.38 NG/DL (ref 0.76–1.46)
TSH SERPL DL<=0.05 MIU/L-ACNC: 4.26 UIU/ML (ref 0.36–3.74)
VENTRICULAR RATE: 94 BPM
WBC # BLD AUTO: 8.71 THOUSAND/UL (ref 4.31–10.16)

## 2021-11-03 PROCEDURE — 97167 OT EVAL HIGH COMPLEX 60 MIN: CPT

## 2021-11-03 PROCEDURE — 85027 COMPLETE CBC AUTOMATED: CPT | Performed by: FAMILY MEDICINE

## 2021-11-03 PROCEDURE — 80053 COMPREHEN METABOLIC PANEL: CPT | Performed by: FAMILY MEDICINE

## 2021-11-03 PROCEDURE — 84439 ASSAY OF FREE THYROXINE: CPT | Performed by: FAMILY MEDICINE

## 2021-11-03 PROCEDURE — 36415 COLL VENOUS BLD VENIPUNCTURE: CPT | Performed by: FAMILY MEDICINE

## 2021-11-03 PROCEDURE — 97163 PT EVAL HIGH COMPLEX 45 MIN: CPT

## 2021-11-03 PROCEDURE — 94760 N-INVAS EAR/PLS OXIMETRY 1: CPT

## 2021-11-03 PROCEDURE — 99233 SBSQ HOSP IP/OBS HIGH 50: CPT | Performed by: FAMILY MEDICINE

## 2021-11-03 PROCEDURE — 93971 EXTREMITY STUDY: CPT

## 2021-11-03 PROCEDURE — 82948 REAGENT STRIP/BLOOD GLUCOSE: CPT

## 2021-11-03 PROCEDURE — 93971 EXTREMITY STUDY: CPT | Performed by: SURGERY

## 2021-11-03 PROCEDURE — 84443 ASSAY THYROID STIM HORMONE: CPT | Performed by: FAMILY MEDICINE

## 2021-11-03 RX ORDER — FUROSEMIDE 10 MG/ML
15 SYRINGE (ML) INJECTION CONTINUOUS
Status: DISCONTINUED | OUTPATIENT
Start: 2021-11-03 | End: 2021-11-06

## 2021-11-03 RX ADMIN — HEPARIN SODIUM 5000 UNITS: 5000 INJECTION INTRAVENOUS; SUBCUTANEOUS at 14:35

## 2021-11-03 RX ADMIN — METOPROLOL SUCCINATE 50 MG: 50 TABLET, EXTENDED RELEASE ORAL at 09:14

## 2021-11-03 RX ADMIN — CLOPIDOGREL BISULFATE 75 MG: 75 TABLET ORAL at 09:14

## 2021-11-03 RX ADMIN — LEVOTHYROXINE SODIUM 112 MCG: 112 TABLET ORAL at 05:32

## 2021-11-03 RX ADMIN — HEPARIN SODIUM 5000 UNITS: 5000 INJECTION INTRAVENOUS; SUBCUTANEOUS at 22:27

## 2021-11-03 RX ADMIN — QUETIAPINE FUMARATE 25 MG: 25 TABLET ORAL at 22:26

## 2021-11-03 RX ADMIN — Medication 15 MG/HR: at 14:15

## 2021-11-03 RX ADMIN — FUROSEMIDE 80 MG: 10 INJECTION, SOLUTION INTRAMUSCULAR; INTRAVENOUS at 12:52

## 2021-11-03 RX ADMIN — FUROSEMIDE 80 MG: 10 INJECTION, SOLUTION INTRAMUSCULAR; INTRAVENOUS at 04:07

## 2021-11-03 RX ADMIN — PANTOPRAZOLE SODIUM 40 MG: 40 TABLET, DELAYED RELEASE ORAL at 05:32

## 2021-11-03 RX ADMIN — ATORVASTATIN CALCIUM 80 MG: 40 TABLET, FILM COATED ORAL at 17:18

## 2021-11-03 RX ADMIN — INSULIN LISPRO 2 UNITS: 100 INJECTION, SOLUTION INTRAVENOUS; SUBCUTANEOUS at 06:59

## 2021-11-03 RX ADMIN — INSULIN LISPRO 4 UNITS: 100 INJECTION, SOLUTION INTRAVENOUS; SUBCUTANEOUS at 12:52

## 2021-11-03 RX ADMIN — CEFTRIAXONE 1000 MG: 1 INJECTION, SOLUTION INTRAVENOUS at 17:18

## 2021-11-03 RX ADMIN — DONEPEZIL HYDROCHLORIDE 5 MG: 5 TABLET, FILM COATED ORAL at 22:26

## 2021-11-03 RX ADMIN — INSULIN LISPRO 4 UNITS: 100 INJECTION, SOLUTION INTRAVENOUS; SUBCUTANEOUS at 17:08

## 2021-11-03 RX ADMIN — INSULIN GLARGINE 10 UNITS: 100 INJECTION, SOLUTION SUBCUTANEOUS at 09:14

## 2021-11-03 RX ADMIN — HEPARIN SODIUM 5000 UNITS: 5000 INJECTION INTRAVENOUS; SUBCUTANEOUS at 05:32

## 2021-11-04 ENCOUNTER — APPOINTMENT (INPATIENT)
Dept: NON INVASIVE DIAGNOSTICS | Facility: HOSPITAL | Age: 76
DRG: 291 | End: 2021-11-04
Payer: COMMERCIAL

## 2021-11-04 LAB
ANION GAP SERPL CALCULATED.3IONS-SCNC: 4 MMOL/L (ref 4–13)
AORTIC VALVE ANNULUS: 2.3 CM
AORTIC VALVE MEAN VELOCITY: 12.8 M/S
APICAL FOUR CHAMBER EJECTION FRACTION: 57 %
AV AREA BY CONTINUOUS VTI: 1.4 CM2
AV AREA PEAK VELOCITY: 0.9 CM2
AV LVOT MEAN GRADIENT: 1 MMHG
AV LVOT PEAK GRADIENT: 2 MMHG
AV MEAN GRADIENT: 7 MMHG
AV PEAK GRADIENT: 13 MMHG
AV VALVE AREA: 1.39 CM2
BACTERIA UR CULT: ABNORMAL
BUN SERPL-MCNC: 45 MG/DL (ref 5–25)
CALCIUM SERPL-MCNC: 8.1 MG/DL (ref 8.3–10.1)
CHLORIDE SERPL-SCNC: 106 MMOL/L (ref 100–108)
CO2 SERPL-SCNC: 32 MMOL/L (ref 21–32)
CREAT SERPL-MCNC: 2.16 MG/DL (ref 0.6–1.3)
DOP CALC AO VTI: 33.89 CM
DOP CALC LVOT AREA: 3.14 CM2
DOP CALC LVOT DIAMETER: 2 CM
DOP CALC LVOT PEAK VEL VTI: 15.03 CM
DOP CALC LVOT PEAK VEL: 0.65 M/S
DOP CALC LVOT STROKE INDEX: 20.8 ML/M2
DOP CALC LVOT STROKE VOLUME: 47.19 CM3
ERYTHROCYTE [DISTWIDTH] IN BLOOD BY AUTOMATED COUNT: 16.7 % (ref 11.6–15.1)
FRACTIONAL SHORTENING: 32 % (ref 28–44)
GFR SERPL CREATININE-BSD FRML MDRD: 28 ML/MIN/1.73SQ M
GLUCOSE SERPL-MCNC: 149 MG/DL (ref 65–140)
GLUCOSE SERPL-MCNC: 157 MG/DL (ref 65–140)
GLUCOSE SERPL-MCNC: 161 MG/DL (ref 65–140)
GLUCOSE SERPL-MCNC: 179 MG/DL (ref 65–140)
GLUCOSE SERPL-MCNC: 191 MG/DL (ref 65–140)
GLUCOSE SERPL-MCNC: 212 MG/DL (ref 65–140)
HCT VFR BLD AUTO: 36.8 % (ref 36.5–49.3)
HGB BLD-MCNC: 10.9 G/DL (ref 12–17)
INTERVENTRICULAR SEPTUM IN DIASTOLE (PARASTERNAL SHORT AXIS VIEW): 1.2 CM
LAAS-AP4: 17.8 CM2
LEFT INTERNAL DIMENSION IN SYSTOLE: 2.6 CM (ref 2.1–4)
LEFT VENTRICULAR INTERNAL DIMENSION IN DIASTOLE: 3.8 CM (ref 7.44–11.09)
LEFT VENTRICULAR POSTERIOR WALL IN END DIASTOLE: 1.2 CM
LEFT VENTRICULAR STROKE VOLUME: 35 ML
MCH RBC QN AUTO: 27.6 PG (ref 26.8–34.3)
MCHC RBC AUTO-ENTMCNC: 29.6 G/DL (ref 31.4–37.4)
MCV RBC AUTO: 93 FL (ref 82–98)
MV E'TISSUE VEL-LAT: 8 CM/S
MV E'TISSUE VEL-SEP: 15 CM/S
PLATELET # BLD AUTO: 323 THOUSANDS/UL (ref 149–390)
PMV BLD AUTO: 10.4 FL (ref 8.9–12.7)
POTASSIUM SERPL-SCNC: 3.7 MMOL/L (ref 3.5–5.3)
RBC # BLD AUTO: 3.95 MILLION/UL (ref 3.88–5.62)
RIGHT ATRIAL 2D VOLUME: 41 ML
RIGHT VENTRICLE ID DIMENSION: 3.6 CM
SL CV PED ECHO LEFT VENTRICLE DIASTOLIC VOLUME (MOD BIPLANE) 2D: 61 ML
SL CV PED ECHO LEFT VENTRICLE SYSTOLIC VOLUME (MOD BIPLANE) 2D: 25 ML
SODIUM SERPL-SCNC: 142 MMOL/L (ref 136–145)
TR PEAK VELOCITY: 2.4 M/S
TRICUSPID VALVE PEAK REGURGITATION VELOCITY: 2.35 M/S
TRICUSPID VALVE S': 54 CM/S
TV PEAK GRADIENT: 22 MMHG
WBC # BLD AUTO: 7.95 THOUSAND/UL (ref 4.31–10.16)
Z-SCORE OF LEFT VENTRICULAR DIMENSION IN END SYSTOLE: -8.71

## 2021-11-04 PROCEDURE — 99232 SBSQ HOSP IP/OBS MODERATE 35: CPT | Performed by: FAMILY MEDICINE

## 2021-11-04 PROCEDURE — 85027 COMPLETE CBC AUTOMATED: CPT | Performed by: FAMILY MEDICINE

## 2021-11-04 PROCEDURE — 80048 BASIC METABOLIC PNL TOTAL CA: CPT | Performed by: FAMILY MEDICINE

## 2021-11-04 PROCEDURE — 97530 THERAPEUTIC ACTIVITIES: CPT

## 2021-11-04 PROCEDURE — 97535 SELF CARE MNGMENT TRAINING: CPT

## 2021-11-04 PROCEDURE — 97110 THERAPEUTIC EXERCISES: CPT

## 2021-11-04 PROCEDURE — C8929 TTE W OR WO FOL WCON,DOPPLER: HCPCS

## 2021-11-04 PROCEDURE — 82948 REAGENT STRIP/BLOOD GLUCOSE: CPT

## 2021-11-04 RX ADMIN — HEPARIN SODIUM 5000 UNITS: 5000 INJECTION INTRAVENOUS; SUBCUTANEOUS at 20:58

## 2021-11-04 RX ADMIN — CEFTRIAXONE 1000 MG: 1 INJECTION, SOLUTION INTRAVENOUS at 17:16

## 2021-11-04 RX ADMIN — INSULIN LISPRO 2 UNITS: 100 INJECTION, SOLUTION INTRAVENOUS; SUBCUTANEOUS at 08:22

## 2021-11-04 RX ADMIN — DONEPEZIL HYDROCHLORIDE 5 MG: 5 TABLET, FILM COATED ORAL at 20:57

## 2021-11-04 RX ADMIN — Medication 15 MG/HR: at 06:09

## 2021-11-04 RX ADMIN — ATORVASTATIN CALCIUM 80 MG: 40 TABLET, FILM COATED ORAL at 17:13

## 2021-11-04 RX ADMIN — PERFLUTREN 0.8 ML/MIN: 6.52 INJECTION, SUSPENSION INTRAVENOUS at 07:39

## 2021-11-04 RX ADMIN — HEPARIN SODIUM 5000 UNITS: 5000 INJECTION INTRAVENOUS; SUBCUTANEOUS at 13:14

## 2021-11-04 RX ADMIN — METOPROLOL SUCCINATE 50 MG: 50 TABLET, EXTENDED RELEASE ORAL at 08:22

## 2021-11-04 RX ADMIN — INSULIN LISPRO 2 UNITS: 100 INJECTION, SOLUTION INTRAVENOUS; SUBCUTANEOUS at 17:21

## 2021-11-04 RX ADMIN — QUETIAPINE FUMARATE 25 MG: 25 TABLET ORAL at 20:58

## 2021-11-04 RX ADMIN — HEPARIN SODIUM 5000 UNITS: 5000 INJECTION INTRAVENOUS; SUBCUTANEOUS at 05:24

## 2021-11-04 RX ADMIN — INSULIN GLARGINE 10 UNITS: 100 INJECTION, SOLUTION SUBCUTANEOUS at 08:22

## 2021-11-04 RX ADMIN — INSULIN LISPRO 4 UNITS: 100 INJECTION, SOLUTION INTRAVENOUS; SUBCUTANEOUS at 11:19

## 2021-11-04 RX ADMIN — PANTOPRAZOLE SODIUM 40 MG: 40 TABLET, DELAYED RELEASE ORAL at 05:24

## 2021-11-04 RX ADMIN — CLOPIDOGREL BISULFATE 75 MG: 75 TABLET ORAL at 08:22

## 2021-11-04 RX ADMIN — LEVOTHYROXINE SODIUM 112 MCG: 112 TABLET ORAL at 05:24

## 2021-11-05 LAB
ANION GAP SERPL CALCULATED.3IONS-SCNC: 10 MMOL/L (ref 4–13)
BUN SERPL-MCNC: 51 MG/DL (ref 5–25)
CALCIUM SERPL-MCNC: 8.1 MG/DL (ref 8.3–10.1)
CHLORIDE SERPL-SCNC: 103 MMOL/L (ref 100–108)
CO2 SERPL-SCNC: 30 MMOL/L (ref 21–32)
CREAT SERPL-MCNC: 2.26 MG/DL (ref 0.6–1.3)
GFR SERPL CREATININE-BSD FRML MDRD: 27 ML/MIN/1.73SQ M
GLUCOSE SERPL-MCNC: 125 MG/DL (ref 65–140)
GLUCOSE SERPL-MCNC: 133 MG/DL (ref 65–140)
GLUCOSE SERPL-MCNC: 164 MG/DL (ref 65–140)
GLUCOSE SERPL-MCNC: 197 MG/DL (ref 65–140)
GLUCOSE SERPL-MCNC: 202 MG/DL (ref 65–140)
MAGNESIUM SERPL-MCNC: 2.3 MG/DL (ref 1.6–2.6)
POTASSIUM SERPL-SCNC: 3.6 MMOL/L (ref 3.5–5.3)
SODIUM SERPL-SCNC: 143 MMOL/L (ref 136–145)
URATE SERPL-MCNC: 6.6 MG/DL (ref 4.2–8)

## 2021-11-05 PROCEDURE — 82948 REAGENT STRIP/BLOOD GLUCOSE: CPT

## 2021-11-05 PROCEDURE — 99232 SBSQ HOSP IP/OBS MODERATE 35: CPT | Performed by: FAMILY MEDICINE

## 2021-11-05 PROCEDURE — 80048 BASIC METABOLIC PNL TOTAL CA: CPT | Performed by: FAMILY MEDICINE

## 2021-11-05 PROCEDURE — 83735 ASSAY OF MAGNESIUM: CPT | Performed by: FAMILY MEDICINE

## 2021-11-05 PROCEDURE — 84550 ASSAY OF BLOOD/URIC ACID: CPT | Performed by: FAMILY MEDICINE

## 2021-11-05 RX ORDER — CEPHALEXIN 500 MG/1
500 CAPSULE ORAL EVERY 8 HOURS SCHEDULED
Status: DISCONTINUED | OUTPATIENT
Start: 2021-11-05 | End: 2021-11-08 | Stop reason: HOSPADM

## 2021-11-05 RX ADMIN — INSULIN LISPRO 2 UNITS: 100 INJECTION, SOLUTION INTRAVENOUS; SUBCUTANEOUS at 11:28

## 2021-11-05 RX ADMIN — QUETIAPINE FUMARATE 25 MG: 25 TABLET ORAL at 21:45

## 2021-11-05 RX ADMIN — LEVOTHYROXINE SODIUM 112 MCG: 112 TABLET ORAL at 05:54

## 2021-11-05 RX ADMIN — PANTOPRAZOLE SODIUM 40 MG: 40 TABLET, DELAYED RELEASE ORAL at 05:54

## 2021-11-05 RX ADMIN — CEPHALEXIN 500 MG: 500 CAPSULE ORAL at 21:46

## 2021-11-05 RX ADMIN — CEPHALEXIN 500 MG: 500 CAPSULE ORAL at 13:58

## 2021-11-05 RX ADMIN — HEPARIN SODIUM 5000 UNITS: 5000 INJECTION INTRAVENOUS; SUBCUTANEOUS at 13:58

## 2021-11-05 RX ADMIN — HEPARIN SODIUM 5000 UNITS: 5000 INJECTION INTRAVENOUS; SUBCUTANEOUS at 21:48

## 2021-11-05 RX ADMIN — INSULIN GLARGINE 10 UNITS: 100 INJECTION, SOLUTION SUBCUTANEOUS at 09:10

## 2021-11-05 RX ADMIN — HEPARIN SODIUM 5000 UNITS: 5000 INJECTION INTRAVENOUS; SUBCUTANEOUS at 05:54

## 2021-11-05 RX ADMIN — INSULIN LISPRO 4 UNITS: 100 INJECTION, SOLUTION INTRAVENOUS; SUBCUTANEOUS at 17:34

## 2021-11-05 RX ADMIN — DONEPEZIL HYDROCHLORIDE 5 MG: 5 TABLET, FILM COATED ORAL at 21:46

## 2021-11-05 RX ADMIN — CLOPIDOGREL BISULFATE 75 MG: 75 TABLET ORAL at 08:13

## 2021-11-05 RX ADMIN — METOPROLOL SUCCINATE 50 MG: 50 TABLET, EXTENDED RELEASE ORAL at 08:13

## 2021-11-05 RX ADMIN — ATORVASTATIN CALCIUM 80 MG: 40 TABLET, FILM COATED ORAL at 17:34

## 2021-11-05 RX ADMIN — Medication 20 MG/HR: at 07:31

## 2021-11-06 LAB
ANION GAP SERPL CALCULATED.3IONS-SCNC: 8 MMOL/L (ref 4–13)
BUN SERPL-MCNC: 56 MG/DL (ref 5–25)
CALCIUM SERPL-MCNC: 8.3 MG/DL (ref 8.3–10.1)
CHLORIDE SERPL-SCNC: 102 MMOL/L (ref 100–108)
CO2 SERPL-SCNC: 32 MMOL/L (ref 21–32)
CREAT SERPL-MCNC: 2.39 MG/DL (ref 0.6–1.3)
GFR SERPL CREATININE-BSD FRML MDRD: 25 ML/MIN/1.73SQ M
GLUCOSE SERPL-MCNC: 121 MG/DL (ref 65–140)
GLUCOSE SERPL-MCNC: 137 MG/DL (ref 65–140)
GLUCOSE SERPL-MCNC: 218 MG/DL (ref 65–140)
GLUCOSE SERPL-MCNC: 241 MG/DL (ref 65–140)
POTASSIUM SERPL-SCNC: 4.3 MMOL/L (ref 3.5–5.3)
SODIUM SERPL-SCNC: 142 MMOL/L (ref 136–145)

## 2021-11-06 PROCEDURE — 99232 SBSQ HOSP IP/OBS MODERATE 35: CPT | Performed by: FAMILY MEDICINE

## 2021-11-06 PROCEDURE — 82948 REAGENT STRIP/BLOOD GLUCOSE: CPT

## 2021-11-06 PROCEDURE — 80048 BASIC METABOLIC PNL TOTAL CA: CPT | Performed by: FAMILY MEDICINE

## 2021-11-06 PROCEDURE — 94761 N-INVAS EAR/PLS OXIMETRY MLT: CPT

## 2021-11-06 RX ORDER — AMLODIPINE BESYLATE 2.5 MG/1
2.5 TABLET ORAL DAILY
Status: DISCONTINUED | OUTPATIENT
Start: 2021-11-07 | End: 2021-11-08 | Stop reason: HOSPADM

## 2021-11-06 RX ADMIN — INSULIN GLARGINE 10 UNITS: 100 INJECTION, SOLUTION SUBCUTANEOUS at 08:00

## 2021-11-06 RX ADMIN — CEPHALEXIN 500 MG: 500 CAPSULE ORAL at 21:19

## 2021-11-06 RX ADMIN — CEPHALEXIN 500 MG: 500 CAPSULE ORAL at 14:21

## 2021-11-06 RX ADMIN — HEPARIN SODIUM 5000 UNITS: 5000 INJECTION INTRAVENOUS; SUBCUTANEOUS at 21:21

## 2021-11-06 RX ADMIN — QUETIAPINE FUMARATE 25 MG: 25 TABLET ORAL at 21:19

## 2021-11-06 RX ADMIN — CEPHALEXIN 500 MG: 500 CAPSULE ORAL at 05:18

## 2021-11-06 RX ADMIN — HEPARIN SODIUM 5000 UNITS: 5000 INJECTION INTRAVENOUS; SUBCUTANEOUS at 14:22

## 2021-11-06 RX ADMIN — INSULIN LISPRO 4 UNITS: 100 INJECTION, SOLUTION INTRAVENOUS; SUBCUTANEOUS at 16:20

## 2021-11-06 RX ADMIN — INSULIN LISPRO 4 UNITS: 100 INJECTION, SOLUTION INTRAVENOUS; SUBCUTANEOUS at 12:15

## 2021-11-06 RX ADMIN — CLOPIDOGREL BISULFATE 75 MG: 75 TABLET ORAL at 08:00

## 2021-11-06 RX ADMIN — PANTOPRAZOLE SODIUM 40 MG: 40 TABLET, DELAYED RELEASE ORAL at 05:18

## 2021-11-06 RX ADMIN — LEVOTHYROXINE SODIUM 112 MCG: 112 TABLET ORAL at 05:19

## 2021-11-06 RX ADMIN — DONEPEZIL HYDROCHLORIDE 5 MG: 5 TABLET, FILM COATED ORAL at 21:19

## 2021-11-06 RX ADMIN — HEPARIN SODIUM 5000 UNITS: 5000 INJECTION INTRAVENOUS; SUBCUTANEOUS at 05:21

## 2021-11-06 RX ADMIN — FUROSEMIDE 60 MG: 40 TABLET ORAL at 12:22

## 2021-11-06 RX ADMIN — ATORVASTATIN CALCIUM 80 MG: 40 TABLET, FILM COATED ORAL at 16:18

## 2021-11-06 RX ADMIN — METOPROLOL SUCCINATE 50 MG: 50 TABLET, EXTENDED RELEASE ORAL at 08:00

## 2021-11-07 LAB
ANION GAP SERPL CALCULATED.3IONS-SCNC: 6 MMOL/L (ref 4–13)
BUN SERPL-MCNC: 58 MG/DL (ref 5–25)
CALCIUM SERPL-MCNC: 8.3 MG/DL (ref 8.3–10.1)
CHLORIDE SERPL-SCNC: 103 MMOL/L (ref 100–108)
CO2 SERPL-SCNC: 31 MMOL/L (ref 21–32)
CREAT SERPL-MCNC: 2.1 MG/DL (ref 0.6–1.3)
GFR SERPL CREATININE-BSD FRML MDRD: 29 ML/MIN/1.73SQ M
GLUCOSE SERPL-MCNC: 151 MG/DL (ref 65–140)
GLUCOSE SERPL-MCNC: 183 MG/DL (ref 65–140)
GLUCOSE SERPL-MCNC: 185 MG/DL (ref 65–140)
GLUCOSE SERPL-MCNC: 188 MG/DL (ref 65–140)
GLUCOSE SERPL-MCNC: 195 MG/DL (ref 65–140)
POTASSIUM SERPL-SCNC: 5.5 MMOL/L (ref 3.5–5.3)
SODIUM SERPL-SCNC: 140 MMOL/L (ref 136–145)

## 2021-11-07 PROCEDURE — 99232 SBSQ HOSP IP/OBS MODERATE 35: CPT | Performed by: FAMILY MEDICINE

## 2021-11-07 PROCEDURE — 80048 BASIC METABOLIC PNL TOTAL CA: CPT | Performed by: FAMILY MEDICINE

## 2021-11-07 PROCEDURE — 82948 REAGENT STRIP/BLOOD GLUCOSE: CPT

## 2021-11-07 RX ADMIN — INSULIN LISPRO 2 UNITS: 100 INJECTION, SOLUTION INTRAVENOUS; SUBCUTANEOUS at 16:05

## 2021-11-07 RX ADMIN — FUROSEMIDE 60 MG: 40 TABLET ORAL at 16:05

## 2021-11-07 RX ADMIN — QUETIAPINE FUMARATE 25 MG: 25 TABLET ORAL at 21:16

## 2021-11-07 RX ADMIN — CLOPIDOGREL BISULFATE 75 MG: 75 TABLET ORAL at 08:16

## 2021-11-07 RX ADMIN — FUROSEMIDE 60 MG: 40 TABLET ORAL at 11:38

## 2021-11-07 RX ADMIN — INSULIN GLARGINE 10 UNITS: 100 INJECTION, SOLUTION SUBCUTANEOUS at 08:16

## 2021-11-07 RX ADMIN — HEPARIN SODIUM 5000 UNITS: 5000 INJECTION INTRAVENOUS; SUBCUTANEOUS at 05:01

## 2021-11-07 RX ADMIN — INSULIN LISPRO 2 UNITS: 100 INJECTION, SOLUTION INTRAVENOUS; SUBCUTANEOUS at 11:38

## 2021-11-07 RX ADMIN — CEPHALEXIN 500 MG: 500 CAPSULE ORAL at 21:16

## 2021-11-07 RX ADMIN — PANTOPRAZOLE SODIUM 40 MG: 40 TABLET, DELAYED RELEASE ORAL at 05:00

## 2021-11-07 RX ADMIN — CEPHALEXIN 500 MG: 500 CAPSULE ORAL at 12:53

## 2021-11-07 RX ADMIN — CEPHALEXIN 500 MG: 500 CAPSULE ORAL at 05:00

## 2021-11-07 RX ADMIN — DONEPEZIL HYDROCHLORIDE 5 MG: 5 TABLET, FILM COATED ORAL at 21:16

## 2021-11-07 RX ADMIN — LEVOTHYROXINE SODIUM 112 MCG: 112 TABLET ORAL at 05:00

## 2021-11-07 RX ADMIN — HEPARIN SODIUM 5000 UNITS: 5000 INJECTION INTRAVENOUS; SUBCUTANEOUS at 12:53

## 2021-11-07 RX ADMIN — INSULIN LISPRO 2 UNITS: 100 INJECTION, SOLUTION INTRAVENOUS; SUBCUTANEOUS at 08:17

## 2021-11-07 RX ADMIN — ATORVASTATIN CALCIUM 80 MG: 40 TABLET, FILM COATED ORAL at 16:05

## 2021-11-07 RX ADMIN — HEPARIN SODIUM 5000 UNITS: 5000 INJECTION INTRAVENOUS; SUBCUTANEOUS at 21:16

## 2021-11-08 VITALS
WEIGHT: 243.8 LBS | DIASTOLIC BLOOD PRESSURE: 75 MMHG | HEART RATE: 76 BPM | OXYGEN SATURATION: 96 % | TEMPERATURE: 98.1 F | HEIGHT: 66 IN | SYSTOLIC BLOOD PRESSURE: 150 MMHG | BODY MASS INDEX: 39.18 KG/M2 | RESPIRATION RATE: 19 BRPM

## 2021-11-08 LAB
ANION GAP SERPL CALCULATED.3IONS-SCNC: 8 MMOL/L (ref 4–13)
BUN SERPL-MCNC: 59 MG/DL (ref 5–25)
CALCIUM SERPL-MCNC: 8.5 MG/DL (ref 8.3–10.1)
CHLORIDE SERPL-SCNC: 105 MMOL/L (ref 100–108)
CO2 SERPL-SCNC: 31 MMOL/L (ref 21–32)
CREAT SERPL-MCNC: 2.04 MG/DL (ref 0.6–1.3)
DME PARACHUTE DELIVERY DATE ACTUAL: NORMAL
DME PARACHUTE DELIVERY DATE EXPECTED: NORMAL
DME PARACHUTE DELIVERY DATE REQUESTED: NORMAL
DME PARACHUTE ITEM DESCRIPTION: NORMAL
DME PARACHUTE ORDER STATUS: NORMAL
DME PARACHUTE SUPPLIER NAME: NORMAL
DME PARACHUTE SUPPLIER PHONE: NORMAL
GFR SERPL CREATININE-BSD FRML MDRD: 30 ML/MIN/1.73SQ M
GLUCOSE SERPL-MCNC: 173 MG/DL (ref 65–140)
GLUCOSE SERPL-MCNC: 173 MG/DL (ref 65–140)
GLUCOSE SERPL-MCNC: 211 MG/DL (ref 65–140)
GLUCOSE SERPL-MCNC: 245 MG/DL (ref 65–140)
POTASSIUM SERPL-SCNC: 3.9 MMOL/L (ref 3.5–5.3)
SODIUM SERPL-SCNC: 144 MMOL/L (ref 136–145)

## 2021-11-08 PROCEDURE — 94762 N-INVAS EAR/PLS OXIMTRY CONT: CPT

## 2021-11-08 PROCEDURE — 99239 HOSP IP/OBS DSCHRG MGMT >30: CPT | Performed by: FAMILY MEDICINE

## 2021-11-08 PROCEDURE — 80048 BASIC METABOLIC PNL TOTAL CA: CPT | Performed by: FAMILY MEDICINE

## 2021-11-08 PROCEDURE — 82948 REAGENT STRIP/BLOOD GLUCOSE: CPT

## 2021-11-08 RX ORDER — CEPHALEXIN 500 MG/1
500 CAPSULE ORAL EVERY 8 HOURS SCHEDULED
Qty: 3 CAPSULE | Refills: 0 | Status: SHIPPED | OUTPATIENT
Start: 2021-11-08 | End: 2021-11-09

## 2021-11-08 RX ORDER — INSULIN GLARGINE 100 [IU]/ML
13 INJECTION, SOLUTION SUBCUTANEOUS EVERY MORNING
Qty: 10 ML | Refills: 0
Start: 2021-11-08

## 2021-11-08 RX ORDER — FUROSEMIDE 20 MG/1
60 TABLET ORAL 2 TIMES DAILY
Qty: 180 TABLET | Refills: 0 | Status: SHIPPED | OUTPATIENT
Start: 2021-11-08 | End: 2021-12-08

## 2021-11-08 RX ORDER — METOLAZONE 2.5 MG/1
2.5 TABLET ORAL 2 TIMES WEEKLY
Qty: 8 TABLET | Refills: 0 | Status: SHIPPED | OUTPATIENT
Start: 2021-11-08 | End: 2021-12-08

## 2021-11-08 RX ORDER — METOLAZONE 5 MG/1
2.5 TABLET ORAL
Status: DISCONTINUED | OUTPATIENT
Start: 2021-11-09 | End: 2021-11-08 | Stop reason: HOSPADM

## 2021-11-08 RX ADMIN — INSULIN LISPRO 4 UNITS: 100 INJECTION, SOLUTION INTRAVENOUS; SUBCUTANEOUS at 15:50

## 2021-11-08 RX ADMIN — HEPARIN SODIUM 5000 UNITS: 5000 INJECTION INTRAVENOUS; SUBCUTANEOUS at 05:18

## 2021-11-08 RX ADMIN — PANTOPRAZOLE SODIUM 40 MG: 40 TABLET, DELAYED RELEASE ORAL at 05:18

## 2021-11-08 RX ADMIN — INSULIN LISPRO 4 UNITS: 100 INJECTION, SOLUTION INTRAVENOUS; SUBCUTANEOUS at 11:33

## 2021-11-08 RX ADMIN — CEPHALEXIN 500 MG: 500 CAPSULE ORAL at 05:18

## 2021-11-08 RX ADMIN — ATORVASTATIN CALCIUM 80 MG: 40 TABLET, FILM COATED ORAL at 15:49

## 2021-11-08 RX ADMIN — FUROSEMIDE 60 MG: 40 TABLET ORAL at 15:49

## 2021-11-08 RX ADMIN — AMLODIPINE BESYLATE 2.5 MG: 2.5 TABLET ORAL at 08:04

## 2021-11-08 RX ADMIN — FUROSEMIDE 60 MG: 40 TABLET ORAL at 11:33

## 2021-11-08 RX ADMIN — CEPHALEXIN 500 MG: 500 CAPSULE ORAL at 14:25

## 2021-11-08 RX ADMIN — METOPROLOL SUCCINATE 50 MG: 50 TABLET, EXTENDED RELEASE ORAL at 08:04

## 2021-11-08 RX ADMIN — LEVOTHYROXINE SODIUM 112 MCG: 112 TABLET ORAL at 05:18

## 2021-11-08 RX ADMIN — HEPARIN SODIUM 5000 UNITS: 5000 INJECTION INTRAVENOUS; SUBCUTANEOUS at 14:25

## 2021-11-08 RX ADMIN — CLOPIDOGREL BISULFATE 75 MG: 75 TABLET ORAL at 08:04

## 2021-11-08 RX ADMIN — INSULIN LISPRO 2 UNITS: 100 INJECTION, SOLUTION INTRAVENOUS; SUBCUTANEOUS at 08:04

## 2021-11-08 RX ADMIN — INSULIN GLARGINE 10 UNITS: 100 INJECTION, SOLUTION SUBCUTANEOUS at 08:04

## 2024-12-23 NOTE — PLAN OF CARE
Problem: MOBILITY - ADULT  Goal: Maintain or return to baseline ADL function  Description: INTERVENTIONS:  - Educate patient/family on patient safety including physical limitations  - Instruct patient to call for assistance with activity   - Consult OT/PT to assist with strengthening/mobility   - Keep Call bell within reach  - Keep bed low and locked with side rails adjusted as appropriate  - Keep care items and personal belongings within reach  - Initiate and maintain comfort rounds  - Make Fall Risk Sign visible to staff  - Offer Toileting every 2Hours, in advance of need  - Initiate/Maintain bedalarm  - Obtain necessary fall risk management equipment: roller walker  - Apply yellow socks and bracelet for high fall risk patients  - Consider moving patient to room near nurses station  7/31/2021 1402 by Tramaine Earl RN  Outcome: Not Progressing  7/31/2021 1401 by Tramaine Earl RN  Outcome: Not Progressing  Goal: Maintains/Returns to pre admission functional level  Description: INTERVENTIONS:  - Perform BMAT or MOVE assessment daily    - Set and communicate daily mobility goal to care team and patient/family/caregiver  - Collaborate with rehabilitation services on mobility goals if consulted  - Perform Range of Motion 3 times a day  - Reposition patient every 2 hours    - Dangle patient 3times a day  -   -   - Record patient progress and toleration of activity level   7/31/2021 1402 by Tramaine Earl RN  Outcome: Not Progressing  7/31/2021 1401 by Tramaine Earl RN  Outcome: Not Progressing     Problem: Prexisting or High Potential for Compromised Skin Integrity  Goal: Skin integrity is maintained or improved  Description: INTERVENTIONS:  - Identify patients at risk for skin breakdown  - Assess and monitor skin integrity  - Assess and monitor nutrition and hydration status  - Monitor labs   - Assess for incontinence   - Turn and reposition patient  - Assist with mobility/ambulation  - Relieve pressure over bony prominences  - Avoid friction and shearing  - Provide appropriate hygiene as needed including keeping skin clean and dry  - Evaluate need for skin moisturizer/barrier cream  - Collaborate with interdisciplinary team   - Patient/family teaching  - Consider wound care consult   Outcome: Progressing     Problem: MUSCULOSKELETAL - ADULT  Goal: Maintain or return mobility to safest level of function  Description: INTERVENTIONS:  - Assess patient's ability to carry out ADLs; assess patient's baseline for ADL function and identify physical deficits which impact ability to perform ADLs (bathing, care of mouth/teeth, toileting, grooming, dressing, etc )  - Assess/evaluate cause of self-care deficits   - Assess range of motion  - Assess patient's mobility  - Assess patient's need for assistive devices and provide as appropriate  - Encourage maximum independence but intervene and supervise when necessary  - Involve family in performance of ADLs  - Assess for home care needs following discharge   - Consider OT consult to assist with ADL evaluation and planning for discharge  - Provide patient education as appropriate  Outcome: Not Progressing     Problem: SAFETY ADULT  Goal: Maintain or return to baseline ADL function  Description: INTERVENTIONS:  - Educate patient/family on patient safety including physical limitations  - Instruct patient to call for assistance with activity   - Consult OT/PT to assist with strengthening/mobility   - Keep Call bell within reach  - Keep bed low and locked with side rails adjusted as appropriate  - Keep care items and personal belongings within reach  - Initiate and maintain comfort rounds  - Make Fall Risk Sign visible to staff  - Offer Toileting every 2Hours, in advance of need  - Initiate/Maintain bedalarm  - Obtain necessary fall risk management equipment: roller walker  - Apply yellow socks and bracelet for high fall risk patients  - Consider moving patient to room near nurses station  7/31/2021 1402 by Daisy Robert RN  Outcome: Not Progressing  7/31/2021 1401 by Daisy Robert RN  Outcome: Not Progressing  Goal: Maintains/Returns to pre admission functional level  Description: INTERVENTIONS:  - Perform BMAT or MOVE assessment daily    - Set and communicate daily mobility goal to care team and patient/family/caregiver  - Collaborate with rehabilitation services on mobility goals if consulted  - Perform Range of Motion 3times a day  - Reposition patient every 2hours    - Dangle patient 3times a day  -   - Out of bed for toileting  - Record patient progress and toleration of activity level   7/31/2021 1402 by Daisy Robert, ALISTAIR  Outcome: Not Progressing  7/31/2021 1401 by Daisy Robert, RN  Outcome: Not Progressing Attending Only